# Patient Record
Sex: MALE | Race: WHITE | Employment: OTHER | URBAN - NONMETROPOLITAN AREA
[De-identification: names, ages, dates, MRNs, and addresses within clinical notes are randomized per-mention and may not be internally consistent; named-entity substitution may affect disease eponyms.]

---

## 2017-12-31 ENCOUNTER — APPOINTMENT (OUTPATIENT)
Dept: GENERAL RADIOLOGY | Facility: HOSPITAL | Age: 72
DRG: 074 | End: 2017-12-31
Attending: INTERNAL MEDICINE
Payer: COMMERCIAL

## 2017-12-31 ENCOUNTER — APPOINTMENT (OUTPATIENT)
Dept: CT IMAGING | Facility: HOSPITAL | Age: 72
DRG: 074 | End: 2017-12-31
Attending: INTERNAL MEDICINE
Payer: COMMERCIAL

## 2017-12-31 ENCOUNTER — HOSPITAL ENCOUNTER (INPATIENT)
Facility: HOSPITAL | Age: 72
LOS: 5 days | Discharge: NURSING, REHAB OR LONG-TERM CARE | DRG: 074 | End: 2018-01-05
Attending: INTERNAL MEDICINE | Admitting: INTERNAL MEDICINE
Payer: COMMERCIAL

## 2017-12-31 DIAGNOSIS — R29.6 FALLS FREQUENTLY: ICD-10-CM

## 2017-12-31 DIAGNOSIS — K59.04 CHRONIC IDIOPATHIC CONSTIPATION: ICD-10-CM

## 2017-12-31 DIAGNOSIS — E11.9 TYPE 2 DIABETES MELLITUS WITHOUT COMPLICATION, WITHOUT LONG-TERM CURRENT USE OF INSULIN (H): Primary | Chronic | ICD-10-CM

## 2017-12-31 DIAGNOSIS — I10 ESSENTIAL HYPERTENSION: ICD-10-CM

## 2017-12-31 DIAGNOSIS — G35 MS (MULTIPLE SCLEROSIS) (H): ICD-10-CM

## 2017-12-31 DIAGNOSIS — B37.2 YEAST INFECTION OF THE SKIN: ICD-10-CM

## 2017-12-31 DIAGNOSIS — R53.1 WEAKNESS: ICD-10-CM

## 2017-12-31 LAB
ALBUMIN SERPL-MCNC: 3.6 G/DL (ref 3.4–5)
ALBUMIN UR-MCNC: 30 MG/DL
ALP SERPL-CCNC: 71 U/L (ref 40–150)
ALT SERPL W P-5'-P-CCNC: 24 U/L (ref 0–70)
ANION GAP SERPL CALCULATED.3IONS-SCNC: 9 MMOL/L (ref 3–14)
APPEARANCE UR: CLEAR
AST SERPL W P-5'-P-CCNC: 24 U/L (ref 0–45)
BACTERIA #/AREA URNS HPF: ABNORMAL /HPF
BASOPHILS # BLD AUTO: 0 10E9/L (ref 0–0.2)
BASOPHILS NFR BLD AUTO: 0.3 %
BILIRUB SERPL-MCNC: 0.6 MG/DL (ref 0.2–1.3)
BILIRUB UR QL STRIP: NEGATIVE
BUN SERPL-MCNC: 16 MG/DL (ref 7–30)
CALCIUM SERPL-MCNC: 8.4 MG/DL (ref 8.5–10.1)
CHLORIDE SERPL-SCNC: 106 MMOL/L (ref 94–109)
CK SERPL-CCNC: 420 U/L (ref 30–300)
CO2 SERPL-SCNC: 24 MMOL/L (ref 20–32)
COLOR UR AUTO: YELLOW
CREAT SERPL-MCNC: 1.31 MG/DL (ref 0.66–1.25)
CRP SERPL-MCNC: 14.8 MG/L (ref 0–8)
DIFFERENTIAL METHOD BLD: ABNORMAL
EOSINOPHIL # BLD AUTO: 0 10E9/L (ref 0–0.7)
EOSINOPHIL NFR BLD AUTO: 0.3 %
ERYTHROCYTE [DISTWIDTH] IN BLOOD BY AUTOMATED COUNT: 13.7 % (ref 10–15)
FLUAV+FLUBV AG SPEC QL: NEGATIVE
FLUAV+FLUBV AG SPEC QL: NEGATIVE
GFR SERPL CREATININE-BSD FRML MDRD: 54 ML/MIN/1.7M2
GLUCOSE BLDC GLUCOMTR-MCNC: 126 MG/DL (ref 70–99)
GLUCOSE BLDC GLUCOMTR-MCNC: 131 MG/DL (ref 70–99)
GLUCOSE SERPL-MCNC: 161 MG/DL (ref 70–99)
GLUCOSE UR STRIP-MCNC: 70 MG/DL
HCT VFR BLD AUTO: 52.4 % (ref 40–53)
HGB BLD-MCNC: 17.2 G/DL (ref 13.3–17.7)
HGB UR QL STRIP: NEGATIVE
HYALINE CASTS #/AREA URNS LPF: 3 /LPF
IMM GRANULOCYTES # BLD: 0 10E9/L (ref 0–0.4)
IMM GRANULOCYTES NFR BLD: 0.4 %
KETONES UR STRIP-MCNC: NEGATIVE MG/DL
LEUKOCYTE ESTERASE UR QL STRIP: NEGATIVE
LYMPHOCYTES # BLD AUTO: 1.1 10E9/L (ref 0.8–5.3)
LYMPHOCYTES NFR BLD AUTO: 11.8 %
MCH RBC QN AUTO: 28.6 PG (ref 26.5–33)
MCHC RBC AUTO-ENTMCNC: 32.8 G/DL (ref 31.5–36.5)
MCV RBC AUTO: 87 FL (ref 78–100)
MONOCYTES # BLD AUTO: 0.8 10E9/L (ref 0–1.3)
MONOCYTES NFR BLD AUTO: 8.2 %
MUCOUS THREADS #/AREA URNS LPF: PRESENT /LPF
NEUTROPHILS # BLD AUTO: 7.4 10E9/L (ref 1.6–8.3)
NEUTROPHILS NFR BLD AUTO: 79 %
NITRATE UR QL: NEGATIVE
NRBC # BLD AUTO: 0 10*3/UL
NRBC BLD AUTO-RTO: 0 /100
PH UR STRIP: 5.5 PH (ref 4.7–8)
PLATELET # BLD AUTO: 297 10E9/L (ref 150–450)
POTASSIUM SERPL-SCNC: 4 MMOL/L (ref 3.4–5.3)
PROT SERPL-MCNC: 8.7 G/DL (ref 6.8–8.8)
RBC # BLD AUTO: 6.02 10E12/L (ref 4.4–5.9)
RBC #/AREA URNS AUTO: 0 /HPF (ref 0–2)
SODIUM SERPL-SCNC: 139 MMOL/L (ref 133–144)
SOURCE: ABNORMAL
SP GR UR STRIP: 1.02 (ref 1–1.03)
SPECIMEN SOURCE: NORMAL
TSH SERPL DL<=0.005 MIU/L-ACNC: 1.29 MU/L (ref 0.4–4)
UROBILINOGEN UR STRIP-MCNC: NORMAL MG/DL (ref 0–2)
WBC # BLD AUTO: 9.4 10E9/L (ref 4–11)
WBC #/AREA URNS AUTO: 1 /HPF (ref 0–2)

## 2017-12-31 PROCEDURE — 99285 EMERGENCY DEPT VISIT HI MDM: CPT | Mod: 25

## 2017-12-31 PROCEDURE — 25000128 H RX IP 250 OP 636: Performed by: INTERNAL MEDICINE

## 2017-12-31 PROCEDURE — 70450 CT HEAD/BRAIN W/O DYE: CPT | Mod: TC

## 2017-12-31 PROCEDURE — 72100 X-RAY EXAM L-S SPINE 2/3 VWS: CPT | Mod: TC

## 2017-12-31 PROCEDURE — 82550 ASSAY OF CK (CPK): CPT | Performed by: INTERNAL MEDICINE

## 2017-12-31 PROCEDURE — 25000132 ZZH RX MED GY IP 250 OP 250 PS 637: Performed by: FAMILY MEDICINE

## 2017-12-31 PROCEDURE — 84443 ASSAY THYROID STIM HORMONE: CPT | Performed by: INTERNAL MEDICINE

## 2017-12-31 PROCEDURE — 99283 EMERGENCY DEPT VISIT LOW MDM: CPT | Performed by: INTERNAL MEDICINE

## 2017-12-31 PROCEDURE — 86140 C-REACTIVE PROTEIN: CPT | Performed by: INTERNAL MEDICINE

## 2017-12-31 PROCEDURE — 81001 URINALYSIS AUTO W/SCOPE: CPT | Performed by: INTERNAL MEDICINE

## 2017-12-31 PROCEDURE — 00000146 ZZHCL STATISTIC GLUCOSE BY METER IP

## 2017-12-31 PROCEDURE — 72170 X-RAY EXAM OF PELVIS: CPT | Mod: TC

## 2017-12-31 PROCEDURE — 40000786 ZZHCL STATISTIC ACTIVE MRSA SURVEILLANCE CULTURE: Performed by: INTERNAL MEDICINE

## 2017-12-31 PROCEDURE — 80053 COMPREHEN METABOLIC PANEL: CPT | Performed by: INTERNAL MEDICINE

## 2017-12-31 PROCEDURE — 93005 ELECTROCARDIOGRAM TRACING: CPT

## 2017-12-31 PROCEDURE — 71020 XR CHEST 2 VW: CPT | Mod: TC

## 2017-12-31 PROCEDURE — 99223 1ST HOSP IP/OBS HIGH 75: CPT | Mod: AI | Performed by: INTERNAL MEDICINE

## 2017-12-31 PROCEDURE — 87804 INFLUENZA ASSAY W/OPTIC: CPT | Performed by: FAMILY MEDICINE

## 2017-12-31 PROCEDURE — 25000132 ZZH RX MED GY IP 250 OP 250 PS 637: Performed by: INTERNAL MEDICINE

## 2017-12-31 PROCEDURE — 93010 ELECTROCARDIOGRAM REPORT: CPT | Mod: 76 | Performed by: INTERNAL MEDICINE

## 2017-12-31 PROCEDURE — 85025 COMPLETE CBC W/AUTO DIFF WBC: CPT | Performed by: INTERNAL MEDICINE

## 2017-12-31 PROCEDURE — 12000000 ZZH R&B MED SURG/OB

## 2017-12-31 RX ORDER — NALOXONE HYDROCHLORIDE 0.4 MG/ML
.1-.4 INJECTION, SOLUTION INTRAMUSCULAR; INTRAVENOUS; SUBCUTANEOUS
Status: DISCONTINUED | OUTPATIENT
Start: 2017-12-31 | End: 2018-01-05 | Stop reason: HOSPADM

## 2017-12-31 RX ORDER — METOPROLOL TARTRATE 50 MG
50 TABLET ORAL ONCE
Status: COMPLETED | OUTPATIENT
Start: 2017-12-31 | End: 2017-12-31

## 2017-12-31 RX ORDER — HYDROMORPHONE HYDROCHLORIDE 1 MG/ML
0.2 INJECTION, SOLUTION INTRAMUSCULAR; INTRAVENOUS; SUBCUTANEOUS
Status: DISCONTINUED | OUTPATIENT
Start: 2017-12-31 | End: 2018-01-01

## 2017-12-31 RX ORDER — FAMOTIDINE 20 MG/1
20 TABLET, FILM COATED ORAL 2 TIMES DAILY
Status: DISCONTINUED | OUTPATIENT
Start: 2017-12-31 | End: 2018-01-05 | Stop reason: HOSPADM

## 2017-12-31 RX ORDER — HYDROCODONE BITARTRATE AND ACETAMINOPHEN 5; 325 MG/1; MG/1
1-2 TABLET ORAL EVERY 4 HOURS PRN
Status: DISCONTINUED | OUTPATIENT
Start: 2017-12-31 | End: 2018-01-05 | Stop reason: HOSPADM

## 2017-12-31 RX ORDER — ONDANSETRON 2 MG/ML
4 INJECTION INTRAMUSCULAR; INTRAVENOUS EVERY 6 HOURS PRN
Status: DISCONTINUED | OUTPATIENT
Start: 2017-12-31 | End: 2018-01-05 | Stop reason: HOSPADM

## 2017-12-31 RX ORDER — METOPROLOL SUCCINATE 50 MG/1
50 TABLET, EXTENDED RELEASE ORAL DAILY
Status: DISCONTINUED | OUTPATIENT
Start: 2017-12-31 | End: 2018-01-05 | Stop reason: HOSPADM

## 2017-12-31 RX ORDER — SODIUM CHLORIDE 9 MG/ML
INJECTION, SOLUTION INTRAVENOUS CONTINUOUS
Status: DISCONTINUED | OUTPATIENT
Start: 2017-12-31 | End: 2018-01-01

## 2017-12-31 RX ORDER — IBUPROFEN 600 MG/1
600 TABLET, FILM COATED ORAL EVERY 6 HOURS PRN
Status: DISCONTINUED | OUTPATIENT
Start: 2017-12-31 | End: 2018-01-05 | Stop reason: HOSPADM

## 2017-12-31 RX ORDER — CLONIDINE HYDROCHLORIDE 0.1 MG/1
0.1 TABLET ORAL ONCE
Status: COMPLETED | OUTPATIENT
Start: 2017-12-31 | End: 2017-12-31

## 2017-12-31 RX ORDER — ACETAMINOPHEN 325 MG/1
650 TABLET ORAL EVERY 4 HOURS PRN
Status: DISCONTINUED | OUTPATIENT
Start: 2017-12-31 | End: 2018-01-05 | Stop reason: HOSPADM

## 2017-12-31 RX ORDER — AMOXICILLIN 250 MG
1 CAPSULE ORAL 2 TIMES DAILY PRN
Status: DISCONTINUED | OUTPATIENT
Start: 2017-12-31 | End: 2018-01-05 | Stop reason: HOSPADM

## 2017-12-31 RX ORDER — AMOXICILLIN 250 MG
2 CAPSULE ORAL 2 TIMES DAILY PRN
Status: DISCONTINUED | OUTPATIENT
Start: 2017-12-31 | End: 2018-01-05 | Stop reason: HOSPADM

## 2017-12-31 RX ORDER — ONDANSETRON 4 MG/1
4 TABLET, ORALLY DISINTEGRATING ORAL EVERY 6 HOURS PRN
Status: DISCONTINUED | OUTPATIENT
Start: 2017-12-31 | End: 2018-01-05 | Stop reason: HOSPADM

## 2017-12-31 RX ORDER — DEXTROSE MONOHYDRATE 25 G/50ML
25-50 INJECTION, SOLUTION INTRAVENOUS
Status: DISCONTINUED | OUTPATIENT
Start: 2017-12-31 | End: 2018-01-01

## 2017-12-31 RX ORDER — LIDOCAINE 40 MG/G
CREAM TOPICAL
Status: DISCONTINUED | OUTPATIENT
Start: 2017-12-31 | End: 2018-01-05 | Stop reason: HOSPADM

## 2017-12-31 RX ORDER — NICOTINE POLACRILEX 4 MG
15-30 LOZENGE BUCCAL
Status: DISCONTINUED | OUTPATIENT
Start: 2017-12-31 | End: 2018-01-01

## 2017-12-31 RX ADMIN — FAMOTIDINE 20 MG: 20 TABLET ORAL at 20:42

## 2017-12-31 RX ADMIN — METOPROLOL TARTRATE 50 MG: 50 TABLET, FILM COATED ORAL at 10:05

## 2017-12-31 RX ADMIN — MICONAZOLE NITRATE: 20 POWDER TOPICAL at 13:20

## 2017-12-31 RX ADMIN — MICONAZOLE NITRATE: 20 POWDER TOPICAL at 20:46

## 2017-12-31 RX ADMIN — METOPROLOL SUCCINATE 50 MG: 50 TABLET, EXTENDED RELEASE ORAL at 13:15

## 2017-12-31 RX ADMIN — FAMOTIDINE 20 MG: 20 TABLET ORAL at 13:16

## 2017-12-31 RX ADMIN — SODIUM CHLORIDE 1000 ML: 9 INJECTION, SOLUTION INTRAVENOUS at 07:51

## 2017-12-31 RX ADMIN — SODIUM CHLORIDE: 9 INJECTION, SOLUTION INTRAVENOUS at 13:01

## 2017-12-31 RX ADMIN — CLONIDINE HYDROCHLORIDE 0.1 MG: 0.1 TABLET ORAL at 11:31

## 2017-12-31 ASSESSMENT — ENCOUNTER SYMPTOMS
VOICE CHANGE: 0
CHEST TIGHTNESS: 0
WHEEZING: 0
FEVER: 0
COLOR CHANGE: 0
WEAKNESS: 0
CONFUSION: 0
CHILLS: 0
ANAL BLEEDING: 0
BLOOD IN STOOL: 0
ABDOMINAL PAIN: 0
PALPITATIONS: 0
BACK PAIN: 1
MYALGIAS: 0
NAUSEA: 0
DYSURIA: 0
LIGHT-HEADEDNESS: 0
SLEEP DISTURBANCE: 0
DIAPHORESIS: 0
NECK PAIN: 0
DIZZINESS: 0
COUGH: 1
SHORTNESS OF BREATH: 0
VOMITING: 0
FREQUENCY: 0
HEADACHES: 0
ABDOMINAL DISTENTION: 0
NUMBNESS: 0
FLANK PAIN: 0

## 2017-12-31 NOTE — H&P
[facility]    History and Physical  Hospitalist       Date of Admission:  12/31/2017    Assessment & Plan   Jose Antonio Marmolejo is a 72 year old male who presents with frequent falls and weakness    Frequent falls: Looking back through his history  Suggests that he has history of falls for about 2 years.  He just recently getting worse.  Patient reports no syncope however states that he loses his balance often.  Initial workup is negative today including CT scan of the head.  But he would require further workup which may include MRI of the brain.  A neurology evaluation might be necessary.   He also need PT and OT evaluation.  He does not feel that he can't care for him at home anymore    Hypertension: Looking through his records it appears that he was on Lopressor and Zestoretic about 2 years ago  And it appears that about a year ago he stopped taking his medications.  Patient does not have any good explanation why he stopped mid taking medications.    Diabetes: he seemed to believe that  he actually does not have diabetes.  His blood sugars are not that elevated.  I will check hemoglobin A1c and start him on sliding scale.  Perhaps starting oral medications tomorrow might be appropriate.  Diabetic education and dietary education would also help    Mild renal insufficiency: Start quite delirious acute or chronic..  But that seems to be some volume issues.  We will give him gentle hydration and recheck electrolytes in the morning.hemoglobin is elevated as well which could suggest volume depletion    Social issues: He would require social work, physical therapy, occupational therapy, to find safe place for him to stay    DVT Prophylaxis: Pneumatic Compression Devices  Code Status: Full Code    Disposition: Expected discharge in 2-3 days  once above issues resolve.    Kristofer Sena    Primary Care Physician   Physician No Ref-Primary    Chief Complaint   Falls and weakness    History is obtained from the  patient    History of Present Illness   Jose Antonio Marmolejo is a 72 year old male who presents with falls and weakness.  Apparently this has been going on for about 2 years.  The last few months they have increased in frequency.  Patient does not lose consciousness and there has been no seizures reported.  Patient reports that he loses his balance.he somehow manage his life but now he is to the point where he cannot deal with that.He called and nevertheless 3 times yesterday.  Twice they gave him a assist and got him back up but third time they decided to bring him in..    He was on 2 blood pressure medications according to medical records but for some unclear reason he stopped using it.  He has no chest pain shortness of breath or lower extremity edema.  He denies palpitation  Or orthopnea or PND.    He has history of diabetes type 2 according to the records but patient does not believe he has diabetes  And he thinks the doctor stopped his diabetes medications.    He just feels generalized weakness.  He thinks he has mild cold but otherwise no fever chills nausea vomiting diarrhea constipation.        Past Medical History    I have reviewed this patient's medical history and updated it with pertinent information if needed.   Past Medical History:   Diagnosis Date     Arthritis     back and left ankle from fall     Diabetes (H)      Hypertension        Past Surgical History   I have reviewed this patient's surgical history and updated it with pertinent information if needed.  Past Surgical History:   Procedure Laterality Date     ORTHOPEDIC SURGERY      left ankle and removal of hardware       Prior to Admission Medications   Prior to Admission Medications   Prescriptions Last Dose Informant Patient Reported? Taking?   Blood Pressure Monitoring (ADULT BLOOD PRESSURE CUFF LG) KIT   No Yes   Si each 2 times daily   lisinopril-hydrochlorothiazide (PRINZIDE,ZESTORETIC) 20-12.5 MG per tablet Unknown at Unknown time  No No    Sig: Take 2 tablets by mouth daily   miconazole (MICATIN; MICRO GUARD) 2 % powder   No No   Sig: Apply topically 2 times daily      Facility-Administered Medications: None     Allergies   Allergies   Allergen Reactions     Penicillins        Social History   I have reviewed this patient's social history and updated it with pertinent information if needed. Jose Antonio Marmolejo  reports that he has quit smoking. He has never used smokeless tobacco. He reports that he does not drink alcohol or use illicit drugs.    Family History   I have reviewed this patient's family history and updated it with pertinent information if needed.   Family History   Problem Relation Age of Onset     Other - See Comments Mother      pneumonia     Obesity Mother      Other Cancer Father      hodgkins lymphoma     Coronary Artery Disease Father      pacemaker     Other Cancer Sister      unknown cancers       Review of Systems   Review of systems is limited by patient factors - poor historian    Physical Exam   Temp: 98.4  F (36.9  C) Temp src: Oral BP: 157/81 Pulse: 111 Heart Rate: 81 Resp: 14 SpO2: 96 % O2 Device: None (Room air)    Vital Signs with Ranges  Temp:  [98.4  F (36.9  C)] 98.4  F (36.9  C)  Pulse:  [111] 111  Heart Rate:  [] 81  Resp:  [12-24] 14  BP: (157-165)/() 157/81  SpO2:  [90 %-96 %] 96 %  0 lbs 0 oz    Constitutional: somewhat full historian.  Pressured speech  Eyes: Lids and lashes normal, pupils equal, round and reactive to light, extra ocular muscles intact, sclera clear, conjunctiva normal  ENT: Normocephalic, without obvious abnormality, atraumatic, sinuses nontender on palpation, external ears without lesions, oral pharynx with moist mucous membranes, tonsils without erythema or exudates, gums normal and good dentition.  Hematologic / Lymphatic: no cervical lymphadenopathy  Respiratory: No increased work of breathing, good air exchange, clear to auscultation bilaterally, no crackles or  wheezing  Cardiovascular: Normal apical impulse, regular rate and rhythm, normal S1 and S2, no S3 or S4, and no murmur noted  GI: soft non tender no hepatosplenomegaly  Genitounirinary: deferred  Skin: no rashes exposed areas  Musculoskeletal: There is no redness, warmth, or swelling of the joints.  Full range of motion noted.  Motor strength is 5 out of 5 all extremities bilaterally.  Tone is normal.  Neurologic: awake alert and oriented ×3.  Face is symmetrical.  Strength upper and lower extremity seems to be normal.  Finger-nose-finger seems to be off gait was not tested  Neuropsychiatric: General: normal, calm, normal eye contact and poor eye contact    Data   Data reviewed today:  I personally reviewed the head CT image(s) showing no acute changes.  Pelvis XR, 1-2 views   Final Result   IMPRESSION: Negative examination pelvis.      MENDEZ MATAMOROS MD      Lumbar spine XR, 2-3 views   Final Result   IMPRESSION: No acute lumbar abnormality.      MENDEZ MATAMOROS MD      XR Chest 2 Views   Final Result   IMPRESSION:  No acute cardiopulmonary disease.        MENDEZ MATAMOROS MD      CT Head w/o Contrast   Final Result   IMPRESSION: No change from previous examination in August 2016      MENDEZ MATAMOROS MD            Recent Labs  Lab 12/31/17  0720   WBC 9.4   HGB 17.2   MCV 87         POTASSIUM 4.0   CHLORIDE 106   CO2 24   BUN 16   CR 1.31*   ANIONGAP 9   STALIN 8.4*   *   ALBUMIN 3.6   PROTTOTAL 8.7   BILITOTAL 0.6   ALKPHOS 71   ALT 24   AST 24       Recent Results (from the past 24 hour(s))   CT Head w/o Contrast    Narrative    PROCEDURE: CT HEAD W/O CONTRAST 12/31/2017 8:44 AM    HISTORY: fall;     COMPARISONS: August 2016    Meds/Dose Given:    TECHNIQUE: CT scan of the brain without contrast.    FINDINGS: The ventricular system and cortical sulci are normal for  age. There is white matter low-density consistent with small vessel  disease. Are no masses ventricular shifts or extraluminal  collections.  Brainstem and cerebellum appear normal. Paranasal sinuses are clear.  Cranial vault is intact.         Impression    IMPRESSION: No change from previous examination in August 2016    MENDEZ MATAMOROS MD   XR Chest 2 Views    Narrative    PROCEDURE:  XR CHEST 2 VW    HISTORY:  cough; .     COMPARISON:  None.    FINDINGS:   The cardiac silhouette is normal in size. The pulmonary vasculature is  normal.  The lungs are clear. No pleural effusion or pneumothorax.  There are severe arthritic changes seen in the right shoulder      Impression    IMPRESSION:  No acute cardiopulmonary disease.      MENDEZ MATAMOROS MD   Lumbar spine XR, 2-3 views    Narrative    PROCEDURE: XR LUMBAR SPINE 2-3 VIEWS 12/31/2017 9:10 AM    HISTORY: fall;     COMPARISONS: August 2016    TECHNIQUE: 2 views    FINDINGS: The lumbar discs are normal height anterior and lateral  osteophytes are seen at T12 L1-L4 L5. There are facet joint  degenerative changes noted at L4-L5 and L5-S1 bilaterally.         Impression    IMPRESSION: No acute lumbar abnormality.    MENDEZ MATAMOROS MD   Pelvis XR, 1-2 views    Narrative    PROCEDURE: XR PELVIS 1/2 VW 12/31/2017 9:11 AM    HISTORY: fall;     COMPARISONS: None.    TECHNIQUE: 1 view    FINDINGS: Pelvis is intact the sacrum and sacroiliac joints appear  normal articular spaces are normal height of both hips both proximal  femurs appear intact.         Impression    IMPRESSION: Negative examination pelvis.    MENDEZ MATAMOROS MD

## 2017-12-31 NOTE — ED PROVIDER NOTES
I assumed care at change of shift.  The patient is confused, states he hasn't been taking any medications.  Has fallen x3 in the past 24 hours.  Has neuropathy.  States he's not on anything for BP or diabetes.  CT head negative, XR negative, CXR negative, /100, will give clonidine, admit to Shona Huizar MD  12/31/17 1000

## 2017-12-31 NOTE — ED NOTES
Patient transferred to room 3106 via stretcher at this time. Personal belongings sent up with pt. Nurse to nurse report given to Rosa WARD.

## 2017-12-31 NOTE — IP AVS SNAPSHOT
[facility]    [address] [place] [address]    [facility] [address]    Phone:  [phone]    Fax:  [phone]                                       After Visit Summary   12/31/2017    Jose Antonio Marmolejo    MRN: [id number]           After Visit Summary Signature Page     I have received my discharge instructions, and my questions have been answered. I have discussed any challenges I see with this plan with the nurse or doctor.    ..........................................................................................................................................  Patient/Patient Representative Signature      ..........................................................................................................................................  Patient Representative Print Name and Relationship to Patient    ..................................................               ................................................  Date                                            Time    ..........................................................................................................................................  Reviewed by Signature/Title    ...................................................              ..............................................  Date                                                            Time

## 2017-12-31 NOTE — PROGRESS NOTES
NICOLE KEMP A  Patient visit during  rounds. Patient alert and orientated.  Patient had no spiritual care requests at this time.

## 2017-12-31 NOTE — ED NOTES
"Incontinent of urine. States \"I peed in my blanket.\" pt requesting coffee with cream and sugar. Updated Dr. Donahue. Dr. Donahue into see pt.   "

## 2017-12-31 NOTE — PLAN OF CARE
[facility] Inpatient Admission Note:    Patient admitted to 3106/3106-1 at approximately 1200via cart accompanied by transport tech from emergency room . Report received from Yaneth ELIZALDE in SBAR format at 1145 via telephone. Patient transferred to bed via slide board.. Patient is alert and oriented X 3, denies pain; rates at 0 on 0-10 scale.  Patient oriented to room, unit, hourly rounding, and plan of care. Explained admission packet and patient handbook with patient bill of rights brochure. Will continue to monitor and document as needed.     Inpatient Nursing criteria listed below was met:      Health care directives status obtained and documented: Yes      Care Everywhere authorization obtained No      MRSA swab completed for patient 65 years and older: Yes      Patient identifies a surrogate decision maker: Yes If yes, who:son Contact Information:see index      Core Measure diagnosis present:No. If yes, state diagnosis       Is initial lactic acid >2.0? NA.       Vaccination assessment and education completed: Yes   Vaccinations received prior to admission: Pneumovax no  Influenza(seasonal)  NO   Vaccination(s) ordered: patient declines      Clergy visit ordered if patient requests: N/A      Skin issues/needs documented: Yes      Isolation Patient: no Education given, correct sign in place and documentation row added to PCS:  No      Fall Prevention Yes: Care plan updated, education given and documented, sticker and magnet in place: Yes      Care Plan initiated: Yes      Education Documented (including assessment): Yes      Patient has discharge needs : Yes If yes, please explain:may need inpt rehab, as has had increase in falls

## 2017-12-31 NOTE — ED NOTES
Pt assisted to bedside commode with assist of 3 staff and transfer belt. Once standing pt was noted to be incontinent of urine. Pt unable to void only passing gas. Pt assisted back to bed with assist of 3 staff and transfer belt. Pt placed back on monitors, call light in reach and coffee per request.

## 2017-12-31 NOTE — ED PROVIDER NOTES
History     Chief Complaint   Patient presents with     Fall     EMS has been pt's home 3 times in 24 hours for lift assist after falling     Back Pain     Patient is a 72 year old male presenting with fall. The history is provided by the patient.   Trauma  Mechanism of injury: fall  Injury location: pelvis and head/neck     Fall:       Fall occurred: in unknown circumstances       Impact surface: unknown    Current symptoms:       Associated symptoms:             Reports back pain.             Denies abdominal pain, chest pain, headache, nausea, neck pain and vomiting.     Problem List:    Patient Active Problem List    Diagnosis Date Noted     Falls frequently 12/31/2017     Priority: Medium     Type 2 diabetes mellitus without complication (H) 08/19/2016     Priority: Medium     Weakness of both lower extremities 08/19/2016     Priority: Medium     Yeast infection of the skin 08/19/2016     Priority: Medium     Essential hypertension 08/17/2016     Priority: Medium     Falling episodes 08/17/2016     Priority: Medium     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     Priority: Medium        Past Medical History:    Past Medical History:   Diagnosis Date     Arthritis      Diabetes (H)      Hypertension        Past Surgical History:    Past Surgical History:   Procedure Laterality Date     ORTHOPEDIC SURGERY      left ankle and removal of hardware       Family History:    Family History   Problem Relation Age of Onset     Other - See Comments Mother      pneumonia     Obesity Mother      Other Cancer Father      hodgkins lymphoma     Coronary Artery Disease Father      pacemaker     Other Cancer Sister      unknown cancers       Social History:  Marital Status:  Single [1]  Social History   Substance Use Topics     Smoking status: Former Smoker     Smokeless tobacco: Never Used     Alcohol use No        Medications:      No current outpatient prescriptions on file.      Review of Systems   Constitutional:  "Negative for chills, diaphoresis and fever.   HENT: Negative for voice change.    Eyes: Negative for visual disturbance.   Respiratory: Positive for cough. Negative for chest tightness, shortness of breath and wheezing.    Cardiovascular: Negative for chest pain, palpitations and leg swelling.   Gastrointestinal: Negative for abdominal distention, abdominal pain, anal bleeding, blood in stool, nausea and vomiting.   Genitourinary: Negative for decreased urine volume, dysuria, flank pain and frequency.   Musculoskeletal: Positive for back pain. Negative for gait problem, myalgias and neck pain.   Skin: Negative for color change, pallor and rash.   Neurological: Negative for dizziness, syncope, weakness, light-headedness, numbness and headaches.   Psychiatric/Behavioral: Negative for confusion, sleep disturbance and suicidal ideas.       Physical Exam   BP: (!) 165/113  Pulse: 111  Heart Rate: 109  Temp: 98.4  F (36.9  C)  Resp: 18  Height: 177.8 cm (5' 10\")  Weight: 121 kg (266 lb 12.1 oz)  SpO2: 96 %      Physical Exam   Constitutional: He is oriented to person, place, and time. He appears well-developed and well-nourished.   HENT:   Head: Normocephalic and atraumatic.   Mouth/Throat: No oropharyngeal exudate.   Eyes: Conjunctivae are normal. Pupils are equal, round, and reactive to light.   Neck: Normal range of motion. Neck supple. No JVD present. No tracheal deviation present. No thyromegaly present.   Cardiovascular: Normal rate, regular rhythm, normal heart sounds and intact distal pulses.  Exam reveals no gallop and no friction rub.    No murmur heard.  Pulmonary/Chest: Effort normal and breath sounds normal. No stridor. No respiratory distress. He has no wheezes. He has no rales. He exhibits no tenderness.   Abdominal: Soft. Bowel sounds are normal. He exhibits no distension and no mass. There is no tenderness. There is no rebound and no guarding.   Musculoskeletal: Normal range of motion. He exhibits no " edema or tenderness.        Lumbar back: He exhibits pain. He exhibits normal range of motion, no tenderness, no bony tenderness, no swelling and no laceration.   Lymphadenopathy:     He has no cervical adenopathy.   Neurological: He is alert and oriented to person, place, and time.   Skin: Skin is warm and dry. No rash noted. No erythema. No pallor.   Psychiatric: His behavior is normal.   Nursing note and vitals reviewed.      ED Course     ED Course     Procedures                   Labs Ordered and Resulted from Time of ED Arrival Up to the Time of Departure from the ED   CBC WITH PLATELETS DIFFERENTIAL - Abnormal; Notable for the following:        Result Value    RBC Count 6.02 (*)     All other components within normal limits   COMPREHENSIVE METABOLIC PANEL - Abnormal; Notable for the following:     Glucose 161 (*)     Creatinine 1.31 (*)     GFR Estimate 54 (*)     Calcium 8.4 (*)     All other components within normal limits   CK TOTAL - Abnormal; Notable for the following:     CK Total 420 (*)     All other components within normal limits   CRP INFLAMMATION - Abnormal; Notable for the following:     CRP Inflammation 14.8 (*)     All other components within normal limits   INFLUENZA A/B ANTIGEN       Assessments & Plan (with Medical Decision Making)   S/p fall possible early morning, complaining lower back pain  Unsteady gait, pt called EMS for lifting assistance muliple times over past 24 hrs  Cough, URI for past 2 days  Xray , labs ordered  S/o to Dr quiros at the change of shift  I have reviewed the nursing notes.    I have reviewed the findings, diagnosis, plan and need for follow up with the patient.      Current Discharge Medication List          Final diagnoses:   Weakness       12/31/2017   [facility]     Valeriano Vick MD  01/01/18 7249

## 2017-12-31 NOTE — IP AVS SNAPSHOT
"     Jose Antonio Marmolejo #[id number] (CSN: [id number])  (72 year old M)  (Adm: 17)     [id number]               [facility]: [phone]            Patient Demographics     Patient Name Sex          Age SSN Address Phone    Jose Antonio Marmolejo Male 1945 (72 year old) [id number]  [place] [address] [phone] (Home)      Emergency Contact(s)     Name Relation Home Work Mobile    ROLANDO MARMOLEJO [phone]      NO SECONDARY CONTACT Other NONE        Admission Information     Attending Provider Admitting Provider Admission Type Admission Date/Time    Leigh Ann Warren MD Rana, Naeem, MD Emergency 17  0708    Discharge Date Hospital Service Auth/Cert Status Service Area     General Medicine Incomplete [facility] SERVICES    Unit Room/Bed Admission Status       [facility] 3106-1 Admission (Confirmed)       Admission     Complaint    Falls frequently      Hospital Account     Name Acct ID Class Status Primary Coverage    Jose Antonio Marmolejo [id number] Inpatient Open COMMERCIAL - [employer]ET[employer] MEDICARE ADVANTAGE            Guarantor Account (for Hospital Account #[id number])     Name Relation to Pt Service Area Active? Acct Type    Jose Antonio Marmolejo Self [facility] Yes Personal/Family    Address Phone          [address]  [place] [address] [phone](H)              Coverage Information (for Hospital Account #[id number])     F/O Payor/Plan Precert #    COMMERCIAL/AETNA MEDICARE ADVANTAGE     Subscriber Subscriber #    Jose Antonio Mramolejo [id number]    Address Phone    [address]  [place], [place] [id number] [phone]                                                INTERAGENCY TRANSFER FORM - PHYSICIAN ORDERS   2017                       [facility]: [phone]            Attending Provider: Leigh Ann Warren MD     Allergies:  Penicillins    Infection:  None   Service:  GENERAL MEDI    Ht:  1.778 m (5' 10\")   Wt:  118.4 kg (261 lb 0.4 oz)   Admission Wt:  121 kg " (266 lb 12.1 oz)    BMI:  37.45 kg/m 2   BSA:  2.42 m 2            ED Clinical Impression     Diagnosis Description Comment Added By Time Added    Weakness [R53.1] Weakness [R53.1]  Shona Donahue MD 12/31/2017 11:03 AM      Hospital Problems as of 1/5/2018              Priority Class Noted POA    Essential hypertension Medium  8/17/2016 Yes    * (Principal)Falling episodes Medium  8/17/2016 Yes    Type 2 diabetes mellitus without complication (H) Medium  8/19/2016 Yes    Weakness of both lower extremities Medium  8/19/2016 Yes    Falls frequently Medium  12/31/2017 Yes      Non-Hospital Problems as of 1/5/2018              Priority Class Noted    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    Yeast infection of the skin Medium  8/19/2016      Code Status History     Date Active Date Inactive Code Status Order ID Comments User Context    1/5/2018  1:37 PM  Full Code [id number]  Leigh Ann Warren MD Outpatient    12/31/2017 12:26 PM 1/5/2018  1:37 PM Full Code [id number]  Kristofer Sena MD Inpatient    8/19/2016  8:09 AM 12/31/2017 12:26 PM Full Code [id number]  Eris Rosales DO Outpatient    8/17/2016 10:22 PM 8/19/2016  8:09 AM Full Code [id number]  Kevin Connolly DO Inpatient      Current Code Status     Date Active Code Status Order ID Comments User Context       Prior      Summary of Visit     Reason for your hospital stay       Weakness and frequent falls                Medication Review      START taking        Dose / Directions Comments    amLODIPine 10 MG tablet   Commonly known as:  NORVASC   Used for:  Essential hypertension        Dose:  10 mg   Take 1 tablet (10 mg) by mouth daily   Quantity:  30 tablet   Refills:  0        hydrochlorothiazide 12.5 MG capsule   Commonly known as:  MICROZIDE   Used for:  Essential hypertension        Dose:  12.5 mg   Take 1 capsule (12.5 mg) by mouth daily   Quantity:  30 capsule   Refills:  0        ibuprofen 600 MG tablet   Commonly known as:   ADVIL/MOTRIN        Dose:  600 mg   Take 1 tablet (600 mg) by mouth every 6 hours as needed for other (mild pain)   Quantity:  120 tablet   Refills:  0        lisinopril 40 MG tablet   Commonly known as:  PRINIVIL/ZESTRIL   Used for:  Essential hypertension        Dose:  40 mg   Take 1 tablet (40 mg) by mouth daily   Quantity:  30 tablet   Refills:  0        metoprolol 50 MG 24 hr tablet   Commonly known as:  TOPROL-XL   Used for:  Essential hypertension        Dose:  50 mg   Take 1 tablet (50 mg) by mouth daily   Quantity:  30 tablet   Refills:  0        senna-docusate 8.6-50 MG per tablet   Commonly known as:  SENOKOT-S;PERICOLACE   Used for:  Chronic idiopathic constipation        Dose:  1 tablet   Take 1 tablet by mouth 2 times daily as needed for constipation   Quantity:  100 tablet   Refills:  0          CONTINUE these medications which may have CHANGED, or have new prescriptions. If we are uncertain of the size of tablets/capsules you have at home, strength may be listed as something that might have changed.        Dose / Directions Comments    * miconazole 2 % powder   Commonly known as:  MICATIN; MICRO GUARD   This may have changed:  Another medication with the same name was added. Make sure you understand how and when to take each.   Used for:  Yeast infection of the skin        Apply topically 2 times daily   Quantity:  90 g   Refills:  1        * miconazole 2 % powder   Commonly known as:  MICATIN; MICRO GUARD   This may have changed:  You were already taking a medication with the same name, and this prescription was added. Make sure you understand how and when to take each.   Used for:  Yeast infection of the skin        Apply topically 2 times daily   Refills:  0        * Notice:  This list has 2 medication(s) that are the same as other medications prescribed for you. Read the directions carefully, and ask your doctor or other care provider to review them with you.      CONTINUE these medications which  have NOT CHANGED        Dose / Directions Comments    Adult Blood Pressure Cuff Lg Kit   Used for:  Malignant essential hypertension        Dose:  1 each   1 each 2 times daily   Quantity:  1 kit   Refills:  0    Check blood pressures twice per day: before breakfast and at bedtime.  Keep a log.               After Care     Activity - Ambulate in hallway       Every shift       Activity - Up with assistive device       Per NH physical therpay       Advance Diet as Tolerated       Follow this diet upon discharge: High consistent carbohydrate (9744-7521 lucina / 4-7 CHO units per meal)  Pt denies DM, but he is early stage - diet controlled diabetic       General info for SNF       Length of Stay Estimate: Short Term Care: Estimated # of Days <30  Condition at Discharge: Improving  Level of care:skilled   Rehabilitation Potential: Good  Admission H&P remains valid and up-to-date: Yes  Recent Chemotherapy: N/A  Use Nursing Home Standing Orders: Yes       Glucose monitor nursing POCT       Before meals and at bedtime       Mantoux instructions       Give two-step Mantoux (PPD) Per Facility Policy Yes       Weight bearing status                 Other Orders     DIABETES EDUCATION - Individual  []       Ordering Instructions for Diabetic Educators - please enter the correct order QUANTITY based on the followin minutes (1/2 hour), Enter QUANTITY of 1    60 minutes (1 hour), Enter QUANTITY of 2    90 minutes (1 1/2 hours), Enter QUANTITY of 3  120 minutes (2 hours), Enter QUANTITY of 4  150 minutes (2 1/2 hours), Enter QUANTITY of 5  180 minutes (3 hours), Enter QUANTITY of 6                 Further instructions from your care team       MRI Contrast Discharge Instructions    The IV contrast you received today will pass out of your body in your  urine. This will happen in the next 24 hours. You will not feel this process.  Your urine will not change color.    Drink at least 4 extra glasses of water or juice today  (unless your doctor  has restricted your fluids). This reduces the stress on your kidneys.  You may take your regular medicines.    If you are on dialysis: It is best to have dialysis today.    If you have a reaction: Most reactions happen right away. If you have  any new symptoms after leaving the hospital (such as hives or swelling),  call your hospital at the correct number below. Or call your family doctor.  If you have breathing distress or wheezing, call 911.    Special instructions: NA    I have read and understand the above information.    Signature:______________________________________ Date:_____2-9-21______    Staff:__________________________________________ Date:___________     Time:__________    [facility] Departments:    ___[facility]: [phone]  ___[facility]: [phone]  ___[place]: [phone] ___[facility]: [phone]  ___[facility]: [phone]  ___[facility]: [phone]  ___[place]: [phone]  ___[facility]: [phone]  ___[phone]    Referrals     NEUROLOGY ADULT REFERRAL       Your provider has referred you for the following:   Primary will arrange  neurology consult at earliest possible time and most convenient location.     Please be aware that coverage of these services is subject to the terms and limitations of your health insurance plan.  Call member services at your health plan with any benefit or coverage questions.      Please bring the following with you to your appointment:    (1) Any X-Rays, CTs or MRIs which have been performed.  Contact the facility where they were done to arrange for  prior to your scheduled appointment.    (2) List of current medications  (3) This referral request   (4) Any documents/labs given to you for this referral       Occupational Therapy Adult Consult       Evaluate and treat as clinically indicated.    Reason:  Frequent falls, weakness       Physical Therapy Adult Consult       Evaluate and treat as  "clinically indicated.    Reason:  LE weakness, frequent falls             Follow-Up Appointment Instructions     Follow Up and recommended labs and tests       Follow up with group home physician.  The following labs/tests are recommended: CBC and BMP (K and creatinine nikki since he just started on Lisinopril and HCTZ).             Your next 10 appointments already scheduled     Jan 30, 2018  1:00 PM CST   (Arrive by 12:45 PM)   Office Visit with Juanita Briggs MD   [facility] [place] ([facility] [place] )    [address]  [place] [address]   [phone]           Bring a current list of meds and any records pertaining to this visit. For Physicals, please bring immunization records and any forms needing to be filled out. Please arrive 10 minutes early to complete paperwork.              Statement of Approval     Ordered          01/05/18 0739  I have reviewed and agree with all the recommendations and orders detailed in this document.  EFFECTIVE NOW     Approved and electronically signed by:  Leigh Ann Warren MD                                                 INTERAGENCY TRANSFER FORM - NURSING   12/31/2017                       [facility]: [phone]            Attending Provider: Leigh Ann Warren MD     Allergies:  Penicillins    Infection:  None   Service:  GENERAL MEDI    Ht:  1.778 m (5' 10\")   Wt:  118.4 kg (261 lb 0.4 oz)   Admission Wt:  121 kg (266 lb 12.1 oz)    BMI:  37.45 kg/m 2   BSA:  2.42 m 2            Advance Directives        Does patient have a scanned Advance Directive/ACP document in EPIC?           No        Immunizations     None      ASSESSMENT     Discharge Profile Flowsheet     EXPECTED DISCHARGE     Last Bowel Movement  01/04/18 01/05/18 0022    Expected Discharge Date  01/04/18 01/02/18 1536   Passing flatus  yes 01/05/18 0022    DISCHARGE NEEDS ASSESSMENT     COMMUNICATION ASSESSMENT      Concerns To Be Addressed  discharge planning concerns " "08/18/16 1505   Patient's communication style  spoken language (English or Bilingual) 12/31/17 0709    Patient/family verbalizes understanding of discharge plan recommendations?  Yes 01/02/18 1536   FINAL RESOURCES      Medical Team notified of plan?  yes 01/02/18 1536   Resources List  Acute Rehab;Skilled Nursing Facility 01/02/18 1536    Readmission Within The Last 30 Days  no previous admission in last 30 days 01/02/18 1536   Other Resources  Other (see comment) 08/18/16 1508    Equipment Currently Used at Home  walker, rolling (Ramp, sit in shower) 01/02/18 1536   SKIN      Transportation Available  agency transportation;family or friend will provide 01/02/18 1536   Inspection of bony prominences  Full 01/05/18 0022    # of Referrals Placed by CTS  -- (SNF, Inpatient rehab) 01/02/18 1536   Skin WDL  ex 01/05/18 0810    Key Recommendations  F/U with PCP 01/02/18 1536   Skin Moisture  dry 01/05/18 0810    Does Patient Need a Referral for Clinic CC  No 01/02/18 1536   Skin Integrity  abrasion(s);bruise(s) 01/05/18 0810    ASSESSMENT OF FUNCTIONAL STATUS     Additional Documentation  -- (scattered excoriated areas from falls at home-knees and elbo) 12/31/17 1435    Prior to admission patient needed assistance with:  Medications;Meal preparation;Laundry/Housekeeping;Shopping;Transportation;Home maintenance/Yard work 01/02/18 1511   SAFETY      GASTROINTESTINAL (ADULT,PEDIATRIC,OB)     Safety WDL  WDL 01/05/18 0810    GI WDL  WDL 01/05/18 0810   All Alarms  alarm(s) activated and audible 01/05/18 0322                 Assessment WDL (Within Defined Limits) Definitions           Safety WDL     Effective: 09/28/15    Row Information: <b>WDL Definition:</b> Bed in low position, wheels locked; call light in reach; upper side rails up x 2; ID band on<br> <font color=\"gray\"><i>Item=AS safety wdl>>List=AS safety wdl>>Version=F14</i></font>      Skin WDL     Effective: 09/28/15    Row Information: <b>WDL Definition:</b> Warm; " "dry; intact; elastic; without discoloration; pressure points without redness<br> <font color=\"gray\"><i>Item=AS skin wdl>>List=AS skin wdl>>Version=F14</i></font>      Vitals     Vital Signs Flowsheet     QUICK ADDS     Height  1.778 m (5' 10\") 12/31/17 1211    Quick Adds  -- 01/03/18 0907   Height Method  Stated 12/31/17 1211    VITAL SIGNS     Weight  118.4 kg (261 lb 0.4 oz) 01/03/18 0505    Temp  97.6  F (36.4  C) 01/05/18 0642   Weight Method  Standing scale 01/03/18 0505    Temp src  Tympanic 01/05/18 0642   Bed Scale  -- (as pt unable to stand) 12/31/17 1211    Resp  18 01/05/18 0642   BSA (Calculated - sq m)  2.44 12/31/17 1211    Pulse  79 12/31/17 1151   BMI (Calculated)  38.36 12/31/17 1211    Heart Rate  71 01/05/18 0642   EKG MONITORING      Pulse/Heart Rate Source  Monitor 01/05/18 0642   Cardiac Regularity  Regular 01/01/18 2240    BP  159/82 01/05/18 0957   Cardiac Rhythm  NSR (SR 60's BBB per ICU written report) 01/01/18 2240    OXYGEN THERAPY     SANDEEP COMA SCALE      SpO2  96 % 01/05/18 0642   Best Eye Response  4-->(E4) spontaneous 01/05/18 0017    O2 Device  None (Room air) 01/05/18 0642   Best Motor Response  6-->(M6) obeys commands 01/05/18 0017    PAIN/COMFORT     Best Verbal Response  5-->(V5) oriented 01/05/18 0017    Patient Currently in Pain  denies 01/05/18 0642   [place] Coma Scale Score  15 01/05/18 0017    Preferred Pain Scale  number (Numeric Rating Pain Scale) 01/03/18 0343   DAILY CARE      0-10 Pain Scale  1 01/03/18 0343   Activity Management  activity adjusted per tolerance 01/05/18 0810    Pain Location  Head 01/03/18 0343   Activity Assistance Provided  assistance, stand-by 01/05/18 0810    Pain Descriptors  Headache 01/03/18 0343   Assistive Device Utilized  walker 01/05/18 0322    Pain Intervention(s)  Repositioned 01/03/18 0343   POSITIONING      Response to Interventions  Decrease in pain 01/03/18 0343   Body Position  independently positioning 01/05/18 0321    POINT OF " CARE TESTING     Head of Bed (HOB)  HOB flat 01/05/18 0017    Puncture Site  fingertip 01/01/18 0657   Chair  Upright in chair 01/04/18 1435    Bedside Glucose (mg/dl )   110 mg/dl 01/01/18 0657   Positioning/Transfer Devices  pillows;in use 01/04/18 1255    HEIGHT AND WEIGHT                   Patient Lines/Drains/Airways Status    Active LINES/DRAINS/AIRWAYS     None            Patient Lines/Drains/Airways Status    Active PICC/CVC     None            Intake/Output Detail Report     Date Intake     Output Net    Shift P.O. I.V. IV Piggyback Total Urine Total       Noc 01/03/18 2300 - 01/04/18 0659 120 -- -- 120 -- -- 120    Day 01/04/18 0700 - 01/04/18 1459 1320 -- -- 1320 -- -- 1320    Jeanette 01/04/18 1500 - 01/04/18 2259 240 -- -- 240 -- -- 240    Noc 01/04/18 2300 - 01/05/18 0659 -- -- -- -- 250 250 -250    Day 01/05/18 0700 - 01/05/18 1459 480 -- -- 480 -- -- 480      Last Void/BM       Most Recent Value    Urine Occurrence 1 at 01/05/2018 0900    Stool Occurrence 1 at 01/05/2018 0800      Case Management/Discharge Planning     Case Management/Discharge Planning Flowsheet     REFERRAL INFORMATION     COPING/STRESS      Did the Initial Social Work Assessment result in a Social Work Case?  No 01/02/18 1511   Major Change/Loss/Stressor  illness 01/02/18 1536    Admission Type  inpatient 01/02/18 1511   EXPECTED DISCHARGE      Arrived From  home or self-care 01/02/18 1511   Expected Discharge Date  01/04/18 01/02/18 1536    Referral Source  admission list 01/02/18 1511   ASSESSMENT/CONCERNS TO BE ADDRESSED      # of Referrals Placed by CTS  -- (SNF, Inpatient rehab) 01/02/18 1536   Concerns To Be Addressed  discharge planning concerns 08/18/16 1505    Reason For Consult  discharge planning 01/02/18 1511   DISCHARGE PLANNING      Record Reviewed  history and physical;plan of care 01/02/18 1511   Patient/family verbalizes understanding of discharge plan recommendations?  Yes 01/02/18 1536    CTS Assigned to Case   Rocio WARD 01/02/18 1511   Medical Team notified of plan?  yes 01/02/18 1536    Primary Care [facility] Name  [facility] 01/02/18 1511   Readmission Within The Last 30 Days  no previous admission in last 30 days 01/02/18 1536    Primary Care MD Name  Dr Romo 01/02/18 1511   Transportation Available  agency transportation;family or friend will provide 01/02/18 1536    LIVING ENVIRONMENT     Key Recommendations  F/U with PCP 01/02/18 1536    Lives With  child(ceasar), adult;grandchild(ceasar) 01/02/18 1511   Does Patient Need a Referral for Clinic CC  No 01/02/18 1536    Living Arrangements  house 01/02/18 1511   FINAL NOTE      Provides Primary Care For  no one 01/02/18 1511   Final Note  See Care Plan 01/02/18 1536    Quality Of Family Relationships  supportive;helpful;involved 01/02/18 1511   FINAL RESOURCES      Able to Return to Prior Living Arrangements  no 01/02/18 1511   Equipment Currently Used at Home  walker, rolling (Ramp, sit in shower) 01/02/18 1536    HOME SAFETY     Resources List  Acute Rehab;Skilled Nursing Facility 01/02/18 1536    Patient Feels Safe Living in Home?  no 01/02/18 1511   Other Resources  Other (see comment) 08/18/16 1508    ASSESSMENT OF FAMILY/SOCIAL SUPPORT     ABUSE RISK SCREEN      Marital Status  Single 01/02/18 1511   QUESTION TO PATIENT:  Has a member of your family or a partner(now or in the past) intimidated, hurt, manipulated, or controlled you in any way?  no 12/31/17 0716    Description of Support System  Supportive;Involved 01/02/18 1511   QUESTION TO PATIENT: Do you feel safe going back to the place where you are living?  yes 12/31/17 0716    Quality of Family Relationships  supportive;involved;evident 01/02/18 1511   OBSERVATION: Is there reason to believe there has been maltreatment of a vulnerable adult (ie. Physical/Sexual/Emotional abuse, self neglect, lack of adequate food, shelter, medical care, or financial exploitation)?  no 12/31/17 0716    ASSESSMENT OF  FUNCTIONAL STATUS     (R) MENTAL HEALTH SUICIDE RISK      Prior to admission patient needed assistance with:  Medications;Meal preparation;Laundry/Housekeeping;Shopping;Transportation;Home maintenance/Yard work 01/02/18 1511   Are you depressed or being treated for depression?  No 12/31/17 1405    EMPLOYMENT     HOMICIDE RISK      Do you work full or part-time?  no 01/02/18 1511   Feels Like Hurting Others  no 12/31/17 0716                  [facility]: [phone]            Medication Administration Report for Jose Antonio Marmolejo as of 01/05/18 1345   Legend:    Given Hold Not Given Due Canceled Entry Other Actions    Time Time (Time) Time  Time-Action       Inactive    Active    Linked        Medications 12/30/17 12/31/17 01/01/18 01/02/18 01/03/18 01/04/18 01/05/18    acetaminophen (TYLENOL) tablet 650 mg  Dose: 650 mg Freq: EVERY 4 HOURS PRN Route: PO  PRN Reason: mild pain  Start: 12/31/17 1226   Admin Instructions: Alternate ibuprofen (if ordered) with acetaminophen.  Maximum acetaminophen dose from all sources = 75 mg/kg/day not to exceed 4 grams/day.         0207 (650 mg)-Given             amLODIPine (NORVASC) 10 MG tablet  Start: 01/05/18 0651   End: 01/05/18 1859   Admin Instructions: Sujatha Larkin: boubacart override                  1859-Med Discontinued       amLODIPine (NORVASC) tablet 10 mg  Dose: 10 mg Freq: DAILY Route: PO  Start: 01/05/18 0900          0653 (10 mg)-Given                  famotidine (PEPCID) tablet 20 mg  Dose: 20 mg Freq: 2 TIMES DAILY Route: PO  Start: 12/31/17 1230     1316 (20 mg)-Given       2042 (20 mg)-Given        0956 (20 mg)-Given [C]       2126 (20 mg)-Given [C]        0829 (20 mg)-Given       2115 (20 mg)-Given        0814 (20 mg)-Given       2135 (20 mg)-Given        1010 (20 mg)-Given       2056 (20 mg)-Given        0932 (20 mg)-Given       [ ] 2100           hydrochlorothiazide (MICROZIDE) 12.5 MG capsule  Start: 01/05/18 0746   End: 01/05/18 1959   Admin  "Instructions: Charlene Kaiser: rik override                  1959-Med Discontinued       hydrochlorothiazide (MICROZIDE) capsule 12.5 mg  Dose: 12.5 mg Freq: DAILY Route: PO  Start: 01/05/18 0900          0746 (12.5 mg)-Given                  HYDROcodone-acetaminophen (NORCO) 5-325 MG per tablet 1-2 tablet  Dose: 1-2 tablet Freq: EVERY 4 HOURS PRN Route: PO  PRN Reason: moderate to severe pain  Start: 12/31/17 1226   Admin Instructions: Start with the lowest dose.    May adjust dose by 1 tablet every 4 hours as needed for pain control or improvement in physical function.  Hold dose for analgesic side effects.  Notify provider to assess for uncontrolled pain or analgesic side effects.  Maximum acetaminophen dose from all sources= 75 mg/kg/day not to exceed 4 grams               ibuprofen (ADVIL/MOTRIN) tablet 600 mg  Dose: 600 mg Freq: EVERY 6 HOURS PRN Route: PO  PRN Reason: other  PRN Comment: mild pain  Start: 12/31/17 1226   Admin Instructions: Alternate acetaminophen (if ordered) with ibuprofen           0952 (600 mg)-Given           lidocaine (LMX4) kit  Freq: EVERY 1 HOUR PRN Route: Top  PRN Reason: pain  PRN Comment: with VAD insertion or accessing implanted port.  Start: 12/31/17 1226   Admin Instructions: Do NOT give if patient has a history of allergy to any local anesthetic or any \"paige\" product.   Apply 30 minutes prior to VAD insertion or port access.  MAX Dose:  2.5 g (  of 5 g tube)               lidocaine 1 % 1 mL  Dose: 1 mL Freq: EVERY 1 HOUR PRN Route: OTHER  PRN Comment: mild pain with VAD insertion or accessing implanted port  Start: 12/31/17 1226   Admin Instructions: Do NOT give if patient has a history of allergy to any local anesthetic or any \"paige\" product. MAX dose 1 mL subcutaneous OR intradermal in divided doses.               lisinopril (PRINIVIL/ZESTRIL) tablet 40 mg  Dose: 40 mg Freq: DAILY Route: PO  Start: 01/04/18 0900         1011 (40 mg)-Given        0652 (40 mg)-Given     "              metoprolol (TOPROL-XL) 24 hr tablet 50 mg  Dose: 50 mg Freq: DAILY Route: PO  Start: 12/31/17 1142   Admin Instructions: DO NOT CRUSH. Tablet may be split in half along score line.      1315 (50 mg)-Given        0955 (50 mg)-Given        0733 (50 mg)-Given [C]               0814 (50 mg)-Given        1011 (50 mg)-Given        0747 (50 mg)-Given                  miconazole (MICATIN; MICRO GUARD) 2 % powder  Freq: 2 TIMES DAILY Route: Top  Start: 12/31/17 1230   Admin Instructions: Apply to groin      1320 ( )-Given       2046 ( )-Given        0956 ( )-Given       2127 ( )-Given        1010 ( )-Given       2110 ( )-Given        0814 ( )-Given       2136 ( )-Given        1115 ( )-Given [C]       2057 ( )-Given        (1038)-Not Given       [ ] 2100           naloxone (NARCAN) injection 0.1-0.4 mg  Dose: 0.1-0.4 mg Freq: EVERY 2 MIN PRN Route: IV  PRN Reason: opioid reversal  Start: 12/31/17 1226   Admin Instructions: For respiratory rate LESS than or EQUAL to 8.  Partial reversal dose:  0.1 mg titrated q 2 minutes for Analgesia Side Effects Monitoring Sedation Level of 3 (frequently drowsy, arousable, drifts to sleep during conversation).Full reversal dose:  0.4 mg bolus for Analgesia Side Effects Monitoring Sedation Level of 4 (somnolent, minimal or no response to stimulation).  For ordered doses up to 2mg give IVP. Give each 0.4mg over 15 seconds in emergency situations. For non-emergent situations further dilute in 9mL of NS to facilitate titration of response.               ondansetron (ZOFRAN-ODT) ODT tab 4 mg  Dose: 4 mg Freq: EVERY 6 HOURS PRN Route: PO  PRN Reasons: nausea,vomiting  Start: 12/31/17 1226   Admin Instructions: This is Step 1 of nausea and vomiting management.  If nausea not resolved in 15 minutes, go to Step 2 prochlorperazine (COMPAZINE). Do not push through foil backing. Peel back foil and gently remove. Place on tongue immediately. Administration with liquid unnecessary               Or  ondansetron (ZOFRAN) injection 4 mg  Dose: 4 mg Freq: EVERY 6 HOURS PRN Route: IV  PRN Reasons: nausea,vomiting  Start: 12/31/17 1226   Admin Instructions: This is Step 1 of nausea and vomiting management.  If nausea not resolved in 15 minutes, go to Step 2 prochlorperazine (COMPAZINE).  Irritant. For ordered doses up to 4 mg, give IV Push undiluted over 2-5 minutes.               senna-docusate (SENOKOT-S;PERICOLACE) 8.6-50 MG per tablet 1 tablet  Dose: 1 tablet Freq: 2 TIMES DAILY PRN Route: PO  PRN Reason: constipation  Start: 12/31/17 1226   Admin Instructions: If no bowel movement in 24 hours, increase to 2 tablets PO.  Hold for loose stools.              Or  senna-docusate (SENOKOT-S;PERICOLACE) 8.6-50 MG per tablet 2 tablet  Dose: 2 tablet Freq: 2 TIMES DAILY PRN Route: PO  PRN Reason: constipation  Start: 12/31/17 1226   Admin Instructions: Hold for loose stools.              Discontinued Medications  Medications 12/30/17 12/31/17 01/01/18 01/02/18 01/03/18 01/04/18 01/05/18         Dose: 10 mg Freq: EVERY EVENING Route: PO  Start: 01/05/18 2100   End: 01/05/18 0650          0650-Med Discontinued         Dose: 2.5 mg Freq: EVERY EVENING Route: PO  Start: 01/02/18 2100   End: 01/05/18 0645       2115 (2.5 mg)-Given        2135 (2.5 mg)-Given        2056 (2.5 mg)-Given        0645-Med Discontinued         Dose: 20 mg Freq: DAILY Route: PO  Start: 01/02/18 1445   End: 01/04/18 0840       1600 (20 mg)-Given        0814 (20 mg)-Given        0840-Med Discontinued          Dose: 3 mL Freq: EVERY 8 HOURS Route: IK  Start: 12/31/17 1230   End: 01/04/18 1653   Admin Instructions: And Q1H PRN, to lock peripheral IV dormant line.      (1317)-Not Given       (2050)-Not Given        (0618)-Not Given       1227 (3 mL)-Given       1603 (3 mL)-Given       2128 (3 mL)-Given        0104 (3 mL)-Given                     2002 (3 mL)-Given       2347 (3 mL)-Given        0814 (3 mL)-Given       1745 (3 mL)-Given        0205  (3 mL)-Given       0930 (3 mL)-Given       1653-Med Discontinued  (1737)-Not Given              Dose: 3 mL Freq: EVERY 1 HOUR PRN Route: IK  PRN Reason: line flush  PRN Comment: for peripheral IV flush post IV meds  Start: 12/31/17 1226   End: 01/04/18 1653         1653-Med Discontinued     Medications 12/30/17 12/31/17 01/01/18 01/02/18 01/03/18 01/04/18 01/05/18               INTERAGENCY TRANSFER FORM - NOTES (H&P, Discharge Summary, Consults, Procedures, Therapies)   12/31/2017                       [facility]: [phone]               History & Physicals      H&P by Kristofer Sena MD at 12/31/2017 12:21 PM     Author:  Kristofer Sena MD Service:  Hospitalist Author Type:  Physician    Filed:  12/31/2017 12:21 PM Date of Service:  12/31/2017 12:21 PM Creation Time:  12/31/2017 11:43 AM    Status:  Signed :  Kristofer Sena MD (Physician)         [facility]    History and Physical  Hospitalist       Date of Admission:  12/31/2017    Assessment & Plan   Jose Antonio Marmolejo is a 72 year old male who presents with frequent falls and weakness    Frequent falls: Looking back through his history  Suggests that he has history of falls for about 2 years.  He just recently getting worse.  Patient reports no syncope however states that he loses his balance often.  Initial workup is negative today including CT scan of the head.  But he would require further workup which may include MRI of the brain.  A neurology evaluation might be necessary.   He also need PT and OT evaluation.  He does not feel that he can't care for him at home anymore    Hypertension: Looking through his records it appears that he was on Lopressor and Zestoretic about 2 years ago  And it appears that about a year ago he stopped taking his medications.  Patient does not have any good explanation why he stopped mid taking medications.    Diabetes: he seemed to believe that  he actually does not have diabetes.  His blood sugars are not that  elevated.  I will check hemoglobin A1c and start him on sliding scale.  Perhaps starting oral medications tomorrow might be appropriate.  Diabetic education and dietary education would also help    Mild renal insufficiency: Start quite delirious acute or chronic..  But that seems to be some volume issues.  We will give him gentle hydration and recheck electrolytes in the morning.hemoglobin is elevated as well which could suggest volume depletion    Social issues: He would require social work, physical therapy, occupational therapy, to find safe place for him to stay    DVT Prophylaxis: Pneumatic Compression Devices  Code Status: Full Code    Disposition: Expected discharge in 2-3 days  once above issues resolve.    Kristofer Sena    Primary Care Physician   Physician No Ref-Primary    Chief Complaint   Falls and weakness    History is obtained from the patient    History of Present Illness   Jose Antonio A Francie is a 72 year old male who presents with falls and weakness.  Apparently this has been going on for about 2 years.  The last few months they have increased in frequency.  Patient does not lose consciousness and there has been no seizures reported.  Patient reports that he loses his balance.he somehow manage his life but now he is to the point where he cannot deal with that.He called and nevertheless 3 times yesterday.  Twice they gave him a assist and got him back up but third time they decided to bring him in..    He was on 2 blood pressure medications according to medical records but for some unclear reason he stopped using it.  He has no chest pain shortness of breath or lower extremity edema.  He denies palpitation  Or orthopnea or PND.    He has history of diabetes type 2 according to the records but patient does not believe he has diabetes  And he thinks the doctor stopped his diabetes medications.    He just feels generalized weakness.  He thinks he has mild cold but otherwise no fever chills nausea vomiting  diarrhea constipation.        Past Medical History    I have reviewed this patient's medical history and updated it with pertinent information if needed.   Past Medical History:   Diagnosis Date     Arthritis     back and left ankle from fall     Diabetes (H)      Hypertension        Past Surgical History   I have reviewed this patient's surgical history and updated it with pertinent information if needed.  Past Surgical History:   Procedure Laterality Date     ORTHOPEDIC SURGERY      left ankle and removal of hardware       Prior to Admission Medications   Prior to Admission Medications   Prescriptions Last Dose Informant Patient Reported? Taking?   Blood Pressure Monitoring (ADULT BLOOD PRESSURE CUFF LG) KIT   No Yes   Si each 2 times daily   lisinopril-hydrochlorothiazide (PRINZIDE,ZESTORETIC) 20-12.5 MG per tablet Unknown at Unknown time  No No   Sig: Take 2 tablets by mouth daily   miconazole (MICATIN; MICRO GUARD) 2 % powder   No No   Sig: Apply topically 2 times daily      Facility-Administered Medications: None     Allergies   Allergies   Allergen Reactions     Penicillins        Social History   I have reviewed this patient's social history and updated it with pertinent information if needed. Jose Antonio Marmolejo  reports that he has quit smoking. He has never used smokeless tobacco. He reports that he does not drink alcohol or use illicit drugs.    Family History   I have reviewed this patient's family history and updated it with pertinent information if needed.   Family History   Problem Relation Age of Onset     Other - See Comments Mother      pneumonia     Obesity Mother      Other Cancer Father      hodgkins lymphoma     Coronary Artery Disease Father      pacemaker     Other Cancer Sister      unknown cancers       Review of Systems   Review of systems is limited by patient factors - poor historian    Physical Exam   Temp: 98.4  F (36.9  C) Temp src: Oral BP: 157/81 Pulse: 111 Heart Rate: 81 Resp: 14  SpO2: 96 % O2 Device: None (Room air)    Vital Signs with Ranges  Temp:  [98.4  F (36.9  C)] 98.4  F (36.9  C)  Pulse:  [111] 111  Heart Rate:  [] 81  Resp:  [12-24] 14  BP: (157-165)/() 157/81  SpO2:  [90 %-96 %] 96 %  0 lbs 0 oz    Constitutional: somewhat full historian.  Pressured speech  Eyes: Lids and lashes normal, pupils equal, round and reactive to light, extra ocular muscles intact, sclera clear, conjunctiva normal  ENT: Normocephalic, without obvious abnormality, atraumatic, sinuses nontender on palpation, external ears without lesions, oral pharynx with moist mucous membranes, tonsils without erythema or exudates, gums normal and good dentition.  Hematologic / Lymphatic: no cervical lymphadenopathy  Respiratory: No increased work of breathing, good air exchange, clear to auscultation bilaterally, no crackles or wheezing  Cardiovascular: Normal apical impulse, regular rate and rhythm, normal S1 and S2, no S3 or S4, and no murmur noted  GI: soft non tender no hepatosplenomegaly  Genitounirinary: deferred  Skin: no rashes exposed areas  Musculoskeletal: There is no redness, warmth, or swelling of the joints.  Full range of motion noted.  Motor strength is 5 out of 5 all extremities bilaterally.  Tone is normal.  Neurologic: awake alert and oriented ×3.  Face is symmetrical.  Strength upper and lower extremity seems to be normal.  Finger-nose-finger seems to be off gait was not tested  Neuropsychiatric: General: normal, calm, normal eye contact and poor eye contact    Data   Data reviewed today:  I personally reviewed the head CT image(s) showing no acute changes.  Pelvis XR, 1-2 views   Final Result   IMPRESSION: Negative examination pelvis.      MENDEZ MATAMOROS MD      Lumbar spine XR, 2-3 views   Final Result   IMPRESSION: No acute lumbar abnormality.      MENDEZ MATAMOROS MD      XR Chest 2 Views   Final Result   IMPRESSION:  No acute cardiopulmonary disease.        MENDEZ MATAMOROS MD       CT Head w/o Contrast   Final Result   IMPRESSION: No change from previous examination in August 2016      MENDEZ MATAMOROS MD            Recent Labs  Lab 12/31/17  0720   WBC 9.4   HGB 17.2   MCV 87         POTASSIUM 4.0   CHLORIDE 106   CO2 24   BUN 16   CR 1.31*   ANIONGAP 9   STALIN 8.4*   *   ALBUMIN 3.6   PROTTOTAL 8.7   BILITOTAL 0.6   ALKPHOS 71   ALT 24   AST 24       Recent Results (from the past 24 hour(s))   CT Head w/o Contrast    Narrative    PROCEDURE: CT HEAD W/O CONTRAST 12/31/2017 8:44 AM    HISTORY: fall;     COMPARISONS: August 2016    Meds/Dose Given:    TECHNIQUE: CT scan of the brain without contrast.    FINDINGS: The ventricular system and cortical sulci are normal for  age. There is white matter low-density consistent with small vessel  disease. Are no masses ventricular shifts or extraluminal collections.  Brainstem and cerebellum appear normal. Paranasal sinuses are clear.  Cranial vault is intact.         Impression    IMPRESSION: No change from previous examination in August 2016    MENDEZ MATAMOROS MD   XR Chest 2 Views    Narrative    PROCEDURE:  XR CHEST 2 VW    HISTORY:  cough; .     COMPARISON:  None.    FINDINGS:   The cardiac silhouette is normal in size. The pulmonary vasculature is  normal.  The lungs are clear. No pleural effusion or pneumothorax.  There are severe arthritic changes seen in the right shoulder      Impression    IMPRESSION:  No acute cardiopulmonary disease.      MENDEZ MATAMOROS MD   Lumbar spine XR, 2-3 views    Narrative    PROCEDURE: XR LUMBAR SPINE 2-3 VIEWS 12/31/2017 9:10 AM    HISTORY: fall;     COMPARISONS: August 2016    TECHNIQUE: 2 views    FINDINGS: The lumbar discs are normal height anterior and lateral  osteophytes are seen at T12 L1-L4 L5. There are facet joint  degenerative changes noted at L4-L5 and L5-S1 bilaterally.         Impression    IMPRESSION: No acute lumbar abnormality.    MENDEZ MATAMOROS MD   Pelvis XR, 1-2 views     Narrative    PROCEDURE: XR PELVIS 1/2 VW 12/31/2017 9:11 AM    HISTORY: fall;     COMPARISONS: None.    TECHNIQUE: 1 view    FINDINGS: Pelvis is intact the sacrum and sacroiliac joints appear  normal articular spaces are normal height of both hips both proximal  femurs appear intact.         Impression    IMPRESSION: Negative examination pelvis.    MENDEZ MATAMOROS MD[NR1.1]        Revision History        User Key Date/Time User Provider Type Action    > NR1.1 12/31/2017 12:21 PM Kristofer Sena MD Physician Sign                  Discharge Summaries     No notes of this type exist for this encounter.      Consult Notes     No notes of this type exist for this encounter.         Progress Notes - Physician (Notes for yesterday and today)      Progress Notes by Leigh Ann Warren MD at 1/4/2018 10:50 AM     Author:  Leigh Ann Warren MD Service:  Hospitalist Author Type:  Physician    Filed:  1/4/2018 10:55 AM Date of Service:  1/4/2018 10:50 AM Creation Time:  1/4/2018 10:50 AM    Status:  Signed :  Leigh Ann Warren MD (Physician)         [facility]    Hospitalist Progress Note[AU1.1]      Assessment & Plan[AU1.2]   Jose Antonio Marmolejo is a 72 year old male who was admitted on 12/31/2017. The main reason for admission was LE weakness and frequent falls.    Hypertension: Looking through his records it appears that he was on Lopressor and Zestoretic about 2 years ago  And it appears that about a year ago he stopped taking his medications.  Patient does not have any good explanation why he stopped mid taking medications. His blood pressure is not controlled during this admission and I will start him on ACE-I and amlodipine  1.4.2018: will increase lisinopril to 40 mg po daily     Diabetes: he denies, but he is diabetic, mild and I believe, diet controlled. Will implement diet control     TASIA, mild on adission: Was due to dehydration. Resolved. Will keep monitoring    Frequent falls. Need rehab. Neurology  consult outpatient due to Abn MRI. Pt was admitted 2016 with same falling problems and falls have been attributed to weakness and neuropathy. If he goes to [place], neurologist will see him over there.     Social issues: He will need placement. Social service consulted.    DVT Prophylaxis: SCD  Code Status:[AU1.1] Full Code[AU1.2]    Disposition: Expected discharge when C-diff r/o. He will need placement. Social service will be consulted.[AU1.1]     Leigh Ann Warren    Interval History[AU1.2]   Admission (12.31- 1.2.2018) DM, mild, diet controlled. MRI brain ordered, done. Findings:   Multiple white matter lesions in both hemispheres of the suggestive of a white matter disease such as multiple sclerosis. One particular white matter lesion without mass effect is seen to enhance in a ringlike fashion in the left centrum semiovale. White matter  lesions are also noted. The brainstem particularly in the left side of the medulla the craniocervical junction and at the right side of the  medulla at the level the olivary nuclei. Findings will require OP neurology consult    1.3.18: started on lisinopril and CCB for better BP control.   R/o C-diff. He has had 4 watery BM and was incontinent. Tele and neuro checks dc. When C-diff w/o, will dc for rehab to SNF (was accepted)  1.4.2018: C-diff r/o. Compliant with rx. No acute events. Awaiting acceptance to NH    -Data reviewed today: I reviewed all new labs and imaging results over the last 24 hours. I personally reviewed the CT and XR image(s) showing FINDINGS: The ventricular system and cortical sulci are normal for.    XR chest: no acute  Cardiopulmonary process    Lumbar spine XR: FINDINGS: The lumbar discs are normal height anterior and lateral osteophytes are seen at T12 L1-L4 L5. There are facet joint degenerative changes noted at L4-L5 and L5-S1 bilaterally.    Pelvis XR: Pelvis is intact the sacrum and sacroiliac joints appear normal articular spaces are normal height  of both hips both proximal femurs appear intact.    MRI: see above.[AU1.1]    Physical Exam   Temp: 98.3  F (36.8  C) Temp src: Tympanic BP: 180/95   Heart Rate: 72 Resp: 18 SpO2: 93 % O2 Device: None (Room air)    Vitals:    01/01/18 0523 01/02/18 0619 01/03/18 0504   Weight: 122.7 kg (270 lb 8.1 oz) 119.3 kg (263 lb 0.1 oz) 118.4 kg (261 lb 0.4 oz)[AU1.2]     Vital Signs with Ranges[AU1.1]  Temp:  [97.7  F (36.5  C)-98.3  F (36.8  C)] 98.3  F (36.8  C)  Heart Rate:  [69-81] 72  Resp:  [16-18] 18  BP: (169-180)/(83-99) 180/95  SpO2:  [93 %-97 %] 93 %  I/O last 3 completed shifts:  In: 900 [P.O.:900]  Out: 200 [Urine:200]    Peripheral IV 12/31/17 Right Upper forearm (Active)   Site Assessment WDL 1/4/2018  8:00 AM   Line Status Saline locked 1/4/2018  8:00 AM   Phlebitis Scale 0-->no symptoms 1/4/2018  8:00 AM   Infiltration Scale 0 1/4/2018  8:00 AM   Extravasation? No 1/3/2018  5:00 PM   Number of days:4[AU1.2]     ROS: admits LE weakness, R<L, denies fever, chills, cough, chest pain, abdominal pain, dysuria, muscle pain, fasciculations.[AU1.1]     Medications        lisinopril  40 mg Oral Daily     amLODIPine  2.5 mg Oral QPM     sodium chloride (PF)  3 mL Intracatheter Q8H     famotidine  20 mg Oral BID     metoprolol  50 mg Oral Daily     miconazole   Topical BID[AU1.2]     Physical exam:  Constitutional: not in distress  HEENT: MMM, no oral lesions  Hematologic / Lymphatic: no cervical lymphadenopathy  Respiratory: no wheezing, diminished at bases. Easy air exit/entry  Cardiovascular: PMI not palpable, no S3, no rubs, no gallops  GI: soft non tender no hepatosplenomegaly, BS present  Rectal exam: not done  : bladder not palpable. No CVA tenderness  Skin: no rashes , skin warm, well perfused.   Musculoskeletal: there is mild sarcopenia, more proximal weakness.  Ambulation with everted feet.  Neurologic: CN intact. Non-focal, unsteady with ambulation  Psychiatric: awake, oriented x4   Endocrine: obese,  exgenous obesity type, no striae[AU1.1]  Data     Recent Labs  Lab 01/04/18  0517 01/01/18  0531 12/31/17  0720   WBC 5.8  --  9.4   HGB 15.1  --  17.2   MCV 84  --  87     --  297    140 139   POTASSIUM 4.2 3.9 4.0   CHLORIDE 107 109 106   CO2 25 24 24   BUN 17 18 16   CR 0.91 1.03 1.31*   ANIONGAP 7 7 9   STALIN 8.0* 7.5* 8.4*   * 93 161*   ALBUMIN  --  2.6* 3.6   PROTTOTAL  --  6.8 8.7   BILITOTAL  --  0.4 0.6   ALKPHOS  --  53 71   ALT  --  19 24   AST  --  28 24       No results found for this or any previous visit (from the past 24 hour(s)).[AU1.2]     Revision History        User Key Date/Time User Provider Type Action    > AU1.2 1/4/2018 10:55 AM Leigh Ann Warren MD Physician Sign     AU1.1 1/4/2018 10:50 AM Leigh Ann Warren MD Physician                   Procedure Notes     No notes of this type exist for this encounter.         Progress Notes - Therapies (Notes from 01/02/18 through 01/05/18)      Progress Notes by Licha Minor OTR/L at 1/2/2018  2:47 PM     Author:  Licha Minor OTR/L Service:  Acute IP Rehab Author Type:  Occupational Therapist    Filed:  1/2/2018  2:47 PM Date of Service:  1/2/2018  2:47 PM Creation Time:  1/2/2018  2:47 PM    Status:  Signed :  Licha Minor OTR/L (Occupational Therapist)          01/02/18 1341   Quick Adds   Type of Visit Initial Occupational Therapy Evaluation   Living Environment   Lives With child(ceasar), adult   Living Arrangements house   Home Accessibility ramps present at home   Number of Stairs Within Home 12   Transportation Available family or friend will provide   Living Environment Comment pt reports he does not need to use stairs in home. has a walk-in shower , son and daughter-in law live with him   Self-Care   Dominant Hand left   Usual Activity Tolerance fair   Current Activity Tolerance fair   Regular Exercise no   Equipment Currently Used at Home walker, rolling   Functional Level Prior   Ambulation  1-->assistive equipment   Transferring 1-->assistive equipment   Toileting 0-->independent   Bathing 1-->assistive equipment   Dressing 0-->independent   Eating 0-->independent   Communication 0-->understands/communicates without difficulty   Swallowing 0-->swallows foods/liquids without difficulty   Cognition 0 - no cognition issues reported   Fall history within last six months yes   Prior Functional Level Comment Pt reports he has fallen quite a bit       Present no   General Information   Onset of Illness/Injury or Date of Surgery - Date 12/31/17   Referring Physician Leigh Ann Warren MD   Patient/Family Goals Statement get back home   Additional Occupational Profile Info/Pertinent History of Current Problem Pt presented with weakness and frequent falls. pt has PMH of diabetes and hypertension. Pt family moved in with him 2 months ago but are not there during the day to assist if needed   Precautions/Limitations fall precautions   Weight-Bearing Status - LUE full weight-bearing   Weight-Bearing Status - RUE full weight-bearing   Weight-Bearing Status - LLE full weight-bearing   Weight-Bearing Status - RLE full weight-bearing   Heart Disease Risk Factors Diabetes;High blood pressure   Cognitive Status Examination   Orientation orientation to person, place and time   Level of Consciousness alert   Able to Follow Commands WNL/WFL   Personal Safety (Cognitive) mild impairment   Memory intact   Attention No deficits were identified   Organization/Problem Solving No deficits were identified   Executive Function No deficits were identified   Visual Perception   Visual Perception Wears glasses   Sensory Examination   Sensory Comments pt reports numbness in feet   Pain Assessment   Patient Currently in Pain No   Range of Motion (ROM)   ROM Comment R shoulder WFL, but decreased ROM from pt reported previous work injury. LUE WNL. Pt does report difficulty with Upper body dressing at times   Strength    Strength Comments R shoulder 3/5, LUE 4/5   Muscle Tone Assessment   Muscle Tone Quick Adds No deficits were identified   Coordination   Upper Extremity Coordination No deficits were identified   Transfer Skill: Bed to Chair/Chair to Bed   Level of [place]: Bed to Chair stand-by assist   Physical Assist/Nonphysical Assist: Bed to Chair supervision;verbal cues   Weight-Bearing Restrictions full weight-bearing   Assistive Device - Transfer Skill Bed to Chair Chair to Bed Rehab Eval rolling walker   Transfer Skill: Sit to Stand   Level of [place]: Sit/Stand stand-by assist   Physical Assist/Nonphysical Assist: Sit/Stand supervision;verbal cues   Transfer Skill: Sit to Stand full weight-bearing   Assistive Device for Transfer: Sit/Stand rolling walker   Toilet Transfer   Toilet Transfer Toilet Transfer Skill   Transfer Skill: Toilet Transfer   Level of [place]: Toilet stand-by assist   Physical Assist/Nonphysical Assist: Toilet supervision;verbal cues   Weight-Bearing Restrictions: Toilet full weight-bearing   Assistive Device rolling walker;grab bars   Upper Body Dressing   Level of [place]: Dress Upper Body stand-by assist   Lower Body Dressing   Level of [place]: Dress Lower Body stand-by assist   Toileting   Level of Tyrrell: Toilet stand-by assist   Grooming   Level of [place]rell: Grooming independent  (while seated, with set up)   Physical Assist/Nonphysical Assist: Grooming set-up required   Instrumental Activities of Daily Living (IADL)   Previous Responsibilities finances   IADL Comments pt reports son and daughter assist with cleaning, laundry, and cooking   Activities of Daily Living Analysis   Impairments Contributing to Impaired Activities of Daily Living balance impaired;strength decreased  (endurance)   General Therapy Interventions   Planned Therapy Interventions progressive activity/exercise;strengthening   Clinical Impression   Criteria for Skilled Therapeutic  "Interventions Met yes, treatment indicated   OT Diagnosis decreased activity tolerance   Influenced by the following impairments strength, endurance   Assessment of Occupational Performance 1-3 Performance Deficits   Identified Performance Deficits mobility, strength, endurance   Clinical Decision Making (Complexity) Low complexity   Therapy Frequency 5 times/wk   Predicted Duration of Therapy Intervention (days/wks) 3 days   Anticipated Discharge Disposition Transitional Care Facility   Risks and Benefits of Treatment have been explained. Yes   Patient, Family & other staff in agreement with plan of care Yes   Clinical Impression Comments Pt has had several falls and would benefit from short-term rehab to improve function prior to return to independent living situation   [facility] TM \"6 Clicks\"   2016, Trustees of [facility], under license to [employer].  All rights reserved.   6 Clicks Short Forms Daily Activity Inpatient Short Form   [facility]  \"6 Clicks\" Daily Activity Inpatient Short Form   1. Putting on and taking off regular lower body clothing? 3 - A Little   2. Bathing (including washing, rinsing, drying)? 3 - A Little   3. Toileting, which includes using toilet, bedpan or urinal? 4 - None   4. Putting on and taking off regular upper body clothing? 4 - None   5. Taking care of personal grooming such as brushing teeth? 4 - None  (if seated)   6. Eating meals? 4 - None   Daily Activity Raw Score (Score out of 24.Lower scores equate to lower levels of function) 22   Total Evaluation Time   Total Evaluation Time (Minutes) 24[ME1.1]        Revision History        User Key Date/Time User Provider Type Action    > ME1.1 1/2/2018  2:47 PM Licha Minor OTR/L Occupational Therapist Sign            Progress Notes by Suzi Hutson PT at 1/2/2018  1:02 PM     Author:  Suzi Hutson PT Service:  (none) Author Type:  Physical Therapist    Filed:  1/2/2018  1:02 PM " Date of Service:  1/2/2018  1:02 PM Creation Time:  1/2/2018  1:02 PM    Status:  Signed :  Suzi Hutson, PT (Physical Therapist)          01/02/18 1107   Quick Adds   Type of Visit Initial PT Evaluation   Living Environment   Lives With child(ceasar), adult   Living Arrangements house   Home Accessibility ramps present at home   Transportation Available family or friend will provide;car   Living Environment Comment Pt states his bedroom and walk in shower are located on the main floor of home.  Pt has not left home for quite some time due to his increasing number of falls   Self-Care   Usual Activity Tolerance fair   Current Activity Tolerance fair   Regular Exercise no   Equipment Currently Used at Home walker, rolling   Activity/Exercise/Self-Care Comment Pt has a 4WW that he has been using for at least the last year to year and a half since his number of falls have increased.     Functional Level Prior   Ambulation 1-->assistive equipment   Transferring 1-->assistive equipment   Toileting 0-->independent   Bathing 0-->independent   Dressing 0-->independent   Eating 0-->independent   Communication 0-->understands/communicates without difficulty   Swallowing 0-->swallows foods/liquids without difficulty   Cognition 0 - no cognition issues reported   Fall history within last six months yes   Number of times patient has fallen within last six months (unable to give number, nearly daily for past year)   Which of the above functional risks had a recent onset or change? ambulation;transferring;fall history   Prior Functional Level Comment Pt reprots that his functional mobility has been declining and number of falls increasing over the past year to year and a half.  Pt states that his ankles tend to roll contributing to falls as well as his legs will just give out when he becomes fatigued and does not have time to sit despite using 4WW.  Pt reports it has been an extended period of time since he has been able to  leave his home.    General Information   Onset of Illness/Injury or Date of Surgery - Date 12/31/17   Referring Physician Dr. Warren   Patient/Family Goals Statement to go to rehab   Pertinent History of Current Problem (include personal factors and/or comorbidities that impact the POC) pt admitted with frequent falls and weakness.  MRI pending.  Falls have been ongoing and increasing in frequency for the past year to year and a half   Precautions/Limitations fall precautions   Weight-Bearing Status - LLE full weight-bearing   Weight-Bearing Status - RLE full weight-bearing   Cognitive Status Examination   Orientation orientation to person, place and time   Level of Consciousness alert   Follows Commands and Answers Questions 100% of the time   Personal Safety and Judgment intact   Memory intact   Pain Assessment   Patient Currently in Pain No   Posture    Posture Forward head position;Protracted shoulders   Range of Motion (ROM)   ROM Comment Bilateral LE AROM appears WFL   Strength   Strength Comments Right:  hip flex 3-/5, hip abd 3+/5, hip add 3+/5, knee ext 3+/5, knee flex 3/5, ankle DF 4-/5, ankle PF 3/5.  Left: hip flex 3/5, abd 3+/5, add 3+/5, knee ext 4-/5, knee flex 3/5, ankle DF 4-/5, ankle PF 3+/5   Bed Mobility   Bed Mobility Comments sup>sit SBA however needed several attempts and heavy use of siderail    Transfer Skills   Transfer Comments sit>stand min A with 3 attempts needed to complete and heavy reliance on walker   Gait   Gait Comments Ambulated 15' with 4WW min A with close w/c follow for safety.  Pt demonstrates significant hyperextension in bilateral knees due to significant weakness, also tends to WB through lateral aspect of feet with significant toed out gait, fatigues very quicly.   Balance   Balance Comments fair with support from 4WW   Sensory Examination   Sensory Perception no deficits were identified   General Therapy Interventions   Planned Therapy Interventions bed mobility  "training;balance training;neuromuscular re-education;strengthening;transfer training;risk factor education;progressive activity/exercise   Clinical Impression   Criteria for Skilled Therapeutic Intervention yes, treatment indicated   PT Diagnosis impaired safety and tolerance for functional mobility   Influenced by the following impairments decreased strength, decreased balance, decreased activity tolerance   Functional limitations due to impairments decreased safety with transfers, decreased safety with ambulation, frequent falls   Clinical Presentation Evolving/Changing   Clinical Presentation Rationale ongoing falls and progressive weakness   Clinical Decision Making (Complexity) Moderate complexity   Therapy Frequency` daily   Predicted Duration of Therapy Intervention (days/wks) 7 days   Anticipated Discharge Disposition Acute Rehabilitation Facility   Risk & Benefits of therapy have been explained Yes   Patient, Family & other staff in agreement with plan of care Yes   Clinical Impression Comments Pt would benefit from short term rehab to focus on strengthening, home safety education, improving activity tolerance and safety with functional mobility.   [facility] [employer] TM \"6 Clicks\"   2016, Trustees of [facility], under license to [employer].  All rights reserved.   6 Clicks Short Forms Basic Mobility Inpatient Short Form   [facility]  \"6 Clicks\" V.2 Basic Mobility Inpatient Short Form   1. Turning from your back to your side while in a flat bed without using bedrails? 2 - A Lot   2. Moving from lying on your back to sitting on the side of a flat bed without using bedrails? 2 - A Lot   3. Moving to and from a bed to a chair (including a wheelchair)? 2 - A Lot   4. Standing up from a chair using your arms (e.g., wheelchair, or bedside chair)? 2 - A Lot   5. To walk in hospital room? 2 - A Lot   6. Climbing 3-5 steps with a railing? 1 - Total   Basic Mobility Raw Score (Score out of " 24.Lower scores equate to lower levels of function) 11   Total Evaluation Time   Total Evaluation Time (Minutes) 19[AH1.1]        Revision History        User Key Date/Time User Provider Type Action    > AH1.1 1/2/2018  1:02 PM Suzi Hutson, PT Physical Therapist Sign                                                      INTERAGENCY TRANSFER FORM - LAB / IMAGING / EKG / EMG RESULTS   12/31/2017                       [facility]: [phone]            Unresulted Labs     None         Lab Results - 3 Days      UA with Microscopic [[id number]] (Abnormal)  Resulted: 01/04/18 1726, Result status: Final result    Ordering provider: Leigh Ann Warren MD  01/04/18 1710 Resulting lab: [facility]    Specimen Information    Type Source Collected On   Midstream Urine  01/04/18 1710          Components       Value Reference Range Flag Lab   Color Urine Yellow   HI   Appearance Urine Slightly Cloudy   HI   Glucose Urine 70 NEG^Negative mg/dL A HI   Bilirubin Urine Negative NEG^Negative  HI   Ketones Urine Negative NEG^Negative mg/dL  HI   Specific Gravity Urine 1.017 1.003 - 1.035  HI   Blood Urine Negative NEG^Negative  HI   pH Urine 5.5 4.7 - 8.0 pH  HI   Protein Albumin Urine 30 NEG^Negative mg/dL A HI   Urobilinogen mg/dL Normal 0.0 - 2.0 mg/dL  HI   Nitrite Urine Negative NEG^Negative  HI   Leukocyte Esterase Urine Negative NEG^Negative  HI   Source Midstream Urine   HI   WBC Urine 0 0 - 2 /HPF  HI   RBC Urine 0 0 - 2 /HPF  HI   Bacteria Urine None NEG^Negative /HPF A HI   Mucous Urine Present NEG^Negative /LPF A HI   sperm Present NEG^Negative /HPF A HI   Triple Phosphates Few NEG^Negative /HPF A HI            Basic metabolic panel [[id number]] (Abnormal)  Resulted: 01/04/18 0552, Result status: Final result    Ordering provider: Leigh Ann Warren MD  01/03/18 2300 Resulting lab: [facility]    Specimen Information    Type Source Collected On   Blood  01/04/18 0552           Components       Value Reference Range Flag Lab   Sodium 139 133 - 144 mmol/L  HI   Potassium 4.2 3.4 - 5.3 mmol/L  HI   Comment:  Specimen slightly hemolyzed   Chloride 107 94 - 109 mmol/L  HI   Carbon Dioxide 25 20 - 32 mmol/L  HI   Anion Gap 7 3 - 14 mmol/L  HI   Glucose 116 70 - 99 mg/dL H HI   Urea Nitrogen 17 7 - 30 mg/dL  HI   Creatinine 0.91 0.66 - 1.25 mg/dL  HI   GFR Estimate 82 >60 mL/min/1.7m2  HI   Comment:  Non  GFR Calc   GFR Estimate If Black >90 >60 mL/min/1.7m2  HI   Comment:  African American GFR Calc   Calcium 8.0 8.5 - 10.1 mg/dL L HI            CBC with platelets differential [[id number]]  Resulted: 01/04/18 0529, Result status: Final result    Ordering provider: Leigh Ann Warren MD  01/03/18 7209 Resulting lab: [facility]    Specimen Information    Type Source Collected On   Blood  01/04/18 0517          Components       Value Reference Range Flag Lab   WBC 5.8 4.0 - 11.0 10e9/L  HI   RBC Count 5.29 4.4 - 5.9 10e12/L  HI   Hemoglobin 15.1 13.3 - 17.7 g/dL  HI   Hematocrit 44.6 40.0 - 53.0 %  HI   MCV 84 78 - 100 fl  HI   MCH 28.5 26.5 - 33.0 pg  HI   MCHC 33.9 31.5 - 36.5 g/dL  HI   RDW 13.5 10.0 - 15.0 %  HI   Platelet Count 261 150 - 450 10e9/L  HI   Diff Method Automated Method   HI   % Neutrophils 41.8 %  HI   % Lymphocytes 43.2 %  HI   % Monocytes 9.3 %  HI   % Eosinophils 5.0 %  HI   % Basophils 0.5 %  HI   % Immature Granulocytes 0.2 %  HI   Nucleated RBCs 0 0 /100  HI   Absolute Neutrophil 2.4 1.6 - 8.3 10e9/L  HI   Absolute Lymphocytes 2.5 0.8 - 5.3 10e9/L  HI   Absolute Monocytes 0.5 0.0 - 1.3 10e9/L  HI   Absolute Eosinophils 0.3 0.0 - 0.7 10e9/L  HI   Absolute Basophils 0.0 0.0 - 0.2 10e9/L  HI   Abs Immature Granulocytes 0.0 0 - 0.4 10e9/L  HI   Absolute Nucleated RBC 0.0   HI            Clostridium difficile toxin B PCR [[id number]]  Resulted: 01/03/18 1613, Result status: Final result    Ordering provider: Leigh Ann Warren MD  01/03/18 0632  Resulting lab: [facility]    Specimen Information    Type Source Collected On   Feces  01/03/18 1434          Components       Value Reference Range Flag Lab   Specimen Description Feces   HI   C Diff Toxin B PCR Negative NEG^Negative  HI   Comment:         Negative: Clostridium difficile target DNA sequences NOT detected, presumed   negative for Clostridium difficile toxin B or the number of bacteria present   may be below the limit of detection for the test.  FDA approved assay performed using [employer] GeneXpert real-time PCR.  A negative result does not exclude actual disease due to Clostridium difficile   and may be due to improper collection, handling and storage of the specimen   or the number of organisms in the specimen is below the detection limit of the   assay.              Testing Performed By     Lab - Abbreviation Name Director Address Valid Date Range    210 - HI [facility] Unknown [address] [place] [id number] 05/08/15 1057 - Present               Imaging Results - 3 Days      MR Brain w/o & w Contrast [[id number]]  Resulted: 01/02/18 1014, Result status: Final result    Ordering provider: Leigh Ann Warren MD  01/01/18 1458 Resulted by: Ace Hedrick MD    Performed: 01/02/18 0909 - 01/02/18 1000 Resulting lab: RADIOLOGY RESULTS    Narrative:       PROCEDURE: MR BRAIN W/O & W CONTRAST 1/2/2018 10:00 AM    HISTORY: frequent falls but on exam R weaker than left (upper and  lower extremities);     COMPARISONS: None.    Meds/Dose Given: Gadavist  10 mL    TECHNIQUE: Images were obtained sagittally T1 weighted images were  obtained axially diffusion gradient echo FLAIR T1 and T2-weighted  images were obtained axially sagittally coronally T1 weighted  following gadolinium administration report    FINDINGS: There are multiple white matter lesions in both hemispheres  most severe in the periventricular distribution white matter lesions  are also seen in the  brainstem including the right and left meg the  right and left medulla. There is a ringlike enhancement associated  with one of the right matter lesions in the centrum semiovale on the  left.. There is enlargement of the cortical sulci and ventricular  system consistent with atrophy. 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 There is mucosal thickening seen in both maxillary ethmoid and  frontal sinuses. There is mucosal thickening seen in the left sphenoid  sinus.         Impression:       IMPRESSION: Multiple white matter lesions in both hemispheres of the  suggestive of a white matter disease such as multiple sclerosis. One  particular white matter lesion without mass effect is seen to enhance  in a ringlike fashion in the left centrum semiovale. White matter  lesions are also noted. The brainstem particularly in the left side of  the medulla the craniocervical junction and at the right side of the  medulla at the level the olivary nuclei    MENDEZ MATAMOROS MD      Testing Performed By     Lab - Abbreviation Name Director Address Valid Date Range    104 - Rad Rslts RADIOLOGY RESULTS Unknown Unknown 02/16/05 1553 - Present                     Jose Antonio Marmolejo #[id number] (CSN: [id number])  (72 year old M)  (Adm: 12/31/17)     [id number]           Encounter-Level Documents:     There are no encounter-level documents.      Order-Level Documents - 12/31/2017:     Scan on 1/2/2018  2:43 PM by Outside, Provider : FINAL EKG (below)            on 1/2/2018  2:39 PM by Outside, Provider : FINAL EKG]      Jose Antonio Marmolejo #[id number] (CSN: [id number])  (72 year old M)  (Adm: 12/31/17)     [id number]            Scan on 1/2/2018  2:39 PM by Outside, Provider : FINAL EKG (below)

## 2017-12-31 NOTE — PROGRESS NOTES
Consult received for diabetes nutrition instruction.    Dietitian is available this week Tuesday through Friday. Dietitian will complete diet education when returns on Tuesday. If patient is discharged before that time will provide free education in person, over the phone, or in the mail if patient desires.    Most recent Hgb A1c was 6.5 on 8/18/16. Noted Hgb A1c lab has been ordered.    Cony Scott RD, LD

## 2017-12-31 NOTE — IP AVS SNAPSHOT
MRN:[id number]                      After Visit Summary   12/31/2017    Jose Antonio Marmolejo    MRN: [id number]           Thank you!     Thank you for choosing [facility] for your care. Our goal is always to provide you with excellent care. Hearing back from our patients is one way we can continue to improve our services. Please take a few minutes to complete the written survey that you may receive in the mail after you visit with us. Thank you!        Patient Information     Date Of Birth          1945        Designated Caregiver       Most Recent Value    Caregiver    Will someone help with your care after discharge? no [lives with son but son works]      About your hospital stay     You were admitted on:  December 31, 2017 You last received care in the:  [facility]    You were discharged on:  January 5, 2018        Reason for your hospital stay       Weakness and frequent falls                  Who to Call     For medical emergencies, please call 911.  For non-urgent questions about your medical care, please call your primary care provider or clinic, None          Attending Provider     Provider Specialty    Valeriano Vick MD Emergency Medicine    Shona Donahue MD Family Practice    Kristofer Sena MD HOSPITALIST    Leigh Ann Warren MD Internal Medicine       Primary Care Provider Fax #    Physician No Ref-Primary [phone]      After Care Instructions     Activity - Ambulate in hallway       Every shift            Activity - Up with assistive device       Per [facility] physical therpay            Advance Diet as Tolerated       Follow this diet upon discharge: High consistent carbohydrate (0175-7531 lucina / 4-7 CHO units per meal)  Pt denies DM, but he is early stage - diet controlled diabetic            General info for SNF       Length of Stay Estimate: Short Term Care: Estimated # of Days <30  Condition at Discharge: Improving  Level of care:skilled   Rehabilitation Potential: Good  Admission H&P  remains valid and up-to-date: Yes  Recent Chemotherapy: N/A  Use Nursing Home Standing Orders: Yes            Glucose monitor nursing POCT       Before meals and at bedtime            Mantoux instructions       Give two-step Mantoux (PPD) Per Facility Policy Yes            Weight bearing status                 Follow-up Appointments     Follow Up and recommended labs and tests       Follow up with long term physician.  The following labs/tests are recommended: CBC and BMP (K and creatinine nikki since he just started on Lisinopril and HCTZ).                  Your next 10 appointments already scheduled     Jan 30, 2018  1:00 PM CST   (Arrive by 12:45 PM)   Office Visit with Juanita Briggs MD   [facility] ([facility] )    [address]  [place] [address]   [phone]           Bring a current list of meds and any records pertaining to this visit. For Physicals, please bring immunization records and any forms needing to be filled out. Please arrive 10 minutes early to complete paperwork.              Additional Services     NEUROLOGY ADULT REFERRAL       Your provider has referred you for the following:   Primary will arrange  neurology consult at earliest possible time and most convenient location.     Please be aware that coverage of these services is subject to the terms and limitations of your health insurance plan.  Call member services at your health plan with any benefit or coverage questions.      Please bring the following with you to your appointment:    (1) Any X-Rays, CTs or MRIs which have been performed.  Contact the facility where they were done to arrange for  prior to your scheduled appointment.    (2) List of current medications  (3) This referral request   (4) Any documents/labs given to you for this referral            Occupational Therapy Adult Consult       Evaluate and treat as clinically indicated.    Reason:  Frequent falls, weakness             Physical Therapy Adult Consult       Evaluate and treat as clinically indicated.    Reason:  LE weakness, frequent falls                  Future tests that were ordered for you     DIABETES EDUCATION - Individual  []       Ordering Instructions for Diabetic Educators - please enter the correct order QUANTITY based on the followin minutes (1/2 hour), Enter QUANTITY of 1    60 minutes (1 hour), Enter QUANTITY of 2    90 minutes (1 1/2 hours), Enter QUANTITY of 3  120 minutes (2 hours), Enter QUANTITY of 4  150 minutes (2 1/2 hours), Enter QUANTITY of 5  180 minutes (3 hours), Enter QUANTITY of 6                    Further instructions from your care team       MRI Contrast Discharge Instructions    The IV contrast you received today will pass out of your body in your  urine. This will happen in the next 24 hours. You will not feel this process.  Your urine will not change color.    Drink at least 4 extra glasses of water or juice today (unless your doctor  has restricted your fluids). This reduces the stress on your kidneys.  You may take your regular medicines.    If you are on dialysis: It is best to have dialysis today.    If you have a reaction: Most reactions happen right away. If you have  any new symptoms after leaving the hospital (such as hives or swelling),  call your hospital at the correct number below. Or call your family doctor.  If you have breathing distress or wheezing, call 911.    Special instructions: NA    I have read and understand the above information.    Signature:______________________________________ Date:_____3-3-04______    Staff:__________________________________________ Date:___________     Time:__________    [facility] Departments:    ___[facility]: [phone]  ___[facility]: [phone]  ___[facility]: [phone] ___[facility]: [phone]  ___[facility]: [phone]  ___[facility]: [phone]  ___[facility] [phone]  ___[facility]:  "[phone]  ___[phone]    Pending Results     No orders found from 2017 to 2018.            Statement of Approval     Ordered          18 1338  I have reviewed and agree with all the recommendations and orders detailed in this document.  EFFECTIVE NOW     Approved and electronically signed by:  Leigh Ann Warren MD             Admission Information     Date & Time Provider Department Dept. Phone    2017 Leigh Ann Warren MD [facility] [phone]      Your Vitals Were     Blood Pressure Pulse Temperature Respirations Height Weight    159/82 79 97.6  F (36.4  C) (Tympanic) 18 1.778 m (5' 10\") 118.4 kg (261 lb 0.4 oz)    Pulse Oximetry BMI (Body Mass Index)                96% 37.45 kg/m2          MyChart Information     [employer] lets you send messages to your doctor, view your test results, renew your prescriptions, schedule appointments and more. To sign up, go to [email or web address] . Click on \"Log in\" on the left side of the screen, which will take you to the Welcome page. Then click on \"Sign up Now\" on the right side of the page.     You will be asked to enter the access code listed below, as well as some personal information. Please follow the directions to create your username and password.     Your access code is: [email or web address]  Expires: 2018  1:42 PM     Your access code will  in 90 days. If you need help or a new code, please call your [facility] clinic or [phone].        Care EveryWhere ID     This is your Care EveryWhere ID. This could be used by other organizations to access your [facility] medical records  [id number]        Equal Access to Services     JEFF SALINAS : Bailey Brower, rochelle fletcher, chelle story, viki patterson. So [facility] [phone].    ATENCIÓN: Si habla español, tiene a garcia disposición servicios gratuitos de asistencia lingüística. Llame al [phone].    We comply with " applicable federal civil rights laws and [place] laws. We do not discriminate on the basis of race, color, national origin, age, disability, sex, sexual orientation, or gender identity.               Review of your medicines      START taking        Dose / Directions    amLODIPine 10 MG tablet   Commonly known as:  NORVASC   Used for:  Essential hypertension        Dose:  10 mg   Take 1 tablet (10 mg) by mouth daily   Quantity:  30 tablet   Refills:  0       hydrochlorothiazide 12.5 MG capsule   Commonly known as:  MICROZIDE   Used for:  Essential hypertension        Dose:  12.5 mg   Take 1 capsule (12.5 mg) by mouth daily   Quantity:  30 capsule   Refills:  0       ibuprofen 600 MG tablet   Commonly known as:  ADVIL/MOTRIN        Dose:  600 mg   Take 1 tablet (600 mg) by mouth every 6 hours as needed for other (mild pain)   Quantity:  120 tablet   Refills:  0       lisinopril 40 MG tablet   Commonly known as:  PRINIVIL/ZESTRIL   Used for:  Essential hypertension        Dose:  40 mg   Take 1 tablet (40 mg) by mouth daily   Quantity:  30 tablet   Refills:  0       metoprolol 50 MG 24 hr tablet   Commonly known as:  TOPROL-XL   Used for:  Essential hypertension        Dose:  50 mg   Take 1 tablet (50 mg) by mouth daily   Quantity:  30 tablet   Refills:  0       senna-docusate 8.6-50 MG per tablet   Commonly known as:  SENOKOT-S;PERICOLACE   Used for:  Chronic idiopathic constipation        Dose:  1 tablet   Take 1 tablet by mouth 2 times daily as needed for constipation   Quantity:  100 tablet   Refills:  0         CONTINUE these medicines which may have CHANGED, or have new prescriptions. If we are uncertain of the size of tablets/capsules you have at home, strength may be listed as something that might have changed.        Dose / Directions    * miconazole 2 % powder   Commonly known as:  MICATIN; MICRO GUARD   This may have changed:  Another medication with the same name was added. Make sure you understand how  and when to take each.   Used for:  Yeast infection of the skin        Apply topically 2 times daily   Quantity:  90 g   Refills:  1       * miconazole 2 % powder   Commonly known as:  MICATIN; MICRO GUARD   This may have changed:  You were already taking a medication with the same name, and this prescription was added. Make sure you understand how and when to take each.   Used for:  Yeast infection of the skin        Apply topically 2 times daily   Refills:  0       * Notice:  This list has 2 medication(s) that are the same as other medications prescribed for you. Read the directions carefully, and ask your doctor or other care provider to review them with you.      CONTINUE these medicines which have NOT CHANGED        Dose / Directions    Adult Blood Pressure Cuff Lg Kit   Used for:  Malignant essential hypertension        Dose:  1 each   1 each 2 times daily   Quantity:  1 kit   Refills:  0            Where to get your medicines      Some of these will need a paper prescription and others can be bought over the counter. Ask your nurse if you have questions.     You don't need a prescription for these medications     amLODIPine 10 MG tablet    hydrochlorothiazide 12.5 MG capsule    ibuprofen 600 MG tablet    lisinopril 40 MG tablet    metoprolol 50 MG 24 hr tablet    miconazole 2 % powder    senna-docusate 8.6-50 MG per tablet                Protect others around you: Learn how to safely use, store and throw away your medicines at www.disposemymeds.org.             Medication List: This is a list of all your medications and when to take them. Check marks below indicate your daily home schedule. Keep this list as a reference.      Medications           Morning Afternoon Evening Bedtime As Needed    Adult Blood Pressure Cuff Lg Kit   1 each 2 times daily                                amLODIPine 10 MG tablet   Commonly known as:  NORVASC   Take 1 tablet (10 mg) by mouth daily   Last time this was given:  10 mg on  1/5/2018  6:53 AM                                hydrochlorothiazide 12.5 MG capsule   Commonly known as:  MICROZIDE   Take 1 capsule (12.5 mg) by mouth daily   Last time this was given:  12.5 mg on 1/5/2018  7:46 AM                                ibuprofen 600 MG tablet   Commonly known as:  ADVIL/MOTRIN   Take 1 tablet (600 mg) by mouth every 6 hours as needed for other (mild pain)   Last time this was given:  600 mg on 1/5/2018  9:52 AM                                lisinopril 40 MG tablet   Commonly known as:  PRINIVIL/ZESTRIL   Take 1 tablet (40 mg) by mouth daily   Last time this was given:  40 mg on 1/5/2018  6:52 AM                                metoprolol 50 MG 24 hr tablet   Commonly known as:  TOPROL-XL   Take 1 tablet (50 mg) by mouth daily   Last time this was given:  50 mg on 1/5/2018  7:47 AM                                * miconazole 2 % powder   Commonly known as:  MICATIN; MICRO GUARD   Apply topically 2 times daily   Last time this was given:  1/4/2018  8:57 PM                                * miconazole 2 % powder   Commonly known as:  MICATIN; MICRO GUARD   Apply topically 2 times daily   Last time this was given:  1/4/2018  8:57 PM                                senna-docusate 8.6-50 MG per tablet   Commonly known as:  SENOKOT-S;PERICOLACE   Take 1 tablet by mouth 2 times daily as needed for constipation                                * Notice:  This list has 2 medication(s) that are the same as other medications prescribed for you. Read the directions carefully, and ask your doctor or other care provider to review them with you.              More Information        Fall Prevention  Falls often occur due to slipping, tripping or losing your balance. Millions of people fall every year and injure themselves. Here are ways to reduce your risk of falling again.    Think about your fall, was there anything that caused your fall that can be fixed, removed, or replaced?    Make your home safe by  keeping walkways clear of objects you may trip over.    Use non-slip pads under rugs. Do not use area rugs or small throw rugs.    Use non-slip mats in bathtubs and showers.    Install handrails and lights on staircases.    Do not walk in poorly lit areas.    Do not stand on chairs or wobbly ladders.    Use caution when reaching overhead or looking upward. This position can cause a loss of balance.    Be sure your shoes fit properly, have non-slip bottoms and are in good condition.     Wear shoes both inside and out. Avoid going barefoot or wearing slippers.    Be cautious when going up and down stairs, curbs, and when walking on uneven sidewalks.    If your balance is poor, consider using a cane or walker.    If your fall was related to alcohol use, stop or limit alcohol intake.     If your fall was related to use of sleeping medicines, talk to your doctor about this. You may need to reduce your dosage at bedtime if you awaken during the night to go to the bathroom.      To reduce the need for nighttime bathroom trips:    Avoid drinking fluids for several hours before going to bed    Empty your bladder before going to bed    Men can keep a urinal at the bedside    Stay as active as you can. Balance, flexibility, strength, and endurance all come from exercise. They all play a role in preventing falls. Ask your healthcare provider which types of activity are right for you.    Get your vision checked on a regular basis.    If you have pets, know where they are before you stand up or walk so you don't trip over them.    Use night lights.  Date Last Reviewed: 11/5/2015 2000-2017 [employer]. [address] [place] [address], [place] [address]. All rights reserved. This information is not intended as a substitute for professional medical care. Always follow your healthcare professional's instructions.                Diabetes: Activity Tips    Being more active can help you manage your diabetes. The tips on this  sheet can help you get the most from your exercise. They can also help you stay safe.  Staying Active  It s important for adults to spend less time sitting and being inactive. This is especially true if you have type 2 diabetes. When you are sitting for long periods of time, get up for short sessions of light activity every 30 minutes.  You should aim for at least 150 minutes a week of exercise or physical activity. Don t let more than 2 days go by without being active.  Benefit from briskness  Brisk activity gets your heart beating faster. This can help you increase your fitness, lose extra weight, and manage your blood sugar level. Try brisk walking. Or, if you have foot or leg problems, you can try swimming or bike riding. You can break up your exercise into chunks throughout the day. Work up to at least 30 minutes of steady, brisk exercise on most days.  Warm up and cool down  Warming up and cooling down reduce your risk of injury. They also help limit muscle soreness. Do a mild version of your activity for 5 minutes before and after your routine. You can also learn stretches that will help keep your muscles loose. Your healthcare provider may show you good ways to warm up and stretch.  Do the talk-sing test  The talk-sing test is a simple way to tell how hard you re exercising. If you can talk while exercising, you re in a safe range. If you re out of breath, slow down. If you can carry a tune, it s time to  the pace. Walk up a hill. Increase the resistance on your stationary bike. Or swim faster.  What about eating?  You may be told to plan your exercise for 1 to 2 hours after a meal. In most cases, you don t need to eat while being active. If you take insulin or medicine that can cause low blood sugar, test your blood sugar before exercising. And carry a fast-acting sugar that will raise your blood sugar level quickly. This includes glucose tablets or hard candy. Use it if you feel low blood sugar  symptoms.  Safety tips  These tips can help you stay safe as you become fit:    Exercise with a friend or carry a cell phone if you have one.    Carry or wear identification, such as a necklace or bracelet, that says you have diabetes.    Use the proper footwear and safety equipment for your activity.    Drink water before, during, and after exercise.    Dress properly for the weather.    Don t exercise in very hot or very cold weather.    Don t exercise if you are sick.    If you are instructed to do so, test your blood sugar before and after you exercise. Have a small carbohydrate snack if your blood sugar is low before you start exercising.   When to stop exercising and call your healthcare provider  Stop exercising and call your healthcare provider right away if you notice any of the following:    Pain, pressure, tightness, or heaviness in the chest    Pain or heaviness in the neck, shoulders, back, arms, legs, or feet    Unusual shortness of breath    Dizziness or lightheadedness    Unusually rapid or slow pulse    Increased joint or muscle pain    Nausea or vomiting  Date Last Reviewed: 5/1/2016 2000-2017 [employer]. [address] [place] [address]. All rights reserved. This information is not intended as a substitute for professional medical care. Always follow your healthcare professional's instructions.                Preventing Falls: Moving Safely Using a Cane or Walker     When using a cane, keep it away from your feet so you don t trip.     A walking aid, such as a cane or walker, can help you stay more independent and avoid falls. Remember to keep your walking aid within easy reach when you're in a chair or in bed. And learn how to use it safely so you don't injure yourself. Be sure the cane or walker is the correct height. Hang your arm loosely at your side, and measure the distance from your wrist to the floor. The distance should be the same as the height of your cane or  walker.  Using a cane  If you have a stronger side, hold the cane on the side of your stronger leg.  1. Get your balance.  2. Move the cane and your weaker leg forward.  3. Support your weight on both the cane and your weaker side.  4. Step with your stronger leg.  5. Start again from step 1.  Using a walker  1. Roll the walker (or lift it, if you're using one without wheels) forward about 12 inches.  2. Step forward with your weaker leg first.  3. Use the walker to help keep your balance.  4. Bring your other foot forward to the center of the walker.  5. Start again from step 1.  Helpful tips    Check with your healthcare provider about the right walking aid to use. Ask about a walker with a seat attached.    Check the tips of your cane or walker to make sure they have nonskid covers.    Move slowly from room to room. Don't rush.    Sit down to get dressed.    Use a minoo pack or backpack to keep your hands free.    Get help for jobs that mean climbing, even on a stepstool.    Do not try going up or down stairs using a walker.   Date Last Reviewed: 5/1/2017 2000-2017 [employer]. [address] [place] [address], [place] [id number]. All rights reserved. This information is not intended as a substitute for professional medical care. Always follow your healthcare professional's instructions.                Be Involved in Your Health Care: Taking Medicines    When medicines are taken as directed, they can greatly improve your health. But if they are not taken as instructed, they may not work. In some cases, not taking them correctly can be harmful. To help make sure that your treatment remains effective and safe, understand your medicines and how to take them.  Reviewing your medicines  Medicines can interact with each other. They can also interact with herbal remedies and supplements. In either case, it can be harmful to your health. This is why your healthcare provider needs to know about every medicine, herb,  and supplement that you take. Schedule a visit with your healthcare provider or pharmacist to discuss all your medicines. When you go in for your visit, have either one of the following:    A bag filled with bottles of all your medicines. Include all prescription and over-the-counter medicines. Also include any vitamins and herbal remedies that you take. Keep medicines in their labeled bottles.  OR    A list of all the medicines, vitamins, herbs, and supplements that you take. Be sure to list how much you take and how often you take it.  Your healthcare provider or pharmacist will review your medicines with you. He or she can decide whether you are at risk for any medicine interactions. Depending on what you take, your prescriptions may be adjusted.  Remembering to take medicines  Remembering to take medicines on time can be hard. Here are some tips to help you:    Develop a routine. For example, take your medicine at the same time each day, such as after you brush your teeth. This helps remind you to take it.    Schedule reminders on your computer, PDA, watch, or cell phone.    If you take more than one medicine, use a pillbox. Get one that lists the days of the week. If you take pills more than once a day, get a pillbox that has spaces for morning, noon, and evening. As you fill the pillbox, have the bottles with labels in front of you. This helps you avoid confusing pills that look alike.    Keep your medicine routine when you re away from home. When traveling, make sure you bring enough for your entire trip. When traveling by air, keep your medicines in your carry-on. Be sure you have your prescription labels (either the original package or a photocopy). To learn more about traveling with medicines, visit: www.tsa.gov.  Working with your healthcare provider  Follow up with your healthcare provider. This lets him or her see how well you are doing on the medicine. It may take a few tries to find the right dosage,  medicine, or combination of medicines for you. Make sure you talk with your healthcare provider about the medicines you take and how they make you feel. And never stop taking a medicine without talking to your healthcare provider first. Some medicines cannot be stopped suddenly. Others can cause problems if they are not taken for the prescribed amount of time.  Taking medicine safely  Below are some tips for taking medicine safely. If you have any questions about your medicines, call your healthcare provider or pharmacist:    Make sure to refill your prescription while you still have some left, so you don t run out. Ask your healthcare provider for an  extra  written prescription in case of an emergency. If you use mail order, be sure to order with enough time for the refill to arrive while you still have some left.    Be sure refills are correct before taking them.    Hold on to the instructions that come with each medicine.    Don t take less than prescribed to save money. Talk to your healthcare provider if you can t afford your medicines. There are choices that can help you.    Never share medicines.    Store medicines in a cool, dry, dark place. A steamy bathroom is often not the best place.  Date Last Reviewed: 2/13/2016 2000-2017 [employer]. [address] [place] [address], [place] [address]. All rights reserved. This information is not intended as a substitute for professional medical care. Always follow your healthcare professional's instructions.                Preventing the Spread of Infection: Understanding Isolation Procedures  Certain infections can spread from person to person. This is why your friend or family member may be put in a special room. Restrictions may be placed on who can go in and out of that room and what protection must be worn. This is for your protection and the patient s protection. Read this sheet to learn more.  How infection spreads  Infection is caused by germs. An  infected person carries germs that he or she can give to others. Even a person who doesn t feel sick can still carry and spread germs. Germs can cause infection by traveling through the air, through direct contact or on the surface of objects such as a door handle, bed railing, or tabletop. The rules about when and who can visit your friend or family member depend on what kind of infection he or she has.  Precautions help prevent the spread of infection  To stop infection from spreading, healthcare workers may do 1 or more of the following:    Place an infected patient in a private room, or in a room with others who have exactly the same infection. (This depends on what kind of infection the person has.)    Place restrictions on who can enter and exit this room.    Wear a mask and eye protection or a face shield, gloves, gown, or other items, and ask you to do the same when you visit.    Wear an air filter (respirator) for some infections, and ask you to do the same when you visit.  What you can do    You may be asked to wear a mask and eye protection or a face shield, gloves, gown, or other items when you visit. Follow any instructions carefully.    Practice good hand hygiene, especially after using the bathroom and before and after touching the person or his or her surroundings. Good hand hygiene means washing your hands with soap and water or using alcohol-based gels or foams.    Keep your hands away from your face.    Cough or sneeze into a tissue. Then throw the tissue away and wash your hands. If you don't have a tissue, cough or sneeze into your elbow.    Do not use the person s bathroom.    Do not visit someone if you feel sick. Do not visit if you have been exposed to an illness such as the flu, chickenpox, or measles.     Date Last Reviewed: 12/1/2016 2000-2017 [employer]. [address] [place] [address], [place] [address]. All rights reserved. This information is not intended as a substitute for  professional medical care. Always follow your healthcare professional's instructions.

## 2017-12-31 NOTE — ED NOTES
Patient presents to emergency room via [facility] with complaints of falls and back pain. EMS reports 3 calls to his residence for falls with lift assists in the past 24 hours. Pt states his son's family just moved in with him to help him. C/o dizziness. C/o cough x 2 days. Non productive. Denies shortness of breath. Dyspneic with talking to staff. Confused. Negative fast exam. C/o lower back pain. States one of the falls he did hit his head. Denies LOC. States he quit taking his medications because he lost a 100 lbs. No edema noted. Abrasions noted to bilateral elbows. Denies chest pain. Placed on monitors. Tachycardia noted. BP elevated.

## 2018-01-01 ENCOUNTER — NURSING HOME VISIT (OUTPATIENT)
Dept: FAMILY MEDICINE | Facility: OTHER | Age: 73
End: 2018-01-01
Payer: COMMERCIAL

## 2018-01-01 ENCOUNTER — RADIANT APPOINTMENT (OUTPATIENT)
Dept: GENERAL RADIOLOGY | Facility: OTHER | Age: 73
End: 2018-01-01
Attending: FAMILY MEDICINE
Payer: COMMERCIAL

## 2018-01-01 ENCOUNTER — RESULTS ONLY (OUTPATIENT)
Dept: LAB | Age: 73
End: 2018-01-01

## 2018-01-01 ENCOUNTER — APPOINTMENT (OUTPATIENT)
Dept: MRI IMAGING | Facility: HOSPITAL | Age: 73
DRG: 603 | End: 2018-01-01
Attending: INTERNAL MEDICINE
Payer: COMMERCIAL

## 2018-01-01 ENCOUNTER — TRANSFERRED RECORDS (OUTPATIENT)
Dept: HEALTH INFORMATION MANAGEMENT | Facility: CLINIC | Age: 73
End: 2018-01-01

## 2018-01-01 ENCOUNTER — HOSPITAL ENCOUNTER (INPATIENT)
Facility: HOSPITAL | Age: 73
LOS: 5 days | Discharge: NURSING, REHAB OR LONG-TERM CARE | DRG: 603 | End: 2018-07-09
Attending: PHYSICIAN ASSISTANT | Admitting: INTERNAL MEDICINE
Payer: COMMERCIAL

## 2018-01-01 ENCOUNTER — APPOINTMENT (OUTPATIENT)
Dept: GENERAL RADIOLOGY | Facility: HOSPITAL | Age: 73
End: 2018-01-01
Attending: PHYSICIAN ASSISTANT
Payer: COMMERCIAL

## 2018-01-01 ENCOUNTER — HOSPITAL ENCOUNTER (OUTPATIENT)
Facility: HOSPITAL | Age: 73
Setting detail: OBSERVATION
Discharge: NURSING, REHAB OR LONG-TERM CARE | End: 2018-03-22
Attending: PHYSICIAN ASSISTANT | Admitting: HOSPITALIST
Payer: COMMERCIAL

## 2018-01-01 ENCOUNTER — APPOINTMENT (OUTPATIENT)
Dept: PHYSICAL THERAPY | Facility: HOSPITAL | Age: 73
DRG: 603 | End: 2018-01-01
Payer: COMMERCIAL

## 2018-01-01 ENCOUNTER — TELEPHONE (OUTPATIENT)
Dept: CASE MANAGEMENT | Facility: HOSPITAL | Age: 73
End: 2018-01-01

## 2018-01-01 ENCOUNTER — TELEPHONE (OUTPATIENT)
Dept: FAMILY MEDICINE | Facility: OTHER | Age: 73
End: 2018-01-01

## 2018-01-01 ENCOUNTER — OFFICE VISIT (OUTPATIENT)
Dept: FAMILY MEDICINE | Facility: OTHER | Age: 73
End: 2018-01-01
Attending: FAMILY MEDICINE
Payer: COMMERCIAL

## 2018-01-01 ENCOUNTER — PATIENT OUTREACH (OUTPATIENT)
Dept: CARE COORDINATION | Facility: OTHER | Age: 73
End: 2018-01-01

## 2018-01-01 ENCOUNTER — ANESTHESIA EVENT (OUTPATIENT)
Dept: MEDSURG UNIT | Facility: HOSPITAL | Age: 73
DRG: 603 | End: 2018-01-01
Payer: COMMERCIAL

## 2018-01-01 ENCOUNTER — HOSPITAL ENCOUNTER (EMERGENCY)
Facility: HOSPITAL | Age: 73
Discharge: NURSING, REHAB OR LONG-TERM CARE | End: 2018-07-26
Attending: INTERNAL MEDICINE | Admitting: INTERNAL MEDICINE
Payer: COMMERCIAL

## 2018-01-01 ENCOUNTER — ANTICOAGULATION THERAPY VISIT (OUTPATIENT)
Dept: ANTICOAGULATION | Facility: OTHER | Age: 73
End: 2018-01-01
Payer: MEDICAID

## 2018-01-01 ENCOUNTER — APPOINTMENT (OUTPATIENT)
Dept: PHYSICAL THERAPY | Facility: HOSPITAL | Age: 73
End: 2018-01-01
Payer: COMMERCIAL

## 2018-01-01 ENCOUNTER — APPOINTMENT (OUTPATIENT)
Dept: OCCUPATIONAL THERAPY | Facility: HOSPITAL | Age: 73
DRG: 603 | End: 2018-01-01
Attending: INTERNAL MEDICINE
Payer: COMMERCIAL

## 2018-01-01 ENCOUNTER — APPOINTMENT (OUTPATIENT)
Dept: INTERVENTIONAL RADIOLOGY/VASCULAR | Facility: HOSPITAL | Age: 73
DRG: 603 | End: 2018-01-01
Attending: INTERNAL MEDICINE
Payer: COMMERCIAL

## 2018-01-01 ENCOUNTER — MEDICAL CORRESPONDENCE (OUTPATIENT)
Dept: HEALTH INFORMATION MANAGEMENT | Facility: CLINIC | Age: 73
End: 2018-01-01

## 2018-01-01 ENCOUNTER — APPOINTMENT (OUTPATIENT)
Dept: PHYSICAL THERAPY | Facility: HOSPITAL | Age: 73
End: 2018-01-01
Attending: INTERNAL MEDICINE
Payer: COMMERCIAL

## 2018-01-01 ENCOUNTER — HOSPITAL ENCOUNTER (OUTPATIENT)
Facility: HOSPITAL | Age: 73
Setting detail: OBSERVATION
Discharge: NURSING, REHAB OR LONG-TERM CARE | End: 2018-05-07
Attending: PHYSICIAN ASSISTANT | Admitting: STUDENT IN AN ORGANIZED HEALTH CARE EDUCATION/TRAINING PROGRAM
Payer: COMMERCIAL

## 2018-01-01 ENCOUNTER — TELEPHONE (OUTPATIENT)
Dept: INFUSION THERAPY | Facility: OTHER | Age: 73
End: 2018-01-01

## 2018-01-01 ENCOUNTER — APPOINTMENT (OUTPATIENT)
Dept: GENERAL RADIOLOGY | Facility: HOSPITAL | Age: 73
End: 2018-01-01
Attending: INTERNAL MEDICINE
Payer: COMMERCIAL

## 2018-01-01 ENCOUNTER — INFUSION THERAPY VISIT (OUTPATIENT)
Dept: INFUSION THERAPY | Facility: OTHER | Age: 73
End: 2018-01-01
Attending: FAMILY MEDICINE
Payer: COMMERCIAL

## 2018-01-01 ENCOUNTER — HOSPITAL ENCOUNTER (EMERGENCY)
Facility: HOSPITAL | Age: 73
Discharge: HOME | End: 2018-07-30
Attending: FAMILY MEDICINE | Admitting: FAMILY MEDICINE
Payer: COMMERCIAL

## 2018-01-01 ENCOUNTER — HOSPITAL ENCOUNTER (EMERGENCY)
Facility: HOSPITAL | Age: 73
Discharge: ANOTHER HOSPITAL | End: 2018-08-29
Attending: INTERNAL MEDICINE | Admitting: INTERNAL MEDICINE
Payer: COMMERCIAL

## 2018-01-01 ENCOUNTER — OFFICE VISIT (OUTPATIENT)
Dept: ORTHOPEDICS | Facility: OTHER | Age: 73
End: 2018-01-01
Attending: FAMILY MEDICINE
Payer: COMMERCIAL

## 2018-01-01 ENCOUNTER — APPOINTMENT (OUTPATIENT)
Dept: CT IMAGING | Facility: HOSPITAL | Age: 73
End: 2018-01-01
Attending: INTERNAL MEDICINE
Payer: COMMERCIAL

## 2018-01-01 ENCOUNTER — NURSING HOME VISIT (OUTPATIENT)
Dept: FAMILY MEDICINE | Facility: OTHER | Age: 73
End: 2018-01-01
Payer: MEDICAID

## 2018-01-01 ENCOUNTER — HOSPITAL ENCOUNTER (OUTPATIENT)
Facility: HOSPITAL | Age: 73
Setting detail: OBSERVATION
Discharge: ANOTHER HOSPITAL | End: 2018-11-01
Attending: PHYSICIAN ASSISTANT | Admitting: INTERNAL MEDICINE
Payer: COMMERCIAL

## 2018-01-01 ENCOUNTER — APPOINTMENT (OUTPATIENT)
Dept: PHYSICAL THERAPY | Facility: HOSPITAL | Age: 73
DRG: 603 | End: 2018-01-01
Attending: INTERNAL MEDICINE
Payer: COMMERCIAL

## 2018-01-01 ENCOUNTER — ALLIED HEALTH/NURSE VISIT (OUTPATIENT)
Dept: EMERGENCY MEDICINE | Facility: HOSPITAL | Age: 73
End: 2018-01-01

## 2018-01-01 ENCOUNTER — TELEPHONE (OUTPATIENT)
Dept: WOUND CARE | Facility: HOSPITAL | Age: 73
End: 2018-01-01

## 2018-01-01 ENCOUNTER — APPOINTMENT (OUTPATIENT)
Dept: CT IMAGING | Facility: HOSPITAL | Age: 73
End: 2018-01-01
Attending: HOSPITALIST
Payer: COMMERCIAL

## 2018-01-01 ENCOUNTER — APPOINTMENT (OUTPATIENT)
Dept: CT IMAGING | Facility: HOSPITAL | Age: 73
End: 2018-01-01
Attending: PHYSICIAN ASSISTANT
Payer: COMMERCIAL

## 2018-01-01 ENCOUNTER — ANTICOAGULATION THERAPY VISIT (OUTPATIENT)
Dept: ANTICOAGULATION | Facility: OTHER | Age: 73
End: 2018-01-01

## 2018-01-01 ENCOUNTER — APPOINTMENT (OUTPATIENT)
Dept: OCCUPATIONAL THERAPY | Facility: HOSPITAL | Age: 73
DRG: 603 | End: 2018-01-01
Payer: COMMERCIAL

## 2018-01-01 ENCOUNTER — DOCUMENTATION ONLY (OUTPATIENT)
Dept: FAMILY MEDICINE | Facility: OTHER | Age: 73
End: 2018-01-01

## 2018-01-01 ENCOUNTER — HOSPITAL ENCOUNTER (EMERGENCY)
Facility: HOSPITAL | Age: 73
Discharge: HOME | End: 2018-08-13
Attending: NURSE PRACTITIONER | Admitting: NURSE PRACTITIONER
Payer: COMMERCIAL

## 2018-01-01 ENCOUNTER — ANESTHESIA (OUTPATIENT)
Dept: MEDSURG UNIT | Facility: HOSPITAL | Age: 73
DRG: 603 | End: 2018-01-01
Payer: COMMERCIAL

## 2018-01-01 ENCOUNTER — APPOINTMENT (OUTPATIENT)
Dept: MRI IMAGING | Facility: HOSPITAL | Age: 73
End: 2018-01-01
Attending: INTERNAL MEDICINE
Payer: COMMERCIAL

## 2018-01-01 VITALS
DIASTOLIC BLOOD PRESSURE: 68 MMHG | WEIGHT: 267.64 LBS | TEMPERATURE: 98 F | RESPIRATION RATE: 18 BRPM | BODY MASS INDEX: 38.32 KG/M2 | SYSTOLIC BLOOD PRESSURE: 135 MMHG | OXYGEN SATURATION: 94 % | HEIGHT: 70 IN | HEART RATE: 71 BPM

## 2018-01-01 VITALS
RESPIRATION RATE: 16 BRPM | SYSTOLIC BLOOD PRESSURE: 130 MMHG | HEIGHT: 70 IN | DIASTOLIC BLOOD PRESSURE: 78 MMHG | TEMPERATURE: 97 F | HEART RATE: 81 BPM | OXYGEN SATURATION: 100 %

## 2018-01-01 VITALS
DIASTOLIC BLOOD PRESSURE: 80 MMHG | OXYGEN SATURATION: 95 % | TEMPERATURE: 97.4 F | SYSTOLIC BLOOD PRESSURE: 110 MMHG | HEART RATE: 65 BPM

## 2018-01-01 VITALS
WEIGHT: 260 LBS | BODY MASS INDEX: 37.22 KG/M2 | SYSTOLIC BLOOD PRESSURE: 148 MMHG | HEART RATE: 78 BPM | DIASTOLIC BLOOD PRESSURE: 70 MMHG | HEIGHT: 70 IN | OXYGEN SATURATION: 94 % | TEMPERATURE: 97.6 F | RESPIRATION RATE: 18 BRPM

## 2018-01-01 VITALS
TEMPERATURE: 98.2 F | SYSTOLIC BLOOD PRESSURE: 124 MMHG | OXYGEN SATURATION: 96 % | HEART RATE: 74 BPM | BODY MASS INDEX: 38.74 KG/M2 | RESPIRATION RATE: 20 BRPM | DIASTOLIC BLOOD PRESSURE: 60 MMHG | WEIGHT: 270 LBS

## 2018-01-01 VITALS
SYSTOLIC BLOOD PRESSURE: 137 MMHG | HEIGHT: 70 IN | TEMPERATURE: 96.6 F | RESPIRATION RATE: 24 BRPM | WEIGHT: 260 LBS | DIASTOLIC BLOOD PRESSURE: 82 MMHG | BODY MASS INDEX: 37.22 KG/M2 | OXYGEN SATURATION: 97 % | HEART RATE: 112 BPM

## 2018-01-01 VITALS
DIASTOLIC BLOOD PRESSURE: 72 MMHG | BODY MASS INDEX: 30.54 KG/M2 | WEIGHT: 219 LBS | HEART RATE: 74 BPM | SYSTOLIC BLOOD PRESSURE: 128 MMHG

## 2018-01-01 VITALS
HEART RATE: 70 BPM | BODY MASS INDEX: 37.51 KG/M2 | HEIGHT: 70 IN | DIASTOLIC BLOOD PRESSURE: 80 MMHG | OXYGEN SATURATION: 97 % | SYSTOLIC BLOOD PRESSURE: 128 MMHG | RESPIRATION RATE: 18 BRPM | WEIGHT: 262 LBS | TEMPERATURE: 96.9 F

## 2018-01-01 VITALS
SYSTOLIC BLOOD PRESSURE: 140 MMHG | HEART RATE: 78 BPM | DIASTOLIC BLOOD PRESSURE: 74 MMHG | OXYGEN SATURATION: 95 % | RESPIRATION RATE: 22 BRPM

## 2018-01-01 VITALS
BODY MASS INDEX: 37.22 KG/M2 | HEIGHT: 70 IN | DIASTOLIC BLOOD PRESSURE: 49 MMHG | OXYGEN SATURATION: 94 % | WEIGHT: 260 LBS | TEMPERATURE: 98.4 F | SYSTOLIC BLOOD PRESSURE: 118 MMHG | RESPIRATION RATE: 18 BRPM

## 2018-01-01 VITALS
SYSTOLIC BLOOD PRESSURE: 160 MMHG | RESPIRATION RATE: 18 BRPM | BODY MASS INDEX: 35.71 KG/M2 | WEIGHT: 263.67 LBS | HEIGHT: 72 IN | DIASTOLIC BLOOD PRESSURE: 83 MMHG | OXYGEN SATURATION: 95 % | TEMPERATURE: 99 F

## 2018-01-01 VITALS
HEART RATE: 63 BPM | DIASTOLIC BLOOD PRESSURE: 59 MMHG | SYSTOLIC BLOOD PRESSURE: 151 MMHG | RESPIRATION RATE: 20 BRPM | BODY MASS INDEX: 37.22 KG/M2 | TEMPERATURE: 97.1 F | HEIGHT: 70 IN | OXYGEN SATURATION: 96 % | WEIGHT: 260 LBS

## 2018-01-01 VITALS
RESPIRATION RATE: 18 BRPM | TEMPERATURE: 97.2 F | HEIGHT: 70 IN | BODY MASS INDEX: 42.51 KG/M2 | OXYGEN SATURATION: 95 % | WEIGHT: 296.96 LBS | DIASTOLIC BLOOD PRESSURE: 64 MMHG | SYSTOLIC BLOOD PRESSURE: 148 MMHG | HEART RATE: 118 BPM

## 2018-01-01 VITALS
TEMPERATURE: 98.3 F | HEART RATE: 94 BPM | BODY MASS INDEX: 31.7 KG/M2 | HEIGHT: 71 IN | DIASTOLIC BLOOD PRESSURE: 80 MMHG | RESPIRATION RATE: 20 BRPM | WEIGHT: 226.41 LBS | SYSTOLIC BLOOD PRESSURE: 174 MMHG | OXYGEN SATURATION: 98 %

## 2018-01-01 VITALS
SYSTOLIC BLOOD PRESSURE: 108 MMHG | OXYGEN SATURATION: 95 % | RESPIRATION RATE: 18 BRPM | DIASTOLIC BLOOD PRESSURE: 72 MMHG | TEMPERATURE: 97.7 F | HEART RATE: 79 BPM

## 2018-01-01 VITALS
SYSTOLIC BLOOD PRESSURE: 118 MMHG | HEART RATE: 84 BPM | OXYGEN SATURATION: 93 % | TEMPERATURE: 98.6 F | DIASTOLIC BLOOD PRESSURE: 64 MMHG

## 2018-01-01 VITALS
BODY MASS INDEX: 37.31 KG/M2 | OXYGEN SATURATION: 95 % | HEART RATE: 76 BPM | SYSTOLIC BLOOD PRESSURE: 114 MMHG | WEIGHT: 260 LBS | DIASTOLIC BLOOD PRESSURE: 60 MMHG | TEMPERATURE: 97.3 F

## 2018-01-01 VITALS
RESPIRATION RATE: 18 BRPM | TEMPERATURE: 98.3 F | DIASTOLIC BLOOD PRESSURE: 69 MMHG | HEIGHT: 70 IN | WEIGHT: 260 LBS | SYSTOLIC BLOOD PRESSURE: 105 MMHG | BODY MASS INDEX: 37.22 KG/M2 | OXYGEN SATURATION: 96 %

## 2018-01-01 VITALS
RESPIRATION RATE: 16 BRPM | TEMPERATURE: 98.4 F | OXYGEN SATURATION: 98 % | SYSTOLIC BLOOD PRESSURE: 130 MMHG | DIASTOLIC BLOOD PRESSURE: 47 MMHG

## 2018-01-01 VITALS
SYSTOLIC BLOOD PRESSURE: 153 MMHG | TEMPERATURE: 96.2 F | DIASTOLIC BLOOD PRESSURE: 74 MMHG | OXYGEN SATURATION: 95 % | HEART RATE: 70 BPM | RESPIRATION RATE: 20 BRPM

## 2018-01-01 VITALS
SYSTOLIC BLOOD PRESSURE: 140 MMHG | TEMPERATURE: 98.1 F | OXYGEN SATURATION: 97 % | HEART RATE: 71 BPM | DIASTOLIC BLOOD PRESSURE: 80 MMHG | BODY MASS INDEX: 35.8 KG/M2 | RESPIRATION RATE: 20 BRPM | WEIGHT: 264 LBS

## 2018-01-01 DIAGNOSIS — I26.99 PULMONARY EMBOLISM WITH INFARCTION (H): ICD-10-CM

## 2018-01-01 DIAGNOSIS — R29.6 RECURRENT FALLS: Primary | ICD-10-CM

## 2018-01-01 DIAGNOSIS — I10 ESSENTIAL HYPERTENSION: ICD-10-CM

## 2018-01-01 DIAGNOSIS — M79.89 MASS OF SOFT TISSUE OF LEFT UPPER EXTREMITY: ICD-10-CM

## 2018-01-01 DIAGNOSIS — S72.402A CLOSED FRACTURE OF DISTAL END OF LEFT FEMUR, UNSPECIFIED FRACTURE MORPHOLOGY, INITIAL ENCOUNTER (H): ICD-10-CM

## 2018-01-01 DIAGNOSIS — M25.511 CHRONIC RIGHT SHOULDER PAIN: Primary | ICD-10-CM

## 2018-01-01 DIAGNOSIS — G35 OPTIC NEURITIS DUE TO MULTIPLE SCLEROSIS (H): Primary | ICD-10-CM

## 2018-01-01 DIAGNOSIS — L03.119 CELLULITIS OF LOWER EXTREMITY, UNSPECIFIED LATERALITY: Primary | ICD-10-CM

## 2018-01-01 DIAGNOSIS — L89.95: Primary | ICD-10-CM

## 2018-01-01 DIAGNOSIS — L89.322 DECUBITUS ULCER OF LEFT BUTTOCK, STAGE 2 (H): ICD-10-CM

## 2018-01-01 DIAGNOSIS — H46.9 OPTIC NEURITIS DUE TO MULTIPLE SCLEROSIS (H): ICD-10-CM

## 2018-01-01 DIAGNOSIS — S83.207D TEAR OF MENISCUS OF LEFT KNEE AS CURRENT INJURY, UNSPECIFIED MENISCUS, UNSPECIFIED TEAR TYPE, SUBSEQUENT ENCOUNTER: ICD-10-CM

## 2018-01-01 DIAGNOSIS — G35 MS (MULTIPLE SCLEROSIS) (H): ICD-10-CM

## 2018-01-01 DIAGNOSIS — Z79.01 LONG-TERM (CURRENT) USE OF ANTICOAGULANTS: ICD-10-CM

## 2018-01-01 DIAGNOSIS — L73.9 FOLLICULITIS: ICD-10-CM

## 2018-01-01 DIAGNOSIS — Z53.9 ERRONEOUS ENCOUNTER--DISREGARD: Primary | ICD-10-CM

## 2018-01-01 DIAGNOSIS — C25.9 MALIGNANT NEOPLASM OF PANCREAS, UNSPECIFIED LOCATION OF MALIGNANCY (H): Primary | ICD-10-CM

## 2018-01-01 DIAGNOSIS — R29.898 WEAKNESS OF BOTH LOWER EXTREMITIES: ICD-10-CM

## 2018-01-01 DIAGNOSIS — I26.92 ACUTE SADDLE PULMONARY EMBOLISM WITHOUT ACUTE COR PULMONALE (H): ICD-10-CM

## 2018-01-01 DIAGNOSIS — K83.1 OBSTRUCTIVE JAUNDICE (H): ICD-10-CM

## 2018-01-01 DIAGNOSIS — R29.6 RECURRENT FALLS: ICD-10-CM

## 2018-01-01 DIAGNOSIS — L89.320 DECUBITUS ULCER OF LEFT BUTTOCK, UNSTAGEABLE (H): ICD-10-CM

## 2018-01-01 DIAGNOSIS — G89.29 CHRONIC RIGHT SHOULDER PAIN: ICD-10-CM

## 2018-01-01 DIAGNOSIS — Z78.9 NURSING HOME RESIDENT: Primary | ICD-10-CM

## 2018-01-01 DIAGNOSIS — E11.9 TYPE 2 DIABETES MELLITUS WITHOUT COMPLICATION, WITHOUT LONG-TERM CURRENT USE OF INSULIN (H): Chronic | ICD-10-CM
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
## 2018-01-01 DIAGNOSIS — R80.9 PROTEINURIA, UNSPECIFIED TYPE: ICD-10-CM

## 2018-01-01 DIAGNOSIS — R53.1 WEAKNESS: ICD-10-CM

## 2018-01-01 DIAGNOSIS — H46.9 OPTIC NEURITIS DUE TO MULTIPLE SCLEROSIS (H): Primary | ICD-10-CM

## 2018-01-01 DIAGNOSIS — I26.92 ACUTE SADDLE PULMONARY EMBOLISM WITHOUT ACUTE COR PULMONALE (H): Primary | ICD-10-CM

## 2018-01-01 DIAGNOSIS — R29.6 FALLING EPISODES: ICD-10-CM

## 2018-01-01 DIAGNOSIS — R29.6 FALLS FREQUENTLY: ICD-10-CM

## 2018-01-01 DIAGNOSIS — L03.317 CELLULITIS OF BUTTOCK: ICD-10-CM

## 2018-01-01 DIAGNOSIS — R93.0 ABNORMAL MRI OF HEAD: ICD-10-CM

## 2018-01-01 DIAGNOSIS — M25.519 ARTHRALGIA OF SHOULDER, UNSPECIFIED LATERALITY: ICD-10-CM

## 2018-01-01 DIAGNOSIS — G35 MS (MULTIPLE SCLEROSIS) (H): Primary | ICD-10-CM

## 2018-01-01 DIAGNOSIS — R45.3 APATHY: ICD-10-CM

## 2018-01-01 DIAGNOSIS — L89.309 DECUBITUS ULCER OF BUTTOCK, UNSPECIFIED LATERALITY, UNSPECIFIED ULCER STAGE: ICD-10-CM

## 2018-01-01 DIAGNOSIS — R26.89 INABILITY TO BEAR WEIGHT: ICD-10-CM

## 2018-01-01 DIAGNOSIS — E11.9 TYPE 2 DIABETES MELLITUS WITHOUT COMPLICATION, WITHOUT LONG-TERM CURRENT USE OF INSULIN (H): Primary | Chronic | ICD-10-CM

## 2018-01-01 DIAGNOSIS — R17 JAUNDICE: ICD-10-CM

## 2018-01-01 DIAGNOSIS — F09 COGNITIVE DYSFUNCTION, ACQUIRED: ICD-10-CM

## 2018-01-01 DIAGNOSIS — R21 RASH AND NONSPECIFIC SKIN ERUPTION: Primary | ICD-10-CM

## 2018-01-01 DIAGNOSIS — R41.89 COGNITIVE IMPAIRMENT: ICD-10-CM

## 2018-01-01 DIAGNOSIS — M25.511 CHRONIC RIGHT SHOULDER PAIN: ICD-10-CM

## 2018-01-01 DIAGNOSIS — G35 OPTIC NEURITIS DUE TO MULTIPLE SCLEROSIS (H): ICD-10-CM

## 2018-01-01 DIAGNOSIS — Z79.01 LONG TERM CURRENT USE OF ANTICOAGULANT THERAPY: Primary | ICD-10-CM

## 2018-01-01 DIAGNOSIS — A04.72 C. DIFFICILE COLITIS: ICD-10-CM

## 2018-01-01 DIAGNOSIS — N17.9 ACUTE KIDNEY INJURY (H): ICD-10-CM

## 2018-01-01 DIAGNOSIS — R29.898 WEAKNESS OF BOTH LOWER EXTREMITIES: Primary | ICD-10-CM

## 2018-01-01 DIAGNOSIS — G89.29 CHRONIC RIGHT SHOULDER PAIN: Primary | ICD-10-CM

## 2018-01-01 DIAGNOSIS — H46.9 OPTIC NEURITIS: ICD-10-CM

## 2018-01-01 DIAGNOSIS — M19.011 PRIMARY OSTEOARTHRITIS OF RIGHT SHOULDER: Primary | ICD-10-CM

## 2018-01-01 DIAGNOSIS — K86.89 PANCREATIC MASS: ICD-10-CM

## 2018-01-01 DIAGNOSIS — L03.317 CELLULITIS OF BUTTOCK: Primary | ICD-10-CM

## 2018-01-01 DIAGNOSIS — R40.4 TRANSIENT ALTERATION OF AWARENESS: ICD-10-CM

## 2018-01-01 DIAGNOSIS — Z71.89 COUNSELING REGARDING ADVANCED CARE PLANNING AND GOALS OF CARE: ICD-10-CM

## 2018-01-01 LAB
ALB CSF/SERPL: 11.4 RATIO (ref 0–9)
ALB CSF/SERPL: 12.6 RATIO (ref 0–9)
ALBUMIN CSF-MCNC: 27 MG/DL (ref 0–35)
ALBUMIN CSF-MCNC: 28 MG/DL (ref 0–35)
ALBUMIN SERPL-MCNC: 2.3 G/DL (ref 3.4–5)
ALBUMIN SERPL-MCNC: 2.3 G/DL (ref 3.4–5)
ALBUMIN SERPL-MCNC: 2.4 G/DL (ref 3.4–5)
ALBUMIN SERPL-MCNC: 2.5 G/DL (ref 3.4–5)
ALBUMIN SERPL-MCNC: 2.6 G/DL (ref 3.4–5)
ALBUMIN SERPL-MCNC: 2.7 G/DL (ref 3.4–5)
ALBUMIN SERPL-MCNC: 2.8 G/DL (ref 3.4–5)
ALBUMIN SERPL-MCNC: 2.8 G/DL (ref 3.4–5)
ALBUMIN SERPL-MCNC: 2220 MG/DL (ref 3500–5200)
ALBUMIN SERPL-MCNC: 2370 MG/DL (ref 3500–5200)
ALBUMIN SERPL-MCNC: 3 G/DL (ref 3.4–5)
ALBUMIN SERPL-MCNC: 3.1 G/DL (ref 3.4–5)
ALBUMIN SERPL-MCNC: 3.2 G/DL (ref 3.4–5)
ALBUMIN SERPL-MCNC: 3.5 G/DL (ref 3.4–5)
ALBUMIN UR-MCNC: 10 MG/DL
ALBUMIN UR-MCNC: 30 MG/DL
ALBUMIN UR-MCNC: 30 MG/DL
ALBUMIN UR-MCNC: NEGATIVE MG/DL
ALP SERPL-CCNC: 116 U/L (ref 40–150)
ALP SERPL-CCNC: 356 U/L (ref 40–150)
ALP SERPL-CCNC: 385 U/L (ref 40–150)
ALP SERPL-CCNC: 439 U/L (ref 40–150)
ALP SERPL-CCNC: 51 U/L (ref 40–150)
ALP SERPL-CCNC: 53 U/L (ref 40–150)
ALP SERPL-CCNC: 61 U/L (ref 40–150)
ALP SERPL-CCNC: 64 U/L (ref 40–150)
ALP SERPL-CCNC: 65 U/L (ref 40–150)
ALP SERPL-CCNC: 68 U/L (ref 40–150)
ALP SERPL-CCNC: 69 U/L (ref 40–150)
ALP SERPL-CCNC: 82 U/L (ref 40–150)
ALT SERPL W P-5'-P-CCNC: 14 U/L (ref 0–70)
ALT SERPL W P-5'-P-CCNC: 150 U/L (ref 0–70)
ALT SERPL W P-5'-P-CCNC: 172 U/L (ref 0–70)
ALT SERPL W P-5'-P-CCNC: 18 U/L (ref 0–70)
ALT SERPL W P-5'-P-CCNC: 19 U/L (ref 0–70)
ALT SERPL W P-5'-P-CCNC: 21 U/L (ref 0–70)
ALT SERPL W P-5'-P-CCNC: 216 U/L (ref 0–70)
ALT SERPL W P-5'-P-CCNC: 29 U/L (ref 0–70)
ALT SERPL W P-5'-P-CCNC: 30 U/L (ref 0–70)
ALT SERPL W P-5'-P-CCNC: 33 U/L (ref 0–70)
ALT SERPL W P-5'-P-CCNC: 35 U/L (ref 0–70)
ALT SERPL W P-5'-P-CCNC: 60 U/L (ref 0–70)
AMMONIA PLAS-SCNC: 11 UMOL/L (ref 10–50)
ANION GAP SERPL CALCULATED.3IONS-SCNC: 11 MMOL/L (ref 3–14)
ANION GAP SERPL CALCULATED.3IONS-SCNC: 5 MMOL/L (ref 3–14)
ANION GAP SERPL CALCULATED.3IONS-SCNC: 6 MMOL/L (ref 3–14)
ANION GAP SERPL CALCULATED.3IONS-SCNC: 7 MMOL/L (ref 3–14)
ANION GAP SERPL CALCULATED.3IONS-SCNC: 8 MMOL/L (ref 3–14)
ANION GAP SERPL CALCULATED.3IONS-SCNC: 9 MMOL/L (ref 3–14)
ANION GAP SERPL CALCULATED.3IONS-SCNC: 9 MMOL/L (ref 3–14)
APPEARANCE UR: CLEAR
APTT PPP: 50 SEC (ref 24–37)
AST SERPL W P-5'-P-CCNC: 105 U/L (ref 0–45)
AST SERPL W P-5'-P-CCNC: 111 U/L (ref 0–45)
AST SERPL W P-5'-P-CCNC: 12 U/L (ref 0–45)
AST SERPL W P-5'-P-CCNC: 131 U/L (ref 0–45)
AST SERPL W P-5'-P-CCNC: 15 U/L (ref 0–45)
AST SERPL W P-5'-P-CCNC: 17 U/L (ref 0–45)
AST SERPL W P-5'-P-CCNC: 19 U/L (ref 0–45)
AST SERPL W P-5'-P-CCNC: 20 U/L (ref 0–45)
AST SERPL W P-5'-P-CCNC: 24 U/L (ref 0–45)
AST SERPL W P-5'-P-CCNC: 28 U/L (ref 0–45)
AST SERPL W P-5'-P-CCNC: 29 U/L (ref 0–45)
AST SERPL W P-5'-P-CCNC: 5 U/L (ref 0–45)
BACTERIA #/AREA URNS HPF: ABNORMAL /HPF
BACTERIA SPEC CULT: ABNORMAL
BACTERIA SPEC CULT: NO GROWTH
BACTERIA SPEC CULT: NORMAL
BASOPHILS # BLD AUTO: 0 10E9/L (ref 0–0.2)
BASOPHILS # BLD AUTO: 0.1 10E9/L (ref 0–0.2)
BASOPHILS NFR BLD AUTO: 0.3 %
BASOPHILS NFR BLD AUTO: 0.4 %
BASOPHILS NFR BLD AUTO: 0.5 %
BASOPHILS NFR BLD AUTO: 0.5 %
BASOPHILS NFR BLD AUTO: 0.6 %
BASOPHILS NFR BLD AUTO: 0.7 %
BASOPHILS NFR BLD AUTO: 0.8 %
BILIRUB DIRECT SERPL-MCNC: 0.1 MG/DL (ref 0–0.2)
BILIRUB DIRECT SERPL-MCNC: 7.2 MG/DL (ref 0–0.2)
BILIRUB SERPL-MCNC: 0.4 MG/DL (ref 0.2–1.3)
BILIRUB SERPL-MCNC: 0.5 MG/DL (ref 0.2–1.3)
BILIRUB SERPL-MCNC: 1 MG/DL (ref 0.2–1.3)
BILIRUB SERPL-MCNC: 8.3 MG/DL (ref 0.2–1.3)
BILIRUB SERPL-MCNC: 9.4 MG/DL (ref 0.2–1.3)
BILIRUB SERPL-MCNC: 9.5 MG/DL (ref 0.2–1.3)
BILIRUB UR QL STRIP: ABNORMAL
BILIRUB UR QL STRIP: NEGATIVE
BUN SERPL-MCNC: 10 MG/DL (ref 7–30)
BUN SERPL-MCNC: 12 MG/DL (ref 7–30)
BUN SERPL-MCNC: 13 MG/DL (ref 7–30)
BUN SERPL-MCNC: 14 MG/DL (ref 7–30)
BUN SERPL-MCNC: 15 MG/DL (ref 7–30)
BUN SERPL-MCNC: 16 MG/DL (ref 7–30)
BUN SERPL-MCNC: 16 MG/DL (ref 7–30)
BUN SERPL-MCNC: 17 MG/DL (ref 7–30)
BUN SERPL-MCNC: 18 MG/DL (ref 7–30)
BUN SERPL-MCNC: 19 MG/DL (ref 7–30)
BUN SERPL-MCNC: 19 MG/DL (ref 7–30)
BUN SERPL-MCNC: 21 MG/DL (ref 7–30)
BUN SERPL-MCNC: 37 MG/DL (ref 7–30)
BUN SERPL-MCNC: 69 MG/DL (ref 7–30)
BUN SERPL-MCNC: 8 MG/DL (ref 7–30)
CALCIUM SERPL-MCNC: 7.5 MG/DL (ref 8.5–10.1)
CALCIUM SERPL-MCNC: 7.7 MG/DL (ref 8.5–10.1)
CALCIUM SERPL-MCNC: 7.9 MG/DL (ref 8.5–10.1)
CALCIUM SERPL-MCNC: 7.9 MG/DL (ref 8.5–10.1)
CALCIUM SERPL-MCNC: 8 MG/DL (ref 8.5–10.1)
CALCIUM SERPL-MCNC: 8 MG/DL (ref 8.5–10.1)
CALCIUM SERPL-MCNC: 8.1 MG/DL (ref 8.5–10.1)
CALCIUM SERPL-MCNC: 8.2 MG/DL (ref 8.5–10.1)
CALCIUM SERPL-MCNC: 8.2 MG/DL (ref 8.5–10.1)
CALCIUM SERPL-MCNC: 8.4 MG/DL (ref 8.5–10.1)
CALCIUM SERPL-MCNC: 8.5 MG/DL (ref 8.5–10.1)
CALCIUM SERPL-MCNC: 8.5 MG/DL (ref 8.5–10.1)
CALCIUM SERPL-MCNC: 8.6 MG/DL (ref 8.5–10.1)
CALCIUM SERPL-MCNC: 8.7 MG/DL (ref 8.5–10.1)
CALCIUM SERPL-MCNC: 8.8 MG/DL (ref 8.5–10.1)
CHLORIDE SERPL-SCNC: 102 MMOL/L (ref 94–109)
CHLORIDE SERPL-SCNC: 102 MMOL/L (ref 94–109)
CHLORIDE SERPL-SCNC: 103 MMOL/L (ref 94–109)
CHLORIDE SERPL-SCNC: 106 MMOL/L (ref 94–109)
CHLORIDE SERPL-SCNC: 108 MMOL/L (ref 94–109)
CHLORIDE SERPL-SCNC: 109 MMOL/L (ref 94–109)
CHLORIDE SERPL-SCNC: 110 MMOL/L (ref 94–109)
CHLORIDE SERPL-SCNC: 110 MMOL/L (ref 94–109)
CHLORIDE SERPL-SCNC: 111 MMOL/L (ref 94–109)
CHLORIDE SERPL-SCNC: 99 MMOL/L (ref 94–109)
CK SERPL-CCNC: 123 U/L (ref 30–300)
CK SERPL-CCNC: 23 U/L (ref 30–300)
CK SERPL-CCNC: 74 U/L (ref 30–300)
CO2 SERPL-SCNC: 22 MMOL/L (ref 20–32)
CO2 SERPL-SCNC: 23 MMOL/L (ref 20–32)
CO2 SERPL-SCNC: 23 MMOL/L (ref 20–32)
CO2 SERPL-SCNC: 24 MMOL/L (ref 20–32)
CO2 SERPL-SCNC: 25 MMOL/L (ref 20–32)
CO2 SERPL-SCNC: 25 MMOL/L (ref 20–32)
CO2 SERPL-SCNC: 26 MMOL/L (ref 20–32)
CO2 SERPL-SCNC: 26 MMOL/L (ref 20–32)
CO2 SERPL-SCNC: 27 MMOL/L (ref 20–32)
CO2 SERPL-SCNC: 28 MMOL/L (ref 20–32)
COLOR UR AUTO: ABNORMAL
COLOR UR AUTO: YELLOW
CREAT SERPL-MCNC: 0.6 MG/DL (ref 0.66–1.25)
CREAT SERPL-MCNC: 0.63 MG/DL (ref 0.66–1.25)
CREAT SERPL-MCNC: 0.72 MG/DL (ref 0.66–1.25)
CREAT SERPL-MCNC: 0.73 MG/DL (ref 0.66–1.25)
CREAT SERPL-MCNC: 0.74 MG/DL (ref 0.66–1.25)
CREAT SERPL-MCNC: 0.75 MG/DL (ref 0.66–1.25)
CREAT SERPL-MCNC: 0.79 MG/DL (ref 0.66–1.25)
CREAT SERPL-MCNC: 0.89 MG/DL (ref 0.66–1.25)
CREAT SERPL-MCNC: 0.92 MG/DL (ref 0.66–1.25)
CREAT SERPL-MCNC: 0.95 MG/DL (ref 0.66–1.25)
CREAT SERPL-MCNC: 0.99 MG/DL (ref 0.66–1.25)
CREAT SERPL-MCNC: 1.01 MG/DL (ref 0.66–1.25)
CREAT SERPL-MCNC: 1.03 MG/DL (ref 0.66–1.25)
CREAT SERPL-MCNC: 1.06 MG/DL (ref 0.66–1.25)
CREAT SERPL-MCNC: 1.09 MG/DL (ref 0.66–1.25)
CREAT SERPL-MCNC: 1.14 MG/DL (ref 0.66–1.25)
CREAT SERPL-MCNC: 1.26 MG/DL (ref 0.66–1.25)
CREAT UR-MCNC: 117 MG/DL
CRP SERPL-MCNC: 133 MG/L (ref 0–8)
CRP SERPL-MCNC: 28.2 MG/L (ref 0–8)
CRP SERPL-MCNC: 35.4 MG/L (ref 0–8)
CRP SERPL-MCNC: 38.3 MG/L (ref 0–8)
CRP SERPL-MCNC: 51.8 MG/L (ref 0–8)
CRP SERPL-MCNC: 75.2 MG/L (ref 0–8)
DIFFERENTIAL METHOD BLD: ABNORMAL
DIFFERENTIAL METHOD BLD: NORMAL
E COLI SXT1+2 STL IA: NORMAL
EOSINOPHIL # BLD AUTO: 0.1 10E9/L (ref 0–0.7)
EOSINOPHIL # BLD AUTO: 0.1 10E9/L (ref 0–0.7)
EOSINOPHIL # BLD AUTO: 0.3 10E9/L (ref 0–0.7)
EOSINOPHIL # BLD AUTO: 0.4 10E9/L (ref 0–0.7)
EOSINOPHIL # BLD AUTO: 0.5 10E9/L (ref 0–0.7)
EOSINOPHIL NFR BLD AUTO: 0.6 %
EOSINOPHIL NFR BLD AUTO: 1.5 %
EOSINOPHIL NFR BLD AUTO: 1.8 %
EOSINOPHIL NFR BLD AUTO: 2 %
EOSINOPHIL NFR BLD AUTO: 3.3 %
EOSINOPHIL NFR BLD AUTO: 3.9 %
EOSINOPHIL NFR BLD AUTO: 4.4 %
EOSINOPHIL NFR BLD AUTO: 4.4 %
EOSINOPHIL NFR BLD AUTO: 4.7 %
EOSINOPHIL NFR BLD AUTO: 4.9 %
EOSINOPHIL NFR BLD AUTO: 5.5 %
EOSINOPHIL NFR BLD AUTO: 5.6 %
EOSINOPHIL NFR BLD AUTO: 6 %
EOSINOPHIL NFR BLD AUTO: 6.3 %
ERYTHROCYTE [DISTWIDTH] IN BLOOD BY AUTOMATED COUNT: 13 % (ref 10–15)
ERYTHROCYTE [DISTWIDTH] IN BLOOD BY AUTOMATED COUNT: 13.1 % (ref 10–15)
ERYTHROCYTE [DISTWIDTH] IN BLOOD BY AUTOMATED COUNT: 13.1 % (ref 10–15)
ERYTHROCYTE [DISTWIDTH] IN BLOOD BY AUTOMATED COUNT: 13.4 % (ref 10–15)
ERYTHROCYTE [DISTWIDTH] IN BLOOD BY AUTOMATED COUNT: 13.4 % (ref 10–15)
ERYTHROCYTE [DISTWIDTH] IN BLOOD BY AUTOMATED COUNT: 13.5 % (ref 10–15)
ERYTHROCYTE [DISTWIDTH] IN BLOOD BY AUTOMATED COUNT: 13.6 % (ref 10–15)
ERYTHROCYTE [DISTWIDTH] IN BLOOD BY AUTOMATED COUNT: 13.7 % (ref 10–15)
ERYTHROCYTE [DISTWIDTH] IN BLOOD BY AUTOMATED COUNT: 14.2 % (ref 10–15)
ERYTHROCYTE [DISTWIDTH] IN BLOOD BY AUTOMATED COUNT: 14.2 % (ref 10–15)
ERYTHROCYTE [DISTWIDTH] IN BLOOD BY AUTOMATED COUNT: 14.6 % (ref 10–15)
ERYTHROCYTE [DISTWIDTH] IN BLOOD BY AUTOMATED COUNT: 15.4 % (ref 10–15)
ERYTHROCYTE [DISTWIDTH] IN BLOOD BY AUTOMATED COUNT: 15.9 % (ref 10–15)
ERYTHROCYTE [DISTWIDTH] IN BLOOD BY AUTOMATED COUNT: 15.9 % (ref 10–15)
ERYTHROCYTE [DISTWIDTH] IN BLOOD BY AUTOMATED COUNT: 16 % (ref 10–15)
EST. AVERAGE GLUCOSE BLD GHB EST-MCNC: 140 MG/DL
EST. AVERAGE GLUCOSE BLD GHB EST-MCNC: 146 MG/DL
GFR SERPL CREATININE-BSD FRML MDRD: 56 ML/MIN/1.7M2
GFR SERPL CREATININE-BSD FRML MDRD: 63 ML/MIN/1.7M2
GFR SERPL CREATININE-BSD FRML MDRD: 67 ML/MIN/1.7M2
GFR SERPL CREATININE-BSD FRML MDRD: 69 ML/MIN/1.7M2
GFR SERPL CREATININE-BSD FRML MDRD: 71 ML/MIN/1.7M2
GFR SERPL CREATININE-BSD FRML MDRD: 73 ML/MIN/1.7M2
GFR SERPL CREATININE-BSD FRML MDRD: 75 ML/MIN/1.7M2
GFR SERPL CREATININE-BSD FRML MDRD: 78 ML/MIN/1.7M2
GFR SERPL CREATININE-BSD FRML MDRD: 81 ML/MIN/1.7M2
GFR SERPL CREATININE-BSD FRML MDRD: 84 ML/MIN/1.7M2
GFR SERPL CREATININE-BSD FRML MDRD: >90 ML/MIN/1.7M2
GLUCOSE BLD-MCNC: 250 MG/DL (ref 70–99)
GLUCOSE BLD-MCNC: 291 MG/DL (ref 70–99)
GLUCOSE BLDC GLUCOMTR-MCNC: 103 MG/DL (ref 70–99)
GLUCOSE BLDC GLUCOMTR-MCNC: 106 MG/DL (ref 70–99)
GLUCOSE BLDC GLUCOMTR-MCNC: 109 MG/DL (ref 70–99)
GLUCOSE BLDC GLUCOMTR-MCNC: 110 MG/DL (ref 70–99)
GLUCOSE BLDC GLUCOMTR-MCNC: 111 MG/DL (ref 70–99)
GLUCOSE BLDC GLUCOMTR-MCNC: 112 MG/DL (ref 70–99)
GLUCOSE BLDC GLUCOMTR-MCNC: 116 MG/DL (ref 70–99)
GLUCOSE BLDC GLUCOMTR-MCNC: 117 MG/DL (ref 70–99)
GLUCOSE BLDC GLUCOMTR-MCNC: 124 MG/DL (ref 70–99)
GLUCOSE BLDC GLUCOMTR-MCNC: 124 MG/DL (ref 70–99)
GLUCOSE BLDC GLUCOMTR-MCNC: 127 MG/DL (ref 70–99)
GLUCOSE BLDC GLUCOMTR-MCNC: 128 MG/DL (ref 70–99)
GLUCOSE BLDC GLUCOMTR-MCNC: 129 MG/DL (ref 70–99)
GLUCOSE BLDC GLUCOMTR-MCNC: 131 MG/DL (ref 70–99)
GLUCOSE BLDC GLUCOMTR-MCNC: 136 MG/DL (ref 70–99)
GLUCOSE BLDC GLUCOMTR-MCNC: 144 MG/DL (ref 70–99)
GLUCOSE BLDC GLUCOMTR-MCNC: 144 MG/DL (ref 70–99)
GLUCOSE BLDC GLUCOMTR-MCNC: 147 MG/DL (ref 70–99)
GLUCOSE BLDC GLUCOMTR-MCNC: 148 MG/DL (ref 70–99)
GLUCOSE BLDC GLUCOMTR-MCNC: 150 MG/DL (ref 70–99)
GLUCOSE BLDC GLUCOMTR-MCNC: 164 MG/DL (ref 70–99)
GLUCOSE BLDC GLUCOMTR-MCNC: 171 MG/DL (ref 70–99)
GLUCOSE BLDC GLUCOMTR-MCNC: 175 MG/DL (ref 70–99)
GLUCOSE BLDC GLUCOMTR-MCNC: 177 MG/DL (ref 70–99)
GLUCOSE BLDC GLUCOMTR-MCNC: 179 MG/DL (ref 70–99)
GLUCOSE BLDC GLUCOMTR-MCNC: 193 MG/DL (ref 70–99)
GLUCOSE BLDC GLUCOMTR-MCNC: 82 MG/DL (ref 70–99)
GLUCOSE CSF-MCNC: 85 MG/DL (ref 40–70)
GLUCOSE SERPL-MCNC: 108 MG/DL (ref 70–99)
GLUCOSE SERPL-MCNC: 113 MG/DL (ref 70–99)
GLUCOSE SERPL-MCNC: 124 MG/DL (ref 70–99)
GLUCOSE SERPL-MCNC: 125 MG/DL (ref 70–99)
GLUCOSE SERPL-MCNC: 127 MG/DL (ref 70–99)
GLUCOSE SERPL-MCNC: 129 MG/DL (ref 70–99)
GLUCOSE SERPL-MCNC: 130 MG/DL (ref 70–99)
GLUCOSE SERPL-MCNC: 139 MG/DL (ref 70–99)
GLUCOSE SERPL-MCNC: 139 MG/DL (ref 70–99)
GLUCOSE SERPL-MCNC: 146 MG/DL (ref 70–99)
GLUCOSE SERPL-MCNC: 155 MG/DL (ref 70–99)
GLUCOSE SERPL-MCNC: 159 MG/DL (ref 70–99)
GLUCOSE SERPL-MCNC: 170 MG/DL (ref 70–99)
GLUCOSE SERPL-MCNC: 174 MG/DL (ref 70–99)
GLUCOSE SERPL-MCNC: 175 MG/DL (ref 70–99)
GLUCOSE SERPL-MCNC: 178 MG/DL (ref 70–99)
GLUCOSE SERPL-MCNC: 195 MG/DL (ref 70–99)
GLUCOSE SERPL-MCNC: 244 MG/DL (ref 70–99)
GLUCOSE SERPL-MCNC: 93 MG/DL (ref 70–99)
GLUCOSE UR STRIP-MCNC: 150 MG/DL
GLUCOSE UR STRIP-MCNC: 300 MG/DL
GLUCOSE UR STRIP-MCNC: 300 MG/DL
GLUCOSE UR STRIP-MCNC: 70 MG/DL
GLUCOSE UR STRIP-MCNC: NEGATIVE MG/DL
GRAM STN SPEC: NORMAL
HBA1C MFR BLD: 6.5 % (ref 0–5.6)
HBA1C MFR BLD: 6.7 % (ref 4.3–6)
HCT VFR BLD AUTO: 38.2 % (ref 40–53)
HCT VFR BLD AUTO: 39.1 % (ref 40–53)
HCT VFR BLD AUTO: 40.3 % (ref 40–53)
HCT VFR BLD AUTO: 41.7 % (ref 40–53)
HCT VFR BLD AUTO: 41.9 % (ref 40–53)
HCT VFR BLD AUTO: 42.6 % (ref 40–53)
HCT VFR BLD AUTO: 42.6 % (ref 40–53)
HCT VFR BLD AUTO: 43.1 % (ref 40–53)
HCT VFR BLD AUTO: 43.6 % (ref 40–53)
HCT VFR BLD AUTO: 44.4 % (ref 40–53)
HCT VFR BLD AUTO: 44.5 % (ref 40–53)
HCT VFR BLD AUTO: 44.7 % (ref 40–53)
HCT VFR BLD AUTO: 46.5 % (ref 40–53)
HCT VFR BLD AUTO: 46.8 % (ref 40–53)
HCT VFR BLD AUTO: 47.3 % (ref 40–53)
HCT VFR BLD AUTO: 48.2 % (ref 40–53)
HCT VFR BLD AUTO: 51.9 % (ref 40–53)
HGB BLD-MCNC: 12.7 G/DL (ref 13.3–17.7)
HGB BLD-MCNC: 12.8 G/DL (ref 13.3–17.7)
HGB BLD-MCNC: 13.8 G/DL (ref 13.3–17.7)
HGB BLD-MCNC: 13.9 G/DL (ref 13.3–17.7)
HGB BLD-MCNC: 14.1 G/DL (ref 13.3–17.7)
HGB BLD-MCNC: 14.1 G/DL (ref 13.3–17.7)
HGB BLD-MCNC: 14.4 G/DL (ref 13.3–17.7)
HGB BLD-MCNC: 14.7 G/DL (ref 13.3–17.7)
HGB BLD-MCNC: 14.8 G/DL (ref 13.3–17.7)
HGB BLD-MCNC: 15.1 G/DL (ref 13.3–17.7)
HGB BLD-MCNC: 15.3 G/DL (ref 13.3–17.7)
HGB BLD-MCNC: 15.3 G/DL (ref 13.3–17.7)
HGB BLD-MCNC: 15.5 G/DL (ref 13.3–17.7)
HGB BLD-MCNC: 15.9 G/DL (ref 13.3–17.7)
HGB BLD-MCNC: 16 G/DL (ref 13.3–17.7)
HGB BLD-MCNC: 16.2 G/DL (ref 13.3–17.7)
HGB BLD-MCNC: 17.6 G/DL (ref 13.3–17.7)
HGB UR QL STRIP: ABNORMAL
HGB UR QL STRIP: NEGATIVE
HYALINE CASTS #/AREA URNS LPF: 3 /LPF
IGG CSF-MCNC: 10.8 MG/DL (ref 0–6)
IGG CSF-MCNC: 9 MG/DL (ref 0–6)
IGG SERPL-MCNC: 1240 MG/DL (ref 768–1632)
IGG SERPL-MCNC: 1520 MG/DL (ref 768–1632)
IGG SYNTH RATE SER+CSF CALC-MRATE: 1.7 MG/D
IGG SYNTH RATE SER+CSF CALC-MRATE: 14.9 MG/D
IGG/ALB CLEAR SER+CSF-RTO: 0.52 RATIO (ref 0.28–0.66)
IGG/ALB CLEAR SER+CSF-RTO: 0.69 RATIO (ref 0.28–0.66)
IGG/ALB CSF: 0.33 RATIO (ref 0.09–0.25)
IGG/ALB CSF: 0.39 RATIO (ref 0.09–0.25)
IMM GRANULOCYTES # BLD: 0 10E9/L (ref 0–0.4)
IMM GRANULOCYTES # BLD: 0.1 10E9/L (ref 0–0.4)
IMM GRANULOCYTES # BLD: 0.1 10E9/L (ref 0–0.4)
IMM GRANULOCYTES NFR BLD: 0.1 %
IMM GRANULOCYTES NFR BLD: 0.2 %
IMM GRANULOCYTES NFR BLD: 0.3 %
IMM GRANULOCYTES NFR BLD: 0.5 %
IMM GRANULOCYTES NFR BLD: 0.5 %
IMM GRANULOCYTES NFR BLD: 0.6 %
IMM GRANULOCYTES NFR BLD: 0.8 %
INR PPP: 1 (ref 0.8–1.2)
INR PPP: 1.24 (ref 0.8–1.2)
INR PPP: 1.8
INR PPP: 2.3
INR PPP: 2.5
INR PPP: 2.7
INR PPP: 3.2
INR PPP: 3.3
INR PPP: 3.6
INR PPP: 3.8
INR PPP: 4.96 (ref 0.8–1.2)
INR PPP: 5
INR PPP: 5.1
INR PPP: 5.2
INR PPP: 5.2
INR PPP: 9.45 (ref 0.8–1.2)
KETONES UR STRIP-MCNC: 5 MG/DL
KETONES UR STRIP-MCNC: NEGATIVE MG/DL
LACTATE SERPL-SCNC: 1.5 MMOL/L (ref 0.4–2)
LACTATE SERPL-SCNC: 1.7 MMOL/L (ref 0.4–2)
LACTATE SERPL-SCNC: 1.7 MMOL/L (ref 0.4–2)
LACTATE SERPL-SCNC: 2.3 MMOL/L (ref 0.4–2)
LACTOFERRIN STL QL IA: POSITIVE
LEUKOCYTE ESTERASE UR QL STRIP: ABNORMAL
LEUKOCYTE ESTERASE UR QL STRIP: ABNORMAL
LEUKOCYTE ESTERASE UR QL STRIP: NEGATIVE
LIPASE SERPL-CCNC: 1500 U/L (ref 73–393)
LIPASE SERPL-CCNC: 938 U/L (ref 73–393)
LYMPHOCYTES # BLD AUTO: 0.9 10E9/L (ref 0.8–5.3)
LYMPHOCYTES # BLD AUTO: 1.3 10E9/L (ref 0.8–5.3)
LYMPHOCYTES # BLD AUTO: 1.6 10E9/L (ref 0.8–5.3)
LYMPHOCYTES # BLD AUTO: 1.7 10E9/L (ref 0.8–5.3)
LYMPHOCYTES # BLD AUTO: 1.9 10E9/L (ref 0.8–5.3)
LYMPHOCYTES # BLD AUTO: 1.9 10E9/L (ref 0.8–5.3)
LYMPHOCYTES # BLD AUTO: 2 10E9/L (ref 0.8–5.3)
LYMPHOCYTES # BLD AUTO: 2 10E9/L (ref 0.8–5.3)
LYMPHOCYTES # BLD AUTO: 2.1 10E9/L (ref 0.8–5.3)
LYMPHOCYTES # BLD AUTO: 2.2 10E9/L (ref 0.8–5.3)
LYMPHOCYTES # BLD AUTO: 2.7 10E9/L (ref 0.8–5.3)
LYMPHOCYTES # BLD AUTO: 3.3 10E9/L (ref 0.8–5.3)
LYMPHOCYTES NFR BLD AUTO: 15.6 %
LYMPHOCYTES NFR BLD AUTO: 18.3 %
LYMPHOCYTES NFR BLD AUTO: 18.6 %
LYMPHOCYTES NFR BLD AUTO: 21.6 %
LYMPHOCYTES NFR BLD AUTO: 24.9 %
LYMPHOCYTES NFR BLD AUTO: 27.5 %
LYMPHOCYTES NFR BLD AUTO: 28.4 %
LYMPHOCYTES NFR BLD AUTO: 28.7 %
LYMPHOCYTES NFR BLD AUTO: 29 %
LYMPHOCYTES NFR BLD AUTO: 29.4 %
LYMPHOCYTES NFR BLD AUTO: 30.2 %
LYMPHOCYTES NFR BLD AUTO: 31.6 %
LYMPHOCYTES NFR BLD AUTO: 33.9 %
LYMPHOCYTES NFR BLD AUTO: 7.1 %
Lab: NORMAL
MAGNESIUM SERPL-MCNC: 2.1 MG/DL (ref 1.6–2.3)
MCH RBC QN AUTO: 28.2 PG (ref 26.5–33)
MCH RBC QN AUTO: 28.3 PG (ref 26.5–33)
MCH RBC QN AUTO: 28.6 PG (ref 26.5–33)
MCH RBC QN AUTO: 28.7 PG (ref 26.5–33)
MCH RBC QN AUTO: 28.8 PG (ref 26.5–33)
MCH RBC QN AUTO: 29 PG (ref 26.5–33)
MCH RBC QN AUTO: 29 PG (ref 26.5–33)
MCH RBC QN AUTO: 29.1 PG (ref 26.5–33)
MCH RBC QN AUTO: 29.2 PG (ref 26.5–33)
MCH RBC QN AUTO: 29.2 PG (ref 26.5–33)
MCH RBC QN AUTO: 29.4 PG (ref 26.5–33)
MCHC RBC AUTO-ENTMCNC: 32.7 G/DL (ref 31.5–36.5)
MCHC RBC AUTO-ENTMCNC: 33.1 G/DL (ref 31.5–36.5)
MCHC RBC AUTO-ENTMCNC: 33.1 G/DL (ref 31.5–36.5)
MCHC RBC AUTO-ENTMCNC: 33.2 G/DL (ref 31.5–36.5)
MCHC RBC AUTO-ENTMCNC: 33.3 G/DL (ref 31.5–36.5)
MCHC RBC AUTO-ENTMCNC: 33.6 G/DL (ref 31.5–36.5)
MCHC RBC AUTO-ENTMCNC: 33.6 G/DL (ref 31.5–36.5)
MCHC RBC AUTO-ENTMCNC: 33.7 G/DL (ref 31.5–36.5)
MCHC RBC AUTO-ENTMCNC: 33.7 G/DL (ref 31.5–36.5)
MCHC RBC AUTO-ENTMCNC: 33.8 G/DL (ref 31.5–36.5)
MCHC RBC AUTO-ENTMCNC: 33.9 G/DL (ref 31.5–36.5)
MCHC RBC AUTO-ENTMCNC: 33.9 G/DL (ref 31.5–36.5)
MCHC RBC AUTO-ENTMCNC: 34.2 G/DL (ref 31.5–36.5)
MCHC RBC AUTO-ENTMCNC: 34.2 G/DL (ref 31.5–36.5)
MCHC RBC AUTO-ENTMCNC: 34.3 G/DL (ref 31.5–36.5)
MCHC RBC AUTO-ENTMCNC: 34.4 G/DL (ref 31.5–36.5)
MCHC RBC AUTO-ENTMCNC: 34.5 G/DL (ref 31.5–36.5)
MCV RBC AUTO: 83 FL (ref 78–100)
MCV RBC AUTO: 84 FL (ref 78–100)
MCV RBC AUTO: 85 FL (ref 78–100)
MCV RBC AUTO: 86 FL (ref 78–100)
MCV RBC AUTO: 87 FL (ref 78–100)
MCV RBC AUTO: 88 FL (ref 78–100)
MCV RBC AUTO: 88 FL (ref 78–100)
MICROALBUMIN UR-MCNC: 10 MG/L
MICROALBUMIN/CREAT UR: 8.45 MG/G CR (ref 0–17)
MONOCYTES # BLD AUTO: 0.5 10E9/L (ref 0–1.3)
MONOCYTES # BLD AUTO: 0.6 10E9/L (ref 0–1.3)
MONOCYTES # BLD AUTO: 0.7 10E9/L (ref 0–1.3)
MONOCYTES # BLD AUTO: 0.7 10E9/L (ref 0–1.3)
MONOCYTES # BLD AUTO: 0.8 10E9/L (ref 0–1.3)
MONOCYTES # BLD AUTO: 0.8 10E9/L (ref 0–1.3)
MONOCYTES # BLD AUTO: 0.9 10E9/L (ref 0–1.3)
MONOCYTES # BLD AUTO: 0.9 10E9/L (ref 0–1.3)
MONOCYTES # BLD AUTO: 1.1 10E9/L (ref 0–1.3)
MONOCYTES # BLD AUTO: 1.3 10E9/L (ref 0–1.3)
MONOCYTES NFR BLD AUTO: 10.1 %
MONOCYTES NFR BLD AUTO: 10.4 %
MONOCYTES NFR BLD AUTO: 10.5 %
MONOCYTES NFR BLD AUTO: 10.7 %
MONOCYTES NFR BLD AUTO: 11.3 %
MONOCYTES NFR BLD AUTO: 6.9 %
MONOCYTES NFR BLD AUTO: 7 %
MONOCYTES NFR BLD AUTO: 7.4 %
MONOCYTES NFR BLD AUTO: 7.5 %
MONOCYTES NFR BLD AUTO: 8 %
MONOCYTES NFR BLD AUTO: 8.4 %
MONOCYTES NFR BLD AUTO: 8.5 %
MONOCYTES NFR BLD AUTO: 8.6 %
MONOCYTES NFR BLD AUTO: 9.5 %
MUCOUS THREADS #/AREA URNS LPF: PRESENT /LPF
NEUTROPHILS # BLD AUTO: 10.3 10E9/L (ref 1.6–8.3)
NEUTROPHILS # BLD AUTO: 10.7 10E9/L (ref 1.6–8.3)
NEUTROPHILS # BLD AUTO: 3.2 10E9/L (ref 1.6–8.3)
NEUTROPHILS # BLD AUTO: 3.4 10E9/L (ref 1.6–8.3)
NEUTROPHILS # BLD AUTO: 3.8 10E9/L (ref 1.6–8.3)
NEUTROPHILS # BLD AUTO: 3.9 10E9/L (ref 1.6–8.3)
NEUTROPHILS # BLD AUTO: 4.1 10E9/L (ref 1.6–8.3)
NEUTROPHILS # BLD AUTO: 4.1 10E9/L (ref 1.6–8.3)
NEUTROPHILS # BLD AUTO: 4.2 10E9/L (ref 1.6–8.3)
NEUTROPHILS # BLD AUTO: 4.4 10E9/L (ref 1.6–8.3)
NEUTROPHILS # BLD AUTO: 4.4 10E9/L (ref 1.6–8.3)
NEUTROPHILS # BLD AUTO: 4.9 10E9/L (ref 1.6–8.3)
NEUTROPHILS # BLD AUTO: 6.3 10E9/L (ref 1.6–8.3)
NEUTROPHILS # BLD AUTO: 8.8 10E9/L (ref 1.6–8.3)
NEUTROPHILS NFR BLD AUTO: 47.7 %
NEUTROPHILS NFR BLD AUTO: 51.1 %
NEUTROPHILS NFR BLD AUTO: 53.1 %
NEUTROPHILS NFR BLD AUTO: 56.1 %
NEUTROPHILS NFR BLD AUTO: 56.4 %
NEUTROPHILS NFR BLD AUTO: 56.7 %
NEUTROPHILS NFR BLD AUTO: 58.1 %
NEUTROPHILS NFR BLD AUTO: 59.6 %
NEUTROPHILS NFR BLD AUTO: 60.2 %
NEUTROPHILS NFR BLD AUTO: 68.1 %
NEUTROPHILS NFR BLD AUTO: 68.8 %
NEUTROPHILS NFR BLD AUTO: 70.9 %
NEUTROPHILS NFR BLD AUTO: 71.1 %
NEUTROPHILS NFR BLD AUTO: 84.7 %
NITRATE UR QL: NEGATIVE
NRBC # BLD AUTO: 0 10*3/UL
NRBC BLD AUTO-RTO: 0 /100
OLIGOCLONAL BANDS CSF ELPH-IMP: ABNORMAL
OLIGOCLONAL BANDS CSF ELPH-IMP: POSITIVE
OLIGOCLONAL BANDS CSF IEF: 16 BANDS (ref 0–1)
PH UR STRIP: 5 PH (ref 4.7–8)
PH UR STRIP: 5 PH (ref 4.7–8)
PH UR STRIP: 5.5 PH (ref 4.7–8)
PH UR STRIP: 6 PH (ref 4.7–8)
PLATELET # BLD AUTO: 205 10E9/L (ref 150–450)
PLATELET # BLD AUTO: 215 10E9/L (ref 150–450)
PLATELET # BLD AUTO: 217 10E9/L (ref 150–450)
PLATELET # BLD AUTO: 232 10E9/L (ref 150–450)
PLATELET # BLD AUTO: 243 10E9/L (ref 150–450)
PLATELET # BLD AUTO: 246 10E9/L (ref 150–450)
PLATELET # BLD AUTO: 250 10E9/L (ref 150–450)
PLATELET # BLD AUTO: 251 10E9/L (ref 150–450)
PLATELET # BLD AUTO: 253 10E9/L (ref 150–450)
PLATELET # BLD AUTO: 257 10E9/L (ref 150–450)
PLATELET # BLD AUTO: 258 10E9/L (ref 150–450)
PLATELET # BLD AUTO: 260 10E9/L (ref 150–450)
PLATELET # BLD AUTO: 270 10E9/L (ref 150–450)
PLATELET # BLD AUTO: 281 10E9/L (ref 150–450)
PLATELET # BLD AUTO: 281 10E9/L (ref 150–450)
PLATELET # BLD AUTO: 286 10E9/L (ref 150–450)
PLATELET # BLD AUTO: 378 10E9/L (ref 150–450)
POTASSIUM SERPL-SCNC: 2.9 MMOL/L (ref 3.4–5.3)
POTASSIUM SERPL-SCNC: 3.3 MMOL/L (ref 3.4–5.3)
POTASSIUM SERPL-SCNC: 3.4 MMOL/L (ref 3.4–5.3)
POTASSIUM SERPL-SCNC: 3.6 MMOL/L (ref 3.4–5.3)
POTASSIUM SERPL-SCNC: 3.7 MMOL/L (ref 3.4–5.3)
POTASSIUM SERPL-SCNC: 3.8 MMOL/L (ref 3.4–5.3)
POTASSIUM SERPL-SCNC: 3.8 MMOL/L (ref 3.4–5.3)
POTASSIUM SERPL-SCNC: 3.9 MMOL/L (ref 3.4–5.3)
POTASSIUM SERPL-SCNC: 4 MMOL/L (ref 3.4–5.3)
POTASSIUM SERPL-SCNC: 4 MMOL/L (ref 3.4–5.3)
POTASSIUM SERPL-SCNC: 4.1 MMOL/L (ref 3.4–5.3)
POTASSIUM SERPL-SCNC: 4.2 MMOL/L (ref 3.4–5.3)
POTASSIUM SERPL-SCNC: 4.6 MMOL/L (ref 3.4–5.3)
POTASSIUM SERPL-SCNC: 5.3 MMOL/L (ref 3.4–5.3)
PROCALCITONIN SERPL-MCNC: <0.05 NG/ML
PROCALCITONIN SERPL-MCNC: <0.05 NG/ML
PROT CSF-MCNC: 59 MG/DL (ref 15–60)
PROT SERPL-MCNC: 6.3 G/DL (ref 6.8–8.8)
PROT SERPL-MCNC: 6.3 G/DL (ref 6.8–8.8)
PROT SERPL-MCNC: 6.5 G/DL (ref 6.8–8.8)
PROT SERPL-MCNC: 6.8 G/DL (ref 6.8–8.8)
PROT SERPL-MCNC: 7.2 G/DL (ref 6.8–8.8)
PROT SERPL-MCNC: 7.4 G/DL (ref 6.8–8.8)
PROT SERPL-MCNC: 7.4 G/DL (ref 6.8–8.8)
PROT SERPL-MCNC: 7.5 G/DL (ref 6.8–8.8)
PROT SERPL-MCNC: 7.7 G/DL (ref 6.8–8.8)
PROT SERPL-MCNC: 7.9 G/DL (ref 6.8–8.8)
PROT SERPL-MCNC: 7.9 G/DL (ref 6.8–8.8)
PROT SERPL-MCNC: 8 G/DL (ref 6.8–8.8)
RADIOLOGIST FLAGS: ABNORMAL
RBC # BLD AUTO: 4.38 10E12/L (ref 4.4–5.9)
RBC # BLD AUTO: 4.47 10E12/L (ref 4.4–5.9)
RBC # BLD AUTO: 4.76 10E12/L (ref 4.4–5.9)
RBC # BLD AUTO: 4.82 10E12/L (ref 4.4–5.9)
RBC # BLD AUTO: 4.83 10E12/L (ref 4.4–5.9)
RBC # BLD AUTO: 4.83 10E12/L (ref 4.4–5.9)
RBC # BLD AUTO: 5 10E12/L (ref 4.4–5.9)
RBC # BLD AUTO: 5.06 10E12/L (ref 4.4–5.9)
RBC # BLD AUTO: 5.08 10E12/L (ref 4.4–5.9)
RBC # BLD AUTO: 5.26 10E12/L (ref 4.4–5.9)
RBC # BLD AUTO: 5.32 10E12/L (ref 4.4–5.9)
RBC # BLD AUTO: 5.32 10E12/L (ref 4.4–5.9)
RBC # BLD AUTO: 5.39 10E12/L (ref 4.4–5.9)
RBC # BLD AUTO: 5.41 10E12/L (ref 4.4–5.9)
RBC # BLD AUTO: 5.49 10E12/L (ref 4.4–5.9)
RBC # BLD AUTO: 5.74 10E12/L (ref 4.4–5.9)
RBC # BLD AUTO: 6.22 10E12/L (ref 4.4–5.9)
RBC # CSF MANUAL: 20 /UL (ref 0–2)
RBC #/AREA URNS AUTO: 0 /HPF (ref 0–2)
RBC #/AREA URNS AUTO: 0 /HPF (ref 0–2)
RBC #/AREA URNS AUTO: 1 /HPF (ref 0–2)
RBC #/AREA URNS AUTO: 75 /HPF (ref 0–2)
RBC #/AREA URNS AUTO: <1 /HPF (ref 0–2)
SODIUM SERPL-SCNC: 135 MMOL/L (ref 133–144)
SODIUM SERPL-SCNC: 135 MMOL/L (ref 133–144)
SODIUM SERPL-SCNC: 136 MMOL/L (ref 133–144)
SODIUM SERPL-SCNC: 136 MMOL/L (ref 133–144)
SODIUM SERPL-SCNC: 137 MMOL/L (ref 133–144)
SODIUM SERPL-SCNC: 137 MMOL/L (ref 133–144)
SODIUM SERPL-SCNC: 138 MMOL/L (ref 133–144)
SODIUM SERPL-SCNC: 138 MMOL/L (ref 133–144)
SODIUM SERPL-SCNC: 139 MMOL/L (ref 133–144)
SODIUM SERPL-SCNC: 140 MMOL/L (ref 133–144)
SODIUM SERPL-SCNC: 142 MMOL/L (ref 133–144)
SODIUM SERPL-SCNC: 142 MMOL/L (ref 133–144)
SOURCE: ABNORMAL
SP GR UR STRIP: 1.01 (ref 1–1.03)
SP GR UR STRIP: 1.02 (ref 1–1.03)
SP GR UR STRIP: 1.05 (ref 1–1.03)
SPECIMEN SOURCE: ABNORMAL
SPECIMEN SOURCE: ABNORMAL
SPECIMEN SOURCE: NORMAL
SPERM #/AREA URNS HPF: PRESENT /HPF
TROPONIN I SERPL-MCNC: <0.015 UG/L (ref 0–0.04)
TSH SERPL DL<=0.005 MIU/L-ACNC: 1.05 MU/L (ref 0.4–4)
UROBILINOGEN UR STRIP-MCNC: 2 MG/DL (ref 0–2)
UROBILINOGEN UR STRIP-MCNC: NORMAL MG/DL (ref 0–2)
WBC # BLD AUTO: 12.4 10E9/L (ref 4–11)
WBC # BLD AUTO: 12.6 10E9/L (ref 4–11)
WBC # BLD AUTO: 15.1 10E9/L (ref 4–11)
WBC # BLD AUTO: 6.3 10E9/L (ref 4–11)
WBC # BLD AUTO: 6.4 10E9/L (ref 4–11)
WBC # BLD AUTO: 6.9 10E9/L (ref 4–11)
WBC # BLD AUTO: 7.3 10E9/L (ref 4–11)
WBC # BLD AUTO: 7.4 10E9/L (ref 4–11)
WBC # BLD AUTO: 7.4 10E9/L (ref 4–11)
WBC # BLD AUTO: 7.5 10E9/L (ref 4–11)
WBC # BLD AUTO: 7.6 10E9/L (ref 4–11)
WBC # BLD AUTO: 8.1 10E9/L (ref 4–11)
WBC # BLD AUTO: 8.1 10E9/L (ref 4–11)
WBC # BLD AUTO: 8.5 10E9/L (ref 4–11)
WBC # BLD AUTO: 9.1 10E9/L (ref 4–11)
WBC # CSF MANUAL: 2 /UL (ref 0–5)
WBC #/AREA URNS AUTO: 1 /HPF (ref 0–5)
WBC #/AREA URNS AUTO: 4 /HPF (ref 0–5)
WBC #/AREA URNS AUTO: 4 /HPF (ref 0–5)
WBC #/AREA URNS AUTO: <1 /HPF (ref 0–5)
WBC #/AREA URNS AUTO: <1 /HPF (ref 0–5)

## 2018-01-01 PROCEDURE — 81001 URINALYSIS AUTO W/SCOPE: CPT | Performed by: PHYSICIAN ASSISTANT

## 2018-01-01 PROCEDURE — 25000128 H RX IP 250 OP 636: Performed by: INTERNAL MEDICINE

## 2018-01-01 PROCEDURE — 93010 ELECTROCARDIOGRAM REPORT: CPT | Performed by: INTERNAL MEDICINE

## 2018-01-01 PROCEDURE — 80053 COMPREHEN METABOLIC PANEL: CPT | Performed by: INTERNAL MEDICINE

## 2018-01-01 PROCEDURE — 73721 MRI JNT OF LWR EXTRE W/O DYE: CPT | Mod: TC,LT

## 2018-01-01 PROCEDURE — 96365 THER/PROPH/DIAG IV INF INIT: CPT

## 2018-01-01 PROCEDURE — 74177 CT ABD & PELVIS W/CONTRAST: CPT | Mod: TC

## 2018-01-01 PROCEDURE — 97161 PT EVAL LOW COMPLEX 20 MIN: CPT | Mod: GP

## 2018-01-01 PROCEDURE — G0378 HOSPITAL OBSERVATION PER HR: HCPCS

## 2018-01-01 PROCEDURE — 36415 COLL VENOUS BLD VENIPUNCTURE: CPT | Performed by: INTERNAL MEDICINE

## 2018-01-01 PROCEDURE — 97110 THERAPEUTIC EXERCISES: CPT | Mod: GP | Performed by: PHYSICAL THERAPIST

## 2018-01-01 PROCEDURE — 82784 ASSAY IGA/IGD/IGG/IGM EACH: CPT | Performed by: INTERNAL MEDICINE

## 2018-01-01 PROCEDURE — 80076 HEPATIC FUNCTION PANEL: CPT | Performed by: PHYSICIAN ASSISTANT

## 2018-01-01 PROCEDURE — 87040 BLOOD CULTURE FOR BACTERIA: CPT | Performed by: INTERNAL MEDICINE

## 2018-01-01 PROCEDURE — 99217 ZZC OBSERVATION CARE DISCHARGE: CPT | Performed by: INTERNAL MEDICINE

## 2018-01-01 PROCEDURE — 97530 THERAPEUTIC ACTIVITIES: CPT | Mod: GP | Performed by: PHYSICAL THERAPIST

## 2018-01-01 PROCEDURE — 36415 COLL VENOUS BLD VENIPUNCTURE: CPT | Performed by: PHYSICIAN ASSISTANT

## 2018-01-01 PROCEDURE — 25000125 ZZHC RX 250

## 2018-01-01 PROCEDURE — 84145 PROCALCITONIN (PCT): CPT | Performed by: INTERNAL MEDICINE

## 2018-01-01 PROCEDURE — 99308 SBSQ NF CARE LOW MDM 20: CPT | Performed by: FAMILY MEDICINE

## 2018-01-01 PROCEDURE — 25000132 ZZH RX MED GY IP 250 OP 250 PS 637: Performed by: INTERNAL MEDICINE

## 2018-01-01 PROCEDURE — 80048 BASIC METABOLIC PNL TOTAL CA: CPT | Performed by: STUDENT IN AN ORGANIZED HEALTH CARE EDUCATION/TRAINING PROGRAM

## 2018-01-01 PROCEDURE — A9585 GADOBUTROL INJECTION: HCPCS | Performed by: RADIOLOGY

## 2018-01-01 PROCEDURE — 99309 SBSQ NF CARE MODERATE MDM 30: CPT | Performed by: FAMILY MEDICINE

## 2018-01-01 PROCEDURE — 40000133 ZZH STATISTIC OT WARD VISIT

## 2018-01-01 PROCEDURE — 72157 MRI CHEST SPINE W/O & W/DYE: CPT | Mod: TC

## 2018-01-01 PROCEDURE — 71045 X-RAY EXAM CHEST 1 VIEW: CPT | Mod: TC

## 2018-01-01 PROCEDURE — 25500064 ZZH RX 255 OP 636: Performed by: RADIOLOGY

## 2018-01-01 PROCEDURE — 83036 HEMOGLOBIN GLYCOSYLATED A1C: CPT | Performed by: INTERNAL MEDICINE

## 2018-01-01 PROCEDURE — 83605 ASSAY OF LACTIC ACID: CPT | Performed by: INTERNAL MEDICINE

## 2018-01-01 PROCEDURE — 82550 ASSAY OF CK (CPK): CPT | Performed by: INTERNAL MEDICINE

## 2018-01-01 PROCEDURE — 83690 ASSAY OF LIPASE: CPT | Performed by: PHYSICIAN ASSISTANT

## 2018-01-01 PROCEDURE — 36415 COLL VENOUS BLD VENIPUNCTURE: CPT | Performed by: STUDENT IN AN ORGANIZED HEALTH CARE EDUCATION/TRAINING PROGRAM

## 2018-01-01 PROCEDURE — 82042 OTHER SOURCE ALBUMIN QUAN EA: CPT | Performed by: INTERNAL MEDICINE

## 2018-01-01 PROCEDURE — 82945 GLUCOSE OTHER FLUID: CPT | Performed by: INTERNAL MEDICINE

## 2018-01-01 PROCEDURE — 87086 URINE CULTURE/COLONY COUNT: CPT | Performed by: INTERNAL MEDICINE

## 2018-01-01 PROCEDURE — 00000146 ZZHCL STATISTIC GLUCOSE BY METER IP

## 2018-01-01 PROCEDURE — 80048 BASIC METABOLIC PNL TOTAL CA: CPT | Performed by: PHYSICIAN ASSISTANT

## 2018-01-01 PROCEDURE — 99213 OFFICE O/P EST LOW 20 MIN: CPT | Performed by: FAMILY MEDICINE

## 2018-01-01 PROCEDURE — 12000000 ZZH R&B MED SURG/OB

## 2018-01-01 PROCEDURE — 85025 COMPLETE CBC W/AUTO DIFF WBC: CPT | Performed by: INTERNAL MEDICINE

## 2018-01-01 PROCEDURE — 96375 TX/PRO/DX INJ NEW DRUG ADDON: CPT

## 2018-01-01 PROCEDURE — 99308 SBSQ NF CARE LOW MDM 20: CPT | Mod: GV | Performed by: FAMILY MEDICINE

## 2018-01-01 PROCEDURE — G0463 HOSPITAL OUTPT CLINIC VISIT: HCPCS | Mod: 25

## 2018-01-01 PROCEDURE — 85025 COMPLETE CBC W/AUTO DIFF WBC: CPT | Performed by: PHYSICIAN ASSISTANT

## 2018-01-01 PROCEDURE — 85025 COMPLETE CBC W/AUTO DIFF WBC: CPT | Performed by: FAMILY MEDICINE

## 2018-01-01 PROCEDURE — 25000132 ZZH RX MED GY IP 250 OP 250 PS 637: Performed by: STUDENT IN AN ORGANIZED HEALTH CARE EDUCATION/TRAINING PROGRAM

## 2018-01-01 PROCEDURE — 86140 C-REACTIVE PROTEIN: CPT | Performed by: INTERNAL MEDICINE

## 2018-01-01 PROCEDURE — 99239 HOSP IP/OBS DSCHRG MGMT >30: CPT | Performed by: INTERNAL MEDICINE

## 2018-01-01 PROCEDURE — 40000275 ZZH STATISTIC RCP TIME EA 10 MIN

## 2018-01-01 PROCEDURE — 25000128 H RX IP 250 OP 636: Performed by: FAMILY MEDICINE

## 2018-01-01 PROCEDURE — 87205 SMEAR GRAM STAIN: CPT | Performed by: INTERNAL MEDICINE

## 2018-01-01 PROCEDURE — 25000128 H RX IP 250 OP 636: Performed by: HOSPITALIST

## 2018-01-01 PROCEDURE — 99285 EMERGENCY DEPT VISIT HI MDM: CPT | Performed by: PHYSICIAN ASSISTANT

## 2018-01-01 PROCEDURE — 80048 BASIC METABOLIC PNL TOTAL CA: CPT | Performed by: INTERNAL MEDICINE

## 2018-01-01 PROCEDURE — 85730 THROMBOPLASTIN TIME PARTIAL: CPT | Performed by: PHYSICIAN ASSISTANT

## 2018-01-01 PROCEDURE — 96374 THER/PROPH/DIAG INJ IV PUSH: CPT

## 2018-01-01 PROCEDURE — 99284 EMERGENCY DEPT VISIT MOD MDM: CPT | Mod: Z6 | Performed by: PHYSICIAN ASSISTANT

## 2018-01-01 PROCEDURE — 73700 CT LOWER EXTREMITY W/O DYE: CPT | Mod: TC,LT

## 2018-01-01 PROCEDURE — 81001 URINALYSIS AUTO W/SCOPE: CPT | Mod: ZL | Performed by: FAMILY MEDICINE

## 2018-01-01 PROCEDURE — 99305 1ST NF CARE MODERATE MDM 35: CPT | Performed by: FAMILY MEDICINE

## 2018-01-01 PROCEDURE — 93005 ELECTROCARDIOGRAM TRACING: CPT

## 2018-01-01 PROCEDURE — 62270 DX LMBR SPI PNXR: CPT | Mod: TC

## 2018-01-01 PROCEDURE — 70450 CT HEAD/BRAIN W/O DYE: CPT | Mod: TC

## 2018-01-01 PROCEDURE — 25000128 H RX IP 250 OP 636: Performed by: RADIOLOGY

## 2018-01-01 PROCEDURE — 40000786 ZZHCL STATISTIC ACTIVE MRSA SURVEILLANCE CULTURE: Performed by: STUDENT IN AN ORGANIZED HEALTH CARE EDUCATION/TRAINING PROGRAM

## 2018-01-01 PROCEDURE — 85610 PROTHROMBIN TIME: CPT | Performed by: INTERNAL MEDICINE

## 2018-01-01 PROCEDURE — 36416 COLLJ CAPILLARY BLOOD SPEC: CPT | Mod: ZL | Performed by: FAMILY MEDICINE

## 2018-01-01 PROCEDURE — 40000193 ZZH STATISTIC PT WARD VISIT: Performed by: PHYSICAL THERAPIST

## 2018-01-01 PROCEDURE — 40000788 ZZHCL STATISTIC ESTIMATED AVERAGE GLUCOSE: Performed by: INTERNAL MEDICINE

## 2018-01-01 PROCEDURE — 25000128 H RX IP 250 OP 636: Performed by: PHYSICIAN ASSISTANT

## 2018-01-01 PROCEDURE — 96374 THER/PROPH/DIAG INJ IV PUSH: CPT | Performed by: INTERNAL MEDICINE

## 2018-01-01 PROCEDURE — 84443 ASSAY THYROID STIM HORMONE: CPT | Performed by: HOSPITALIST

## 2018-01-01 PROCEDURE — 83605 ASSAY OF LACTIC ACID: CPT | Performed by: PHYSICIAN ASSISTANT

## 2018-01-01 PROCEDURE — 009U3ZX DRAINAGE OF SPINAL CANAL, PERCUTANEOUS APPROACH, DIAGNOSTIC: ICD-10-PCS | Performed by: INTERNAL MEDICINE

## 2018-01-01 PROCEDURE — 82550 ASSAY OF CK (CPK): CPT | Performed by: PHYSICIAN ASSISTANT

## 2018-01-01 PROCEDURE — 99204 OFFICE O/P NEW MOD 45 MIN: CPT | Performed by: FAMILY MEDICINE

## 2018-01-01 PROCEDURE — 96367 TX/PROPH/DG ADDL SEQ IV INF: CPT

## 2018-01-01 PROCEDURE — 99232 SBSQ HOSP IP/OBS MODERATE 35: CPT | Performed by: INTERNAL MEDICINE

## 2018-01-01 PROCEDURE — 40000193 ZZH STATISTIC PT WARD VISIT

## 2018-01-01 PROCEDURE — 82140 ASSAY OF AMMONIA: CPT | Performed by: PHYSICIAN ASSISTANT

## 2018-01-01 PROCEDURE — 99213 OFFICE O/P EST LOW 20 MIN: CPT | Performed by: NURSE PRACTITIONER

## 2018-01-01 PROCEDURE — 99284 EMERGENCY DEPT VISIT MOD MDM: CPT | Mod: Z6 | Performed by: FAMILY MEDICINE

## 2018-01-01 PROCEDURE — 81001 URINALYSIS AUTO W/SCOPE: CPT | Performed by: INTERNAL MEDICINE

## 2018-01-01 PROCEDURE — 83690 ASSAY OF LIPASE: CPT | Performed by: INTERNAL MEDICINE

## 2018-01-01 PROCEDURE — 25000132 ZZH RX MED GY IP 250 OP 250 PS 637: Performed by: HOSPITALIST

## 2018-01-01 PROCEDURE — 40000786 ZZHCL STATISTIC ACTIVE MRSA SURVEILLANCE CULTURE: Performed by: HOSPITALIST

## 2018-01-01 PROCEDURE — 99285 EMERGENCY DEPT VISIT HI MDM: CPT | Mod: 25

## 2018-01-01 PROCEDURE — 25000132 ZZH RX MED GY IP 250 OP 250 PS 637: Performed by: PHYSICIAN ASSISTANT

## 2018-01-01 PROCEDURE — 99214 OFFICE O/P EST MOD 30 MIN: CPT | Performed by: FAMILY MEDICINE

## 2018-01-01 PROCEDURE — 37000011 ZZH ANESTHESIA WARD SERVICE: Performed by: NURSE ANESTHETIST, CERTIFIED REGISTERED

## 2018-01-01 PROCEDURE — 80048 BASIC METABOLIC PNL TOTAL CA: CPT | Performed by: HOSPITALIST

## 2018-01-01 PROCEDURE — 83916 OLIGOCLONAL BANDS: CPT | Performed by: INTERNAL MEDICINE

## 2018-01-01 PROCEDURE — 80053 COMPREHEN METABOLIC PANEL: CPT | Performed by: FAMILY MEDICINE

## 2018-01-01 PROCEDURE — 83735 ASSAY OF MAGNESIUM: CPT | Performed by: PHYSICIAN ASSISTANT

## 2018-01-01 PROCEDURE — 84484 ASSAY OF TROPONIN QUANT: CPT | Performed by: INTERNAL MEDICINE

## 2018-01-01 PROCEDURE — 85027 COMPLETE CBC AUTOMATED: CPT | Performed by: HOSPITALIST

## 2018-01-01 PROCEDURE — 84145 PROCALCITONIN (PCT): CPT | Performed by: PHYSICIAN ASSISTANT

## 2018-01-01 PROCEDURE — G0463 HOSPITAL OUTPT CLINIC VISIT: HCPCS

## 2018-01-01 PROCEDURE — 85027 COMPLETE CBC AUTOMATED: CPT | Performed by: STUDENT IN AN ORGANIZED HEALTH CARE EDUCATION/TRAINING PROGRAM

## 2018-01-01 PROCEDURE — 99217 ZZC OBSERVATION CARE DISCHARGE: CPT | Performed by: NURSE PRACTITIONER

## 2018-01-01 PROCEDURE — 99310 SBSQ NF CARE HIGH MDM 45: CPT | Performed by: FAMILY MEDICINE

## 2018-01-01 PROCEDURE — 25000125 ZZHC RX 250: Performed by: RADIOLOGY

## 2018-01-01 PROCEDURE — 85610 PROTHROMBIN TIME: CPT | Performed by: PHYSICIAN ASSISTANT

## 2018-01-01 PROCEDURE — 85027 COMPLETE CBC AUTOMATED: CPT | Performed by: PHYSICIAN ASSISTANT

## 2018-01-01 PROCEDURE — 97165 OT EVAL LOW COMPLEX 30 MIN: CPT | Mod: GO

## 2018-01-01 PROCEDURE — 70553 MRI BRAIN STEM W/O & W/DYE: CPT | Mod: TC

## 2018-01-01 PROCEDURE — 40000786 ZZHCL STATISTIC ACTIVE MRSA SURVEILLANCE CULTURE: Performed by: INTERNAL MEDICINE

## 2018-01-01 PROCEDURE — 97110 THERAPEUTIC EXERCISES: CPT | Mod: GO

## 2018-01-01 PROCEDURE — 25000131 ZZH RX MED GY IP 250 OP 636 PS 637: Performed by: INTERNAL MEDICINE

## 2018-01-01 PROCEDURE — 99220 ZZC INITIAL OBSERVATION CARE,LEVL III: CPT | Performed by: INTERNAL MEDICINE

## 2018-01-01 PROCEDURE — 80053 COMPREHEN METABOLIC PANEL: CPT | Performed by: PHYSICIAN ASSISTANT

## 2018-01-01 PROCEDURE — 99203 OFFICE O/P NEW LOW 30 MIN: CPT | Performed by: SURGERY

## 2018-01-01 PROCEDURE — 82947 ASSAY GLUCOSE BLOOD QUANT: CPT | Mod: ZL | Performed by: FAMILY MEDICINE

## 2018-01-01 PROCEDURE — 25000128 H RX IP 250 OP 636

## 2018-01-01 PROCEDURE — 96372 THER/PROPH/DIAG INJ SC/IM: CPT | Performed by: INTERNAL MEDICINE

## 2018-01-01 PROCEDURE — 87070 CULTURE OTHR SPECIMN AEROBIC: CPT | Performed by: INTERNAL MEDICINE

## 2018-01-01 PROCEDURE — 73562 X-RAY EXAM OF KNEE 3: CPT | Mod: TC,LT

## 2018-01-01 PROCEDURE — 99223 1ST HOSP IP/OBS HIGH 75: CPT | Mod: AI | Performed by: INTERNAL MEDICINE

## 2018-01-01 PROCEDURE — 82040 ASSAY OF SERUM ALBUMIN: CPT | Performed by: INTERNAL MEDICINE

## 2018-01-01 PROCEDURE — 99202 OFFICE O/P NEW SF 15 MIN: CPT | Performed by: ORTHOPAEDIC SURGERY

## 2018-01-01 PROCEDURE — 82043 UR ALBUMIN QUANTITATIVE: CPT | Mod: ZL | Performed by: FAMILY MEDICINE

## 2018-01-01 PROCEDURE — 84132 ASSAY OF SERUM POTASSIUM: CPT | Performed by: INTERNAL MEDICINE

## 2018-01-01 PROCEDURE — 96372 THER/PROPH/DIAG INJ SC/IM: CPT | Mod: XS

## 2018-01-01 PROCEDURE — 99219 ZZC INITIAL OBSERVATION CARE,LEVL II: CPT | Performed by: HOSPITALIST

## 2018-01-01 PROCEDURE — 73030 X-RAY EXAM OF SHOULDER: CPT | Mod: TC,RT

## 2018-01-01 PROCEDURE — 72156 MRI NECK SPINE W/O & W/DYE: CPT | Mod: TC

## 2018-01-01 PROCEDURE — 71275 CT ANGIOGRAPHY CHEST: CPT | Mod: TC

## 2018-01-01 PROCEDURE — 36415 COLL VENOUS BLD VENIPUNCTURE: CPT | Performed by: HOSPITALIST

## 2018-01-01 PROCEDURE — 99285 EMERGENCY DEPT VISIT HI MDM: CPT | Mod: Z6 | Performed by: INTERNAL MEDICINE

## 2018-01-01 PROCEDURE — 97161 PT EVAL LOW COMPLEX 20 MIN: CPT | Mod: GP | Performed by: PHYSICAL THERAPIST

## 2018-01-01 PROCEDURE — 99225 ZZC SUBSEQUENT OBSERVATION CARE,LEVEL II: CPT | Performed by: INTERNAL MEDICINE

## 2018-01-01 PROCEDURE — 84157 ASSAY OF PROTEIN OTHER: CPT | Performed by: INTERNAL MEDICINE

## 2018-01-01 PROCEDURE — 99285 EMERGENCY DEPT VISIT HI MDM: CPT | Performed by: INTERNAL MEDICINE

## 2018-01-01 PROCEDURE — 36415 COLL VENOUS BLD VENIPUNCTURE: CPT | Performed by: FAMILY MEDICINE

## 2018-01-01 PROCEDURE — 96360 HYDRATION IV INFUSION INIT: CPT

## 2018-01-01 PROCEDURE — 99284 EMERGENCY DEPT VISIT MOD MDM: CPT | Mod: 25

## 2018-01-01 PROCEDURE — 96361 HYDRATE IV INFUSION ADD-ON: CPT

## 2018-01-01 RX ORDER — ONDANSETRON 4 MG/1
4 TABLET, ORALLY DISINTEGRATING ORAL EVERY 6 HOURS PRN
Qty: 120 TABLET | COMMUNITY
Start: 2018-01-01

## 2018-01-01 RX ORDER — LIDOCAINE HYDROCHLORIDE 10 MG/ML
INJECTION, SOLUTION EPIDURAL; INFILTRATION; INTRACAUDAL; PERINEURAL
Status: DISCONTINUED
Start: 2018-01-01 | End: 2018-01-01 | Stop reason: WASHOUT

## 2018-01-01 RX ORDER — LISINOPRIL 40 MG/1
40 TABLET ORAL DAILY
Qty: 30 TABLET | OUTPATIENT
Start: 2018-01-01

## 2018-01-01 RX ORDER — SULFAMETHOXAZOLE/TRIMETHOPRIM 800-160 MG
1 TABLET ORAL 2 TIMES DAILY
Qty: 14 TABLET | Refills: 0 | Status: SHIPPED | OUTPATIENT
Start: 2018-01-01 | End: 2018-01-01

## 2018-01-01 RX ORDER — SENNOSIDES 8.6 MG
1 TABLET ORAL 2 TIMES DAILY
Status: DISCONTINUED | OUTPATIENT
Start: 2018-01-01 | End: 2018-01-01 | Stop reason: HOSPADM

## 2018-01-01 RX ORDER — NALOXONE HYDROCHLORIDE 0.4 MG/ML
.1-.4 INJECTION, SOLUTION INTRAMUSCULAR; INTRAVENOUS; SUBCUTANEOUS
Status: DISCONTINUED | OUTPATIENT
Start: 2018-01-01 | End: 2018-01-01 | Stop reason: HOSPADM

## 2018-01-01 RX ORDER — LISINOPRIL 40 MG/1
40 TABLET ORAL DAILY
Status: DISCONTINUED | OUTPATIENT
Start: 2018-01-01 | End: 2018-01-01 | Stop reason: HOSPADM

## 2018-01-01 RX ORDER — LIDOCAINE HYDROCHLORIDE 10 MG/ML
5 INJECTION, SOLUTION EPIDURAL; INFILTRATION; INTRACAUDAL; PERINEURAL ONCE
Status: COMPLETED | OUTPATIENT
Start: 2018-01-01 | End: 2018-01-01

## 2018-01-01 RX ORDER — AMOXICILLIN 250 MG
1 CAPSULE ORAL 2 TIMES DAILY PRN
Status: DISCONTINUED | OUTPATIENT
Start: 2018-01-01 | End: 2018-01-01 | Stop reason: HOSPADM

## 2018-01-01 RX ORDER — AMLODIPINE BESYLATE 10 MG/1
TABLET ORAL
Status: DISPENSED
Start: 2018-01-01 | End: 2018-01-01

## 2018-01-01 RX ORDER — NUTRITIONAL SUPPLEMENT
1.5 POWDER (GRAM) ORAL 2 TIMES DAILY
Status: ON HOLD | COMMUNITY
End: 2018-01-01

## 2018-01-01 RX ORDER — SODIUM CHLORIDE AND POTASSIUM CHLORIDE 150; 900 MG/100ML; MG/100ML
INJECTION, SOLUTION INTRAVENOUS CONTINUOUS
COMMUNITY
Start: 2018-01-01

## 2018-01-01 RX ORDER — HYDROCODONE BITARTRATE AND ACETAMINOPHEN 7.5; 325 MG/1; MG/1
1 TABLET ORAL EVERY 6 HOURS PRN
Status: DISCONTINUED | OUTPATIENT
Start: 2018-01-01 | End: 2018-01-01 | Stop reason: HOSPADM

## 2018-01-01 RX ORDER — DIAPER,BRIEF,INFANT-TODD,DISP
EACH MISCELLANEOUS
Qty: 30 G | Refills: 0 | Status: SHIPPED | OUTPATIENT
Start: 2018-01-01 | End: 2018-01-01

## 2018-01-01 RX ORDER — NYSTATIN 100000 U/G
OINTMENT TOPICAL 2 TIMES DAILY
Status: DISCONTINUED | OUTPATIENT
Start: 2018-01-01 | End: 2018-01-01

## 2018-01-01 RX ORDER — WARFARIN SODIUM 2.5 MG/1
TABLET ORAL
Refills: 0 | COMMUNITY
Start: 2018-01-01 | End: 2018-01-01

## 2018-01-01 RX ORDER — DEXTROSE MONOHYDRATE 25 G/50ML
25-50 INJECTION, SOLUTION INTRAVENOUS
Status: DISCONTINUED | OUTPATIENT
Start: 2018-01-01 | End: 2018-01-01 | Stop reason: HOSPADM

## 2018-01-01 RX ORDER — LISINOPRIL 40 MG/1
40 TABLET ORAL ONCE
Status: COMPLETED | OUTPATIENT
Start: 2018-01-01 | End: 2018-01-01

## 2018-01-01 RX ORDER — METOPROLOL SUCCINATE 50 MG/1
50 TABLET, EXTENDED RELEASE ORAL DAILY
Qty: 30 TABLET | OUTPATIENT
Start: 2018-01-01

## 2018-01-01 RX ORDER — BISACODYL 10 MG
10 SUPPOSITORY, RECTAL RECTAL DAILY PRN
Status: DISCONTINUED | OUTPATIENT
Start: 2018-01-01 | End: 2018-01-01 | Stop reason: HOSPADM

## 2018-01-01 RX ORDER — ONDANSETRON 4 MG/1
4 TABLET, ORALLY DISINTEGRATING ORAL EVERY 6 HOURS PRN
Status: DISCONTINUED | OUTPATIENT
Start: 2018-01-01 | End: 2018-01-01 | Stop reason: HOSPADM

## 2018-01-01 RX ORDER — ACETAMINOPHEN 325 MG/1
650 TABLET ORAL EVERY 4 HOURS PRN
Status: DISCONTINUED | OUTPATIENT
Start: 2018-01-01 | End: 2018-01-01 | Stop reason: HOSPADM

## 2018-01-01 RX ORDER — METOPROLOL SUCCINATE 50 MG/1
50 TABLET, EXTENDED RELEASE ORAL ONCE
Status: COMPLETED | OUTPATIENT
Start: 2018-01-01 | End: 2018-01-01

## 2018-01-01 RX ORDER — AMOXICILLIN 250 MG
1 CAPSULE ORAL 2 TIMES DAILY
Status: DISCONTINUED | OUTPATIENT
Start: 2018-01-01 | End: 2018-01-01 | Stop reason: HOSPADM

## 2018-01-01 RX ORDER — AMOXICILLIN 250 MG
2 CAPSULE ORAL 2 TIMES DAILY PRN
Status: DISCONTINUED | OUTPATIENT
Start: 2018-01-01 | End: 2018-01-01 | Stop reason: HOSPADM

## 2018-01-01 RX ORDER — HYDRALAZINE HYDROCHLORIDE 20 MG/ML
10 INJECTION INTRAMUSCULAR; INTRAVENOUS EVERY 6 HOURS PRN
Status: DISCONTINUED | OUTPATIENT
Start: 2018-01-01 | End: 2018-01-01

## 2018-01-01 RX ORDER — METOPROLOL SUCCINATE 50 MG/1
50 TABLET, EXTENDED RELEASE ORAL DAILY
Status: DISCONTINUED | OUTPATIENT
Start: 2018-01-01 | End: 2018-01-01 | Stop reason: HOSPADM

## 2018-01-01 RX ORDER — IOPAMIDOL 755 MG/ML
75 INJECTION, SOLUTION INTRAVASCULAR ONCE
Status: COMPLETED | OUTPATIENT
Start: 2018-01-01 | End: 2018-01-01

## 2018-01-01 RX ORDER — SODIUM CHLORIDE 9 MG/ML
INJECTION, SOLUTION INTRAVENOUS CONTINUOUS
Status: DISCONTINUED | OUTPATIENT
Start: 2018-01-01 | End: 2018-01-01

## 2018-01-01 RX ORDER — GADOBUTROL 604.72 MG/ML
7.5 INJECTION INTRAVENOUS ONCE
Status: COMPLETED | OUTPATIENT
Start: 2018-01-01 | End: 2018-01-01

## 2018-01-01 RX ORDER — POLYETHYLENE GLYCOL 3350 17 G/17G
17 POWDER, FOR SOLUTION ORAL DAILY PRN
Status: DISCONTINUED | OUTPATIENT
Start: 2018-01-01 | End: 2018-01-01 | Stop reason: HOSPADM

## 2018-01-01 RX ORDER — VANCOMYCIN HYDROCHLORIDE 1 G/200ML
1000 INJECTION, SOLUTION INTRAVENOUS ONCE
Status: COMPLETED | OUTPATIENT
Start: 2018-01-01 | End: 2018-01-01

## 2018-01-01 RX ORDER — LIDOCAINE 40 MG/G
CREAM TOPICAL
Status: DISCONTINUED | OUTPATIENT
Start: 2018-01-01 | End: 2018-01-01 | Stop reason: HOSPADM

## 2018-01-01 RX ORDER — POTASSIUM CL/LIDO/0.9 % NACL 10MEQ/0.1L
10 INTRAVENOUS SOLUTION, PIGGYBACK (ML) INTRAVENOUS
Status: DISCONTINUED | OUTPATIENT
Start: 2018-01-01 | End: 2018-01-01 | Stop reason: HOSPADM

## 2018-01-01 RX ORDER — POTASSIUM CHLORIDE 7.45 MG/ML
10 INJECTION INTRAVENOUS
Status: DISCONTINUED | OUTPATIENT
Start: 2018-01-01 | End: 2018-01-01 | Stop reason: HOSPADM

## 2018-01-01 RX ORDER — IOPAMIDOL 612 MG/ML
100 INJECTION, SOLUTION INTRAVASCULAR ONCE
Status: COMPLETED | OUTPATIENT
Start: 2018-01-01 | End: 2018-01-01

## 2018-01-01 RX ORDER — MORPHINE SULFATE 4 MG/ML
1 INJECTION, SOLUTION INTRAMUSCULAR; INTRAVENOUS
Status: DISCONTINUED | OUTPATIENT
Start: 2018-01-01 | End: 2018-01-01 | Stop reason: HOSPADM

## 2018-01-01 RX ORDER — LISINOPRIL 10 MG/1
40 TABLET ORAL ONCE
Status: COMPLETED | OUTPATIENT
Start: 2018-01-01 | End: 2018-01-01

## 2018-01-01 RX ORDER — SODIUM CHLORIDE 9 MG/ML
1000 INJECTION, SOLUTION INTRAVENOUS CONTINUOUS
Status: DISCONTINUED | OUTPATIENT
Start: 2018-01-01 | End: 2018-01-01

## 2018-01-01 RX ORDER — SODIUM CHLORIDE 9 MG/ML
INJECTION, SOLUTION INTRAVENOUS
Status: DISCONTINUED
Start: 2018-01-01 | End: 2018-01-01 | Stop reason: HOSPADM

## 2018-01-01 RX ORDER — GADOBUTROL 604.72 MG/ML
7.5 INJECTION INTRAVENOUS ONCE
Status: DISCONTINUED | OUTPATIENT
Start: 2018-01-01 | End: 2018-01-01 | Stop reason: HOSPADM

## 2018-01-01 RX ORDER — HYDROCODONE BITARTRATE AND ACETAMINOPHEN 5; 325 MG/1; MG/1
1-2 TABLET ORAL EVERY 4 HOURS PRN
Status: DISCONTINUED | OUTPATIENT
Start: 2018-01-01 | End: 2018-01-01 | Stop reason: HOSPADM

## 2018-01-01 RX ORDER — CEFAZOLIN SODIUM 1 G/50ML
1 INJECTION, SOLUTION INTRAVENOUS EVERY 8 HOURS
Status: DISCONTINUED | OUTPATIENT
Start: 2018-01-01 | End: 2018-01-01

## 2018-01-01 RX ORDER — NICOTINE POLACRILEX 4 MG
15-30 LOZENGE BUCCAL
Status: DISCONTINUED | OUTPATIENT
Start: 2018-01-01 | End: 2018-01-01 | Stop reason: HOSPADM

## 2018-01-01 RX ORDER — HYDROCODONE BITARTRATE AND ACETAMINOPHEN 5; 325 MG/1; MG/1
1 TABLET ORAL EVERY 6 HOURS PRN
COMMUNITY
End: 2018-01-01

## 2018-01-01 RX ORDER — IBUPROFEN 600 MG/1
600 TABLET, FILM COATED ORAL EVERY 6 HOURS PRN
Status: DISCONTINUED | OUTPATIENT
Start: 2018-01-01 | End: 2018-01-01 | Stop reason: HOSPADM

## 2018-01-01 RX ORDER — ONDANSETRON 2 MG/ML
4 INJECTION INTRAMUSCULAR; INTRAVENOUS EVERY 6 HOURS PRN
Status: DISCONTINUED | OUTPATIENT
Start: 2018-01-01 | End: 2018-01-01 | Stop reason: HOSPADM

## 2018-01-01 RX ORDER — ACETAMINOPHEN 650 MG/1
650 SUPPOSITORY RECTAL EVERY 4 HOURS PRN
Status: DISCONTINUED | OUTPATIENT
Start: 2018-01-01 | End: 2018-01-01 | Stop reason: HOSPADM

## 2018-01-01 RX ORDER — POTASSIUM CHLORIDE 1500 MG/1
40 TABLET, EXTENDED RELEASE ORAL ONCE
Status: COMPLETED | OUTPATIENT
Start: 2018-01-01 | End: 2018-01-01

## 2018-01-01 RX ORDER — LISINOPRIL 40 MG/1
40 TABLET ORAL DAILY
Status: DISCONTINUED | OUTPATIENT
Start: 2018-01-01 | End: 2018-01-01

## 2018-01-01 RX ORDER — NYSTATIN AND TRIAMCINOLONE ACETONIDE 100000; 1 [USP'U]/G; MG/G
CREAM TOPICAL 2 TIMES DAILY
Status: DISCONTINUED | OUTPATIENT
Start: 2018-01-01 | End: 2018-01-01 | Stop reason: HOSPADM

## 2018-01-01 RX ORDER — KETOROLAC TROMETHAMINE 30 MG/ML
15 INJECTION, SOLUTION INTRAMUSCULAR; INTRAVENOUS ONCE
Status: COMPLETED | OUTPATIENT
Start: 2018-01-01 | End: 2018-01-01

## 2018-01-01 RX ORDER — BACITRACIN 500 [USP'U]/G
0.5 OINTMENT OPHTHALMIC 2 TIMES DAILY
Status: ON HOLD | COMMUNITY
End: 2018-01-01

## 2018-01-01 RX ORDER — CEPHALEXIN 500 MG/1
500 CAPSULE ORAL EVERY 6 HOURS SCHEDULED
Status: DISCONTINUED | OUTPATIENT
Start: 2018-01-01 | End: 2018-01-01 | Stop reason: HOSPADM

## 2018-01-01 RX ORDER — HYDROCODONE BITARTRATE AND ACETAMINOPHEN 7.5; 325 MG/1; MG/1
1 TABLET ORAL EVERY 6 HOURS PRN
Qty: 20 TABLET | Refills: 0 | Status: ON HOLD | OUTPATIENT
Start: 2018-01-01 | End: 2018-01-01

## 2018-01-01 RX ORDER — SODIUM CHLORIDE AND POTASSIUM CHLORIDE 150; 900 MG/100ML; MG/100ML
INJECTION, SOLUTION INTRAVENOUS CONTINUOUS
Status: DISCONTINUED | OUTPATIENT
Start: 2018-01-01 | End: 2018-01-01 | Stop reason: HOSPADM

## 2018-01-01 RX ORDER — AMOXICILLIN 250 MG
2 CAPSULE ORAL 2 TIMES DAILY
Status: DISCONTINUED | OUTPATIENT
Start: 2018-01-01 | End: 2018-01-01 | Stop reason: HOSPADM

## 2018-01-01 RX ORDER — WARFARIN SODIUM 2 MG/1
TABLET ORAL
Refills: 0 | COMMUNITY
Start: 2018-01-01 | End: 2018-01-01

## 2018-01-01 RX ORDER — KETOROLAC TROMETHAMINE 15 MG/ML
15 INJECTION, SOLUTION INTRAMUSCULAR; INTRAVENOUS ONCE
Status: COMPLETED | OUTPATIENT
Start: 2018-01-01 | End: 2018-01-01

## 2018-01-01 RX ORDER — ACETAMINOPHEN 325 MG/1
650 TABLET ORAL EVERY 4 HOURS PRN
Status: DISCONTINUED | OUTPATIENT
Start: 2018-01-01 | End: 2018-01-01

## 2018-01-01 RX ORDER — POTASSIUM CHLORIDE 1500 MG/1
20-40 TABLET, EXTENDED RELEASE ORAL
Status: DISCONTINUED | OUTPATIENT
Start: 2018-01-01 | End: 2018-01-01 | Stop reason: HOSPADM

## 2018-01-01 RX ORDER — SENNOSIDES 8.6 MG
1 TABLET ORAL 2 TIMES DAILY
Status: ON HOLD | COMMUNITY
End: 2018-01-01

## 2018-01-01 RX ORDER — HYDROCHLOROTHIAZIDE 12.5 MG/1
12.5 CAPSULE ORAL DAILY
Status: DISCONTINUED | OUTPATIENT
Start: 2018-01-01 | End: 2018-01-01 | Stop reason: HOSPADM

## 2018-01-01 RX ORDER — WARFARIN SODIUM 2.5 MG/1
TABLET ORAL
Qty: 30 TABLET | Refills: 0 | COMMUNITY
Start: 2018-01-01 | End: 2018-01-01

## 2018-01-01 RX ORDER — AMLODIPINE BESYLATE 10 MG/1
10 TABLET ORAL ONCE
Status: COMPLETED | OUTPATIENT
Start: 2018-01-01 | End: 2018-01-01

## 2018-01-01 RX ORDER — CEPHALEXIN 500 MG/1
500 CAPSULE ORAL EVERY 6 HOURS
Qty: 28 CAPSULE | Refills: 0 | Status: SHIPPED | OUTPATIENT
Start: 2018-01-01 | End: 2018-01-01

## 2018-01-01 RX ORDER — PREDNISONE 20 MG/1
120 TABLET ORAL DAILY
Qty: 66 TABLET | Refills: 0 | Status: SHIPPED | OUTPATIENT
Start: 2018-01-01 | End: 2018-01-01

## 2018-01-01 RX ORDER — POTASSIUM CHLORIDE 1.5 G/1.58G
20-40 POWDER, FOR SOLUTION ORAL
Status: DISCONTINUED | OUTPATIENT
Start: 2018-01-01 | End: 2018-01-01 | Stop reason: HOSPADM

## 2018-01-01 RX ORDER — NYSTATIN 100000 U/G
CREAM TOPICAL
Refills: 99 | Status: ON HOLD | COMMUNITY
Start: 2018-01-01 | End: 2018-01-01

## 2018-01-01 RX ORDER — HYDROCODONE BITARTRATE AND ACETAMINOPHEN 5; 325 MG/1; MG/1
TABLET ORAL
Status: DISCONTINUED
Start: 2018-01-01 | End: 2018-01-01 | Stop reason: WASHOUT

## 2018-01-01 RX ORDER — AMLODIPINE BESYLATE 10 MG/1
10 TABLET ORAL DAILY
Qty: 30 TABLET | OUTPATIENT
Start: 2018-01-01

## 2018-01-01 RX ORDER — WARFARIN SODIUM 4 MG/1
TABLET ORAL
COMMUNITY
Start: 2018-01-01 | End: 2018-01-01

## 2018-01-01 RX ORDER — ACETAMINOPHEN 325 MG/1
975 TABLET ORAL ONCE
Status: COMPLETED | OUTPATIENT
Start: 2018-01-01 | End: 2018-01-01

## 2018-01-01 RX ORDER — AZTREONAM 1 G/1
INJECTION, POWDER, LYOPHILIZED, FOR SOLUTION INTRAMUSCULAR; INTRAVENOUS
Status: COMPLETED
Start: 2018-01-01 | End: 2018-01-01

## 2018-01-01 RX ORDER — ACETAMINOPHEN 325 MG/1
650 TABLET ORAL EVERY 4 HOURS PRN
Qty: 100 TABLET | DISCHARGE
Start: 2018-01-01

## 2018-01-01 RX ORDER — BACITRACIN ZINC 500 [USP'U]/G
OINTMENT TOPICAL 2 TIMES DAILY
COMMUNITY

## 2018-01-01 RX ORDER — VANCOMYCIN HYDROCHLORIDE 125 MG/1
CAPSULE ORAL
Qty: 40 CAPSULE | Refills: 0 | Status: SHIPPED | OUTPATIENT
Start: 2018-01-01 | End: 2018-01-01

## 2018-01-01 RX ADMIN — NYSTATIN, TRIAMCINOLONE ACETONIDE: 100000; 1 CREAM TOPICAL at 20:14

## 2018-01-01 RX ADMIN — MICONAZOLE NITRATE: 2 POWDER TOPICAL at 10:47

## 2018-01-01 RX ADMIN — INSULIN ASPART 2 UNITS: 100 INJECTION, SOLUTION INTRAVENOUS; SUBCUTANEOUS at 16:11

## 2018-01-01 RX ADMIN — SODIUM CHLORIDE 1000 MG: 9 INJECTION, SOLUTION INTRAVENOUS at 18:22

## 2018-01-01 RX ADMIN — IOPAMIDOL 75 ML: 755 INJECTION, SOLUTION INTRAVENOUS at 01:07

## 2018-01-01 RX ADMIN — SODIUM CHLORIDE: 9 INJECTION, SOLUTION INTRAVENOUS at 01:51

## 2018-01-01 RX ADMIN — NYSTATIN, TRIAMCINOLONE ACETONIDE: 100000; 1 CREAM TOPICAL at 10:47

## 2018-01-01 RX ADMIN — CEPHALEXIN 500 MG: 500 CAPSULE ORAL at 11:21

## 2018-01-01 RX ADMIN — METOPROLOL SUCCINATE 50 MG: 50 TABLET, EXTENDED RELEASE ORAL at 10:48

## 2018-01-01 RX ADMIN — ENOXAPARIN SODIUM 40 MG: 40 INJECTION SUBCUTANEOUS at 20:25

## 2018-01-01 RX ADMIN — VANCOMYCIN HYDROCHLORIDE 2000 MG: 1 INJECTION, POWDER, LYOPHILIZED, FOR SOLUTION INTRAVENOUS at 08:30

## 2018-01-01 RX ADMIN — HYDROCHLOROTHIAZIDE 12.5 MG: 12.5 CAPSULE ORAL at 10:55

## 2018-01-01 RX ADMIN — NYSTATIN, TRIAMCINOLONE ACETONIDE: 100000; 1 CREAM TOPICAL at 21:45

## 2018-01-01 RX ADMIN — LISINOPRIL 40 MG: 40 TABLET ORAL at 01:41

## 2018-01-01 RX ADMIN — LISINOPRIL 40 MG: 40 TABLET ORAL at 19:40

## 2018-01-01 RX ADMIN — SODIUM CHLORIDE 1000 ML: 9 INJECTION, SOLUTION INTRAVENOUS at 15:38

## 2018-01-01 RX ADMIN — MICONAZOLE NITRATE: 2 POWDER TOPICAL at 21:15

## 2018-01-01 RX ADMIN — MICONAZOLE NITRATE: 2 POWDER TOPICAL at 20:14

## 2018-01-01 RX ADMIN — GADOBUTROL 7.5 ML: 604.72 INJECTION INTRAVENOUS at 09:50

## 2018-01-01 RX ADMIN — VANCOMYCIN HYDROCHLORIDE 1000 MG: 1 INJECTION, SOLUTION INTRAVENOUS at 02:12

## 2018-01-01 RX ADMIN — POTASSIUM CHLORIDE AND SODIUM CHLORIDE: 900; 150 INJECTION, SOLUTION INTRAVENOUS at 16:12

## 2018-01-01 RX ADMIN — METOPROLOL SUCCINATE 50 MG: 50 TABLET, FILM COATED, EXTENDED RELEASE ORAL at 16:12

## 2018-01-01 RX ADMIN — CEPHALEXIN 500 MG: 500 CAPSULE ORAL at 16:31

## 2018-01-01 RX ADMIN — HYDROCHLOROTHIAZIDE 12.5 MG: 12.5 CAPSULE ORAL at 10:51

## 2018-01-01 RX ADMIN — VANCOMYCIN HYDROCHLORIDE 2000 MG: 1 INJECTION, POWDER, LYOPHILIZED, FOR SOLUTION INTRAVENOUS at 20:13

## 2018-01-01 RX ADMIN — METOPROLOL SUCCINATE 50 MG: 50 TABLET, EXTENDED RELEASE ORAL at 08:40

## 2018-01-01 RX ADMIN — LISINOPRIL 40 MG: 40 TABLET ORAL at 08:33

## 2018-01-01 RX ADMIN — METOPROLOL SUCCINATE 50 MG: 50 TABLET, EXTENDED RELEASE ORAL at 10:55

## 2018-01-01 RX ADMIN — INSULIN ASPART 1 UNITS: 100 INJECTION, SOLUTION INTRAVENOUS; SUBCUTANEOUS at 12:57

## 2018-01-01 RX ADMIN — ENOXAPARIN SODIUM 40 MG: 40 INJECTION SUBCUTANEOUS at 18:38

## 2018-01-01 RX ADMIN — METOPROLOL SUCCINATE 50 MG: 50 TABLET, EXTENDED RELEASE ORAL at 19:41

## 2018-01-01 RX ADMIN — LISINOPRIL 40 MG: 40 TABLET ORAL at 20:25

## 2018-01-01 RX ADMIN — SODIUM CHLORIDE 1000 ML: 9 INJECTION, SOLUTION INTRAVENOUS at 00:26

## 2018-01-01 RX ADMIN — LISINOPRIL 40 MG: 40 TABLET ORAL at 16:38

## 2018-01-01 RX ADMIN — ENOXAPARIN SODIUM 40 MG: 40 INJECTION SUBCUTANEOUS at 20:41

## 2018-01-01 RX ADMIN — CEPHALEXIN 500 MG: 500 CAPSULE ORAL at 12:57

## 2018-01-01 RX ADMIN — ENOXAPARIN SODIUM 40 MG: 40 INJECTION SUBCUTANEOUS at 19:41

## 2018-01-01 RX ADMIN — MICONAZOLE NITRATE: 2 POWDER TOPICAL at 13:13

## 2018-01-01 RX ADMIN — SODIUM CHLORIDE 1000 ML: 9 INJECTION, SOLUTION INTRAVENOUS at 21:12

## 2018-01-01 RX ADMIN — SODIUM CHLORIDE 1000 MG: 9 INJECTION, SOLUTION INTRAVENOUS at 11:58

## 2018-01-01 RX ADMIN — INSULIN ASPART 1 UNITS: 100 INJECTION, SOLUTION INTRAVENOUS; SUBCUTANEOUS at 07:29

## 2018-01-01 RX ADMIN — METOPROLOL SUCCINATE 50 MG: 50 TABLET, EXTENDED RELEASE ORAL at 10:30

## 2018-01-01 RX ADMIN — INSULIN ASPART 2 UNITS: 100 INJECTION, SOLUTION INTRAVENOUS; SUBCUTANEOUS at 06:43

## 2018-01-01 RX ADMIN — MICONAZOLE NITRATE: 2 POWDER TOPICAL at 20:08

## 2018-01-01 RX ADMIN — NYSTATIN, TRIAMCINOLONE ACETONIDE: 100000; 1 CREAM TOPICAL at 14:44

## 2018-01-01 RX ADMIN — ACETAMINOPHEN 975 MG: 325 TABLET ORAL at 19:40

## 2018-01-01 RX ADMIN — HYDROCHLOROTHIAZIDE 12.5 MG: 12.5 CAPSULE ORAL at 08:40

## 2018-01-01 RX ADMIN — METOPROLOL SUCCINATE 50 MG: 50 TABLET, FILM COATED, EXTENDED RELEASE ORAL at 08:58

## 2018-01-01 RX ADMIN — KETOROLAC TROMETHAMINE 15 MG: 30 INJECTION, SOLUTION INTRAMUSCULAR at 22:19

## 2018-01-01 RX ADMIN — FAMOTIDINE 20 MG: 20 TABLET ORAL at 09:56

## 2018-01-01 RX ADMIN — HYDROCHLOROTHIAZIDE 12.5 MG: 12.5 CAPSULE ORAL at 01:41

## 2018-01-01 RX ADMIN — LISINOPRIL 40 MG: 40 TABLET ORAL at 10:29

## 2018-01-01 RX ADMIN — INSULIN ASPART 1 UNITS: 100 INJECTION, SOLUTION INTRAVENOUS; SUBCUTANEOUS at 13:06

## 2018-01-01 RX ADMIN — ENOXAPARIN SODIUM 40 MG: 40 INJECTION SUBCUTANEOUS at 20:13

## 2018-01-01 RX ADMIN — FAMOTIDINE 20 MG: 20 TABLET ORAL at 21:26

## 2018-01-01 RX ADMIN — METOPROLOL SUCCINATE 50 MG: 50 TABLET, EXTENDED RELEASE ORAL at 10:26

## 2018-01-01 RX ADMIN — MICONAZOLE NITRATE: 2 POWDER TOPICAL at 08:58

## 2018-01-01 RX ADMIN — SENNOSIDES 1 TABLET: 8.6 TABLET, FILM COATED ORAL at 21:14

## 2018-01-01 RX ADMIN — LISINOPRIL 40 MG: 40 TABLET ORAL at 08:58

## 2018-01-01 RX ADMIN — LISINOPRIL 40 MG: 10 TABLET ORAL at 10:48

## 2018-01-01 RX ADMIN — METOPROLOL SUCCINATE 50 MG: 50 TABLET, EXTENDED RELEASE ORAL at 09:55

## 2018-01-01 RX ADMIN — MICONAZOLE NITRATE: 20 POWDER TOPICAL at 09:56

## 2018-01-01 RX ADMIN — CEPHALEXIN 500 MG: 500 CAPSULE ORAL at 18:34

## 2018-01-01 RX ADMIN — ENOXAPARIN SODIUM 40 MG: 40 INJECTION SUBCUTANEOUS at 19:40

## 2018-01-01 RX ADMIN — NYSTATIN, TRIAMCINOLONE ACETONIDE: 100000; 1 CREAM TOPICAL at 22:08

## 2018-01-01 RX ADMIN — ACETAMINOPHEN 650 MG: 325 TABLET, FILM COATED ORAL at 01:11

## 2018-01-01 RX ADMIN — METOPROLOL SUCCINATE 50 MG: 50 TABLET, EXTENDED RELEASE ORAL at 08:33

## 2018-01-01 RX ADMIN — CEPHALEXIN 500 MG: 500 CAPSULE ORAL at 06:36

## 2018-01-01 RX ADMIN — MICONAZOLE NITRATE: 20 POWDER TOPICAL at 21:27

## 2018-01-01 RX ADMIN — HYDROCODONE BITARTRATE AND ACETAMINOPHEN 1 TABLET: 7.5; 325 TABLET ORAL at 22:18

## 2018-01-01 RX ADMIN — METOPROLOL SUCCINATE 50 MG: 50 TABLET, EXTENDED RELEASE ORAL at 08:58

## 2018-01-01 RX ADMIN — AMLODIPINE BESYLATE 10 MG: 10 TABLET ORAL at 00:07

## 2018-01-01 RX ADMIN — ENOXAPARIN SODIUM 120 MG: 60 INJECTION SUBCUTANEOUS at 02:22

## 2018-01-01 RX ADMIN — LISINOPRIL 40 MG: 40 TABLET ORAL at 16:12

## 2018-01-01 RX ADMIN — SODIUM CHLORIDE 1000 ML: 9 INJECTION, SOLUTION INTRAVENOUS at 15:04

## 2018-01-01 RX ADMIN — CEPHALEXIN 500 MG: 500 CAPSULE ORAL at 18:39

## 2018-01-01 RX ADMIN — MICONAZOLE NITRATE: 2 POWDER TOPICAL at 22:08

## 2018-01-01 RX ADMIN — VANCOMYCIN HYDROCHLORIDE 2250 MG: 1 INJECTION, POWDER, LYOPHILIZED, FOR SOLUTION INTRAVENOUS at 20:05

## 2018-01-01 RX ADMIN — SODIUM CHLORIDE 1000 MG: 9 INJECTION, SOLUTION INTRAVENOUS at 12:03

## 2018-01-01 RX ADMIN — POTASSIUM CHLORIDE 40 MEQ: 1500 TABLET, EXTENDED RELEASE ORAL at 11:59

## 2018-01-01 RX ADMIN — NYSTATIN, TRIAMCINOLONE ACETONIDE: 100000; 1 CREAM TOPICAL at 09:52

## 2018-01-01 RX ADMIN — LIDOCAINE HYDROCHLORIDE 5 ML: 10 INJECTION, SOLUTION EPIDURAL; INFILTRATION; INTRACAUDAL; PERINEURAL at 16:34

## 2018-01-01 RX ADMIN — MICONAZOLE NITRATE: 2 POWDER TOPICAL at 09:52

## 2018-01-01 RX ADMIN — SODIUM CHLORIDE 1000 ML: 9 INJECTION, SOLUTION INTRAVENOUS at 16:23

## 2018-01-01 RX ADMIN — METOPROLOL SUCCINATE 50 MG: 50 TABLET, EXTENDED RELEASE ORAL at 16:38

## 2018-01-01 RX ADMIN — CEPHALEXIN 500 MG: 500 CAPSULE ORAL at 23:56

## 2018-01-01 RX ADMIN — SODIUM CHLORIDE: 9 INJECTION, SOLUTION INTRAVENOUS at 22:19

## 2018-01-01 RX ADMIN — SODIUM CHLORIDE 1000 MG: 9 INJECTION, SOLUTION INTRAVENOUS at 14:01

## 2018-01-01 RX ADMIN — LISINOPRIL 40 MG: 40 TABLET ORAL at 20:36

## 2018-01-01 RX ADMIN — SENNOSIDES 1 TABLET: 8.6 TABLET, FILM COATED ORAL at 08:58

## 2018-01-01 RX ADMIN — METOPROLOL SUCCINATE 50 MG: 50 TABLET, EXTENDED RELEASE ORAL at 01:41

## 2018-01-01 RX ADMIN — CEPHALEXIN 500 MG: 500 CAPSULE ORAL at 11:41

## 2018-01-01 RX ADMIN — ACETAMINOPHEN 650 MG: 325 TABLET, FILM COATED ORAL at 00:03

## 2018-01-01 RX ADMIN — POTASSIUM CHLORIDE AND SODIUM CHLORIDE: 900; 150 INJECTION, SOLUTION INTRAVENOUS at 03:07

## 2018-01-01 RX ADMIN — CEPHALEXIN 500 MG: 500 CAPSULE ORAL at 06:43

## 2018-01-01 RX ADMIN — SODIUM CHLORIDE 1000 ML: 9 INJECTION, SOLUTION INTRAVENOUS at 23:26

## 2018-01-01 RX ADMIN — SODIUM CHLORIDE 1000 MG: 9 INJECTION, SOLUTION INTRAVENOUS at 12:23

## 2018-01-01 RX ADMIN — SODIUM CHLORIDE 1000 ML: 9 INJECTION, SOLUTION INTRAVENOUS at 19:23

## 2018-01-01 RX ADMIN — NYSTATIN, TRIAMCINOLONE ACETONIDE: 100000; 1 CREAM TOPICAL at 21:28

## 2018-01-01 RX ADMIN — POTASSIUM CHLORIDE AND SODIUM CHLORIDE: 900; 150 INJECTION, SOLUTION INTRAVENOUS at 00:46

## 2018-01-01 RX ADMIN — ACETAMINOPHEN 650 MG: 325 TABLET, FILM COATED ORAL at 05:25

## 2018-01-01 RX ADMIN — KETOROLAC TROMETHAMINE 15 MG: 15 INJECTION, SOLUTION INTRAMUSCULAR; INTRAVENOUS at 00:05

## 2018-01-01 RX ADMIN — ACETAMINOPHEN 650 MG: 325 TABLET, FILM COATED ORAL at 23:56

## 2018-01-01 RX ADMIN — PHYTONADIONE 10 MG: 10 INJECTION, EMULSION INTRAMUSCULAR; INTRAVENOUS; SUBCUTANEOUS at 16:12

## 2018-01-01 RX ADMIN — MICONAZOLE NITRATE: 2 POWDER TOPICAL at 21:28

## 2018-01-01 RX ADMIN — NYSTATIN, TRIAMCINOLONE ACETONIDE: 100000; 1 CREAM TOPICAL at 13:13

## 2018-01-01 RX ADMIN — INSULIN ASPART 1 UNITS: 100 INJECTION, SOLUTION INTRAVENOUS; SUBCUTANEOUS at 07:12

## 2018-01-01 RX ADMIN — AZTREONAM 2 G: 1 INJECTION, POWDER, LYOPHILIZED, FOR SOLUTION INTRAMUSCULAR; INTRAVENOUS at 01:36

## 2018-01-01 RX ADMIN — MICONAZOLE NITRATE: 2 POWDER TOPICAL at 11:07

## 2018-01-01 RX ADMIN — LISINOPRIL 40 MG: 40 TABLET ORAL at 10:26

## 2018-01-01 RX ADMIN — INSULIN ASPART 1 UNITS: 100 INJECTION, SOLUTION INTRAVENOUS; SUBCUTANEOUS at 06:43

## 2018-01-01 RX ADMIN — IOPAMIDOL 100 ML: 612 INJECTION, SOLUTION INTRAVENOUS at 12:43

## 2018-01-01 RX ADMIN — METOPROLOL SUCCINATE 50 MG: 50 TABLET, EXTENDED RELEASE ORAL at 10:51

## 2018-01-01 RX ADMIN — CEPHALEXIN 500 MG: 500 CAPSULE ORAL at 23:59

## 2018-01-01 RX ADMIN — CEPHALEXIN 500 MG: 500 CAPSULE ORAL at 01:02

## 2018-01-01 RX ADMIN — ENOXAPARIN SODIUM 40 MG: 40 INJECTION SUBCUTANEOUS at 16:25

## 2018-01-01 RX ADMIN — ENOXAPARIN SODIUM 40 MG: 40 INJECTION SUBCUTANEOUS at 18:34

## 2018-01-01 RX ADMIN — SODIUM CHLORIDE: 9 INJECTION, SOLUTION INTRAVENOUS at 08:46

## 2018-01-01 RX ADMIN — SODIUM CHLORIDE 250 ML: 9 INJECTION, SOLUTION INTRAVENOUS at 11:58

## 2018-01-01 RX ADMIN — CEFAZOLIN SODIUM 1 G: 1 INJECTION, SOLUTION INTRAVENOUS at 07:29

## 2018-01-01 RX ADMIN — INSULIN ASPART 1 UNITS: 100 INJECTION, SOLUTION INTRAVENOUS; SUBCUTANEOUS at 11:25

## 2018-01-01 RX ADMIN — MICONAZOLE NITRATE: 2 POWDER TOPICAL at 21:45

## 2018-01-01 RX ADMIN — CEPHALEXIN 500 MG: 500 CAPSULE ORAL at 06:28

## 2018-01-01 ASSESSMENT — ENCOUNTER SYMPTOMS
WEAKNESS: 1
COLOR CHANGE: 1
FATIGUE: 0
FLANK PAIN: 0
COUGH: 0
ARTHRALGIAS: 1
CHILLS: 0
SHORTNESS OF BREATH: 0
ABDOMINAL PAIN: 0
FEVER: 0
FREQUENCY: 0
LIGHT-HEADEDNESS: 0
CHEST TIGHTNESS: 0
DIZZINESS: 0
ANAL BLEEDING: 0
BACK PAIN: 0
PSYCHIATRIC NEGATIVE: 1
CONFUSION: 1
ANAL BLEEDING: 0
ABDOMINAL PAIN: 0
CONFUSION: 1
ALTERED MENTAL STATUS: 1
DIZZINESS: 0
ANAL BLEEDING: 0
DISORIENTATION: 1
FREQUENCY: 0
DYSURIA: 0
UNEXPECTED WEIGHT CHANGE: 0
ABDOMINAL PAIN: 0
FEVER: 0
CHILLS: 0
SLEEP DISTURBANCE: 0
FEVER: 0
DYSURIA: 0
NAUSEA: 0
LIGHT-HEADEDNESS: 0
DIZZINESS: 1
MUSCULOSKELETAL NEGATIVE: 1
FLANK PAIN: 0
ACTIVITY CHANGE: 0
PALPITATIONS: 0
RESPIRATORY NEGATIVE: 1
ALTERED MENTAL STATUS: 1
VOMITING: 0
SHORTNESS OF BREATH: 0
FEVER: 0
FEVER: 0
CHILLS: 0
NEUROLOGICAL NEGATIVE: 1
BLOOD IN STOOL: 0
COLOR CHANGE: 0
HEADACHES: 0
CONSTIPATION: 0
ACTIVITY CHANGE: 0
NERVOUS/ANXIOUS: 1
NECK PAIN: 0
PALPITATIONS: 0
ABDOMINAL DISTENTION: 0
SHORTNESS OF BREATH: 0
LIGHT-HEADEDNESS: 1
WOUND: 1
DIZZINESS: 0
DIARRHEA: 0
WEAKNESS: 1
FEVER: 0
ABDOMINAL PAIN: 0
WHEEZING: 0
LIGHT-HEADEDNESS: 0
DIAPHORESIS: 0
CHILLS: 0
NAUSEA: 0
SHORTNESS OF BREATH: 0
WEAKNESS: 1
SORE THROAT: 0
NUMBNESS: 0
NUMBNESS: 0
FEVER: 0
VOICE CHANGE: 0
DIAPHORESIS: 0
COUGH: 0
RECTAL PAIN: 0
VOMITING: 0
SLEEP DISTURBANCE: 0
ABDOMINAL DISTENTION: 0
CHILLS: 0
CARDIOVASCULAR NEGATIVE: 1
BLOOD IN STOOL: 0
CHILLS: 0
HEMATURIA: 0
HEADACHES: 0
MYALGIAS: 0
HEADACHES: 0
BLOOD IN STOOL: 0
BACK PAIN: 0
ABDOMINAL DISTENTION: 0
COLOR CHANGE: 1
COLOR CHANGE: 0
APPETITE CHANGE: 0
COUGH: 0
HEADACHES: 0
WEAKNESS: 0
LIGHT-HEADEDNESS: 0
VOMITING: 0
FATIGUE: 0
ABDOMINAL PAIN: 0
MYALGIAS: 0
NECK PAIN: 0
DYSURIA: 0
NAUSEA: 0
WOUND: 0
SHORTNESS OF BREATH: 0
JOINT SWELLING: 1
WEAKNESS: 0
FATIGUE: 1
DISORIENTATION: 1
VOICE CHANGE: 0
PALPITATIONS: 0
WHEEZING: 0
CHEST TIGHTNESS: 0
APPETITE CHANGE: 0

## 2018-01-01 ASSESSMENT — PAIN SCALES - GENERAL
PAINLEVEL: NO PAIN (0)
PAINLEVEL: MODERATE PAIN (5)
PAINLEVEL: NO PAIN (0)

## 2018-01-01 ASSESSMENT — PAIN DESCRIPTION - DESCRIPTORS
DESCRIPTORS: SHARP
DESCRIPTORS: HEADACHE

## 2018-01-01 ASSESSMENT — VISUAL ACUITY
OS: OTHER (SEE COMMENTS)
OD: 20/70

## 2018-01-01 ASSESSMENT — ACTIVITIES OF DAILY LIVING (ADL): PREVIOUS_RESPONSIBILITIES: FINANCES;MEDICATION MANAGEMENT

## 2018-01-01 NOTE — PLAN OF CARE
Problem: Patient Care Overview  Goal: Plan of Care/Patient Progress Review  Outcome: No Change  Pt slept most of the night without any problems.  Neuro's intact and unchanged.  VSS except temp of 99.2 and 99.3.  NS running at 75/hr without any problems and changed to saline lock by the end of the shift. Moves independently in bed.  Urinated x 2 azul urine in the urinal.  Groin folds very yeasty with redness R>L.  Less redness throughout this shift. Telemetry running SR in the 80's per ICU.  Had a full tray in the beginning of the shift.  States he got a little confused on the time of day and slept through dinner.  Pt A/O although forgetful at times and very redirectable.  Cough at times with some production in sputum. Lungs are diminished.   Denies any pain.  Call light within reach and encouraged to use with any needs.  Bed alarm on although doesn't attempt to get out of bed.  Pt has SCD boots on all night without any issues.      Face to face report given with opportunity to observe patient.    Report given to Jocelyn Stevenson   1/1/2018  7:57 AM

## 2018-01-01 NOTE — PLAN OF CARE
"Problem: Patient Care Overview  Goal: Plan of Care/Patient Progress Review  Outcome: No Change  Admitted to unit at 1200. Hx of falls but has had increase in falls recently, requiring assist from EMS to get up. Pt states he has button to call for help, but did not have said 'button' on upon arrival to floor. Arrived with Cell phone. Careless with phone. Falls asleep freq and phone wrapped in bedding.Sleeps freq. Moans in sleep. Wakes to name and denies pain. Does need assist and direction to turn self in bed for prevention of skin breakdown. Does turn self to back when falls asleep.. SCD's on. Bed alarm on at all times. Has not made any attempts to get OOB on own. Bed alarm on at all times. MD orders for pt \"Strict Bedrest\"  Voided in urinal this shift. IVF @ 75/hr. Takes fluids well when offered. Groin red and excoriated from yeast. Cleaned X2 this shift and Micatin pwdr applied. Telemetry intact. Has not needed insulin this shift. Neuros stable.     Face to face report given with opportunity to observe patient.    Report given to Abril Brown   12/31/2017  11:08 PM    "

## 2018-01-01 NOTE — PLAN OF CARE
Problem: Patient Care Overview  Goal: Individualization & Mutuality  Outcome: No Change  Pt very sleepy with initial assessment, woke up around 1000 alert and oriented. Able to ambulate to bathroom with gait belt and walker with assist of 1, gait unsteady. Sat in chair for awhile with personal alarm attached. Neuro checks WDL. Denies pain, nausea N/T or dizziness when up. Telemetry per ICU RN SR 60 BBB. Room air sats 95%. No resp distress or SOB. Minimal cough in a.m. Interdry applied to bilat groin after cleansing for red rash and excoriation. Nystatin powder also used. To periarea. Afebrile. SCD's on earlier with request to be off for awhile. Elevated heels after feet cleansed and lotioned with aloe due to dry, cracked areas to soles.

## 2018-01-02 ENCOUNTER — APPOINTMENT (OUTPATIENT)
Dept: OCCUPATIONAL THERAPY | Facility: HOSPITAL | Age: 73
DRG: 074 | End: 2018-01-02
Attending: INTERNAL MEDICINE
Payer: COMMERCIAL

## 2018-01-02 ENCOUNTER — APPOINTMENT (OUTPATIENT)
Dept: MRI IMAGING | Facility: HOSPITAL | Age: 73
DRG: 074 | End: 2018-01-02
Attending: INTERNAL MEDICINE
Payer: COMMERCIAL

## 2018-01-02 ENCOUNTER — APPOINTMENT (OUTPATIENT)
Dept: PHYSICAL THERAPY | Facility: HOSPITAL | Age: 73
DRG: 074 | End: 2018-01-02
Attending: INTERNAL MEDICINE
Payer: COMMERCIAL

## 2018-01-02 PROCEDURE — 12000000 ZZH R&B MED SURG/OB

## 2018-01-02 PROCEDURE — 97530 THERAPEUTIC ACTIVITIES: CPT | Mod: GP | Performed by: PHYSICAL THERAPIST

## 2018-01-02 PROCEDURE — 25000128 H RX IP 250 OP 636: Performed by: RADIOLOGY

## 2018-01-02 PROCEDURE — 40000444 ZZHC STATISTIC OT PEDS VISIT

## 2018-01-02 PROCEDURE — 99232 SBSQ HOSP IP/OBS MODERATE 35: CPT | Performed by: INTERNAL MEDICINE

## 2018-01-02 PROCEDURE — 25000132 ZZH RX MED GY IP 250 OP 250 PS 637: Performed by: INTERNAL MEDICINE

## 2018-01-02 PROCEDURE — 70553 MRI BRAIN STEM W/O & W/DYE: CPT | Mod: TC

## 2018-01-02 PROCEDURE — 40000193 ZZH STATISTIC PT WARD VISIT: Performed by: PHYSICAL THERAPIST

## 2018-01-02 PROCEDURE — 97162 PT EVAL MOD COMPLEX 30 MIN: CPT | Mod: GP | Performed by: PHYSICAL THERAPIST

## 2018-01-02 PROCEDURE — 97530 THERAPEUTIC ACTIVITIES: CPT | Mod: GO

## 2018-01-02 PROCEDURE — A9585 GADOBUTROL INJECTION: HCPCS | Performed by: RADIOLOGY

## 2018-01-02 RX ORDER — LISINOPRIL 20 MG/1
20 TABLET ORAL DAILY
Status: DISCONTINUED | OUTPATIENT
Start: 2018-01-02 | End: 2018-01-04 | Stop reason: DRUGHIGH

## 2018-01-02 RX ORDER — GADOBUTROL 604.72 MG/ML
10 INJECTION INTRAVENOUS ONCE
Status: COMPLETED | OUTPATIENT
Start: 2018-01-02 | End: 2018-01-02

## 2018-01-02 RX ORDER — AMLODIPINE BESYLATE 2.5 MG/1
2.5 TABLET ORAL EVERY EVENING
Status: DISCONTINUED | OUTPATIENT
Start: 2018-01-02 | End: 2018-01-05

## 2018-01-02 RX ADMIN — MICONAZOLE NITRATE: 20 POWDER TOPICAL at 21:10

## 2018-01-02 RX ADMIN — METOPROLOL SUCCINATE 50 MG: 50 TABLET, EXTENDED RELEASE ORAL at 07:33

## 2018-01-02 RX ADMIN — FAMOTIDINE 20 MG: 20 TABLET ORAL at 21:15

## 2018-01-02 RX ADMIN — GADOBUTROL 10 ML: 604.72 INJECTION INTRAVENOUS at 09:49

## 2018-01-02 RX ADMIN — LISINOPRIL 20 MG: 20 TABLET ORAL at 16:00

## 2018-01-02 RX ADMIN — FAMOTIDINE 20 MG: 20 TABLET ORAL at 08:29

## 2018-01-02 RX ADMIN — AMLODIPINE BESYLATE 2.5 MG: 2.5 TABLET ORAL at 21:15

## 2018-01-02 RX ADMIN — MICONAZOLE NITRATE: 20 POWDER TOPICAL at 10:10

## 2018-01-02 ASSESSMENT — ACTIVITIES OF DAILY LIVING (ADL): PREVIOUS_RESPONSIBILITIES: FINANCES

## 2018-01-02 NOTE — PLAN OF CARE
"Problem: Patient Care Overview  Goal: Plan of Care/Patient Progress Review  Outcome: No Change  Reason for hospital stay:  Falls    Most recent vitals: /85  Pulse 79  Temp 98.3  F (36.8  C) (Tympanic)  Resp 20  Ht 1.778 m (5' 10\")  Wt 122.7 kg (270 lb 8.1 oz)  SpO2 93%  BMI 38.81 kg/m2  Pain Management:  Denies pain  Orientation:  A&O with reported forgetfulness. Neuro's intact  Cardiac:  Telemetry reading SR 70's with BBB per ICU  Respiratory:  LS exp wheezes. Denies sob  GI:  BS active. Denies nausea  :  Voids without difficulty  Diet: Reg  Skin Issues:  Redness to abd folds, Inter-dry remains in place.  IVF:  Saline locked  ABX:  None  Mobility:  Stand by assist with 4ww. Up in chair for a period of time.  Safety:  Call light within reach. Bed alarm active.    Comments: MRI to be preformed today. Check list completed by previous shift.    1/2/2018  5:47 AM  Cora Ferrari    Face to face report given with opportunity to observe patient.    Report given to SYLVIA Banks   1/2/2018  7:36 AM      "

## 2018-01-02 NOTE — PLAN OF CARE
Problem: Patient Care Overview  Goal: Plan of Care/Patient Progress Review  Outcome: No Change  Discharge Planner OT   Patient plan for discharge: pt would like to return to home but was agreeable to short-term rehab  Current status: SBA and set up for ADLs. Contact guard for functional transfers  Barriers to return to prior living situation: mobility, strength, endurance  Recommendations for discharge: short-term rehab  Rationale for recommendations: pt has decreased activity tolerance and has had frequent falls. He would benefit from short-term rehab prior to returning prior living situation.       Entered by: Licha Minor 01/02/2018 2:49 PM

## 2018-01-02 NOTE — PROGRESS NOTES
Jose Antonio Marmolejo 72 year old    Returned from mri  Exam Performed : mri brain  Pushed to Reading Service?: No  Tolerated Procedure : well  May resume previous diet : Yes  Time Returned to Unit : 10:10  Report Given to : jil nurse    1/2/2018 10:27 AM   SABRINA HURD

## 2018-01-02 NOTE — PLAN OF CARE
Face to face report given with opportunity to observe patient.    Report given to Ramiro RN.    Jocelyn King  1/2/2018, 3:50 PM

## 2018-01-02 NOTE — PLAN OF CARE
Problem: Patient Care Overview  Goal: Individualization & Mutuality  Outcome: No Change  Pt has denied pain, nausea, N/T or dizziness when up. Assist of one with gait belt and walker to and back from bed or chair. Ambulates to bathroom with his own rolling walker- gait slightly unsteady . No difficulty eating or swallowing, appetite good. MRI completed this a.m room air sat 95%- states respiratory issues at home in which he felt he caught. Occasional cough with rhonchi and maybe a few wheezes. Groin rash appears to be healing. Clean interdry applied to bilat groin after bath. Voiding qs in urinal.afebrile. Pleasant and cooperative.

## 2018-01-02 NOTE — PLAN OF CARE
Problem: Patient Care Overview  Goal: Plan of Care/Patient Progress Review  Outcome: No Change  No falls or injuries this shift. Uses call light approp. Plan for pt to have MRI brain in am. Assisted pt to fill out MRI checklist. Did have 8 beats V Tach at 2300 tonight. ICU nurse to pts room to check VS.Up in chair for dinner. Assist of 1.     Face to face report given with opportunity to observe patient.    Report given to Cora Brown   1/2/2018  12:12 AM

## 2018-01-02 NOTE — PROGRESS NOTES
Assessment completed see flow sheet.    Jose Antonio is laying in bed, he did not have a PCP, and establish care appointment was set up with Dr Briggs on Tues Jan 30 @ 1114. He sees Dr Romo for dental care, does not have an eye doctor, he wears cheaters. He states his son is his POA, paperwork sent to medical records. He [place] [facility] [place] [facility] He is a , the [facility] has been notified.     Jose Antonio lives at home, his adult son and wife and granchild live with him. He states he feels safe and that his home is well maintained. He performs his own self cares, manages his medication and appointment schedule. His son and daughter in law do the shopping and food prep. He has not driven in a year, he uses agency transportation, but is basically homebound. His bedroom and bathroom are on the main floor, he does not go into the basement, he hires his laundry done. He also hires out his grass and snow removal.     He sleeps ok, and naps during the day. He has no mood concerns. He quit smoking in 1988 and quit drinking in 1987. His wally is important to him, he does not want awlly support at this time. His support system is his family who live with him, his 3 other children, friends and siblings. His goal is to live and get through this. He enjoys fishing and pontooning.     His plan is to go to inpatient rehab or a SNF for short term rehab. Ref[facility] to [place] [facility], [facility], [facility], [facility] and [facility]. CM will remain available.    LOC: alert    Others present: Patient    Dx: fall/back pain    Lives with: Lives With: child(ceasar), adult, grandchild(ceasar)    Living at: Living Arrangements: house    Equipment used:  Walker, ramps, handicapped shower    Support System: Description of Support System: Supportive, Involved    Primary PCP: No Ref-Primary, Physician    POA/Guardian: Yes, paperwork sent to medical records    Health Care Directive: NO     Pharmacy: [facility] @  [facility]    :  no    Homecare/County Services:   no    Adequate Resources for needs (housing, utilities, food/med): YES    Meds and appointments management: YES    Work: NO    Transportation: YES     ADLs: independent    Falls: Yes  Reason for falls risk: Other- many in the last 6 months    Able to Return to Prior Living Arrangements: NO    : YES    Goals: get better    Barriers: unknown    Needs: Demonstrates Competency    MARY KATE: low    Discharge Plan: SNF vs Inpatient rehab

## 2018-01-02 NOTE — PROGRESS NOTES
Consult received for DM diet instruction.    Patient declined DM diet education. Hgb A1C 6.7.    Encouraged patient to consume a consistent amount of carbohydrates at each meal for glucose control.    Cony Scott RD, LD

## 2018-01-02 NOTE — PROGRESS NOTES
01/02/18 1107   Quick Adds   Type of Visit Initial PT Evaluation   Living Environment   Lives With child(ceasar), adult   Living Arrangements house   Home Accessibility ramps present at home   Transportation Available family or friend will provide;car   Living Environment Comment Pt states his bedroom and walk in shower are located on the main floor of home.  Pt has not left home for quite some time due to his increasing number of falls   Self-Care   Usual Activity Tolerance fair   Current Activity Tolerance fair   Regular Exercise no   Equipment Currently Used at Home walker, rolling   Activity/Exercise/Self-Care Comment Pt has a 4WW that he has been using for at least the last year to year and a half since his number of falls have increased.     Functional Level Prior   Ambulation 1-->assistive equipment   Transferring 1-->assistive equipment   Toileting 0-->independent   Bathing 0-->independent   Dressing 0-->independent   Eating 0-->independent   Communication 0-->understands/communicates without difficulty   Swallowing 0-->swallows foods/liquids without difficulty   Cognition 0 - no cognition issues reported   Fall history within last six months yes   Number of times patient has fallen within last six months (unable to give number, nearly daily for past year)   Which of the above functional risks had a recent onset or change? ambulation;transferring;fall history   Prior Functional Level Comment Pt reprots that his functional mobility has been declining and number of falls increasing over the past year to year and a half.  Pt states that his ankles tend to roll contributing to falls as well as his legs will just give out when he becomes fatigued and does not have time to sit despite using 4WW.  Pt reports it has been an extended period of time since he has been able to leave his home.    General Information   Onset of Illness/Injury or Date of Surgery - Date 12/31/17   Referring Physician Dr. Warren    Patient/Family Goals Statement to go to rehab   Pertinent History of Current Problem (include personal factors and/or comorbidities that impact the POC) pt admitted with frequent falls and weakness.  MRI pending.  Falls have been ongoing and increasing in frequency for the past year to year and a half   Precautions/Limitations fall precautions   Weight-Bearing Status - LLE full weight-bearing   Weight-Bearing Status - RLE full weight-bearing   Cognitive Status Examination   Orientation orientation to person, place and time   Level of Consciousness alert   Follows Commands and Answers Questions 100% of the time   Personal Safety and Judgment intact   Memory intact   Pain Assessment   Patient Currently in Pain No   Posture    Posture Forward head position;Protracted shoulders   Range of Motion (ROM)   ROM Comment Bilateral LE AROM appears WFL   Strength   Strength Comments Right:  hip flex 3-/5, hip abd 3+/5, hip add 3+/5, knee ext 3+/5, knee flex 3/5, ankle DF 4-/5, ankle PF 3/5.  Left: hip flex 3/5, abd 3+/5, add 3+/5, knee ext 4-/5, knee flex 3/5, ankle DF 4-/5, ankle PF 3+/5   Bed Mobility   Bed Mobility Comments sup>sit SBA however needed several attempts and heavy use of siderail    Transfer Skills   Transfer Comments sit>stand min A with 3 attempts needed to complete and heavy reliance on walker   Gait   Gait Comments Ambulated 15' with 4WW min A with close w/c follow for safety.  Pt demonstrates significant hyperextension in bilateral knees due to significant weakness, also tends to WB through lateral aspect of feet with significant toed out gait, fatigues very quicly.   Balance   Balance Comments fair with support from 4WW   Sensory Examination   Sensory Perception no deficits were identified   General Therapy Interventions   Planned Therapy Interventions bed mobility training;balance training;neuromuscular re-education;strengthening;transfer training;risk factor education;progressive activity/exercise  "  Clinical Impression   Criteria for Skilled Therapeutic Intervention yes, treatment indicated   PT Diagnosis impaired safety and tolerance for functional mobility   Influenced by the following impairments decreased strength, decreased balance, decreased activity tolerance   Functional limitations due to impairments decreased safety with transfers, decreased safety with ambulation, frequent falls   Clinical Presentation Evolving/Changing   Clinical Presentation Rationale ongoing falls and progressive weakness   Clinical Decision Making (Complexity) Moderate complexity   Therapy Frequency` daily   Predicted Duration of Therapy Intervention (days/wks) 7 days   Anticipated Discharge Disposition Acute Rehabilitation Facility   Risk & Benefits of therapy have been explained Yes   Patient, Family & other staff in agreement with plan of care Yes   Clinical Impression Comments Pt would benefit from short term rehab to focus on strengthening, home safety education, improving activity tolerance and safety with functional mobility.   [facility] [email or web address] \"6 Clicks\"   2016, Trustees of [facility], under license to [employer].  All rights reserved.   6 Clicks Short Forms Basic Mobility Inpatient Short Form   [facility] AM-PAC  \"6 Clicks\" V.2 Basic Mobility Inpatient Short Form   1. Turning from your back to your side while in a flat bed without using bedrails? 2 - A Lot   2. Moving from lying on your back to sitting on the side of a flat bed without using bedrails? 2 - A Lot   3. Moving to and from a bed to a chair (including a wheelchair)? 2 - A Lot   4. Standing up from a chair using your arms (e.g., wheelchair, or bedside chair)? 2 - A Lot   5. To walk in hospital room? 2 - A Lot   6. Climbing 3-5 steps with a railing? 1 - Total   Basic Mobility Raw Score (Score out of 24.Lower scores equate to lower levels of function) 11   Total Evaluation Time   Total Evaluation Time (Minutes) 19     "

## 2018-01-02 NOTE — PLAN OF CARE
At 2255 pt had 10 beat run of v-tach, vital signs obtained, and primary nurse notified. Pt asymptomatic

## 2018-01-02 NOTE — PROGRESS NOTES
01/02/18 1341   Quick Adds   Type of Visit Initial Occupational Therapy Evaluation   Living Environment   Lives With child(ceasar), adult   Living Arrangements house   Home Accessibility ramps present at home   Number of Stairs Within Home 12   Transportation Available family or friend will provide   Living Environment Comment pt reports he does not need to use stairs in home. has a walk-in shower , son and daughter-in law live with him   Self-Care   Dominant Hand left   Usual Activity Tolerance fair   Current Activity Tolerance fair   Regular Exercise no   Equipment Currently Used at Home walker, rolling   Functional Level Prior   Ambulation 1-->assistive equipment   Transferring 1-->assistive equipment   Toileting 0-->independent   Bathing 1-->assistive equipment   Dressing 0-->independent   Eating 0-->independent   Communication 0-->understands/communicates without difficulty   Swallowing 0-->swallows foods/liquids without difficulty   Cognition 0 - no cognition issues reported   Fall history within last six months yes   Prior Functional Level Comment Pt reports he has fallen quite a bit       Present no   General Information   Onset of Illness/Injury or Date of Surgery - Date 12/31/17   Referring Physician Leigh Ann Warren MD   Patient/Family Goals Statement get back home   Additional Occupational Profile Info/Pertinent History of Current Problem Pt presented with weakness and frequent falls. pt has PMH of diabetes and hypertension. Pt family moved in with him 2 months ago but are not there during the day to assist if needed   Precautions/Limitations fall precautions   Weight-Bearing Status - LUE full weight-bearing   Weight-Bearing Status - RUE full weight-bearing   Weight-Bearing Status - LLE full weight-bearing   Weight-Bearing Status - RLE full weight-bearing   Heart Disease Risk Factors Diabetes;High blood pressure   Cognitive Status Examination   Orientation orientation to person, place  and time   Level of Consciousness alert   Able to Follow Commands WNL/WFL   Personal Safety (Cognitive) mild impairment   Memory intact   Attention No deficits were identified   Organization/Problem Solving No deficits were identified   Executive Function No deficits were identified   Visual Perception   Visual Perception Wears glasses   Sensory Examination   Sensory Comments pt reports numbness in feet   Pain Assessment   Patient Currently in Pain No   Range of Motion (ROM)   ROM Comment R shoulder WFL, but decreased ROM from pt reported previous work injury. LUE WNL. Pt does report difficulty with Upper body dressing at times   Strength   Strength Comments R shoulder 3/5, LUE 4/5   Muscle Tone Assessment   Muscle Tone Quick Adds No deficits were identified   Coordination   Upper Extremity Coordination No deficits were identified   Transfer Skill: Bed to Chair/Chair to Bed   Level of [place]: Bed to Chair stand-by assist   Physical Assist/Nonphysical Assist: Bed to Chair supervision;verbal cues   Weight-Bearing Restrictions full weight-bearing   Assistive Device - Transfer Skill Bed to Chair Chair to Bed Rehab Eval rolling walker   Transfer Skill: Sit to Stand   Level of [place]: Sit/Stand stand-by assist   Physical Assist/Nonphysical Assist: Sit/Stand supervision;verbal cues   Transfer Skill: Sit to Stand full weight-bearing   Assistive Device for Transfer: Sit/Stand rolling walker   Toilet Transfer   Toilet Transfer Toilet Transfer Skill   Transfer Skill: Toilet Transfer   Level of [place]: Toilet stand-by assist   Physical Assist/Nonphysical Assist: Toilet supervision;verbal cues   Weight-Bearing Restrictions: Toilet full weight-bearing   Assistive Device rolling walker;grab bars   Upper Body Dressing   Level of [place]: Dress Upper Body stand-by assist   Lower Body Dressing   Level of [place]: Dress Lower Body stand-by assist   Toileting   Level of [place]: Toilet stand-by assist  "  Grooming   Level of [place]: Grooming independent  (while seated, with set up)   Physical Assist/Nonphysical Assist: Grooming set-up required   Instrumental Activities of Daily Living (IADL)   Previous Responsibilities finances   IADL Comments pt reports son and daughter assist with cleaning, laundry, and cooking   Activities of Daily Living Analysis   Impairments Contributing to Impaired Activities of Daily Living balance impaired;strength decreased  (endurance)   General Therapy Interventions   Planned Therapy Interventions progressive activity/exercise;strengthening   Clinical Impression   Criteria for Skilled Therapeutic Interventions Met yes, treatment indicated   OT Diagnosis decreased activity tolerance   Influenced by the following impairments strength, endurance   Assessment of Occupational Performance 1-3 Performance Deficits   Identified Performance Deficits mobility, strength, endurance   Clinical Decision Making (Complexity) Low complexity   Therapy Frequency 5 times/wk   Predicted Duration of Therapy Intervention (days/wks) 3 days   Anticipated Discharge Disposition Transitional Care Facility   Risks and Benefits of Treatment have been explained. Yes   Patient, Family & other staff in agreement with plan of care Yes   Clinical Impression Comments Pt has had several falls and would benefit from short-term rehab to improve function prior to return to independent living situation   [facility] TM \"6 Clicks\"   2016, Trustees of [facility], under license to [employer].  All rights reserved.   6 Clicks Short Forms Daily Activity Inpatient Short Form   [facility] AM-PAC  \"6 Clicks\" Daily Activity Inpatient Short Form   1. Putting on and taking off regular lower body clothing? 3 - A Little   2. Bathing (including washing, rinsing, drying)? 3 - A Little   3. Toileting, which includes using toilet, bedpan or urinal? 4 - None   4. Putting on and taking off regular upper body " clothing? 4 - None   5. Taking care of personal grooming such as brushing teeth? 4 - None  (if seated)   6. Eating meals? 4 - None   Daily Activity Raw Score (Score out of 24.Lower scores equate to lower levels of function) 22   Total Evaluation Time   Total Evaluation Time (Minutes) 24

## 2018-01-02 NOTE — PROGRESS NOTES
[facility]    Hospitalist Progress Note      Assessment & Plan   Jose Antonio Marmolejo is a 72 year old male who was admitted on 12/31/2017. The main reason for admission was LE weakness and frequent falls.    Frequent falls: will r/o CVA with MRI brain. PT/OT     Hypertension: Looking through his records it appears that he was on Lopressor and Zestoretic about 2 years ago  And it appears that about a year ago he stopped taking his medications.  Patient does not have any good explanation why he stopped mid taking medications.     Diabetes: he denies, but he is diabetic, mild and I believe, diet controlled.      Mild renal insufficiency: Was due to dehydration. Resolved. Will keep monitoring     Social issues: He will need placement. Social service consult.    DVT Prophylaxis: SCD  Code Status: Full Code    Disposition: Expected discharge in 2-3 days once above problems resolved. He will need placement. Social service will be consulted.     Leigh Ann Warren    Interval History   DM, mild, diet controlled. MRI brain ordered (pt has R sided weakness)    -Data reviewed today: I reviewed all new labs and imaging results over the last 24 hours. I personally reviewed the CT and XR image(s) showing FINDINGS: The ventricular system and cortical sulci are normal for.  XR chest: no acute  Cardiopulmonary process  Lumbar spine XR: FINDINGS: The lumbar discs are normal height anterior and lateral osteophytes are seen at T12 L1-L4 L5. There are facet joint degenerative changes noted at L4-L5 and L5-S1 bilaterally.  Pelvis XR: Pelvis is intact the sacrum and sacroiliac joints appear normal articular spaces are normal height of both hips both proximal femurs appear intact.    Physical Exam   Temp: 99.4  F (37.4  C) Temp src: Tympanic BP: 157/79   Heart Rate: 77 Resp: 20 SpO2: 96 % O2 Device: None (Room air)    Vitals:    12/31/17 1208 01/01/18 0523   Weight: 121 kg (266 lb 12.1 oz) 122.7 kg (270 lb 8.1 oz)     Vital Signs with  Ranges  Temp:  [98  F (36.7  C)-99.7  F (37.6  C)] 99.4  F (37.4  C)  Heart Rate:  [64-77] 77  Resp:  [20-22] 20  BP: (142-165)/(60-89) 157/79  SpO2:  [93 %-96 %] 96 %  I/O last 3 completed shifts:  In: 2322 [P.O.:1038; I.V.:1284]  Out: 475 [Urine:475]    Peripheral IV 12/31/17 Right Upper forearm (Active)   Site Assessment WDL 1/1/2018  4:00 PM   Line Status Saline locked;Checked every 1-2 hour 1/1/2018  4:00 PM   Phlebitis Scale 0-->no symptoms 1/1/2018  4:00 PM   Infiltration Scale 0 1/1/2018  4:00 PM   Number of days:1     ROS: admits LE weakness, R<L, denies fever, chills, cough, chest pain, abdominal pain, dysuria, muscle pain, fasciculations.     Medications        sodium chloride (PF)  3 mL Intracatheter Q8H     famotidine  20 mg Oral BID     metoprolol  50 mg Oral Daily     miconazole   Topical BID   Physical exam:  Constitutional: snot in distress  HEENT: MMM, no oral lesions  Hematologic / Lymphatic: no cervical lymphadenopathy  Respiratory: no wheezing, diminished at bases. Easy air exit/entry  Cardiovascular: PMI not palpable, no S3, no rubs, no gallops  GI: soft non tender no hepatosplenomegaly, BS present  Rectal exam: not done  : bladder not palpable. No CVA tenderness  Skin: no rashes , skin warm, well perfused.   Musculoskeletal: there is mild sarcopenia, more proximal weakness  Neurologic: CN intact. R side weaker than left. Also some distal muscle weakness.   Psychiatric: awake, oriented x4   Endocrine: obese, exgenous obesity type, no striae  Data     Recent Labs  Lab 01/01/18  0531 12/31/17  0720   WBC  --  9.4   HGB  --  17.2   MCV  --  87   PLT  --  297    139   POTASSIUM 3.9 4.0   CHLORIDE 109 106   CO2 24 24   BUN 18 16   CR 1.03 1.31*   ANIONGAP 7 9   STALIN 7.5* 8.4*   GLC 93 161*   ALBUMIN 2.6* 3.6   PROTTOTAL 6.8 8.7   BILITOTAL 0.4 0.6   ALKPHOS 53 71   ALT 19 24   AST 28 24       No results found for this or any previous visit (from the past 24 hour(s)).

## 2018-01-03 ENCOUNTER — APPOINTMENT (OUTPATIENT)
Dept: PHYSICAL THERAPY | Facility: HOSPITAL | Age: 73
DRG: 074 | End: 2018-01-03
Payer: COMMERCIAL

## 2018-01-03 ENCOUNTER — TELEPHONE (OUTPATIENT)
Dept: FAMILY MEDICINE | Facility: OTHER | Age: 73
End: 2018-01-03

## 2018-01-03 ENCOUNTER — APPOINTMENT (OUTPATIENT)
Dept: OCCUPATIONAL THERAPY | Facility: HOSPITAL | Age: 73
DRG: 074 | End: 2018-01-03
Payer: COMMERCIAL

## 2018-01-03 LAB
C DIFF TOX B STL QL: NEGATIVE
SPECIMEN SOURCE: NORMAL

## 2018-01-03 PROCEDURE — 25000132 ZZH RX MED GY IP 250 OP 250 PS 637: Performed by: INTERNAL MEDICINE

## 2018-01-03 PROCEDURE — 97110 THERAPEUTIC EXERCISES: CPT | Mod: GO

## 2018-01-03 PROCEDURE — 40000133 ZZH STATISTIC OT WARD VISIT

## 2018-01-03 PROCEDURE — 12000000 ZZH R&B MED SURG/OB

## 2018-01-03 PROCEDURE — 99232 SBSQ HOSP IP/OBS MODERATE 35: CPT | Performed by: INTERNAL MEDICINE

## 2018-01-03 PROCEDURE — 87493 C DIFF AMPLIFIED PROBE: CPT | Performed by: INTERNAL MEDICINE

## 2018-01-03 PROCEDURE — 97530 THERAPEUTIC ACTIVITIES: CPT | Mod: GP

## 2018-01-03 PROCEDURE — 97116 GAIT TRAINING THERAPY: CPT | Mod: GP

## 2018-01-03 PROCEDURE — 40000193 ZZH STATISTIC PT WARD VISIT

## 2018-01-03 PROCEDURE — 97535 SELF CARE MNGMENT TRAINING: CPT | Mod: GO

## 2018-01-03 RX ADMIN — ACETAMINOPHEN 650 MG: 325 TABLET, FILM COATED ORAL at 02:07

## 2018-01-03 RX ADMIN — METOPROLOL SUCCINATE 50 MG: 50 TABLET, EXTENDED RELEASE ORAL at 08:14

## 2018-01-03 RX ADMIN — LISINOPRIL 20 MG: 20 TABLET ORAL at 08:14

## 2018-01-03 RX ADMIN — MICONAZOLE NITRATE: 20 POWDER TOPICAL at 21:36

## 2018-01-03 RX ADMIN — AMLODIPINE BESYLATE 2.5 MG: 2.5 TABLET ORAL at 21:35

## 2018-01-03 RX ADMIN — FAMOTIDINE 20 MG: 20 TABLET ORAL at 21:35

## 2018-01-03 RX ADMIN — FAMOTIDINE 20 MG: 20 TABLET ORAL at 08:14

## 2018-01-03 RX ADMIN — MICONAZOLE NITRATE: 20 POWDER TOPICAL at 08:14

## 2018-01-03 ASSESSMENT — PAIN DESCRIPTION - DESCRIPTORS
DESCRIPTORS: HEADACHE
DESCRIPTORS: HEADACHE

## 2018-01-03 NOTE — PLAN OF CARE
Face to face report given with opportunity to observe patient.    Report given to Shane RN.    Jocelyn King  1/3/2018, 3:49 PM

## 2018-01-03 NOTE — PROGRESS NOTES
Met with Jose Antonio today, advised that I do not still have a short term rehab for him but several facilities are screening him and need a prior authorization from [facility]. CM will remain available.

## 2018-01-03 NOTE — PLAN OF CARE
Problem: Patient Care Overview  Goal: Plan of Care/Patient Progress Review  Outcome: Therapy, progress toward functional goals as expected  Discharge Planner OT   Patient plan for discharge: short-term rehab  Current status: contact guard and set up for ADLs  Barriers to return to prior living situation: endurance, falls  Recommendations for discharge: short-term rehab or home therapy   Rationale for recommendations: pt has had several falls and has decreased activity tolerance          Entered by: Licha Minor 01/03/2018 12:03 PM

## 2018-01-03 NOTE — DISCHARGE INSTRUCTIONS
MRI Contrast Discharge Instructions    The IV contrast you received today will pass out of your body in your  urine. This will happen in the next 24 hours. You will not feel this process.  Your urine will not change color.    Drink at least 4 extra glasses of water or juice today (unless your doctor  has restricted your fluids). This reduces the stress on your kidneys.  You may take your regular medicines.    If you are on dialysis: It is best to have dialysis today.    If you have a reaction: Most reactions happen right away. If you have  any new symptoms after leaving the hospital (such as hives or swelling),  call your hospital at the correct number below. Or call your family doctor.  If you have breathing distress or wheezing, call 911.    Special instructions: NA    I have read and understand the above information.    Signature:______________________________________ Date:_____3-0-89______    Staff:__________________________________________ Date:___________     Time:__________    [facility] Departments:    ___[facility]: [phone]  ___[facility]: [phone]  ___[place]: [phone] ___[facility]: [phone]  ___[facility]: [phone]  ___[facility]: [phone]  ___[phone]  ___[facility]: [phone]  ___[phone]

## 2018-01-03 NOTE — PLAN OF CARE
Problem: Patient Care Overview  Goal: Individualization & Mutuality  Outcome: No Change  Stool sent for cdiff. Has had 1 gel-like light brown stool for shift. Telemetry dc'd HR 70's . Denied pain, nausea or dizziness when up. Frequent ambulation to bathroom for void . Spent most of day in chair. Appetite good. Rash to groin appears to be healing well, interdry in R pannus crease.

## 2018-01-03 NOTE — PLAN OF CARE
Face to face report given with opportunity to observe patient.    Report given to SYLVIA Alvarez   1/3/2018  7:28 AM

## 2018-01-03 NOTE — PLAN OF CARE
Problem: Patient Care Overview  Goal: Plan of Care/Patient Progress Review  Outcome: No Change  A/O, denies pain. VSS. Tele report per ICU report: SR 70's w/ bundle branch. Loose stool x2 this shift. Ax1 w/ FWW and gait belt. Pt exhibits unsteady gait. Denies dizziness/weakness upon ambulation.  Interdry applied to abdominal folds this AM.      01/02/18 2106   OTHER   Plan Of Care Reviewed With patient   Plan of Care Review   Progress no change       Problem: Safety Awareness Impairment (IRF) (Adult)  Goal: Safety Awareness Fxn [place]   01/02/18 2106   Safety Awareness Impairment (IRF) (Adult)   Safety Awareness Fxn [place] progressing to functional independence

## 2018-01-03 NOTE — PLAN OF CARE
"Problem: Patient Care Overview  Goal: Plan of Care/Patient Progress Review  Outcome: Improving  Reason for hospital stay:  Falls    Most recent vitals: /79  Pulse 79  Temp 98.4  F (36.9  C) (Tympanic)  Resp 16  Ht 1.778 m (5' 10\")  Wt 118.4 kg (261 lb 0.4 oz)  SpO2 95%  BMI 37.45 kg/m2  Pain Management:  C/o 3/10 headache, administered Tylenol with decrease  Orientation:  A&O, forgetful. Needed frequent reminders to use call light for assitance  Cardiac:  Telemetry reading SR 70 with BBB per ICU  Respiratory:  LS clear. Denies sob. Infrequent cough reported  GI:  BS active. Denies nausea. BM this shift  :  Voids frequently but without difficulty  Diet: Reg  Skin Issues:  Redness to abd folds, inter-dry in place  IVF:  Saline locked  ABX:  None  Mobility:  Stand by assist to Ind. Unsteady/wobbly gait noted  Safety:  Call light within reach and bed/chair alarm active    Comments:    1/3/2018  5:09 AM  Cora Ferrari          "

## 2018-01-03 NOTE — TELEPHONE ENCOUNTER
Patient being discharged from [facility] to short term rehab at [facility], calling to verify Dr. Hussein will follow usual protocal. Called Misty [phone] informerd her yes, usual protocal per Johnna

## 2018-01-03 NOTE — PLAN OF CARE
Face to face report given with opportunity to observe patient.    Report given to Cora Yancey   1/2/2018  11:27 PM

## 2018-01-03 NOTE — PROVIDER NOTIFICATION
MD notified of Pt loose stools x2 this am and request for medication to help settle. MD would like a stool sample to test for C-diff and then will decide on any new orders.

## 2018-01-03 NOTE — PROGRESS NOTES
[facility]    Hospitalist Progress Note      Assessment & Plan   Jose Antonio Marmolejo is a 72 year old male who was admitted on 12/31/2017. The main reason for admission was LE weakness and frequent falls.    Hypertension: Looking through his records it appears that he was on Lopressor and Zestoretic about 2 years ago  And it appears that about a year ago he stopped taking his medications.  Patient does not have any good explanation why he stopped mid taking medications. His blood pressure is not controlled during this admission and I will start him on ACE-I and amlodipine     Diabetes: he denies, but he is diabetic, mild and I believe, diet controlled.      Mild renal insufficiency: Was due to dehydration. Resolved. Will keep monitoring    Frequent falls. Need rehab. Neurology consult outpatient due to Abn MRI. Pt was admitted 2016 with same falling problems and falls have been attributed to weakness and neuropathy.      Social issues: He will need placement. Social service consulted.    DVT Prophylaxis: SCD  Code Status: Full Code    Disposition: Expected discharge when C-diff r/o. He will need placement. Social service will be consulted.     Leigh Ann Warren    Interval History   Admission (12.31- 1.2.2018) DM, mild, diet controlled. MRI brain ordered, done. Findings:   Multiple white matter lesions in both hemispheres of the suggestive of a white matter disease such as multiple sclerosis. One particular white matter lesion without mass effect is seen to enhance in a ringlike fashion in the left centrum semiovale. White matter  lesions are also noted. The brainstem particularly in the left side of the medulla the craniocervical junction and at the right side of the  medulla at the level the olivary nuclei. Findings will require OP neurology consult    1.3.18: started on lisinopril and CCB for better BP control.   R/o C-diff. He has had 4 watery BM and was incontinent. Tele and neuro checks dc. When C-diff w/o,  will dc for rehab to SNF (was accepted)    -Data reviewed today: I reviewed all new labs and imaging results over the last 24 hours. I personally reviewed the CT and XR image(s) showing FINDINGS: The ventricular system and cortical sulci are normal for.    XR chest: no acute  Cardiopulmonary process    Lumbar spine XR: FINDINGS: The lumbar discs are normal height anterior and lateral osteophytes are seen at T12 L1-L4 L5. There are facet joint degenerative changes noted at L4-L5 and L5-S1 bilaterally.    Pelvis XR: Pelvis is intact the sacrum and sacroiliac joints appear normal articular spaces are normal height of both hips both proximal femurs appear intact.    MRI: see above.    Physical Exam   Temp: 97.9  F (36.6  C) Temp src: Tympanic BP: 168/88 (Dr Warren aware)   Heart Rate: 70 Resp: 16 SpO2: 94 % O2 Device: None (Room air)    Vitals:    01/01/18 0523 01/02/18 0619 01/03/18 0504   Weight: 122.7 kg (270 lb 8.1 oz) 119.3 kg (263 lb 0.1 oz) 118.4 kg (261 lb 0.4 oz)     Vital Signs with Ranges  Temp:  [97.9  F (36.6  C)-98.4  F (36.9  C)] 97.9  F (36.6  C)  Heart Rate:  [69-82] 70  Resp:  [16-18] 16  BP: (163-195)/() 168/88  SpO2:  [94 %-97 %] 94 %  I/O last 3 completed shifts:  In: 1203 [P.O.:1200; I.V.:3]  Out: 675 [Urine:675]    Peripheral IV 12/31/17 Right Upper forearm (Active)   Site Assessment WDL 1/3/2018  8:00 AM   Line Status Saline locked 1/3/2018  8:00 AM   Phlebitis Scale 0-->no symptoms 1/3/2018  8:00 AM   Infiltration Scale 0 1/3/2018  8:00 AM   Extravasation? No 1/2/2018  4:00 PM   Number of days:3     ROS: admits LE weakness, R<L, denies fever, chills, cough, chest pain, abdominal pain, dysuria, muscle pain, fasciculations.     Medications        lisinopril  20 mg Oral Daily     amLODIPine  2.5 mg Oral QPM     sodium chloride (PF)  3 mL Intracatheter Q8H     famotidine  20 mg Oral BID     metoprolol  50 mg Oral Daily     miconazole   Topical BID     Physical exam:  Constitutional: not in  distress  HEENT: MMM, no oral lesions  Hematologic / Lymphatic: no cervical lymphadenopathy  Respiratory: no wheezing, diminished at bases. Easy air exit/entry  Cardiovascular: PMI not palpable, no S3, no rubs, no gallops  GI: soft non tender no hepatosplenomegaly, BS present  Rectal exam: not done  : bladder not palpable. No CVA tenderness  Skin: no rashes , skin warm, well perfused.   Musculoskeletal: there is mild sarcopenia, more proximal weakness. Ambulation with everted feet.  Neurologic: CN intact. Non-focal  Psychiatric: awake, oriented x4   Endocrine: obese, exgenous obesity type, no striae  Data     Recent Labs  Lab 01/01/18  0531 12/31/17  0720   WBC  --  9.4   HGB  --  17.2   MCV  --  87   PLT  --  297    139   POTASSIUM 3.9 4.0   CHLORIDE 109 106   CO2 24 24   BUN 18 16   CR 1.03 1.31*   ANIONGAP 7 9   STALIN 7.5* 8.4*   GLC 93 161*   ALBUMIN 2.6* 3.6   PROTTOTAL 6.8 8.7   BILITOTAL 0.4 0.6   ALKPHOS 53 71   ALT 19 24   AST 28 24       No results found for this or any previous visit (from the past 24 hour(s)).

## 2018-01-04 LAB
ALBUMIN UR-MCNC: 30 MG/DL
ANION GAP SERPL CALCULATED.3IONS-SCNC: 7 MMOL/L (ref 3–14)
APPEARANCE UR: ABNORMAL
BACTERIA #/AREA URNS HPF: ABNORMAL /HPF
BASOPHILS # BLD AUTO: 0 10E9/L (ref 0–0.2)
BASOPHILS NFR BLD AUTO: 0.5 %
BILIRUB UR QL STRIP: NEGATIVE
BUN SERPL-MCNC: 17 MG/DL (ref 7–30)
CALCIUM SERPL-MCNC: 8 MG/DL (ref 8.5–10.1)
CHLORIDE SERPL-SCNC: 107 MMOL/L (ref 94–109)
CO2 SERPL-SCNC: 25 MMOL/L (ref 20–32)
COLOR UR AUTO: YELLOW
CREAT SERPL-MCNC: 0.91 MG/DL (ref 0.66–1.25)
DIFFERENTIAL METHOD BLD: NORMAL
EOSINOPHIL # BLD AUTO: 0.3 10E9/L (ref 0–0.7)
EOSINOPHIL NFR BLD AUTO: 5 %
ERYTHROCYTE [DISTWIDTH] IN BLOOD BY AUTOMATED COUNT: 13.5 % (ref 10–15)
GFR SERPL CREATININE-BSD FRML MDRD: 82 ML/MIN/1.7M2
GLUCOSE SERPL-MCNC: 116 MG/DL (ref 70–99)
GLUCOSE UR STRIP-MCNC: 70 MG/DL
HCT VFR BLD AUTO: 44.6 % (ref 40–53)
HGB BLD-MCNC: 15.1 G/DL (ref 13.3–17.7)
HGB UR QL STRIP: NEGATIVE
IMM GRANULOCYTES # BLD: 0 10E9/L (ref 0–0.4)
IMM GRANULOCYTES NFR BLD: 0.2 %
KETONES UR STRIP-MCNC: NEGATIVE MG/DL
LEUKOCYTE ESTERASE UR QL STRIP: NEGATIVE
LYMPHOCYTES # BLD AUTO: 2.5 10E9/L (ref 0.8–5.3)
LYMPHOCYTES NFR BLD AUTO: 43.2 %
MCH RBC QN AUTO: 28.5 PG (ref 26.5–33)
MCHC RBC AUTO-ENTMCNC: 33.9 G/DL (ref 31.5–36.5)
MCV RBC AUTO: 84 FL (ref 78–100)
MONOCYTES # BLD AUTO: 0.5 10E9/L (ref 0–1.3)
MONOCYTES NFR BLD AUTO: 9.3 %
MUCOUS THREADS #/AREA URNS LPF: PRESENT /LPF
NEUTROPHILS # BLD AUTO: 2.4 10E9/L (ref 1.6–8.3)
NEUTROPHILS NFR BLD AUTO: 41.8 %
NITRATE UR QL: NEGATIVE
NRBC # BLD AUTO: 0 10*3/UL
NRBC BLD AUTO-RTO: 0 /100
PH UR STRIP: 5.5 PH (ref 4.7–8)
PLATELET # BLD AUTO: 261 10E9/L (ref 150–450)
POTASSIUM SERPL-SCNC: 4.2 MMOL/L (ref 3.4–5.3)
RBC # BLD AUTO: 5.29 10E12/L (ref 4.4–5.9)
RBC #/AREA URNS AUTO: 0 /HPF (ref 0–2)
SODIUM SERPL-SCNC: 139 MMOL/L (ref 133–144)
SOURCE: ABNORMAL
SP GR UR STRIP: 1.02 (ref 1–1.03)
SPERM #/AREA URNS HPF: PRESENT /HPF
TRI-PHOS CRY #/AREA URNS HPF: ABNORMAL /HPF
UROBILINOGEN UR STRIP-MCNC: NORMAL MG/DL (ref 0–2)
WBC # BLD AUTO: 5.8 10E9/L (ref 4–11)
WBC #/AREA URNS AUTO: 0 /HPF (ref 0–2)

## 2018-01-04 PROCEDURE — 25000132 ZZH RX MED GY IP 250 OP 250 PS 637: Performed by: INTERNAL MEDICINE

## 2018-01-04 PROCEDURE — 81001 URINALYSIS AUTO W/SCOPE: CPT | Performed by: INTERNAL MEDICINE

## 2018-01-04 PROCEDURE — 36415 COLL VENOUS BLD VENIPUNCTURE: CPT | Performed by: INTERNAL MEDICINE

## 2018-01-04 PROCEDURE — 99232 SBSQ HOSP IP/OBS MODERATE 35: CPT | Performed by: INTERNAL MEDICINE

## 2018-01-04 PROCEDURE — 85025 COMPLETE CBC W/AUTO DIFF WBC: CPT | Performed by: INTERNAL MEDICINE

## 2018-01-04 PROCEDURE — 12000000 ZZH R&B MED SURG/OB

## 2018-01-04 PROCEDURE — 80048 BASIC METABOLIC PNL TOTAL CA: CPT | Performed by: INTERNAL MEDICINE

## 2018-01-04 RX ORDER — LISINOPRIL 20 MG/1
40 TABLET ORAL DAILY
Status: DISCONTINUED | OUTPATIENT
Start: 2018-01-04 | End: 2018-01-05 | Stop reason: HOSPADM

## 2018-01-04 RX ADMIN — AMLODIPINE BESYLATE 2.5 MG: 2.5 TABLET ORAL at 20:56

## 2018-01-04 RX ADMIN — FAMOTIDINE 20 MG: 20 TABLET ORAL at 10:10

## 2018-01-04 RX ADMIN — FAMOTIDINE 20 MG: 20 TABLET ORAL at 20:56

## 2018-01-04 RX ADMIN — METOPROLOL SUCCINATE 50 MG: 50 TABLET, EXTENDED RELEASE ORAL at 10:11

## 2018-01-04 RX ADMIN — MICONAZOLE NITRATE: 20 POWDER TOPICAL at 11:15

## 2018-01-04 RX ADMIN — LISINOPRIL 40 MG: 20 TABLET ORAL at 10:11

## 2018-01-04 RX ADMIN — MICONAZOLE NITRATE: 20 POWDER TOPICAL at 20:57

## 2018-01-04 NOTE — PLAN OF CARE
Problem: Patient Care Overview  Goal: Plan of Care/Patient Progress Review  Physical Therapy Discharge Summary    Reason for therapy discharge:    Discharged to transitional care facility.    Progress towards therapy goal(s). See goals on Care Plan in [facility] electronic health record for goal details.  Goals partially met.  Barriers to achieving goals:   discharge from facility.    Therapy recommendation(s):    Continued therapy is recommended.  Rationale/Recommendations:  Continued inpatient PT recommended for gait training, strengthening, balance, improved safety.

## 2018-01-04 NOTE — PROGRESS NOTES
[facility]    Hospitalist Progress Note      Assessment & Plan   Jose Antonio Marmolejo is a 72 year old male who was admitted on 12/31/2017. The main reason for admission was LE weakness and frequent falls.    Hypertension: Looking through his records it appears that he was on Lopressor and Zestoretic about 2 years ago  And it appears that about a year ago he stopped taking his medications.  Patient does not have any good explanation why he stopped mid taking medications. His blood pressure is not controlled during this admission and I will start him on ACE-I and amlodipine  1.4.2018: will increase lisinopril to 40 mg po daily     Diabetes: he denies, but he is diabetic, mild and I believe, diet controlled. Will implement diet control     TASIA, mild on adission: Was due to dehydration. Resolved. Will keep monitoring    Frequent falls. Need rehab. Neurology consult outpatient due to Abn MRI. Pt was admitted 2016 with same falling problems and falls have been attributed to weakness and neuropathy. If he goes to [place], neurologist will see him over there.     Social issues: He will need placement. Social service consulted.    DVT Prophylaxis: SCD  Code Status: Full Code    Disposition: Expected discharge when C-diff r/o. He will need placement. Social service will be consulted.     Leigh Ann Stewartjarvis    Interval History   Admission (12.31- 1.2.2018) DM, mild, diet controlled. MRI brain ordered, done. Findings:   Multiple white matter lesions in both hemispheres of the suggestive of a white matter disease such as multiple sclerosis. One particular white matter lesion without mass effect is seen to enhance in a ringlike fashion in the left centrum semiovale. White matter  lesions are also noted. The brainstem particularly in the left side of the medulla the craniocervical junction and at the right side of the  medulla at the level the olivary nuclei. Findings will require OP neurology consult    1.3.18: started on  lisinopril and CCB for better BP control.   R/o C-diff. He has had 4 watery BM and was incontinent. Tele and neuro checks dc. When C-diff w/o, will dc for rehab to SNF (was accepted)  1.4.2018: C-diff r/o. Compliant with rx. No acute events. Awaiting acceptance to NH    -Data reviewed today: I reviewed all new labs and imaging results over the last 24 hours. I personally reviewed the CT and XR image(s) showing FINDINGS: The ventricular system and cortical sulci are normal for.    XR chest: no acute  Cardiopulmonary process    Lumbar spine XR: FINDINGS: The lumbar discs are normal height anterior and lateral osteophytes are seen at T12 L1-L4 L5. There are facet joint degenerative changes noted at L4-L5 and L5-S1 bilaterally.    Pelvis XR: Pelvis is intact the sacrum and sacroiliac joints appear normal articular spaces are normal height of both hips both proximal femurs appear intact.    MRI: see above.    Physical Exam   Temp: 98.3  F (36.8  C) Temp src: Tympanic BP: 180/95   Heart Rate: 72 Resp: 18 SpO2: 93 % O2 Device: None (Room air)    Vitals:    01/01/18 0523 01/02/18 0619 01/03/18 0504   Weight: 122.7 kg (270 lb 8.1 oz) 119.3 kg (263 lb 0.1 oz) 118.4 kg (261 lb 0.4 oz)     Vital Signs with Ranges  Temp:  [97.7  F (36.5  C)-98.3  F (36.8  C)] 98.3  F (36.8  C)  Heart Rate:  [69-81] 72  Resp:  [16-18] 18  BP: (169-180)/(83-99) 180/95  SpO2:  [93 %-97 %] 93 %  I/O last 3 completed shifts:  In: 900 [P.O.:900]  Out: 200 [Urine:200]    Peripheral IV 12/31/17 Right Upper forearm (Active)   Site Assessment WDL 1/4/2018  8:00 AM   Line Status Saline locked 1/4/2018  8:00 AM   Phlebitis Scale 0-->no symptoms 1/4/2018  8:00 AM   Infiltration Scale 0 1/4/2018  8:00 AM   Extravasation? No 1/3/2018  5:00 PM   Number of days:4     ROS: admits LE weakness, R<L, denies fever, chills, cough, chest pain, abdominal pain, dysuria, muscle pain, fasciculations.     Medications        lisinopril  40 mg Oral Daily     amLODIPine  2.5  mg Oral QPM     sodium chloride (PF)  3 mL Intracatheter Q8H     famotidine  20 mg Oral BID     metoprolol  50 mg Oral Daily     miconazole   Topical BID     Physical exam:  Constitutional: not in distress  HEENT: MMM, no oral lesions  Hematologic / Lymphatic: no cervical lymphadenopathy  Respiratory: no wheezing, diminished at bases. Easy air exit/entry  Cardiovascular: PMI not palpable, no S3, no rubs, no gallops  GI: soft non tender no hepatosplenomegaly, BS present  Rectal exam: not done  : bladder not palpable. No CVA tenderness  Skin: no rashes , skin warm, well perfused.   Musculoskeletal: there is mild sarcopenia, more proximal weakness.  Ambulation with everted feet.  Neurologic: CN intact. Non-focal, unsteady with ambulation  Psychiatric: awake, oriented x4   Endocrine: obese, exgenous obesity type, no striae  Data     Recent Labs  Lab 01/04/18  0517 01/01/18  0531 12/31/17  0720   WBC 5.8  --  9.4   HGB 15.1  --  17.2   MCV 84  --  87     --  297    140 139   POTASSIUM 4.2 3.9 4.0   CHLORIDE 107 109 106   CO2 25 24 24   BUN 17 18 16   CR 0.91 1.03 1.31*   ANIONGAP 7 7 9   STALIN 8.0* 7.5* 8.4*   * 93 161*   ALBUMIN  --  2.6* 3.6   PROTTOTAL  --  6.8 8.7   BILITOTAL  --  0.4 0.6   ALKPHOS  --  53 71   ALT  --  19 24   AST  --  28 24       No results found for this or any previous visit (from the past 24 hour(s)).

## 2018-01-04 NOTE — PLAN OF CARE
Problem: Patient Care Overview  Goal: Plan of Care/Patient Progress Review  Outcome: Adequate for Discharge Date Met: 01/04/18  Occupational Therapy Discharge Summary    Reason for therapy discharge:    Discharged to transitional care facility.    Progress towards therapy goal(s). See goals on Care Plan in [facility] health record for goal details.  Goals met    Therapy recommendation(s):    Continued therapy is recommended.  Rationale/Recommendations:  pt has decreased activity tolerance.

## 2018-01-04 NOTE — PLAN OF CARE
"Problem: Patient Care Overview  Goal: Plan of Care/Patient Progress Review  Outcome: Improving  Reason for hospital stay:  Falls    Most recent vitals: /88  Pulse 79  Temp 97.7  F (36.5  C) (Tympanic)  Resp 16  Ht 1.778 m (5' 10\")  Wt 118.4 kg (261 lb 0.4 oz)  SpO2 94%  BMI 37.45 kg/m2  Pain Management:  Denies pain  Orientation:  A&O, forgetful  Cardiac:  HR wnl  Respiratory:  LS clear. Infrequent cough  GI:  BS active. Denies nausea. No stool this shift  :  Voids frequently  Diet:Reg diet  Skin Issues:  Cold sore to the left upper lip, neck rash noted to neck following facial shave. Red/pink abd folds  IVF:  Saline locked  ABX:  None  Mobility:  Stand by assist to Ind with ambulation to bathroom  Safety:  Call light within reach. Bed alarm active, needs reminders to use call light    Comments:    1/4/2018  4:50 AM  Cora Ferrari    Face to face report given with opportunity to observe patient.    Report given to SYLVIA Gordon   1/4/2018  7:33 AM      "

## 2018-01-04 NOTE — PROGRESS NOTES
Met with Jose Antonio, we are still waiting for a response from [facility], if not accepted at [place] [facility] Jose Antonio will be admitted to [facility] on 01/05/2018.

## 2018-01-04 NOTE — PLAN OF CARE
Problem: Patient Care Overview  Goal: Individualization & Mutuality  Outcome: Improving  Pt has denied pain, nausea, dizziness or N/T. Up with standby assist with gait belt and walker. Needs direction with safe ambulation but continues to just sit when close to chair or toilet. Forgetful at times. /95 and 161/89., appetite good- consumes 100% meals. Groin rash healing well. Spent most of day in chair- has ambulated back and forth from chair to bathroom. No complaints. Pt stable

## 2018-01-04 NOTE — PLAN OF CARE
Problem: Patient Care Overview  Goal: Plan of Care/Patient Progress Review  Outcome: Improving  A/O. VSS, denies pain. No BM this shift. Urinary frequency noted throughout this shift. Redness to abdominal folds- miconazole powder applied. Appetite good. SBA to BR. Pt appears to be wobbly upon ambulation. SCD's on in bed.      01/03/18 2254   OTHER   Plan Of Care Reviewed With patient   Plan of Care Review   Progress improving       Problem: Safety Awareness Impairment (IRF) (Adult)  Goal: Safety Awareness Fxn [place]  Outcome: No Change   01/03/18 2254   Safety Awareness Impairment (IRF) (Adult)   Safety Awareness Fxn [place] progressing to functional independence       Face to face report given with opportunity to observe patient.    Report given to Cora Yancey   1/3/2018  11:03 PM

## 2018-01-05 VITALS
BODY MASS INDEX: 37.37 KG/M2 | HEIGHT: 70 IN | RESPIRATION RATE: 18 BRPM | SYSTOLIC BLOOD PRESSURE: 159 MMHG | TEMPERATURE: 97.6 F | OXYGEN SATURATION: 96 % | DIASTOLIC BLOOD PRESSURE: 82 MMHG | WEIGHT: 261.02 LBS | HEART RATE: 79 BPM

## 2018-01-05 PROCEDURE — 25000132 ZZH RX MED GY IP 250 OP 250 PS 637: Performed by: INTERNAL MEDICINE

## 2018-01-05 PROCEDURE — 99238 HOSP IP/OBS DSCHRG MGMT 30/<: CPT | Performed by: INTERNAL MEDICINE

## 2018-01-05 RX ORDER — HYDROCHLOROTHIAZIDE 12.5 MG/1
12.5 CAPSULE ORAL DAILY
Status: DISCONTINUED | OUTPATIENT
Start: 2018-01-05 | End: 2018-01-05 | Stop reason: HOSPADM

## 2018-01-05 RX ORDER — HYDROCHLOROTHIAZIDE 12.5 MG/1
CAPSULE ORAL
Status: DISCONTINUED
Start: 2018-01-05 | End: 2018-01-05 | Stop reason: HOSPADM

## 2018-01-05 RX ORDER — AMLODIPINE BESYLATE 10 MG/1
10 TABLET ORAL EVERY EVENING
Status: DISCONTINUED | OUTPATIENT
Start: 2018-01-05 | End: 2018-01-05

## 2018-01-05 RX ORDER — HYDROCHLOROTHIAZIDE 12.5 MG/1
12.5 CAPSULE ORAL DAILY
Qty: 30 CAPSULE | Status: ON HOLD | DISCHARGE
Start: 2018-01-05 | End: 2018-01-01

## 2018-01-05 RX ORDER — AMOXICILLIN 250 MG
1 CAPSULE ORAL 2 TIMES DAILY PRN
Qty: 100 TABLET | Status: ON HOLD | DISCHARGE
Start: 2018-01-05 | End: 2018-01-01

## 2018-01-05 RX ORDER — LISINOPRIL 40 MG/1
40 TABLET ORAL DAILY
Qty: 30 TABLET | DISCHARGE
Start: 2018-01-05

## 2018-01-05 RX ORDER — AMLODIPINE BESYLATE 10 MG/1
10 TABLET ORAL DAILY
Qty: 30 TABLET | Status: ON HOLD | DISCHARGE
Start: 2018-01-05 | End: 2018-01-01

## 2018-01-05 RX ORDER — AMLODIPINE BESYLATE 10 MG/1
TABLET ORAL
Status: DISCONTINUED
Start: 2018-01-05 | End: 2018-01-05 | Stop reason: HOSPADM

## 2018-01-05 RX ORDER — METOPROLOL SUCCINATE 50 MG/1
50 TABLET, EXTENDED RELEASE ORAL DAILY
Qty: 30 TABLET | DISCHARGE
Start: 2018-01-05

## 2018-01-05 RX ORDER — IBUPROFEN 600 MG/1
600 TABLET, FILM COATED ORAL EVERY 6 HOURS PRN
Qty: 120 TABLET | Status: ON HOLD | DISCHARGE
Start: 2018-01-05 | End: 2018-01-01

## 2018-01-05 RX ORDER — AMLODIPINE BESYLATE 10 MG/1
10 TABLET ORAL DAILY
Status: DISCONTINUED | OUTPATIENT
Start: 2018-01-05 | End: 2018-01-05 | Stop reason: HOSPADM

## 2018-01-05 RX ADMIN — IBUPROFEN 600 MG: 600 TABLET ORAL at 09:52

## 2018-01-05 RX ADMIN — FAMOTIDINE 20 MG: 20 TABLET ORAL at 09:32

## 2018-01-05 RX ADMIN — METOPROLOL SUCCINATE 50 MG: 50 TABLET, EXTENDED RELEASE ORAL at 07:47

## 2018-01-05 RX ADMIN — HYDROCHLOROTHIAZIDE 12.5 MG: 12.5 CAPSULE ORAL at 07:46

## 2018-01-05 RX ADMIN — LISINOPRIL 40 MG: 20 TABLET ORAL at 06:52

## 2018-01-05 RX ADMIN — AMLODIPINE BESYLATE 10 MG: 10 TABLET ORAL at 06:53

## 2018-01-05 NOTE — PLAN OF CARE
Initial assessment /100, MD informed. Giving scheduled lisinopril now and Amlodipine 10mg. Will monitor.     Recheck of BP still elevated, 190/101. MD aware and gave HTZ early also Metoprolol early. Recheck 160/85. MD aware. Will continue to monitor.

## 2018-01-05 NOTE — PROGRESS NOTES
Name: Jose Antonio Marmolejo    MRN#: [id number]    Reason for Hospitalization: Weakness [R53.1]  Type 2 diabetes mellitus without complication, without long-term current use of insulin (H) [E11.9]    Discharge Date: 1/5/2018    Patient / Family response to discharge plan: in agreement    Follow-Up Appt: Future Appointments  Date Time Provider Department Center   1/30/2018 1:00 PM Juanita Briggs MD [facility]       Other Providers (Care Coordinator, County Services, PCA services etc): Yes: discharged faxed to [facility].    Discharge Disposition: [facility], transported by [facility] transportation.        Rocio Carrizales

## 2018-01-05 NOTE — PLAN OF CARE
Problem: Patient Care Overview  Goal: Plan of Care/Patient Progress Review  Outcome: No Change  No falls or injuries this shift. Uses safelt belt and 4WW when up. Ua to lab as pt noted to void freq. (-)Appetite good for dinner. Bed alarm on at all times as pt is impulsive . Does not use call light as instructed mult times.

## 2018-01-05 NOTE — DISCHARGE SUMMARY
[facility]    Discharge Summary  Hospitalist    Date of Admission:  12/31/2017  Date of Discharge:  1/5/2018  Discharging Provider: Leigh Ann Warren  Date of Service (when I saw the patient): 01/05/18    Discharge Diagnoses   Hypertension:     Diabetes:     TASIA, mild on adission: Was due to dehydration. Resolved.   Frequent falls.   Abnormal MRI brain    History of Present Illness  Jose Antonio Marmolejo is a 72 year old male who presents with falls and weakness.  Apparently this has been going on for about 2 years.  The last few months they have increased in frequency.  Patient does not lose consciousness and there has been no seizures reported.  Patient reports that he loses his balance.he somehow manage his life but now he is to the point where he cannot deal with that.He called and nevertheless 3 times yesterday.  Twice they gave him a assist and got him back up but third time they decided to bring him in..     He was on 2 blood pressure medications according to medical records but for some unclear reason he stopped using it.  He has no chest pain shortness of breath or lower extremity edema.  He denies palpitation  Or orthopnea or PND.     He has history of diabetes type 2 according to the records but patient does not believe he has diabetes  And he thinks the doctor stopped his diabetes medications.     He just feels generalized weakness.  He thinks he has mild cold but otherwise no fever chills nausea vomiting diarrhea constipation.    Hospital Course   Jose Antonio Marmolejo was admitted on 12/31/2017.  The following problems were addressed during his hospitalization:    Hypertension: he came with no BP meds, he is leaving the hospital with lisinopril 40 mg po daily, HCTZ 12.5 mg po daily, amlodipine 10 mg po daily. BP still not optimally controlled but is acceptable. Renal function needs to be followed.   Diabetes: he denies, but he is diabetic, mild and I believe, diet controlled. Will implement diet control. Hg A1C  6.7     TASIA, mild on adission: Was due to dehydration. Resolved     Frequent falls. Need rehab. Neurology consult outpatient due to Abn MRI. Pt was admitted 2016 with same falling problems and falls have been attributed to weakness and neuropathy. If he goes to [place], neurologist will see him over there.    Other hospital events; his MRI showed abnormal findings (demyelinaiton type) in different parts of the brain. Will need neurology consult. Recommendation will be given to a newly found PCP ( will find the new PCP for this patient)     Leigh Ann Warren    Significant Results and Procedures   CT head: The ventricular system and cortical sulci are normal for  age. There is white matter low-density consistent with small vessel  disease. Are no masses ventricular shifts or extraluminal collections.  Brainstem and cerebellum appear normal. Paranasal sinuses are clear.  Cranial vault is intact    MRI brain: Multiple white matter lesions in both hemispheres of the  suggestive of a white matter disease such as multiple sclerosis. One  particular white matter lesion without mass effect is seen to enhance  in a ringlike fashion in the left centrum semiovale. White matter  lesions are also noted. The brainstem particularly in the left side of  the medulla the craniocervical junction and at the right side of the  medulla at the level the olivary nuclei    Pending Results None    Unresulted Labs Ordered in the Past 30 Days of this Admission     No orders found from 11/1/2017 to 1/1/2018.          Code Status   Full Code       Primary Care Physician   Physician No Ref-Primary    Physical Exam   Temp: 97.6  F (36.4  C) Temp src: Tympanic BP: 159/82   Heart Rate: 71 Resp: 18 SpO2: 96 % O2 Device: None (Room air)    Vitals:    01/01/18 0523 01/02/18 0619 01/03/18 0504   Weight: 122.7 kg (270 lb 8.1 oz) 119.3 kg (263 lb 0.1 oz) 118.4 kg (261 lb 0.4 oz)     Vital Signs with Ranges  Temp:  [97.6  F (36.4  C)-99.2  F  (37.3  C)] 97.6  F (36.4  C)  Heart Rate:  [66-74] 71  Resp:  [18] 18  BP: (151-200)/() 159/82  SpO2:  [94 %-96 %] 96 %  I/O last 3 completed shifts:  In: 1560 [P.O.:1560]  Out: 250 [Urine:250]    Physical exam:   Constitutional: not in distress  HEENT: MMM, no oral lesions  Hematologic / Lymphatic: no cervical lymphadenopathy  Respiratory: no wheezing, diminished at bases. Easy air exit/entry  Cardiovascular: PMI not palpable, no S3, no rubs, no gallops  GI: soft non tender no hepatosplenomegaly, BS present  Rectal exam: not done  : bladder not palpable. No CVA tenderness  Skin: no rashes , skin warm, well perfused.   Musculoskeletal: there is mild sarcopenia, more proximal weakness.  Ambulation with everted feet.  Neurologic: CN intact. Non-focal, unsteady with ambulation  Psychiatric: awake, oriented x4   Endocrine: obese, exgenous obesity type, no striae    Discharge Disposition   Discharged to nursing home  Condition at discharge: Good    Consultations This Hospital Stay   SOCIAL WORK IP CONSULT  NUTRITION SERVICES ADULT IP CONSULT  SOCIAL WORK IP CONSULT  PHYSICAL THERAPY ADULT IP CONSULT  OCCUPATIONAL THERAPY ADULT IP CONSULT  PHYSICAL THERAPY ADULT IP CONSULT  OCCUPATIONAL THERAPY ADULT IP CONSULT  PHYSICAL THERAPY ADULT IP CONSULT  OCCUPATIONAL THERAPY ADULT IP CONSULT    Time Spent on this Encounter   Leigh Ann ARANGO, personally saw the patient today and spent less than or equal to 30 minutes discharging this patient.    Discharge Orders     DIABETES EDUCATION - Individual  []   Ordering Instructions for Diabetic Educators - please enter the correct order QUANTITY based on the followin minutes (1/2 hour), Enter QUANTITY of 1   60 minutes (1 hour), Enter QUANTITY of 2   90 minutes (1 1/2 hours), Enter QUANTITY of 3  120 minutes (2 hours), Enter QUANTITY of 4  150 minutes (2 1/2 hours), Enter QUANTITY of 5  180 minutes (3 hours), Enter QUANTITY of 6       NEUROLOGY ADULT REFERRAL      General info for SNF   Length of Stay Estimate: Short Term Care: Estimated # of Days <30  Condition at Discharge: Improving  Level of care:skilled   Rehabilitation Potential: Good  Admission H&P remains valid and up-to-date: Yes  Recent Chemotherapy: N/A  Use Nursing Home Standing Orders: Yes     Mantoux instructions   Give two-step Mantoux (PPD) Per Facility Policy Yes     Reason for your hospital stay   Weakness and frequent falls     Glucose monitor nursing POCT   Before meals and at bedtime     Follow Up and recommended labs and tests   Follow up with [facility] physician.  The following labs/tests are recommended: CBC and BMP (K and creatinine nikki since he just started on Lisinopril and HCTZ).     Activity - Up with assistive device   Per NH physical therpay     Activity - Ambulate in hallway   Every shift     Weight bearing status     Full Code     Physical Therapy Adult Consult   Evaluate and treat as clinically indicated.    Reason:  LE weakness, frequent falls     Occupational Therapy Adult Consult   Evaluate and treat as clinically indicated.    Reason:  Frequent falls, weakness     Advance Diet as Tolerated   Follow this diet upon discharge: High consistent carbohydrate (9063-6113 lucina / 4-7 CHO units per meal)  Pt denies DM, but he is early stage - diet controlled diabetic       Discharge Medications   Current Discharge Medication List      START taking these medications    Details   ibuprofen (ADVIL/MOTRIN) 600 MG tablet Take 1 tablet (600 mg) by mouth every 6 hours as needed for other (mild pain)  Qty: 120 tablet    Associated Diagnoses: Falls frequently      lisinopril (PRINIVIL/ZESTRIL) 40 MG tablet Take 1 tablet (40 mg) by mouth daily  Qty: 30 tablet    Associated Diagnoses: Essential hypertension      metoprolol (TOPROL-XL) 50 MG 24 hr tablet Take 1 tablet (50 mg) by mouth daily  Qty: 30 tablet    Associated Diagnoses: Essential hypertension      amLODIPine (NORVASC) 10 MG tablet Take 1 tablet  (10 mg) by mouth daily  Qty: 30 tablet    Associated Diagnoses: Essential hypertension      !! miconazole (MICATIN; MICRO GUARD) 2 % powder Apply topically 2 times daily    Associated Diagnoses: Yeast infection of the skin      hydrochlorothiazide (MICROZIDE) 12.5 MG capsule Take 1 capsule (12.5 mg) by mouth daily  Qty: 30 capsule    Associated Diagnoses: Essential hypertension      senna-docusate (SENOKOT-S;PERICOLACE) 8.6-50 MG per tablet Take 1 tablet by mouth 2 times daily as needed for constipation  Qty: 100 tablet    Associated Diagnoses: Chronic idiopathic constipation       !! - Potential duplicate medications found. Please discuss with provider.      CONTINUE these medications which have NOT CHANGED    Details   Blood Pressure Monitoring (ADULT BLOOD PRESSURE CUFF LG) KIT 1 each 2 times daily  Qty: 1 kit, Refills: 0    Comments: Check blood pressures twice per day: before breakfast and at bedtime.  Keep a log.  Associated Diagnoses: Benign essential hypertension; Malignant essential hypertension      !! miconazole (MICATIN; MICRO GUARD) 2 % powder Apply topically 2 times daily  Qty: 90 g, Refills: 1    Associated Diagnoses: Yeast infection of the skin       !! - Potential duplicate medications found. Please discuss with provider.        Allergies   Allergies   Allergen Reactions     Penicillins      Data   Most Recent 3 CBC's:  Recent Labs   Lab Test  01/04/18   0517  12/31/17   0720  08/17/16   1715   WBC  5.8  9.4  7.6   HGB  15.1  17.2  17.0   MCV  84  87  84   PLT  261  297  271      Most Recent 3 BMP's:  Recent Labs   Lab Test  01/04/18   0517  01/01/18   0531  12/31/17   0720   NA  139  140  139   POTASSIUM  4.2  3.9  4.0   CHLORIDE  107  109  106   CO2  25  24  24   BUN  17  18  16   CR  0.91  1.03  1.31*   ANIONGAP  7  7  9   STALIN  8.0*  7.5*  8.4*   GLC  116*  93  161*     Most Recent 2 LFT's:  Recent Labs   Lab Test  01/01/18   0531  12/31/17   0720   AST  28  24   ALT  19  24   ALKPHOS  53  71    BILITOTAL  0.4  0.6     Most Recent INR's and Anticoagulation Dosing History:  Anticoagulation Dose History     There is no flowsheet data to display.        Most Recent 3 Troponin's:No lab results found.  Most Recent Cholesterol Panel:No lab results found.  Most Recent 6 Bacteria Isolates From Any Culture (See EPIC Reports for Culture Details):  Recent Labs   Lab Test  12/31/17   1210  08/17/16   2155   CULT  No MRSA isolated  No MRSA isolated     Most Recent TSH, T4 and A1c Labs:  Recent Labs   Lab Test  01/01/18   0531  12/31/17   0720   TSH   --   1.29   A1C  6.7*   --      Results for orders placed or performed during the hospital encounter of 12/31/17   CT Head w/o Contrast    Narrative    PROCEDURE: CT HEAD W/O CONTRAST 12/31/2017 8:44 AM    HISTORY: fall;     COMPARISONS: August 2016    Meds/Dose Given:    TECHNIQUE: CT scan of the brain without contrast.    FINDINGS: The ventricular system and cortical sulci are normal for  age. There is white matter low-density consistent with small vessel  disease. Are no masses ventricular shifts or extraluminal collections.  Brainstem and cerebellum appear normal. Paranasal sinuses are clear.  Cranial vault is intact.         Impression    IMPRESSION: No change from previous examination in August 2016    MENDEZ MATAMOROS MD   XR Chest 2 Views    Narrative    PROCEDURE:  XR CHEST 2 VW    HISTORY:  cough; .     COMPARISON:  None.    FINDINGS:   The cardiac silhouette is normal in size. The pulmonary vasculature is  normal.  The lungs are clear. No pleural effusion or pneumothorax.  There are severe arthritic changes seen in the right shoulder      Impression    IMPRESSION:  No acute cardiopulmonary disease.      MENDEZ MATAMOROS MD   Lumbar spine XR, 2-3 views    Narrative    PROCEDURE: XR LUMBAR SPINE 2-3 VIEWS 12/31/2017 9:10 AM    HISTORY: fall;     COMPARISONS: August 2016    TECHNIQUE: 2 views    FINDINGS: The lumbar discs are normal height anterior and  lateral  osteophytes are seen at T12 L1-L4 L5. There are facet joint  degenerative changes noted at L4-L5 and L5-S1 bilaterally.         Impression    IMPRESSION: No acute lumbar abnormality.    MENDEZ MATAMOROS MD   Pelvis XR, 1-2 views    Narrative    PROCEDURE: XR PELVIS 1/2 VW 12/31/2017 9:11 AM    HISTORY: fall;     COMPARISONS: None.    TECHNIQUE: 1 view    FINDINGS: Pelvis is intact the sacrum and sacroiliac joints appear  normal articular spaces are normal height of both hips both proximal  femurs appear intact.         Impression    IMPRESSION: Negative examination pelvis.    MENDEZ MATAMOROS MD   MR Brain w/o & w Contrast    Narrative    PROCEDURE: MR BRAIN W/O & W CONTRAST 1/2/2018 10:00 AM    HISTORY: frequent falls but on exam R weaker than left (upper and  lower extremities);     COMPARISONS: None.    Meds/Dose Given: Gadavist  10 mL    TECHNIQUE: Images were obtained sagittally T1 weighted images were  obtained axially diffusion gradient echo FLAIR T1 and T2-weighted  images were obtained axially sagittally coronally T1 weighted  following gadolinium administration report    FINDINGS: There are multiple white matter lesions in both hemispheres  most severe in the periventricular distribution white matter lesions  are also seen in the brainstem including the right and left meg the  right and left medulla. There is a ringlike enhancement associated  with one of the right matter lesions in the centrum semiovale on the  left.. There is enlargement of the cortical sulci and ventricular  system consistent with atrophy. 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 There is mucosal thickening seen in both maxillary ethmoid and  frontal sinuses. There is mucosal thickening seen in the left sphenoid  sinus.         Impression    IMPRESSION: Multiple white matter lesions in both hemispheres of the  suggestive of a white matter disease such as multiple sclerosis. One  particular white matter lesion without mass  effect is seen to enhance  in a ringlike fashion in the left centrum semiovale. White matter  lesions are also noted. The brainstem particularly in the left side of  the medulla the craniocervical junction and at the right side of the  medulla at the level the olivary nuclei    MENDEZ MATAMOROS MD

## 2018-01-05 NOTE — PLAN OF CARE
Patient discharged at 2:14 PM via wheel chair accompanied by other:NA and staff. Prescriptions sent to patients preferred pharmacy. All belongings sent with patient.     Discharge instructions reviewed with patient. Listed belongings gathered and returned to patient.     Patient discharged to  rehab.   Report called to Nursing Home:  Lambert Perales RN at 1415    Core Measures and Vaccines  Core Measures applicable during stay: No. If yes, state diagnosis: NA  Pneumonia and Influenza given prior to discharge, if indicated: N/A    Surgical Patient   Surgical Procedures during stay: NA  Did patient receive discharge instruction on wound care and recognition of infection symptoms? Yes    MISC  Follow up appointment made:  Yes  Home and hospital aquired medications returned to patient: Yes and N/A  Patient reports pain was well managed at discharge: Yes

## 2018-01-05 NOTE — PLAN OF CARE
Problem: Patient Care Overview  Goal: Plan of Care/Patient Progress Review  Outcome: No Change  No falls or injuries this shift. Has been up to bathroom 4 times during night. Doesn't use call light. Always uses walker when up. Plan for pt to be discharged to 1 of 2 LTCF upon discharge. Pt agreeable to this plan.     Face to face report given with opportunity to observe patient.    Report given to Janice Brown   1/5/2018  5:16 AM
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
## 2018-03-21 NOTE — IP AVS SNAPSHOT
[facility]    [address]    [facility] [address]    Phone:  [phone]    Fax:  [phone]                                       After Visit Summary   3/21/2018    Jose Antonio Marmolejo    MRN: [id number]           After Visit Summary Signature Page     I have received my discharge instructions, and my questions have been answered. I have discussed any challenges I see with this plan with the nurse or doctor.    ..........................................................................................................................................  Patient/Patient Representative Signature      ..........................................................................................................................................  Patient Representative Print Name and Relationship to Patient    ..................................................               ................................................  Date                                            Time    ..........................................................................................................................................  Reviewed by Signature/Title    ...................................................              ..............................................  Date                                                            Time

## 2018-03-21 NOTE — ED NOTES
"Patient arrives via [facility] with complaint of weakness and dizziness. Patient fell while going to use the bathroom this afternoon and was unable to get up. Patient then called 911 for a lift assist and when EMS arrived also reported dizziness. Patient stating \"I just feel tired and weak.\" Denies any chest pain. Denies any shortness of breath. Per EMS, patient calls multiple times a week for a lift assist. Negative FAST exam.  IV placed by EMS. Call light within reach.   "

## 2018-03-21 NOTE — ED PROVIDER NOTES
History     Chief Complaint   Patient presents with     Extremity Weakness     weakness and falling frequently at home. pt notes on the floor for approx 30 min. denies injury from falling. incont urine while on the floor     The history is provided by the patient.     Jose Antonio Marmolejo is a 72 year old male who presented to the emergency department via EMS for evaluation of weakness and fall.  He was walking to the bathroom with his walker when his legs became weak and fell to the floor.  Denies any hip or knee pain.  Denies any shoulder pain. Denies any chest pain.  Denies any shortness of breath.  States he felt dizzy when he was on the floor activated his medical alert.  Denies dizziness to me right now.  EMS reports that they are at his house on numerous occasions for lift assist.  They have also filed a vulnerable adult.  He denies any fevers.  Denies any abdominal pain.    Problem List:    Patient Active Problem List    Diagnosis Date Noted     Falls frequently 12/31/2017     Priority: Medium     Type 2 diabetes mellitus without complication (H) 08/19/2016     Priority: Medium     Weakness of both lower extremities 08/19/2016     Priority: Medium     Yeast infection of the skin 08/19/2016     Priority: Medium     Essential hypertension 08/17/2016     Priority: Medium     Falling episodes 08/17/2016     Priority: Medium     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     Priority: Medium        Past Medical History:    Past Medical History:   Diagnosis Date     Arthritis      Diabetes (H)      Hypertension        Past Surgical History:    Past Surgical History:   Procedure Laterality Date     ORTHOPEDIC SURGERY      left ankle and removal of hardware       Family History:    Family History   Problem Relation Age of Onset     Other - See Comments Mother      pneumonia     Obesity Mother      Other Cancer Father      hodgkins lymphoma     Coronary Artery Disease Father      pacemaker     Other Cancer Sister       unknown cancers       Social History:  Marital Status:  Single [1]  Social History   Substance Use Topics     Smoking status: Former Smoker     Smokeless tobacco: Never Used     Alcohol use No        Medications:      ibuprofen (ADVIL/MOTRIN) 600 MG tablet   lisinopril (PRINIVIL/ZESTRIL) 40 MG tablet   metoprolol (TOPROL-XL) 50 MG 24 hr tablet   amLODIPine (NORVASC) 10 MG tablet   hydrochlorothiazide (MICROZIDE) 12.5 MG capsule   Blood Pressure Monitoring (ADULT BLOOD PRESSURE CUFF LG) KIT   miconazole (MICATIN; MICRO GUARD) 2 % powder   senna-docusate (SENOKOT-S;PERICOLACE) 8.6-50 MG per tablet   miconazole (MICATIN; MICRO GUARD) 2 % powder         Review of Systems   Constitutional: Positive for fatigue. Negative for activity change, chills and fever.   Respiratory: Negative for shortness of breath.    Cardiovascular: Negative for chest pain and palpitations.   Gastrointestinal: Negative for abdominal pain.   Genitourinary: Negative.    Musculoskeletal:        Chronic right shoulder pain    Skin: Negative.    Neurological: Positive for dizziness, weakness and light-headedness. Negative for headaches.        All chronic        Physical Exam   BP: (!) 202/107  Heart Rate: 101  Temp: 98.6  F (37  C)  Resp: 18  SpO2: 94 %      Physical Exam   Constitutional: He is oriented to person, place, and time. He appears well-developed and well-nourished.   HENT:   Head: Normocephalic and atraumatic.   Cardiovascular: Normal rate and regular rhythm.    Pulmonary/Chest: Effort normal.   Musculoskeletal:   Pain on passive and active ROM of the right shoulder-chronic   Neurological: He is alert and oriented to person, place, and time.   No focal findings.    Skin: Skin is warm and dry.   Psychiatric: He has a normal mood and affect.   Nursing note and vitals reviewed.      ED Course     ED Course     Procedures  Medications   lisinopril (PRINIVIL/ZESTRIL) tablet 40 mg (40 mg Oral Given 3/21/18 2568)   metoprolol succinate  (TOPROL-XL) 24 hr tablet 50 mg (50 mg Oral Given 3/21/18 9730)     Results for orders placed or performed during the hospital encounter of 03/21/18   CBC with platelets differential   Result Value Ref [facility]    WBC 6.9 4.0 - 11.0 10e9/L    RBC Count 5.74 4.4 - 5.9 10e12/L    Hemoglobin 16.2 13.3 - 17.7 g/dL    Hematocrit 48.2 40.0 - 53.0 %    MCV 84 78 - 100 fl    MCH 28.2 26.5 - 33.0 pg    MCHC 33.6 31.5 - 36.5 g/dL    RDW 14.6 10.0 - 15.0 %    Platelet Count 217 150 - 450 10e9/L    Diff Method Automated Method     % Neutrophils 70.9 %    % Lymphocytes 18.3 %    % Monocytes 8.4 %    % Eosinophils 1.5 %    % Basophils 0.6 %    % Immature Granulocytes 0.3 %    Nucleated RBCs 0 0 /100    Absolute Neutrophil 4.9 1.6 - 8.3 10e9/L    Absolute Lymphocytes 1.3 0.8 - 5.3 10e9/L    Absolute Monocytes 0.6 0.0 - 1.3 10e9/L    Absolute Eosinophils 0.1 0.0 - 0.7 10e9/L    Absolute Basophils 0.0 0.0 - 0.2 10e9/L    Abs Immature Granulocytes 0.0 0 - 0.4 10e9/L    Absolute Nucleated RBC 0.0    Comprehensive metabolic panel   Result Value Ref [facility]    Sodium 140 133 - 144 mmol/L    Potassium 3.8 3.4 - 5.3 mmol/L    Chloride 109 94 - 109 mmol/L    Carbon Dioxide 24 20 - 32 mmol/L    Anion Gap 7 3 - 14 mmol/L    Glucose 178 (H) 70 - 99 mg/dL    Urea Nitrogen 13 7 - 30 mg/dL    Creatinine 0.99 0.66 - 1.25 mg/dL    GFR Estimate 75 >60 mL/min/1.7m2    GFR Estimate If Black >90 >60 mL/min/1.7m2    Calcium 8.4 (L) 8.5 - 10.1 mg/dL    Bilirubin Total 0.4 0.2 - 1.3 mg/dL    Albumin 3.5 3.4 - 5.0 g/dL    Protein Total 7.7 6.8 - 8.8 g/dL    Alkaline Phosphatase 69 40 - 150 U/L    ALT 21 0 - 70 U/L    AST 19 0 - 45 U/L   Magnesium   Result Value Ref [facility]    Magnesium 2.1 1.6 - 2.3 mg/dL   CK total   Result Value Ref [facility]    CK Total 123 30 - 300 U/L                Critical Care time:  none               Results for orders placed or performed during the hospital encounter of 03/21/18 (from the past 24 hour(s))   CBC with platelets differential    Result Value Ref [facility]    WBC 6.9 4.0 - 11.0 10e9/L    RBC Count 5.74 4.4 - 5.9 10e12/L    Hemoglobin 16.2 13.3 - 17.7 g/dL    Hematocrit 48.2 40.0 - 53.0 %    MCV 84 78 - 100 fl    MCH 28.2 26.5 - 33.0 pg    MCHC 33.6 31.5 - 36.5 g/dL    RDW 14.6 10.0 - 15.0 %    Platelet Count 217 150 - 450 10e9/L    Diff Method Automated Method     % Neutrophils 70.9 %    % Lymphocytes 18.3 %    % Monocytes 8.4 %    % Eosinophils 1.5 %    % Basophils 0.6 %    % Immature Granulocytes 0.3 %    Nucleated RBCs 0 0 /100    Absolute Neutrophil 4.9 1.6 - 8.3 10e9/L    Absolute Lymphocytes 1.3 0.8 - 5.3 10e9/L    Absolute Monocytes 0.6 0.0 - 1.3 10e9/L    Absolute Eosinophils 0.1 0.0 - 0.7 10e9/L    Absolute Basophils 0.0 0.0 - 0.2 10e9/L    Abs Immature Granulocytes 0.0 0 - 0.4 10e9/L    Absolute Nucleated RBC 0.0    Comprehensive metabolic panel   Result Value Ref [facility]    Sodium 140 133 - 144 mmol/L    Potassium 3.8 3.4 - 5.3 mmol/L    Chloride 109 94 - 109 mmol/L    Carbon Dioxide 24 20 - 32 mmol/L    Anion Gap 7 3 - 14 mmol/L    Glucose 178 (H) 70 - 99 mg/dL    Urea Nitrogen 13 7 - 30 mg/dL    Creatinine 0.99 0.66 - 1.25 mg/dL    GFR Estimate 75 >60 mL/min/1.7m2    GFR Estimate If Black >90 >60 mL/min/1.7m2    Calcium 8.4 (L) 8.5 - 10.1 mg/dL    Bilirubin Total 0.4 0.2 - 1.3 mg/dL    Albumin 3.5 3.4 - 5.0 g/dL    Protein Total 7.7 6.8 - 8.8 g/dL    Alkaline Phosphatase 69 40 - 150 U/L    ALT 21 0 - 70 U/L    AST 19 0 - 45 U/L   Magnesium   Result Value Ref [facility]    Magnesium 2.1 1.6 - 2.3 mg/dL   CK total   Result Value Ref Range    CK Total 123 30 - 300 U/L       Medications   lisinopril (PRINIVIL/ZESTRIL) tablet 40 mg (40 mg Oral Given 3/21/18 1638)   metoprolol succinate (TOPROL-XL) 24 hr tablet 50 mg (50 mg Oral Given 3/21/18 1638)       Assessments & Plan (with Medical Decision Making)   Jose Antonio does not feel safe at home.  Lives alone. Multiple EMS calls. Vulnerable adult has been filed by EMS and ED.  Graciously accepted by   [facility].  Provided Lisinopril and Toprol here in the ED.     I have reviewed the nursing notes.    I have reviewed the findings, diagnosis, plan and need for follow up with the patient.       Current Discharge Medication List          Final diagnoses:   Falls frequently   Weakness       3/21/2018   [facility]     Srinath Yeung PA-C  03/21/18 0542

## 2018-03-21 NOTE — H&P
[facility]    History and Physical  Hospitalist       Date of Admission:  3/21/2018  Date of Service (when I saw the patient): 03/21/18    Assessment & Plan   1. Fall and generalized weakness  2. Dizziness  3. Uncontrolled hypertension  4. Medication noncompliance  5. Concern for vulnerable adult  6. Hx of Abnormal MRI Brain (Multiple white matter lesions in both hemispheres of the  suggestive of a white matter disease such as multiple sclerosis). It had been recommended he follow up with Neurology  7. Hx of T2DM    Plan  1. CT Head; may need repeat MRI Brain in morning will defer to rounding physician. Anticipated outpatient Neurology follow up  2. Fall precautions  3. Orthostatics  4. EKG  5. PT, OT, SW consult (may need short term rehab)  6. Obtain medication list in AM he does not know home meds; given lisinopril and metoprolol in ED; home meds to be ordered once med rec complete  7. Medium SSI; diabetic diet; hypoglycemia protocol   8. Check TSH      DVT Prophylaxis: Pneumatic Compression Devices  Code Status: Full Code    Disposition: Expected discharge in 1-2 days once above workup complete.    Kei Marcus    Primary Care Physician   Physician No Ref-Primary    Chief Complaint   fall    History is obtained from the patient    History of Present Illness   Jose Antonio Marmolejo is a 72 year old male with past medical history presenting for evaluation of fall. He was previously hospitalized in early January of this year for falls and weakness. He had abnormal MRI of Brain at that time and was encouraged to follow up with Neurology as outpatient (it appears he did not do so). THe patient had another fall today. He felt dizzy and lightheaded. He lowered/fell to floor and scraped his left knee. He did not lose consciousness. He denies seizure activity, stroke symptoms, nausea, vomiting. He has had previous falls where he hit his head but did not hit his head today. He felt unsafe to go home and thus was  admitted under observation status. He denies chest pain, sob, fevers, chills. He states his is noncompliant with his medications and will frequently skip antihypertensive medications every few days. He has no other complaints.     Past Medical History    I have reviewed this patient's medical history and updated it with pertinent information if needed.   Past Medical History:   Diagnosis Date     Arthritis     back and left ankle from fall     Diabetes (H)      Hypertension        Past Surgical History   I have reviewed this patient's surgical history and updated it with pertinent information if needed.  Past Surgical History:   Procedure Laterality Date     ORTHOPEDIC SURGERY      left ankle and removal of hardware       Prior to Admission Medications   Prior to Admission Medications   Prescriptions Last Dose Informant Patient Reported? Taking?   Blood Pressure Monitoring (ADULT BLOOD PRESSURE CUFF LG) KIT Past Week at Unknown time  No Yes   Si each 2 times daily   amLODIPine (NORVASC) 10 MG tablet Past Week at Unknown time  No Yes   Sig: Take 1 tablet (10 mg) by mouth daily   hydrochlorothiazide (MICROZIDE) 12.5 MG capsule Past Week at Unknown time  No Yes   Sig: Take 1 capsule (12.5 mg) by mouth daily   ibuprofen (ADVIL/MOTRIN) 600 MG tablet Past Week at Unknown time  No Yes   Sig: Take 1 tablet (600 mg) by mouth every 6 hours as needed for other (mild pain)   lisinopril (PRINIVIL/ZESTRIL) 40 MG tablet Past Week at Unknown time  No Yes   Sig: Take 1 tablet (40 mg) by mouth daily   metoprolol (TOPROL-XL) 50 MG 24 hr tablet Past Week at Unknown time  No Yes   Sig: Take 1 tablet (50 mg) by mouth daily   miconazole (MICATIN; MICRO GUARD) 2 % powder More than a month at Unknown time  No No   Sig: Apply topically 2 times daily   miconazole (MICATIN; MICRO GUARD) 2 % powder More than a month at Unknown time  No No   Sig: Apply topically 2 times daily   senna-docusate (SENOKOT-S;PERICOLACE) 8.6-50 MG per tablet More  than a month at Unknown time  No No   Sig: Take 1 tablet by mouth 2 times daily as needed for constipation      Facility-Administered Medications: None     Allergies   Allergies   Allergen Reactions     Penicillins        Social History   I have reviewed this patient's social history and updated it with pertinent information if needed. Jose Antonio Marmolejo  reports that he has quit smoking. He has never used smokeless tobacco. He reports that he does not drink alcohol or use illicit drugs.    Family History   I have reviewed this patient's family history and updated it with pertinent information if needed.   Family History   Problem Relation Age of Onset     Other - See Comments Mother      pneumonia     Obesity Mother      Other Cancer Father      hodgkins lymphoma     Coronary Artery Disease Father      pacemaker     Other Cancer Sister      unknown cancers       Review of Systems   10 point ROS is negative except as noted in hpi    Physical Exam   Temp: 98.2  F (36.8  C) Temp src: Tympanic BP: 177/98   Heart Rate: 108 Resp: 16 SpO2: 94 % O2 Device: None (Room air)    Vital Signs with Ranges  Temp:  [97.7  F (36.5  C)-98.6  F (37  C)] 98.2  F (36.8  C)  Heart Rate:  [] 108  Resp:  [16-18] 16  BP: (177-202)/() 177/98  SpO2:  [91 %-94 %] 94 %  262 lbs 5.56 oz    Constitutional: no acute distress  Eyes: eomi; mmm  HEENT: ncat  Respiratory: ctab  Cardiovascular: tachycardic; normal s1s2  GI: soft, nt, nd  Genitourinary: deferred   Skin: warm, dry, no rash on face;   Musculoskeletal: age appropriate muscle mass  Neurologic: alert and orientedX3; CN grossly intact, moving extremities;   Psychiatric: appropriate affect      Data   Data reviewed today:  I personally reviewed today's labs     Recent Labs  Lab 03/21/18  1654   WBC 6.9   HGB 16.2   MCV 84         POTASSIUM 3.8   CHLORIDE 109   CO2 24   BUN 13   CR 0.99   ANIONGAP 7   STALIN 8.4*   *   ALBUMIN 3.5   PROTTOTAL 7.7   BILITOTAL 0.4   ALKPHOS  69   ALT 21   AST 19       No results found for this or any previous visit (from the past 24 hour(s)).     Kei Marcus MD

## 2018-03-21 NOTE — IP AVS SNAPSHOT
MRN:[id number]                      After Visit Summary   3/21/2018    Jose Antonio Marmolejo    MRN: [id number]           Thank you!     Thank you for choosing [facility] for your care. Our goal is always to provide you with excellent care. Hearing back from our patients is one way we can continue to improve our services. Please take a few minutes to complete the written survey that you may receive in the mail after you visit with us. Thank you!        Patient Information     Date Of Birth          1945        Designated Caregiver       Most Recent Value    Caregiver    Will someone help with your care after discharge? yes    Name of designated caregiver Dre    Phone number of caregiver [phone]    Caregiver address see face sheet      About your hospital stay     You were admitted on:  March 21, 2018 You last received care in the:  [facility]    You were discharged on:  March 22, 2018        Reason for your hospital stay       Jose Antonio Marmolejo is a 72 year old man presenting for evaluation of fall. He was previously hospitalized in early Januaryfor falls and weakness. He had a fall a home today, as well as several previously. No other prodromal symptoms.  It does appear his functional status has worsened since recent discharge from skilled nursing after his similar presentation.  Hypertension with poor compliance with medications.  He is discharged with plans for subacute inpatient rehabilitation as well as evaluation of blood pressure control and self-care abilities.                  Who to Call     For medical emergencies, please call 911.  For non-urgent questions about your medical care, please call your primary care provider or clinic, None          Attending Provider     Provider Specialty    Srinath Yeung PA-C Emergency Medicine    Kei Marcus MD Internal Medicine    Dangelo Barragan MD Internal Medicine       Primary Care Provider Fax #    Physician No Ref-Primary [phone]       After Care Instructions     Activity - Up with assistive device           Activity - Up with nursing assistance       Per physical therapy recommendations            Advance Diet as Tolerated       Follow this diet upon discharge: Orders Placed This Encounter      Consistent Carbohydrate Diet 6877-5536 Calories: Low Consistent CHO (3-5 CHO units/meal)            Fall precautions           General info for SNF       Length of Stay Estimate: Short Term Care: Estimated # of Days <30  Condition at Discharge: Improving  Level of care:skilled   Rehabilitation Potential: Good  Admission H&P remains valid and up-to-date: Yes  Recent Chemotherapy: N/A  Use Nursing Home Standing Orders: Yes            Glucose monitor nursing POCT       Twice daily            Mantoux instructions       Give two-step Mantoux (PPD) Per Facility Policy Yes                  Follow-up Appointments     Follow Up and recommended labs and tests       Follow up with primary care physician   Neurology evaluation after primary care evaluation                  Your next 10 appointments already scheduled     Mar 27, 2018  9:00 AM CDT   (Arrive by 8:45 AM)   Office Visit with Juanita Briggs MD   [facility] [place] ([facility] )    [address] [place] [address]  [place] [address]   [phone]           Bring a current list of meds and any records pertaining to this visit. For Physicals, please bring immunization records and any forms needing to be filled out. Please arrive 10 minutes early to complete paperwork.              Additional Services     Nutrition Services Adult IP Consult       Reason: Diabetic education regarding prudent consistent carbohydrate diet            Occupational Therapy Adult Consult       Evaluate and treat as clinically indicated.    Reason: Gait disturbance, frequent falls.            Physical Therapy Adult Consult       Evaluate and treat as clinically indicated.    Reason: Gait disturbance, generalized  "weakness, frequent falls.                  Pending Results     Date and Time Order Name Status Description    3/21/2018 2058 Active MRSA Surveillance Culture In process             Statement of Approval     Ordered          18 1250  I have reviewed and agree with all the recommendations and orders detailed in this document.  EFFECTIVE NOW     Approved and electronically signed by:  Dangelo Barragan MD             Admission Information     Date & Time Provider Department Dept. Phone    3/21/2018 Dangelo Barragan MD [facility] [phone]      Your Vitals Were     Blood Pressure Temperature Respirations Height Weight Pulse Oximetry    160/83 99  F (37.2  C) (Tympanic) 18 1.829 m (6') 119.6 kg (263 lb 10.7 oz) 95%    BMI (Body Mass Index)                   35.76 kg/m2           MyChart Information     [employer] lets you send messages to your doctor, view your test results, renew your prescriptions, schedule appointments and more. To sign up, go to [email or web address] . Click on \"Log in\" on the left side of the screen, which will take you to the Welcome page. Then click on \"Sign up Now\" on the right side of the page.     You will be asked to enter the access code listed below, as well as some personal information. Please follow the directions to create your username and password.     Your access code is: [email or web address]  Expires: 2018  2:42 PM     Your access code will  in 90 days. If you need help or a new code, please call your [facility] clinic or [phone].        Care EveryWhere ID     This is your Care EveryWhere ID. This could be used by other organizations to access your [facility] medical records  [id number]        Equal Access to Services     [facility]: Hadii charles Brower, wadhavalda lumonicaadaha, qaybta kaalmada porsha, viki triplett . So [facility] [phone].    ATENCIÓN: Si habla español, tiene a garcia disposición servicios gratuitos de " asistencia lingüística. Sandra al [phone].    We comply with applicable federal civil rights laws and [place] laws. We do not discriminate on the basis of race, color, national origin, age, disability, sex, sexual orientation, or gender identity.               Review of your medicines      START taking        Dose / Directions    acetaminophen 325 MG tablet   Commonly known as:  TYLENOL   Used for:  Weakness        Dose:  650 mg   Take 2 tablets (650 mg) by mouth every 4 hours as needed for mild pain   Quantity:  100 tablet   Refills:  0         CONTINUE these medicines which have NOT CHANGED        Dose / Directions    Adult Blood Pressure Cuff Lg Kit   Used for:  Benign essential hypertension, Malignant essential hypertension        Dose:  1 each   1 each 2 times daily   Quantity:  1 kit   Refills:  0       hydrochlorothiazide 12.5 MG capsule   Commonly known as:  MICROZIDE   Used for:  Essential hypertension        Dose:  12.5 mg   Take 1 capsule (12.5 mg) by mouth daily   Quantity:  30 capsule   Refills:  0       ibuprofen 600 MG tablet   Commonly known as:  ADVIL/MOTRIN   Used for:  Falls frequently        Dose:  600 mg   Take 1 tablet (600 mg) by mouth every 6 hours as needed for other (mild pain)   Quantity:  120 tablet   Refills:  0       lisinopril 40 MG tablet   Commonly known as:  PRINIVIL/ZESTRIL   Used for:  Essential hypertension        Dose:  40 mg   Take 1 tablet (40 mg) by mouth daily   Quantity:  30 tablet   Refills:  0       metoprolol succinate 50 MG 24 hr tablet   Commonly known as:  TOPROL-XL   Used for:  Essential hypertension        Dose:  50 mg   Take 1 tablet (50 mg) by mouth daily   Quantity:  30 tablet   Refills:  0       * miconazole 2 % powder   Commonly known as:  MICATIN; MICRO GUARD   Used for:  Yeast infection of the skin        Apply topically 2 times daily   Quantity:  90 g   Refills:  1       * miconazole 2 % powder   Commonly known as:  MICATIN; MICRO GUARD   Used for:   Yeast infection of the skin        Apply topically 2 times daily   Refills:  0       senna-docusate 8.6-50 MG per tablet   Commonly known as:  SENOKOT-S;PERICOLACE   Used for:  Chronic idiopathic constipation        Dose:  1 tablet   Take 1 tablet by mouth 2 times daily as needed for constipation   Quantity:  100 tablet   Refills:  0       * Notice:  This list has 2 medication(s) that are the same as other medications prescribed for you. Read the directions carefully, and ask your doctor or other care provider to review them with you.      STOP taking     amLODIPine 10 MG tablet   Commonly known as:  NORVASC                Where to get your medicines      Some of these will need a paper prescription and others can be bought over the counter. Ask your nurse if you have questions.     You don't need a prescription for these medications     acetaminophen 325 MG tablet                Protect others around you: Learn how to safely use, store and throw away your medicines at www.disposemymeds.org.             Medication List: This is a list of all your medications and when to take them. Check marks below indicate your daily home schedule. Keep this list as a reference.      Medications           Morning Afternoon Evening Bedtime As Needed    acetaminophen 325 MG tablet   Commonly known as:  TYLENOL   Take 2 tablets (650 mg) by mouth every 4 hours as needed for mild pain   Last time this was given:  650 mg on 3/22/2018 12:03 AM                                Adult Blood Pressure Cuff Lg Kit   1 each 2 times daily                                hydrochlorothiazide 12.5 MG capsule   Commonly known as:  MICROZIDE   Take 1 capsule (12.5 mg) by mouth daily                                ibuprofen 600 MG tablet   Commonly known as:  ADVIL/MOTRIN   Take 1 tablet (600 mg) by mouth every 6 hours as needed for other (mild pain)                                lisinopril 40 MG tablet   Commonly known as:  PRINIVIL/ZESTRIL   Take 1  tablet (40 mg) by mouth daily   Last time this was given:  40 mg on 3/22/2018 10:48 AM                                metoprolol succinate 50 MG 24 hr tablet   Commonly known as:  TOPROL-XL   Take 1 tablet (50 mg) by mouth daily   Last time this was given:  50 mg on 3/22/2018 10:48 AM                                * miconazole 2 % powder   Commonly known as:  MICATIN; MICRO GUARD   Apply topically 2 times daily                                * miconazole 2 % powder   Commonly known as:  MICATIN; MICRO GUARD   Apply topically 2 times daily                                senna-docusate 8.6-50 MG per tablet   Commonly known as:  SENOKOT-S;PERICOLACE   Take 1 tablet by mouth 2 times daily as needed for constipation                                * Notice:  This list has 2 medication(s) that are the same as other medications prescribed for you. Read the directions carefully, and ask your doctor or other care provider to review them with you.              More Information        Patient Education    Acetaminophen Chewable tablet    Acetaminophen Elixir    Acetaminophen Oral capsule    Acetaminophen Oral disintegrating tablet    Acetaminophen Oral drops, solution    Acetaminophen Oral drops, suspension    Acetaminophen Oral solution    Acetaminophen Oral suspension    Acetaminophen Oral tablet    Acetaminophen Oral tablet, biphasic release    Acetaminophen Oral tablet, extended-release    Acetaminophen Rectal suppository    Acetaminophen Solution for injection  Acetaminophen Oral tablet  What is this medicine?  ACETAMINOPHEN (a set a ASHLEY guillermina fen) is a pain reliever. It is used to treat mild pain and fever.  This medicine may be used for other purposes; ask your health care provider or pharmacist if you have questions.  What should I tell my health care provider before I take this medicine?  They need to know if you have any of these conditions:    if you often drink alcohol    liver disease    an unusual or allergic  reaction to acetaminophen, other medicines, foods, dyes, or preservatives    pregnant or trying to get pregnant    breast-feeding  How should I use this medicine?  Take this medicine by mouth with a glass of water. Follow the directions on the package or prescription label. Take your medicine at regular intervals. Do not take your medicine more often than directed.  Talk to your pediatrician regarding the use of this medicine in children. While this drug may be prescribed for children as young as 6 years of age for selected conditions, precautions do apply.  Overdosage: If you think you have taken too much of this medicine contact a poison control center or emergency room at once.  NOTE: This medicine is only for you. Do not share this medicine with others.  What if I miss a dose?  If you miss a dose, take it as soon as you can. If it is almost time for your next dose, take only that dose. Do not take double or extra doses.  What may interact with this medicine?    alcohol    imatinib    isoniazid    other medicines with acetaminophen  This list may not describe all possible interactions. Give your health care provider a list of all the medicines, herbs, non-prescription drugs, or dietary supplements you use. Also tell them if you smoke, drink alcohol, or use illegal drugs. Some items may interact with your medicine.  What should I watch for while using this medicine?  Tell your doctor or health care professional if the pain lasts more than 10 days (5 days for children), if it gets worse, or if there is a new or different kind of pain. Also, check with your doctor if a fever lasts for more than 3 days.  Do not take other medicines that contain acetaminophen with this medicine. Always read labels carefully. If you have questions, ask your doctor or pharmacist.  If you take too much acetaminophen get medical help right away. Too much acetaminophen can be very dangerous and cause liver damage. Even if you do not have  symptoms, it is important to get help right away.  What side effects may I notice from receiving this medicine?  Side effects that you should report to your doctor or health care professional as soon as possible:    allergic reactions like skin rash, itching or hives, swelling of the face, lips, or tongue    breathing problems    fever or sore throat    redness, blistering, peeling or loosening of the skin, including inside the mouth    trouble passing urine or change in the amount of urine    unusual bleeding or bruising    unusually weak or tired    yellowing of the eyes or skin  Side effects that usually do not require medical attention (report to your doctor or health care professional if they continue or are bothersome):    headache    nausea, stomach upset  This list may not describe all possible side effects. Call your doctor for medical advice about side effects. You may report side effects to FDA at 4-724-FDA-6749.  Where should I keep my medicine?  Keep out of reach of children.  Store at room temperature between 20 and 25 degrees C (68 and 77 degrees F). Protect from moisture and heat. Throw away any unused medicine after the expiration date.  NOTE:This sheet is a summary. It may not cover all possible information. If you have questions about this medicine, talk to your doctor, pharmacist, or health care provider. Copyright  2016 [employer] Standard                Weakness (Uncertain Cause)  Based on your exam today, the exact cause of your weakness is not certain. However, your weakness does not seem to be a sign of a serious illness at this time. Keep an eye on your symptoms and get medical advice as instructed below.  Home care    Rest at home today. Do not over-exert yourself.    Take any medicine as prescribed.    For the next few days, drink extra fluids (unless your healthcare provider wants you to restrict fluids for other reasons). Do not skip meals.  Follow-up care  Follow up with your healthcare  provider or as advised.  When to seek medical advice  Call your healthcare provider for any of the following    Worsening of your symptoms    Symptoms don't start getting better within 2 days    Fever of 100.4  F (38  C) or higher, or as directed by your healthcare provider     Call 911  Get emergency medical care for any of these:    Chest, arm, neck, jaw or upper back pain    Trouble breathing    Numbness or weakness of the face, one arm or one leg    Slurred speech, confusion, trouble speaking, walking or seeing    Blood in vomit or stool (black or red color)    Loss of consciousness  Date Last Reviewed: 6/10/2015    5878-8518 [employer]. [address] [place] [id number]. All rights reserved. This information is not intended as a substitute for professional medical care. Always follow your healthcare professional's instructions.                Discharge Instructions for High Blood Pressure (Hypertension)  You have been diagnosed with high blood pressure (also called hypertension). This means the force of blood against your artery walls is too strong. It also means your heart is working hard to move blood. High blood pressure usually has no symptoms, but over time, it can damage your heart, blood vessels, eyes, kidneys, and other organs. With help from your doctor, you can manage your blood pressure and protect your health.  Taking medicine    Learn to take your own blood pressure. Keep a record of your results. Ask your doctor which readings mean that you need medical attention.    Take your blood pressure medicine exactly as directed. Don t skip doses. Missing doses can cause your blood pressure to get out of control.    If you do miss a dose (or doses) check with your healthcare provider about what to do.    Avoid medicine that contain heart stimulants, including over-the-counter drugs. Check for warnings about high blood pressure on the label. Ask the pharmacist before purchasing  "something you haven't used before    Check with your doctor or pharmacist before taking a decongestant. Some decongestants can worsen high blood pressure.  Lifestyle changes    Maintain a healthy weight. Get help to lose any extra pounds.    Cut back on salt.    Limit canned, dried, packaged, and fast foods.    Don t add salt to your food at the table.    Season foods with herbs instead of salt when you cook.    Request no added salt when you go to a restaurant.    The American Heart Association s (AHA) \"ideal\" sodium intake recommendation is 1,500 milligrams per day.  However, since American's eat so much salt, the AHA says a positive change can occur by cutting back to even 2,400 milligrams of sodium a day.     Follow the DASH (Dietary Approaches to Stop Hypertension) eating plan. This plan recommends vegetables, fruits, whole gains, and other heart healthy foods.    Begin an exercise program. Ask your doctor how to get started. The American Heart Association recommends aerobic exercise 3 to 4 times a week for an average of 40 minutes at a time, with your doctor's approval. Simple activities like walking or gardening can help.    Break the smoking habit. Enroll in a stop-smoking program to improve your chances of success. Ask your healthcare provider about programs and medicines to help you stop smoking.    Limit drinks that contain caffeine (coffee, black or green tea, cola) to 2 per day.    Never take stimulants such as amphetamines or cocaine; these drugs can be deadly for someone with high blood pressure.    Control your stress. Learn stress-management techniques.    Limit alcohol to no more than 1 drink a day for women and 2 drinks a day for men.  Follow-up care  Make a follow-up appointment as directed by our staff.     When to seek medical care  Call your doctor immediately or seek emergency care if you have any of the following:    Chest pain or shortness of breath (call 911)    Moderate to severe " headache    Weakness in the muscles of your face, arms, or legs    Trouble speaking    Extreme drowsiness    Confusion    Fainting or dizziness    Pulsating or rushing sound in your ears    Unexplained nosebleed    Weakness, tingling, or numbness of your face, arms, or legs    Change in vision    Blood pressure measured at home that is greater than 180/110   Date Last Reviewed: 4/27/2016 2000-2017 [employer]. [address] [place] [address], [place] [id number]. All rights reserved. This information is not intended as a substitute for professional medical care. Always follow your healthcare professional's instructions.

## 2018-03-21 NOTE — IP AVS SNAPSHOT
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 then swallow with saliva; liquid not required.              Or  ondansetron (ZOFRAN) injection 4 mg  Dose: 4 mg  Freq: EVERY 6 HOURS PRN Route: IV  PRN Reasons: nausea,vomiting  Start: 03/21/18 2013   Admin Instructions: This is Step 1 of nausea and vomiting management.  If nausea not resolved in 15 minutes, go to Step 2 prochlorperazine (COMPAZINE).  Irritant. For ordered doses up to 4 mg, give IV Push undiluted over 2-5 minutes.               senna-docusate (SENOKOT-S;PERICOLACE) 8.6-50 MG per tablet 1 tablet  Dose: 1 tablet  Freq: 2 TIMES DAILY PRN Route: PO  PRN Reason: constipation  Start: 03/21/18 2014   Admin Instructions: If no bowel movement in 24 hours, increase to 2 tablets PO.  Hold for loose stools.              Or  senna-docusate (SENOKOT-S;PERICOLACE) 8.6-50 MG per tablet 2 tablet  Dose: 2 tablet  Freq: 2 TIMES DAILY PRN Route: PO  PRN Reason: constipation  Start: 03/21/18 2014   Admin Instructions: Hold for loose stools.               sodium chloride (PF) 0.9% PF flush 3 mL  Dose: 3 mL  Freq: EVERY 8 HOURS Route: IK  Start: 03/21/18 2015   Admin Instructions: And Q1H PRN, to lock peripheral IV dormant line.           2017 (3 mL)-Given                      [ ] 2015           sodium chloride (PF) 0.9% PF flush 3 mL  Dose: 3 mL  Freq: EVERY 1 HOUR PRN Route: IK  PRN Reason: line flush  Start: 03/21/18 2013   Admin Instructions: for peripheral IV flush post IV meds              Completed Medications  Medications 03/16/18 03/17/18 03/18/18 03/19/18 03/20/18 03/21/18 03/22/18         Dose: 40 mg  Freq: ONCE Route: PO  Start: 03/22/18 1015   End: 03/22/18 1048          1048 (40 mg)-Given             Dose: 40 mg  Freq: ONCE Route: PO  Start: 03/21/18 1622   End: 03/21/18 1638         1638 (40 mg)-Given              Dose: 50 mg  Freq: ONCE Route: PO  Start: 03/21/18 1622   End: 03/21/18 1638   Admin Instructions: DO NOT CRUSH. Tablet may be split in half along score line.          1638 (50 mg)-Given           Discontinued Medications  Medications 03/16/18 03/17/18 03/18/18 03/19/18 03/20/18 03/21/18 03/22/18         Dose: 10 mg  Freq: EVERY 6 HOURS PRN Route: IV  PRN Reason: high blood pressure  PRN Comment: SBP>180  Start: 03/21/18 2040   End: 03/22/18 1009   Admin Instructions: For ordered doses up to 40 mg, give IV Push undiluted over 1 minute.           1009-Med Discontinued         Rate: 100 mL/hr   Freq: CONTINUOUS Route: IV  Start: 03/21/18 2145   End: 03/22/18 1009         2219 ( )-New Bag        0846 ( )-New Bag       1009-Med Discontinued                INTERAGENCY TRANSFER FORM - NOTES (H&P, Discharge Summary, Consults, Procedures, Therapies)   3/21/2018                       [facility]: [phone]               History & Physicals      H&P by Kei Marcus MD at 3/21/2018  6:51 PM     Author:  Kei Marcus MD Service:  Hospitalist Author Type:  Physician    Filed:  3/21/2018 10:12 PM Date of Service:  3/21/2018  6:51 PM Creation Time:  3/21/2018  6:50 PM    Status:  Addendum :  Kei Marcus MD (Physician)           [facility]    History and Physical  Hospitalist       Date  of Admission:  3/21/2018  Date of Service (when I saw the patient):[MD1.1] 03/21/18[MD1.2]    Assessment & Plan[MD1.3]   1. Fall and generalized weakness  2. Dizziness  3. Uncontrolled hypertension  4. Medication noncompliance  5. Concern for vulnerable adult  6. Hx of Abnormal MRI Brain (Multiple white matter lesions in both hemispheres of the  suggestive of a white matter disease such as multiple sclerosis). It had been recommended he follow up with Neurology  7. Hx of T2DM    Plan  1. CT Head; may need repeat MRI Brain in morning will defer to rounding physician. Anticipated outpatient Neurology follow up  2. Fall precautions  3. Orthostatics  4.[MD1.4] EKG[MD1.5]  5. PT, OT, SW consult (may need short term rehab)  6. Obtain medication list in AM he does not know home meds; given lisinopril and metoprolol in ED; home meds to be ordered once med rec complete  7. Medium SSI; diabetic diet; hypoglycemia protocol   8. Check TSH[MD1.4]      DVT Prophylaxis:[MD1.1] Pneumatic Compression Devices[MD1.4]  Code Status:[MD1.1] Full Code[MD1.4]    Disposition: Expected discharge in[MD1.1] 1-2[MD1.4] days once[MD1.1] above workup complete[MD1.4].[MD1.1]    Kei Marcus    Primary Care Physician   Physician No Ref-Primary    Chief Complaint[MD1.3]   fall    History is obtained from the patient[MD1.6]    History of Present Illness[MD1.3]   Jose Antonio Marmolejo is a 72 year old male[MD1.1] with past medical history presenting for evaluation of fall.[MD1.6] He was previously hospitalized in early January of this year for falls and weakness. He had abnormal MRI of Brain at that time and was encouraged to follow up with Neurology as outpatient (it appears he did not do so). THe patient had another fall today. He felt dizzy and lightheaded. He lowered/fell to floor and scraped his left knee. He did not lose consciousness. He denies seizure activity, stroke symptoms, nausea, vomiting. He has had previous falls where he hit his head but did  not hit his head today. He felt unsafe to go home and thus was admitted under observation status. He denies chest pain, sob, fevers, chills. He states his is noncompliant with his medications and will frequently skip antihypertensive medications every few days. He has no other complaints.[MD1.4]     Past Medical History[MD1.3]    I have reviewed this patient's medical history and updated it with pertinent information if needed.[MD1.1]   Past Medical History:   Diagnosis Date     Arthritis     back and left ankle from fall     Diabetes (H)      Hypertension        Past Surgical History[MD1.3]   I have reviewed this patient's surgical history and updated it with pertinent information if needed.[MD1.1]  Past Surgical History:   Procedure Laterality Date     ORTHOPEDIC SURGERY      left ankle and removal of hardware       Prior to Admission Medications   Prior to Admission Medications   Prescriptions Last Dose Informant Patient Reported? Taking?   Blood Pressure Monitoring (ADULT BLOOD PRESSURE CUFF LG) KIT Past Week at Unknown time  No Yes   Si each 2 times daily   amLODIPine (NORVASC) 10 MG tablet Past Week at Unknown time  No Yes   Sig: Take 1 tablet (10 mg) by mouth daily   hydrochlorothiazide (MICROZIDE) 12.5 MG capsule Past Week at Unknown time  No Yes   Sig: Take 1 capsule (12.5 mg) by mouth daily   ibuprofen (ADVIL/MOTRIN) 600 MG tablet Past Week at Unknown time  No Yes   Sig: Take 1 tablet (600 mg) by mouth every 6 hours as needed for other (mild pain)   lisinopril (PRINIVIL/ZESTRIL) 40 MG tablet Past Week at Unknown time  No Yes   Sig: Take 1 tablet (40 mg) by mouth daily   metoprolol (TOPROL-XL) 50 MG 24 hr tablet Past Week at Unknown time  No Yes   Sig: Take 1 tablet (50 mg) by mouth daily   miconazole (MICATIN; MICRO GUARD) 2 % powder More than a month at Unknown time  No No   Sig: Apply topically 2 times daily   miconazole (MICATIN; MICRO GUARD) 2 % powder More than a month at Unknown time  No No    Sig: Apply topically 2 times daily   senna-docusate (SENOKOT-S;PERICOLACE) 8.6-50 MG per tablet More than a month at Unknown time  No No   Sig: Take 1 tablet by mouth 2 times daily as needed for constipation      Facility-Administered Medications: None     Allergies   Allergies   Allergen Reactions     Penicillins        Social History[MD1.3]   I have reviewed this patient's social history and updated it with pertinent information if needed. Jose Antonio APODACA Francie[MD1.1]  reports that he has quit smoking. He has never used smokeless tobacco. He reports that he does not drink alcohol or use illicit drugs.    Family History[MD1.3]   I have reviewed this patient's family history and updated it with pertinent information if needed.[MD1.1]   Family History   Problem Relation Age of Onset     Other - See Comments Mother      pneumonia     Obesity Mother      Other Cancer Father      hodgkins lymphoma     Coronary Artery Disease Father      pacemaker     Other Cancer Sister      unknown cancers       Review of Systems   10 point ROS is negative except as noted in hpi    Physical Exam   Temp: 98.2  F (36.8  C) Temp src: Tympanic BP: 177/98   Heart Rate: 108 Resp: 16 SpO2: 94 % O2 Device: None (Room air)[MD1.3]    Vital Signs with Ranges[MD1.1]  Temp:  [97.7  F (36.5  C)-98.6  F (37  C)] 98.2  F (36.8  C)  Heart Rate:  [] 108  Resp:  [16-18] 16  BP: (177-202)/() 177/98  SpO2:  [91 %-94 %] 94 %  262 lbs 5.56 oz[MD1.3]    Constitutional: no acute distress  Eyes: eomi[MD1.7]; mmm[MD1.3]  HEENT: ncat  Respiratory: ctab  Cardiovascular: [MD1.7]tachycardic[MD1.3]; normal s1s2  GI: soft, nt, nd  Genitourinary: deferred   Skin: warm, dry, no rash on face;   Musculoskeletal: age appropriate muscle mass  Neurologic: alert and oriented[MD1.7]X3[MD1.3]; CN grossly intact, moving extremities;   Psychiatric: appropriate affect[MD1.7]      Data[MD1.3]   Data reviewed today:  I personally reviewed[MD1.1] today's labs[MD1.7]     Recent  Labs  Lab 03/21/18  1654   WBC 6.9   HGB 16.2   MCV 84         POTASSIUM 3.8   CHLORIDE 109   CO2 24   BUN 13   CR 0.99   ANIONGAP 7   STALIN 8.4*   *   ALBUMIN 3.5   PROTTOTAL 7.7   BILITOTAL 0.4   ALKPHOS 69   ALT 21   AST 19       No results found for this or any previous visit (from the past 24 hour(s)).[MD1.3]     Kei Marcus MD[MD1.7]     Revision History        User Key Date/Time User Provider Type Action    > MD1.5 3/21/2018 10:12 PM Kei Marcus MD Physician Addend     [N/A] 3/21/2018 10:06 PM Kei Marcus MD Physician Addend     MD1.2 3/21/2018 10:03 PM Kei Marcus MD Physician Sign     MD1.4 3/21/2018  9:52 PM Kei Marcus MD Physician      MD1.6 3/21/2018  9:41 PM Kei Marcus MD Physician      MD1.3 3/21/2018  9:38 PM Kei Marcus MD Physician      MD1.7 3/21/2018  8:40 PM Kei Marcus MD Physician      MD1.1 3/21/2018  6:50 PM Kei Marcus MD Physician                   Discharge Summaries     No notes of this type exist for this encounter.      Consult Notes     No notes of this type exist for this encounter.         Progress Notes - Physician (Notes for yesterday and today)      ED Provider Notes by Srinath Yeung PA-C at 3/21/2018  4:09 PM     Author:  Srinath Yeugn PA-C Service:  Emergency Medicine Author Type:  Physician Assistant - C    Filed:  3/21/2018  7:00 PM Date of Service:  3/21/2018  4:09 PM Creation Time:  3/21/2018  4:29 PM    Status:  Signed :  Srinath Yeung PA-C (Physician Assistant - C)           History[KW1.1]     Chief Complaint   Patient presents with     Extremity Weakness     weakness and falling frequently at home. pt notes on the floor for approx 30 min. denies injury from falling. incont urine while on the floor[KW1.2]     The history is provided by[KW1.1] the patient[KW1.3].     Jose Antonio Marmolejo is a 72 year old male who presented to the emergency department via EMS for evaluation of weakness and fall.  He  was walking to the bathroom with his walker when his legs became weak and fell to the floor.  Denies any hip or knee pain.  Denies any shoulder pain. Denies any chest pain.  Denies any shortness of breath.  States he felt dizzy when he was on the floor activated his medical alert.  Denies dizziness to me right now.  EMS reports that they are at his house on numerous occasions for lift assist.  They have also filed a vulnerable adult.  He denies any fevers.  Denies any abdominal pain.    Problem List:[KW1.1]    Patient Active Problem List    Diagnosis Date Noted     Falls frequently 12/31/2017     Priority: Medium     Type 2 diabetes mellitus without complication (H) 08/19/2016     Priority: Medium     Weakness of both lower extremities 08/19/2016     Priority: Medium     Yeast infection of the skin 08/19/2016     Priority: Medium     Essential hypertension 08/17/2016     Priority: Medium     Falling episodes 08/17/2016     Priority: Medium     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     Priority: Medium[KW1.2]        Past Medical History:[KW1.1]    Past Medical History:   Diagnosis Date     Arthritis      Diabetes (H)      Hypertension[KW1.2]        Past Surgical History:[KW1.1]    Past Surgical History:   Procedure Laterality Date     ORTHOPEDIC SURGERY      left ankle and removal of hardware[KW1.2]       Family History:[KW1.1]    Family History   Problem Relation Age of Onset     Other - See Comments Mother      pneumonia     Obesity Mother      Other Cancer Father      hodgkins lymphoma     Coronary Artery Disease Father      pacemaker     Other Cancer Sister      unknown cancers[KW1.2]       Social History:  Marital Status:  Single [1][KW1.1]  Social History   Substance Use Topics     Smoking status: Former Smoker     Smokeless tobacco: Never Used     Alcohol use No[KW1.2]        Medications:[KW1.1]      ibuprofen (ADVIL/MOTRIN) 600 MG tablet   lisinopril (PRINIVIL/ZESTRIL) 40 MG tablet   metoprolol  (TOPROL-XL) 50 MG 24 hr tablet   amLODIPine (NORVASC) 10 MG tablet   hydrochlorothiazide (MICROZIDE) 12.5 MG capsule   Blood Pressure Monitoring (ADULT BLOOD PRESSURE CUFF LG) KIT   miconazole (MICATIN; MICRO GUARD) 2 % powder   senna-docusate (SENOKOT-S;PERICOLACE) 8.6-50 MG per tablet   miconazole (MICATIN; MICRO GUARD) 2 % powder[KW1.2]         Review of Systems   Constitutional: Positive for[KW1.1] fatigue[KW1.3]. Negative for[KW1.1] activity change[KW1.3],[KW1.1] chills[KW1.3] and[KW1.1] fever[KW1.3].   Respiratory: Negative for[KW1.1] shortness of breath[KW1.3].    Cardiovascular: Negative for[KW1.1] chest pain[KW1.3] and[KW1.1] palpitations[KW1.3].   Gastrointestinal: Negative for[KW1.1] abdominal pain[KW1.3].   Genitourinary:[KW1.1] Negative[KW1.3].    Musculoskeletal:[KW1.1]        Chronic right shoulder pain[KW1.3]    Skin:[KW1.1] Negative[KW1.3].    Neurological: Positive for[KW1.1] dizziness[KW1.3],[KW1.1] weakness[KW1.3] and[KW1.1] light-headedness[KW1.3]. Negative for[KW1.1] headaches[KW1.3].[KW1.1]        All chronic[KW1.3]        Physical Exam[KW1.1]   BP: (!) 202/107  Heart Rate: 101  Temp: 98.6  F (37  C)  Resp: 18  SpO2: 94 %[KW1.2]      Physical Exam   Constitutional: He is[KW1.1] oriented to person, place, and time[KW1.3]. He appears[KW1.1] well-developed[KW1.3] and[KW1.1] well-nourished[KW1.3].   HENT:   Head:[KW1.1] Normocephalic[KW1.3] and[KW1.1] atraumatic[KW1.3].   Cardiovascular:[KW1.1] Normal rate[KW1.3] and[KW1.1] regular rhythm[KW1.3].    Pulmonary/Chest:[KW1.1] Effort normal[KW1.3].   Musculoskeletal:[KW1.1]   Pain on passive and active ROM of the right shoulder-chronic[KW1.3]   Neurological: He is[KW1.1] alert[KW1.3] and[KW1.1] oriented to person, place, and time[KW1.3].[KW1.1]   No focal findings.[KW1.3]    Skin: Skin is[KW1.1] warm[KW1.3] and[KW1.1] dry[KW1.3].   Psychiatric: He has a[KW1.1] normal mood and affect[KW1.3].[KW1.1]   Nursing note[KW1.3] and[KW1.1] vitals[KW1.3]  reviewed.      ED Course[KW1.1]     ED Course[KW1.2]     Procedures[KW1.1]  Medications   lisinopril (PRINIVIL/ZESTRIL) tablet 40 mg (40 mg Oral Given 3/21/18 1638)   metoprolol succinate (TOPROL-XL) 24 hr tablet 50 mg (50 mg Oral Given 3/21/18 1638)     Results for orders placed or performed during the hospital encounter of 03/21/18   CBC with platelets differential   Result Value Ref Range    WBC 6.9 4.0 - 11.0 10e9/L    RBC Count 5.74 4.4 - 5.9 10e12/L    Hemoglobin 16.2 13.3 - 17.7 g/dL    Hematocrit 48.2 40.0 - 53.0 %    MCV 84 78 - 100 fl    MCH 28.2 26.5 - 33.0 pg    MCHC 33.6 31.5 - 36.5 g/dL    RDW 14.6 10.0 - 15.0 %    Platelet Count 217 150 - 450 10e9/L    Diff Method Automated Method     % Neutrophils 70.9 %    % Lymphocytes 18.3 %    % Monocytes 8.4 %    % Eosinophils 1.5 %    % Basophils 0.6 %    % Immature Granulocytes 0.3 %    Nucleated RBCs 0 0 /100    Absolute Neutrophil 4.9 1.6 - 8.3 10e9/L    Absolute Lymphocytes 1.3 0.8 - 5.3 10e9/L    Absolute Monocytes 0.6 0.0 - 1.3 10e9/L    Absolute Eosinophils 0.1 0.0 - 0.7 10e9/L    Absolute Basophils 0.0 0.0 - 0.2 10e9/L    Abs Immature Granulocytes 0.0 0 - 0.4 10e9/L    Absolute Nucleated RBC 0.0    Comprehensive metabolic panel   Result Value Ref Range    Sodium 140 133 - 144 mmol/L    Potassium 3.8 3.4 - 5.3 mmol/L    Chloride 109 94 - 109 mmol/L    Carbon Dioxide 24 20 - 32 mmol/L    Anion Gap 7 3 - 14 mmol/L    Glucose 178 (H) 70 - 99 mg/dL    Urea Nitrogen 13 7 - 30 mg/dL    Creatinine 0.99 0.66 - 1.25 mg/dL    GFR Estimate 75 >60 mL/min/1.7m2    GFR Estimate If Black >90 >60 mL/min/1.7m2    Calcium 8.4 (L) 8.5 - 10.1 mg/dL    Bilirubin Total 0.4 0.2 - 1.3 mg/dL    Albumin 3.5 3.4 - 5.0 g/dL    Protein Total 7.7 6.8 - 8.8 g/dL    Alkaline Phosphatase 69 40 - 150 U/L    ALT 21 0 - 70 U/L    AST 19 0 - 45 U/L   Magnesium   Result Value Ref Range    Magnesium 2.1 1.6 - 2.3 mg/dL   CK total   Result Value Ref Range    CK Total 123 30 - 300 U/L[KW1.2]                 Critical Care time:[KW1.1]  none[KW1.3]               Results for orders placed or performed during the hospital encounter of 03/21/18 (from the past 24 hour(s))   CBC with platelets differential   Result Value Ref Range    WBC 6.9 4.0 - 11.0 10e9/L    RBC Count 5.74 4.4 - 5.9 10e12/L    Hemoglobin 16.2 13.3 - 17.7 g/dL    Hematocrit 48.2 40.0 - 53.0 %    MCV 84 78 - 100 fl    MCH 28.2 26.5 - 33.0 pg    MCHC 33.6 31.5 - 36.5 g/dL    RDW 14.6 10.0 - 15.0 %    Platelet Count 217 150 - 450 10e9/L    Diff Method Automated Method     % Neutrophils 70.9 %    % Lymphocytes 18.3 %    % Monocytes 8.4 %    % Eosinophils 1.5 %    % Basophils 0.6 %    % Immature Granulocytes 0.3 %    Nucleated RBCs 0 0 /100    Absolute Neutrophil 4.9 1.6 - 8.3 10e9/L    Absolute Lymphocytes 1.3 0.8 - 5.3 10e9/L    Absolute Monocytes 0.6 0.0 - 1.3 10e9/L    Absolute Eosinophils 0.1 0.0 - 0.7 10e9/L    Absolute Basophils 0.0 0.0 - 0.2 10e9/L    Abs Immature Granulocytes 0.0 0 - 0.4 10e9/L    Absolute Nucleated RBC 0.0    Comprehensive metabolic panel   Result Value Ref Range    Sodium 140 133 - 144 mmol/L    Potassium 3.8 3.4 - 5.3 mmol/L    Chloride 109 94 - 109 mmol/L    Carbon Dioxide 24 20 - 32 mmol/L    Anion Gap 7 3 - 14 mmol/L    Glucose 178 (H) 70 - 99 mg/dL    Urea Nitrogen 13 7 - 30 mg/dL    Creatinine 0.99 0.66 - 1.25 mg/dL    GFR Estimate 75 >60 mL/min/1.7m2    GFR Estimate If Black >90 >60 mL/min/1.7m2    Calcium 8.4 (L) 8.5 - 10.1 mg/dL    Bilirubin Total 0.4 0.2 - 1.3 mg/dL    Albumin 3.5 3.4 - 5.0 g/dL    Protein Total 7.7 6.8 - 8.8 g/dL    Alkaline Phosphatase 69 40 - 150 U/L    ALT 21 0 - 70 U/L    AST 19 0 - 45 U/L   Magnesium   Result Value Ref Range    Magnesium 2.1 1.6 - 2.3 mg/dL   CK total   Result Value Ref Range    CK Total 123 30 - 300 U/L       Medications   lisinopril (PRINIVIL/ZESTRIL) tablet 40 mg (40 mg Oral Given 3/21/18 4278)   metoprolol succinate (TOPROL-XL) 24 hr tablet 50 mg (50 mg Oral Given  3/21/18 1638)[KW1.2]       Assessments & Plan (with Medical Decision Making)[KW1.1]   Jose Antonio does not feel safe at home.  Lives alone. Multiple EMS calls. Vulnerable adult has been filed by EMS and ED.  Graciously accepted by Dr. Marcus.  Provided Lisinopril and Toprol here in the ED.[KW1.2]     I have reviewed the nursing notes.    I have reviewed the findings, diagnosis, plan and need for follow up with the patient.[KW1.1]       Current Discharge Medication List          Final diagnoses:   Falls frequently   Weakness[KW1.2]       3/21/2018   [facility][KW1.1]     Srinath Yeung PA-C  03/21/18 1900  [KW1.4]     Revision History        User Key Date/Time User Provider Type Action    > KW1.4 3/21/2018  7:00 PM Srinath Yeung PA-C Physician Assistant - GE[place]NA Sign     KW1.2 3/21/2018  6:57 PM Srinath Yeung PA-C Physician Assistant - C      KW1.3 3/21/2018  6:55 PM Srinath Yeung PA-C Physician Assistant - C      KW1.1 3/21/2018  4:29 PM Srinath Yeung PA-C Physician Assistant - C                   Procedure Notes     No notes of this type exist for this encounter.         Progress Notes - Therapies (Notes from 03/19/18 through 03/22/18)      Progress Notes by Marielena Dolan, PT at 3/22/2018 11:22 AM     Author:  Marielena Dolan, PT Service:  (none) Author Type:  Physical Therapist    Filed:  3/22/2018 11:22 AM Date of Service:  3/22/2018 11:22 AM Creation Time:  3/22/2018 11:22 AM    Status:  Cosign Needed :  Marielena Dolan, PT (Physical Therapist)    Cosign Required:  Yes              03/22/18 1000   Quick Adds   Type of Visit Initial PT Evaluation   Living Environment   Lives With alone   Living Arrangements house   Home Accessibility ramps present at home;stairs to enter home;stairs within home   Number of Stairs to Enter Home 4   Number of Stairs Within Home 12   Self-Care   Usual Activity Tolerance moderate   Current Activity Tolerance fair   Equipment Currently Used at Home walker,  "rolling   Functional Level Prior   Ambulation 1-->assistive equipment   Transferring 1-->assistive equipment   Toileting 1-->assistive equipment   Fall history within last six months yes   Number of times patient has fallen within last six months (Reports \"too many to count\")   Which of the above functional risks had a recent onset or change? ambulation;transferring;fall history   Prior Functional Level Comment Pt reports he ambulates with a 4WW at home. Reports he falls all the time. Reports son was living with him but now has moved out. Reports stairs are very difficult to so he pretty much just stays in his house. Has a neighbor who helps him when he can   General Information   Onset of Illness/Injury or Date of Surgery - Date 03/21/18   Referring Physician Dr. Marcus   Patient/Family Goals Statement Feels he is not safe to go home   Pertinent History of Current Problem (include personal factors and/or comorbidities that impact the POC) Pt hospitalized c falls, weakness   Precautions/Limitations fall precautions   Weight-Bearing Status - LLE full weight-bearing   Weight-Bearing Status - RLE full weight-bearing   Cognitive Status Examination   Orientation orientation to person, place and time   Level of Consciousness alert   Follows Commands and Answers Questions 100% of the time   Personal Safety and Judgment intact   Memory intact   Pain Assessment   Patient Currently in Pain No   Integumentary/Edema   Integumentary/Edema no deficits were identifed   Posture    Posture Forward head position;Protracted shoulders   Range of Motion (ROM)   ROM Quick Adds No deficits were identified   Strength   Strength Comments BLE strength grossly 3+ to 4-/5    Transfer Skills   Transfer Transfer Skill: Sit to Stand   Transfer Skill:  Sit to Stand   Level of [place]: Sit/Stand minimum assist (75% patients effort)   Physical Assist/Nonphysical Assist: Sit/Stand 2 persons   Weightbearing Restrictions: Sit/Stand full " weight-bearing   Assistive Device for Transfer: Sit/Stand rolling walker   Gait   Gait Gait Skill;Gait Analysis   Gait Skills   Level of [place]: Gait minimum assist (75% patients effort)   Physical Assist/Nonphysical Assist: Gait 1 person + 1 person to manage equipment   Weight-Bearing Restrictions: Gait full weight-bearing   Assistive Device for Transfer: Gait rolling walker   Gait Distance 25 feet   Gait Analysis   Gait Pattern Used swing-through gait   Gait Deviations Noted decreased azul;decreased step length;increased stride width   Impairments Contributing to Gait Deviations impaired balance;impaired coordination;decreased strength   Balance   Balance other (describe)   Standing Balance: Static fair balance   Standing Balance: Dynamic poor balance   Balance Quick Add Standing balance: static;Standing balance: dynamic   Muscle Tone   Muscle Tone no deficits were identified   Clinical Impression   Criteria for Skilled Therapeutic Intervention evaluation only  (OBSERVATION)   PT Diagnosis Impaired gait, impaired balance, weakness   Influenced by the following impairments Impaired gait, impaired balance, weakness, fall risk, impaired coordination, high fall risk   Functional limitations due to impairments Decreased safety and independence c functional mobility, high fall risk   Clinical Presentation Stable/Uncomplicated   Clinical Presentation Rationale Clinical judgement   Clinical Decision Making (Complexity) Low complexity   Predicted Duration of Therapy Intervention (days/wks) today   Anticipated Discharge Disposition Acute Rehabilitation Facility;Transitional Care Facility   Risk & Benefits of therapy have been explained Yes   Patient, Family & other staff in agreement with plan of care Yes   Clinical Impression Comments Pt is not safe to d/c home at this time due to impairments listed above. Recommending short term rehab and possibly more assistance in the long term due to his history. Pt is  "motivated and agreeable to short term rehab   [facility] AM-PAC TM \"6 Clicks\"   2016, Trustees of [facility], under license to [employer].  All rights reserved.   6 Clicks Short Forms Basic Mobility Inpatient Short Form   [facility] AM-PAC  \"6 Clicks\" V.2 Basic Mobility Inpatient Short Form   1. Turning from your back to your side while in a flat bed without using bedrails? 4 - None   2. Moving from lying on your back to sitting on the side of a flat bed without using bedrails? 3 - A Little   3. Moving to and from a bed to a chair (including a wheelchair)? 2 - A Lot   4. Standing up from a chair using your arms (e.g., wheelchair, or bedside chair)? 2 - A Lot   5. To walk in hospital room? 2 - A Lot   6. Climbing 3-5 steps with a railing? 2 - A Lot   Basic Mobility Raw Score (Score out of 24.Lower scores equate to lower levels of function) 15   Total Evaluation Time   Total Evaluation Time (Minutes) 15     I certify the need for these services furnished under this plan of treatment and while under my care. (Physician co-signature of this document indicates review and certification of the therapy plan).[NK1.1]         Revision History        User Key Date/Time User Provider Type Action    > NK1.1 3/22/2018 11:22 AM Marielena Dolan, PT Physical Therapist Sign                                                      INTERAGENCY TRANSFER FORM - LAB / IMAGING / EKG / EMG RESULTS   3/21/2018                       [facility]: [phone]            Unresulted Labs     None         Lab Results - 3 Days      Glucose by meter [[id number]] (Abnormal)  Resulted: 03/22/18 1106, Result status: Final result    Ordering provider: Kei Marcus MD  03/22/18 1054 Resulting lab: POINT OF CARE TEST, GLUCOSE    Specimen Information    Type Source Collected On     03/22/18 1054          Components       Value Reference Range Flag Lab   Glucose 106 70 - 99 mg/dL H 170            TSH [[id number]]  Resulted: " 03/22/18 0559, Result status: Final result    Ordering provider: Kei Marcus MD  03/22/18 0002 Resulting lab: [facility]    Specimen Information    Type Source Collected On   Blood  03/22/18 0526          Components       Value Reference Range Flag Lab   TSH 1.05 0.40 - 4.00 mU/L  HI            Basic metabolic panel [[id number]] (Abnormal)  Resulted: 03/22/18 0559, Result status: Final result    Ordering provider: Kei Marcus MD  03/22/18 0002 Resulting lab: [facility]    Specimen Information    Type Source Collected On   Blood  03/22/18 0526          Components       Value Reference Range Flag Lab   Sodium 142 133 - 144 mmol/L  HI   Potassium 3.7 3.4 - 5.3 mmol/L  HI   Chloride 109 94 - 109 mmol/L  HI   Carbon Dioxide 24 20 - 32 mmol/L  HI   Anion Gap 9 3 - 14 mmol/L  HI   Glucose 108 70 - 99 mg/dL H HI   Urea Nitrogen 12 7 - 30 mg/dL  HI   Creatinine 0.92 0.66 - 1.25 mg/dL  HI   GFR Estimate 81 >60 mL/min/1.7m2  HI   Comment:  Non  GFR Calc   GFR Estimate If Black >90 >60 mL/min/1.7m2  HI   Comment:  African American GFR Calc   Calcium 7.9 8.5 - 10.1 mg/dL L HI            CBC with platelets [[id number]]  Resulted: 03/22/18 0533, Result status: Final result    Ordering provider: Kei Marcus MD  03/22/18 0002 Resulting lab: [facility]    Specimen Information    Type Source Collected On   Blood  03/22/18 0526          Components       Value Reference Range Flag Lab   WBC 8.1 4.0 - 11.0 10e9/L  HI   RBC Count 5.32 4.4 - 5.9 10e12/L  HI   Hemoglobin 15.3 13.3 - 17.7 g/dL  HI   Hematocrit 44.7 40.0 - 53.0 %  HI   MCV 84 78 - 100 fl  HI   MCH 28.8 26.5 - 33.0 pg  HI   MCHC 34.2 31.5 - 36.5 g/dL  HI   RDW 14.6 10.0 - 15.0 %  HI   Platelet Count 205 150 - 450 10e9/L  HI            Glucose by meter [[id number]] (Abnormal)  Resulted: 03/22/18 0319, Result status: Final result    Ordering provider: Kei Marcus MD  03/22/18 0536  Resulting lab: POINT OF CARE TEST, GLUCOSE    Specimen Information    Type Source Collected On     03/22/18 0306          Components       Value Reference Range Flag Lab   Glucose 127 70 - 99 mg/dL H 170            UA reflex to Microscopic [[id number]] (Abnormal)  Resulted: 03/21/18 2302, Result status: Final result    Ordering provider: Srinath Yeung PA-C  03/21/18 1639 Resulting lab: [facility]    Specimen Information    Type Source Collected On   Midstream Urine  03/21/18 2240          Components       Value Reference Range Flag Lab   Color Urine Yellow   HI   Appearance Urine Clear   HI   Glucose Urine 300 NEG^Negative mg/dL A HI   Bilirubin Urine Negative NEG^Negative  HI   Ketones Urine Negative NEG^Negative mg/dL  HI   Specific Gravity Urine 1.019 1.003 - 1.035  HI   Blood Urine Negative NEG^Negative  HI   pH Urine 6.0 4.7 - 8.0 pH  HI   Protein Albumin Urine 30 NEG^Negative mg/dL A HI   Urobilinogen mg/dL 2.0 0.0 - 2.0 mg/dL  HI   Nitrite Urine Negative NEG^Negative  HI   Leukocyte Esterase Urine Trace NEG^Negative A HI   Source Midstream Urine   HI   RBC Urine 1 0 - 2 /HPF  HI   WBC Urine 4 0 - 5 /HPF  HI   Bacteria Urine None NEG^Negative /HPF A HI   Mucous Urine Present NEG^Negative /LPF A HI   Hyaline Casts 3 OTO2^O - 2 /LPF A HI            Glucose by meter [[id number]] (Abnormal)  Resulted: 03/21/18 2120, Result status: Final result    Ordering provider: Kei Marcus MD  03/21/18 2100 Resulting lab: POINT OF CARE TEST, GLUCOSE    Specimen Information    Type Source Collected On     03/21/18 2100          Components       Value Reference Range Flag Lab   Glucose 148 70 - 99 mg/dL H 170            Active MRSA Surveillance Culture [[id number]]  Resulted: 03/21/18 2113, Result status: In process    Ordering provider: Kei Marcus MD  03/21/18 2058 Resulting lab: [facility]    Specimen Information    Type Source Collected On   Swab Nose 03/21/18 2107            Comprehensive metabolic  panel [[id number]] (Abnormal)  Resulted: 03/21/18 1716, Result status: Final result    Ordering provider: Srinath Yeung PA-C  03/21/18 1639 Resulting lab: [facility]    Specimen Information    Type Source Collected On   Blood  03/21/18 1654          Components       Value Reference Range Flag Lab   Sodium 140 133 - 144 mmol/L  HI   Potassium 3.8 3.4 - 5.3 mmol/L  HI   Chloride 109 94 - 109 mmol/L  HI   Carbon Dioxide 24 20 - 32 mmol/L  HI   Anion Gap 7 3 - 14 mmol/L  HI   Glucose 178 70 - 99 mg/dL H HI   Urea Nitrogen 13 7 - 30 mg/dL  HI   Creatinine 0.99 0.66 - 1.25 mg/dL  HI   GFR Estimate 75 >60 mL/min/1.7m2  HI   Comment:  Non  GFR Calc   GFR Estimate If Black >90 >60 mL/min/1.7m2  HI   Comment:  African American GFR Calc   Calcium 8.4 8.5 - 10.1 mg/dL L HI   Bilirubin Total 0.4 0.2 - 1.3 mg/dL  HI   Albumin 3.5 3.4 - 5.0 g/dL  HI   Protein Total 7.7 6.8 - 8.8 g/dL  HI   Alkaline Phosphatase 69 40 - 150 U/L  HI   ALT 21 0 - 70 U/L  HI   AST 19 0 - 45 U/L  HI            Magnesium [[id number]]  Resulted: 03/21/18 1716, Result status: Final result    Ordering provider: Srinath Yeung PA-C  03/21/18 1639 Resulting lab: [facility]    Specimen Information    Type Source Collected On   Blood  03/21/18 1654          Components       Value Reference Range Flag Lab   Magnesium 2.1 1.6 - 2.3 mg/dL  HI            CK total [[id number]]  Resulted: 03/21/18 1716, Result status: Final result    Ordering provider: Srinath Yeung PA-C  03/21/18 1639 Resulting lab: [facility]    Specimen Information    Type Source Collected On   Blood  03/21/18 1654          Components       Value Reference Range Flag Lab   CK Total 123 30 - 300 U/L  HI            CBC with platelets differential [[id number]]  Resulted: 03/21/18 1700, Result status: Final result    Ordering provider: Srinath Yeung PA-C  03/21/18 1639 Resulting lab: [facility]    Specimen  Information    Type Source Collected On   Blood  03/21/18 1654          Components       Value Reference Range Flag Lab   WBC 6.9 4.0 - 11.0 10e9/L  HI   RBC Count 5.74 4.4 - 5.9 10e12/L  HI   Hemoglobin 16.2 13.3 - 17.7 g/dL  HI   Hematocrit 48.2 40.0 - 53.0 %  HI   MCV 84 78 - 100 fl  HI   MCH 28.2 26.5 - 33.0 pg  HI   MCHC 33.6 31.5 - 36.5 g/dL  HI   RDW 14.6 10.0 - 15.0 %  HI   Platelet Count 217 150 - 450 10e9/L  HI   Diff Method Automated Method   HI   % Neutrophils 70.9 %  HI   % Lymphocytes 18.3 %  HI   % Monocytes 8.4 %  HI   % Eosinophils 1.5 %  HI   % Basophils 0.6 %  HI   % Immature Granulocytes 0.3 %  HI   Nucleated RBCs 0 0 /100  HI   Absolute Neutrophil 4.9 1.6 - 8.3 10e9/L  HI   Absolute Lymphocytes 1.3 0.8 - 5.3 10e9/L  HI   Absolute Monocytes 0.6 0.0 - 1.3 10e9/L  HI   Absolute Eosinophils 0.1 0.0 - 0.7 10e9/L  HI   Absolute Basophils 0.0 0.0 - 0.2 10e9/L  HI   Abs Immature Granulocytes 0.0 0 - 0.4 10e9/L  HI   Absolute Nucleated RBC 0.0   HI            Testing Performed By     Lab - Abbreviation Name Director Address Valid Date Range    45 - IAL786 [facility] Unknown Unknown 01/28/02 0000 - Present    170 - Unknown POINT OF CARE TEST, GLUCOSE Unknown Unknown 10/31/11 1114 - Present    210 - [facility] Unknown [address] [address] 05/08/15 1057 - Present               Imaging Results - 3 Days      CT Head w/o Contrast [[id number]]  Resulted: 03/22/18 0843, Result status: Final result    Ordering provider: Kei Marcus MD  03/21/18 2138 Resulted by: Aga Knapp MD    Performed: 03/21/18 2141 - 03/21/18 2217 Resulting lab: RADIOLOGY RESULTS    Narrative:       PROCEDURE: CT HEAD W/O CONTRAST 3/21/2018 10:17 PM    HISTORY: fall;     COMPARISONS: 12/31/2017.    Meds/Dose Given: None.    TECHNIQUE: Axial noncontrast enhanced images with coronal and sagittal  reformatted images.    FINDINGS: There is some generalized atrophy. There are moderate  changes in the  periventricular white matter consistent with small  vessel ischemic change. This is similar to the prior exam.    No mass, midline shift or extra-axial fluid collection is seen. There  is no focal hemorrhage.    Bone windows show no fracture. Visualized paranasal sinuses show  mucoperiosteal thickening in the inferior maxillary sinuses. There is  no air-fluid level. Mastoid air cells are clear.         Impression:       IMPRESSION: Chronic appearing small vessel ischemic change without  acute mass effect or hemorrhage.    Findings are concordant with the original virtual radiology result.    STEVEN ROMERO MD      Testing Performed By     Lab - Abbreviation Name Director Address Valid Date Range    104 - Rad Rslts RADIOLOGY RESULTS Unknown Unknown 02/16/05 1553 - Present            Encounter-Level Documents:     There are no encounter-level documents.      Order-Level Documents:     There are no order-level documents.

## 2018-03-22 NOTE — ED NOTES
Face to face report given with opportunity to observe patient.    Report given to SYLVIA Mcdowell   3/21/2018  7:06 PM

## 2018-03-22 NOTE — PLAN OF CARE
Face to face report given with opportunity to observe patient.    Report given to Cora WARD.    Annetta Sutherland  3/22/2018, 12:01 AM

## 2018-03-22 NOTE — PLAN OF CARE
Problem: Patient Care Overview  Goal: Plan of Care/Patient Progress Review  diagnostic tests and consults completed and resulted   -vital signs normal or at patient baseline   Outcome: No Change  Received orders for OT evaluation, pt was d/c'd prior to evaluation. Will complete orders.

## 2018-03-22 NOTE — TELEPHONE ENCOUNTER
See message below, willing to do hospital follow up and establish care at the same time? Please advise.

## 2018-03-22 NOTE — DISCHARGE SUMMARY
[facility]    Discharge Summary  Hospitalist    Date of Admission:  3/21/2018  Date of Discharge:  3/22/2018  2:25 PM  Discharging Provider: Dangelo Alvarado  Date of Service (when I saw the patient): 03/22/18    Discharge Diagnoses   Multiple mechanical falls possibly related to gait disturbance or dizziness  Resistant hypertension  Difficulty following medical plan  Abnormal MRI suggesting diffuse white matter abnormality noted on prior admission    History of Present Illness   Jose Antonio Marmolejo is a 72 year old man with presenting for evaluation of fall. He was previously hospitalized in early January of this year for falls and weakness. He had abnormal MRI of Brain at that time and was encouraged to follow up with Neurology as outpatient.  At the time of discharge from that admission he was transferred to a skilled nursing facility for rehabilitation.  On social workers discussion with them it appeared that he responded well to therapy and was felt fit to leave their facility and return home.  On discussion with him however it is not certain whether his functional status in fact has worsened since he has been home.  The patient had another fall today, and likely has had several other falls although his history is somewhat inconsistent. He felt dizzy and lightheaded. He lowered himself/fell to floor and scraped his left knee. He did not lose consciousness. He denies seizure activity, stroke symptoms, nausea, vomiting. He has had previous falls where he hit his head but did not hit his head today. He felt unsafe to go home and thus was admitted under observation status. He denies chest pain, sob, fevers, chills. He states his is noncompliant with his medications and will frequently skip antihypertensive medications every few days. He has no other complaints.   Hospital Course   Jose Antonio Marmolejo was admitted on 3/21/2018.  The following problems were addressed during his hospitalization:    1.  Mechanical  falls  Evaluated by physical therapy as he has an past who found a gait disturbance and that he required at least some assistance and certainly was unsafe to return to an unsupervised setting.  He has a wide-based gait.  As noted above MRI obtained on his prior admission suggested white matter abnormality with differential including multiple sclerosis.  He also describes some dizziness which has some characteristics of orthostasis.  On detailed discussion with him he has had at least several falls at home.  Son does live with him but is not frequently present.  He is discharged to another skilled nursing facilities with plans for continued rehabilitation although it is not unlikely he may require prolonged residence in a supervised setting.  2.  MRI abnormality  Explanation is uncertain.  No evident hydrocephalus on that imaging.  Marked broad-based gait disturbance.  In any event full neurologic evaluation likely to be helpful.  To begin this process have strongly encouraged follow-up with [facility] physician both to facilitate this as well as, as below, improved treatment of his blood pressure.  3.  Resistant hypertension particularly on his last admission he had resistant hypertension requiring multiple agents including amlodipine, lisinopril, metoprolol, and thiazide.  Blood pressure on his initial evaluation here was quite elevated but did improve after resumption of lisinopril and metoprolol.  At the time of discharge continued these as well as a lower dose of amlodipine to ensure that his blood pressure is not excessively lowered.  If he continues to require multiple agents for blood pressure control renal evaluation for resistant hypertension may be indicated.    Dangelo Alvarado      Code Status   Full Code       Primary Care Physician   Physician No Ref-Primary    Vital signs:  Temp: 99  F (37.2  C) Temp src: Tympanic BP: 160/83   Heart Rate: 74 Resp: 18 SpO2: 95 % O2 Device: None (Room air)   Height:  182.9 cm (6') Weight: 119.6 kg (263 lb 10.7 oz)  Estimated body mass index is 35.76 kg/(m^2) as calculated from the following:    Height as of this encounter: 1.829 m (6').    Weight as of this encounter: 119.6 kg (263 lb 10.7 oz).        Awake, alert, somewhat distractible but overall concentration preserved.  Oriented ×3.  Pleasant and interactive.  HEENT: Pupils equal, conjugate. No icterus or nystagmus. Oral mucosa moist. No facial asymmetry.   Neck: Supple, jugular veins not elevated. Trachea midline   Chest: No chest wall movement asymmetry. Aeration preserved to bases.. Accessory muscles not in use. Expiratory time not increased. No tidal wheezes. No rhonchi. No discrete crackles.   Cardiac: PMI not displaced. S1, S2 unremarkable. No S3, S4. P2 not accentuated.    Abdomen: Soft. No palpation or percussion tenderness. No distention. Normoactive bowel sounds. Liver and spleen not increased in size. No bruits, masses, or pulsations.   Extremities: No lower extremity edema.  Extremities warm distally.  No eccymoses, clubbing.   Neurologic: Mental state above. Motor 5/5 and bilaterally equal. Tone preserved. No fasiculations or tremors.  No asterixis.    Discharge Disposition   Discharged to nursing home  Condition at discharge: Stable    Consultations This Hospital Stay   SOCIAL WORK IP CONSULT  CARE COORDINATOR IP CONSULT  PHYSICAL THERAPY ADULT IP CONSULT  OCCUPATIONAL THERAPY ADULT IP CONSULT  PHYSICAL THERAPY ADULT IP CONSULT  OCCUPATIONAL THERAPY ADULT IP CONSULT  NUTRITION SERVICES ADULT IP CONSULT    Time Spent on this Encounter   NBA, Dangelo Alvarado, personally saw the patient today and spent greater than 30 minutes discharging this patient.    Discharge Orders     General info for SNF   Length of Stay Estimate: Short Term Care: Estimated # of Days <30  Condition at Discharge: Improving  Level of care:skilled   Rehabilitation Potential: Good  Admission H&P remains valid and up-to-date: Yes  Recent  Chemotherapy: N/A  Use Nursing Home Standing Orders: Yes     Mantoux instructions   Give two-step Mantoux (PPD) Per Facility Policy Yes     Reason for your hospital stay   Jose Antonio Marmolejo is a 72 year old man presenting for evaluation of fall. He was previously hospitalized in early Januaryfor falls and weakness. He had a fall a home today, as well as several previously. No other prodromal symptoms.  It does appear his functional status has worsened since recent discharge from skilled nursing after his similar presentation.  Hypertension with poor compliance with medications.  He is discharged with plans for subacute inpatient rehabilitation as well as evaluation of blood pressure control and self-care abilities.     Glucose monitor nursing POCT   Twice daily     Activity - Up with assistive device     Activity - Up with nursing assistance   Per physical therapy recommendations     Follow Up and recommended labs and tests   Follow up with primary care physician   Neurology evaluation after primary care evaluation     Full Code     Physical Therapy Adult Consult   Evaluate and treat as clinically indicated.    Reason: Gait disturbance, generalized weakness, frequent falls.     Occupational Therapy Adult Consult   Evaluate and treat as clinically indicated.    Reason: Gait disturbance, frequent falls.     Nutrition Services Adult IP Consult   Reason: Diabetic education regarding prudent consistent carbohydrate diet     Fall precautions     Advance Diet as Tolerated   Follow this diet upon discharge: Orders Placed This Encounter     Consistent Carbohydrate Diet 0067-8871 Calories: Low Consistent CHO (3-5 CHO units/meal)       Discharge Medications   Discharge Medication List as of 3/22/2018  1:15 PM      START taking these medications    Details   acetaminophen (TYLENOL) 325 MG tablet Take 2 tablets (650 mg) by mouth every 4 hours as needed for mild pain, Disp-100 tablet, Transitional         CONTINUE these medications  which have NOT CHANGED    Details   ibuprofen (ADVIL/MOTRIN) 600 MG tablet Take 1 tablet (600 mg) by mouth every 6 hours as needed for other (mild pain), Disp-120 tablet, Transitional      lisinopril (PRINIVIL/ZESTRIL) 40 MG tablet Take 1 tablet (40 mg) by mouth daily, Disp-30 tablet, Transitional      metoprolol (TOPROL-XL) 50 MG 24 hr tablet Take 1 tablet (50 mg) by mouth daily, Disp-30 tablet, Transitional      hydrochlorothiazide (MICROZIDE) 12.5 MG capsule Take 1 capsule (12.5 mg) by mouth daily, Disp-30 capsule, Transitional      Blood Pressure Monitoring (ADULT BLOOD PRESSURE CUFF LG) KIT 1 each 2 times daily, Disp-1 kit, R-0, Local PrintCheck blood pressures twice per day: before breakfast and at bedtime.  Keep a log.      !! miconazole (MICATIN; MICRO GUARD) 2 % powder Apply topically 2 times dailyTransitional      senna-docusate (SENOKOT-S;PERICOLACE) 8.6-50 MG per tablet Take 1 tablet by mouth 2 times daily as needed for constipation, Disp-100 tablet, Transitional      !! miconazole (MICATIN; MICRO GUARD) 2 % powder Apply topically 2 times dailyDisp-90 g, R-1Local Print       !! - Potential duplicate medications found. Please discuss with provider.      STOP taking these medications       amLODIPine (NORVASC) 10 MG tablet Comments:   Reason for Stopping:             Allergies   Allergies   Allergen Reactions     Penicillins      Data   Most Recent 3 CBC's:  Recent Labs   Lab Test  03/22/18   0526 03/21/18   1654  01/04/18   0517   WBC  8.1  6.9  5.8   HGB  15.3  16.2  15.1   MCV  84  84  84   PLT  205  217  261      Most Recent 3 BMP's:  Recent Labs   Lab Test  03/22/18   0526  03/21/18   1654  01/04/18   0517   NA  142  140  139   POTASSIUM  3.7  3.8  4.2   CHLORIDE  109  109  107   CO2  24  24  25   BUN  12  13  17   CR  0.92  0.99  0.91   ANIONGAP  9  7  7   STALIN  7.9*  8.4*  8.0*   GLC  108*  178*  116*     Most Recent 2 LFT's:  Recent Labs   Lab Test  03/21/18   1654  01/01/18   0531   AST  19  28    ALT  21  19   ALKPHOS  69  53   BILITOTAL  0.4  0.4     Most Recent INR's and Anticoagulation Dosing History:  Anticoagulation Dose History     There is no flowsheet data to display.        Most Recent 3 Troponin's:No lab results found.  Most Recent Cholesterol Panel:No lab results found.  Most Recent 6 Bacteria Isolates From Any Culture (See EPIC Reports for Culture Details):  Recent Labs   Lab Test  12/31/17   1210  08/17/16   2155   CULT  No MRSA isolated  No MRSA isolated     Most Recent TSH, T4 and A1c Labs:  Recent Labs   Lab Test  03/22/18   0526  01/01/18   0531   TSH  1.05   --    A1C   --   6.7*     Results for orders placed or performed during the hospital encounter of 03/21/18   CT Head w/o Contrast    Narrative    PROCEDURE: CT HEAD W/O CONTRAST 3/21/2018 10:17 PM    HISTORY: fall;     COMPARISONS: 12/31/2017.    Meds/Dose Given: None.    TECHNIQUE: Axial noncontrast enhanced images with coronal and sagittal  reformatted images.    FINDINGS: There is some generalized atrophy. There are moderate  changes in the periventricular white matter consistent with small  vessel ischemic change. This is similar to the prior exam.    No mass, midline shift or extra-axial fluid collection is seen. There  is no focal hemorrhage.    Bone windows show no fracture. Visualized paranasal sinuses show  mucoperiosteal thickening in the inferior maxillary sinuses. There is  no air-fluid level. Mastoid air cells are clear.         Impression    IMPRESSION: Chronic appearing small vessel ischemic change without  acute mass effect or hemorrhage.    Findings are concordant with the original virtual radiology result.    STEVEN ROMERO MD

## 2018-03-22 NOTE — PLAN OF CARE
Patient discharged at 2:21 PM via wheel chair accompanied by staff. Prescriptions sent with patient to fill, per [facility] nursing [place].  All belongings sent with patient.     Discharge instructions reviewed with patient and Nomi WARD with [facility].  Listed belongings gathered and returned to patient. Yes     Patient discharged to skilled nursing facility. .   Report called to Nursing Home:  [facility].     Core Measures and Vaccines  Core Measures applicable during stay: N/A. If yes, state diagnosis: N/A  Pneumonia and Influenza given prior to discharge, if indicated: N/A    Surgical Patient   Surgical Procedures during stay: N/A  Did patient receive discharge instruction on wound care and recognition of infection symptoms? N/A    MISC  Follow up appointment made:  For skilled nursing facility to arrange.   Home and hospital aquired medications returned to patient: Yes  Patient reports pain was well managed at discharge: Yes

## 2018-03-22 NOTE — TELEPHONE ENCOUNTER
[facility]  needed this pt to get in for a hospital follow up within one week. Pt is also hoping to establish care with Dr. Briggs at the same appt. Please see if that would be possible. If not, please keep his follow up appt for that day and then he can establish care on some other date. If you have questions for Veronica, she can be reached at extension [phone].Thank you.

## 2018-03-22 NOTE — PROGRESS NOTES
Assessment completed see flowsheet.      LOC: alert    Others present: Patient    Dx: Weakness and falls    Lives with: Lives With: alone    Living at: Living Arrangements: house    Equipment used:  Walker, wheelchair ramps to garage and house, handicap accessible shower with shower chair.     Support System: Description of Support System: Supportive, Involved    Primary PCP: No PCP- had an appt in January with Dr. Briggs at [facility] but did not show up. He states he didn't realize he had that appt and missed it. Advised of the importance of f/u/establish care appt after this hospitalization and he agrees and verbalizes an understanding. He would like to try for a provider through [facility] again.     POA/Guardian: No     Health Care Directive: NO States he has one and will provide a copy    Pharmacy: [facility]    :  No    Homecare/County Services:   No    Adequate Resources for needs (housing, utilities, food/med): YES    Meds and appointments management: Patient states he doesn't reliably always take his medications. When asked why, he states he just gets lazy. Advised of the importance of medication compliance and risks of not taking medications. He verbalized an understanding and agreed to talk about his medications at his f/u appt with new PCP.    Work: NO- retired from [employer]    Transportation: YES - uses [employer]    Falls: Yes  Reason for falls risk:  Mobility- states many falls in the last six months and states it's caused by loss of balance    Able to Return to Prior Living Arrangements: No- patient would benefit from short-term rehab d/t weakness and falls    : YES- [facility], he states he tried getting established in the past but was told his income was too high. [place]   information given and patient plans to contact them to inquire about getting established    Goals: To  return home after short-term rehab    Barriers: None at this time    MARY KATE: Average    Discharge Plan: SNF for short-term rehab, transport by [employer]     Patient is not established with PCP, he had an appt scheduled after prior hospitalization in January of 2018 but did not show up. He states he did not realize he missed that appt, as he had been at [facility] for ten days and was followed by a provider there. Advised of the importance of following up and getting established after this hospitalization to help prevent further hospitalization and better health management and he agreed and verbalized an understanding. Patient wanted to try to get in with Dr. Briggs at [facility] again, as he was scheduled with before. This writer spoke to Yanet at [facility] to discuss how it went after patient stayed there last time. She stated that patient did well with therapy and was discharged after ten days after his insurance stated they would no longer cover his stay as he made progress and was doing well. She stated patient was not homebound at that time and would therefor have to pay privately for home care. Patient did not have any outpatient PT or home PT following his stay there. Patient states that everything he needs within the home is on one level and he has someone who comes to do his laundry and yard work, which he pays for. He states he sleeps in his recliner as he has (R) shoulder problems from his work history, which are aggravated when he sleeps in the bed, as he ends up laying on that shoulder in his sleep. He agrees to talk to PCP about this when he gets established. He denies any mood concerns. Patient denies any tobacco use, alcohol use or drug use.     Discussed with patient PT's recommendation here today for patient to go to inpatient rehab or SNF for short-term rehab, and he agreed to this, but stated he cannot pay privately and would not have the funds.  Choice of vendor reviewed with patient, referrals sent to [facility] in [place], [facility] in [place], [facility] in [place], [facility], and [facility]. [facility] responded that patient would need a $5000 deposit to be accepted. Patient stated he cannot afford that. [facility] stated they were screening patient. [facility] stated they are screening patient but does not anticipate accepting patient as he does not have a rehab diagnosis. [facility] responded that they can accept patient. Spoke with patient and he agreed to go to [facility]. Per Dr. Barragan, patient is ready to go today and patient agreed to this plan. Ride scheduled for 2:00 PM with [employer] [employer] per patient's request, and advised patient that the ride would cost $45.10 and he agreed to that. Appt scheduled with Dr. Briggs at [facility] for Tuesday the 27th at 8:45 AM and this writer spoke with Amber at [facility] to let her know that we scheduled that appt soon to ensure that patient will get to that appt while he is still at [facility] and she agreed to help with that. Patient also agreed to this plan.

## 2018-03-22 NOTE — PLAN OF CARE
"[place] Observation Note:  Patient is registered to observation and is in 3210/3210-1 at approximately 2000 via wheel chair accompanied by transport tech from emergency room . Report received from Alivia WARD in SBAR format at 1944 via telephone. Patient transferred to bed via self.. Patient is alert and oriented X 3, denies pain; rates at 0 on 0-10 scale.  Patient oriented to room, unit, hourly rounding, and plan of care. Explained the admission packet including \"What is Observation Status\" and patient handbook with patient bill of rights brochure. Will continue to monitor and document as needed.  Nursing Observation criteria listed below was met:    Health care directives status obtained and documented: Yes    Care Everywhere authorization completed No      MRSA swab completed for patient 65 years and older: Yes      Is initial lactic acid >2.0?: NA.    Patient identifies a surrogate decision maker: Yes If yes, who:Dre  Contact Information:[phone]    Vaccination assessment and education completed: Yes   Vaccinations received prior to hospitalization: Pneumovax no  Influenza(seasonal)  NO   Vaccination(s) ordered: patient declines      Skin issues/needs documented:Yes    Isolation Patient: no Education given and documented, correct sign in place and documentation row added to PCS:  No      Fall Prevention: Observation fall risk completed:yes Education given and documented, sticker and magnet in place: Yes      General Care Plan initiated with observation goal(s): Yes    Education (including assessment) Documented: Yes    New medication information given to patient and documented: No    Patient elects to use own medications from home during hospitalization:  No If yes, a MD order was obtained to use Medications from Home:  No and home medications were sent to Pharmacy for verification for use during hospitalization: No    Patient has discharge needs (If yes, please explain): Yes, pt increase falls.     "

## 2018-03-22 NOTE — PLAN OF CARE
Problem: Patient Care Overview  Goal: Plan of Care/Patient Progress Review  diagnostic tests and consults completed and resulted   -vital signs normal or at patient baseline   Outcome: No Change  Reason for hospital stay:  Falls     Most recent vitals: /81  Temp 98.1  F (36.7  C) (Tympanic)  Resp 16  Ht 1.829 m (6')  Wt 119.6 kg (263 lb 10.7 oz)  SpO2 95%  BMI 35.76 kg/m2  Pain Management:  C/o headache, administered Tylenol with relief  Orientation:  A&O  Cardiac:  HR reg. BP elevated but not outside parameters, no hydrazaline administered this shift  Respiratory:  LS diminished/clear. Denies sob. No use of O2  GI:  BS active. Denies nausea. BM this shift.  :  Voids without difficulty  Diet: CHO. BG at 0300 of 136.  Skin Issues:  Redness noted to groin folds, InterDry in place.  IVF:  NS   ABX:  None  Mobility:  A1 to standby with walker and gaitbelt. Gait is wobbly/unsteady, walks with feet pointing out  Safety:  Call light within reach. Bed alarm active.    Comments:    3/22/2018  5:16 AM  Cora Ferrari    Face to face report given with opportunity to observe patient.    Report given to SYLVIA Wyman   3/22/2018  7:16 AM

## 2018-03-22 NOTE — PROGRESS NOTES
" 03/22/18 1000   Quick Adds   Type of Visit Initial PT Evaluation   Living Environment   Lives With alone   Living Arrangements house   Home Accessibility ramps present at home;stairs to enter home;stairs within home   Number of Stairs to Enter Home 4   Number of Stairs Within Home 12   Self-Care   Usual Activity Tolerance moderate   Current Activity Tolerance fair   Equipment Currently Used at Home walker, rolling   Functional Level Prior   Ambulation 1-->assistive equipment   Transferring 1-->assistive equipment   Toileting 1-->assistive equipment   Fall history within last six months yes   Number of times patient has fallen within last six months (Reports \"too many to count\")   Which of the above functional risks had a recent onset or change? ambulation;transferring;fall history   Prior Functional Level Comment Pt reports he ambulates with a 4WW at home. Reports he falls all the time. Reports son was living with him but now has moved out. Reports stairs are very difficult to so he pretty much just stays in his house. Has a neighbor who helps him when he can   General Information   Onset of Illness/Injury or Date of Surgery - Date 03/21/18   Referring Physician Dr. Marcus   Patient/Family Goals Statement Feels he is not safe to go home   Pertinent History of Current Problem (include personal factors and/or comorbidities that impact the POC) Pt hospitalized c falls, weakness   Precautions/Limitations fall precautions   Weight-Bearing Status - LLE full weight-bearing   Weight-Bearing Status - RLE full weight-bearing   Cognitive Status Examination   Orientation orientation to person, place and time   Level of Consciousness alert   Follows Commands and Answers Questions 100% of the time   Personal Safety and Judgment intact   Memory intact   Pain Assessment   Patient Currently in Pain No   Integumentary/Edema   Integumentary/Edema no deficits were identifed   Posture    Posture Forward head position;Protracted " shoulders   Range of Motion (ROM)   ROM Quick Adds No deficits were identified   Strength   Strength Comments BLE strength grossly 3+ to 4-/5    Transfer Skills   Transfer Transfer Skill: Sit to Stand   Transfer Skill:  Sit to Stand   Level of Shannon: Sit/Stand minimum assist (75% patients effort)   Physical Assist/Nonphysical Assist: Sit/Stand 2 persons   Weightbearing Restrictions: Sit/Stand full weight-bearing   Assistive Device for Transfer: Sit/Stand rolling walker   Gait   Gait Gait Skill;Gait Analysis   Gait Skills   Level of [place]: Gait minimum assist (75% patients effort)   Physical Assist/Nonphysical Assist: Gait 1 person + 1 person to manage equipment   Weight-Bearing Restrictions: Gait full weight-bearing   Assistive Device for Transfer: Gait rolling walker   Gait Distance 25 feet   Gait Analysis   Gait Pattern Used swing-through gait   Gait Deviations Noted decreased azul;decreased step length;increased stride width   Impairments Contributing to Gait Deviations impaired balance;impaired coordination;decreased strength   Balance   Balance other (describe)   Standing Balance: Static fair balance   Standing Balance: Dynamic poor balance   Balance Quick Add Standing balance: static;Standing balance: dynamic   Muscle Tone   Muscle Tone no deficits were identified   Clinical Impression   Criteria for Skilled Therapeutic Intervention evaluation only  (OBSERVATION)   PT Diagnosis Impaired gait, impaired balance, weakness   Influenced by the following impairments Impaired gait, impaired balance, weakness, fall risk, impaired coordination, high fall risk   Functional limitations due to impairments Decreased safety and independence c functional mobility, high fall risk   Clinical Presentation Stable/Uncomplicated   Clinical Presentation Rationale Clinical judgement   Clinical Decision Making (Complexity) Low complexity   Predicted Duration of Therapy Intervention (days/wks) today   Anticipated  "Discharge Disposition Acute Rehabilitation Facility;Transitional Care Facility   Risk & Benefits of therapy have been explained Yes   Patient, Family & other staff in agreement with plan of care Yes   Clinical Impression Comments Pt is not safe to d/c home at this time due to impairments listed above. Recommending short term rehab and possibly more assistance in the long term due to his history. Pt is motivated and agreeable to short term rehab   [facility] TM \"6 Clicks\"   2016, Trustees of [facility], under license to [employer].  All rights reserved.   6 Clicks Short Forms Basic Mobility Inpatient Short Form   [facility]  \"6 Clicks\" V.2 Basic Mobility Inpatient Short Form   1. Turning from your back to your side while in a flat bed without using bedrails? 4 - None   2. Moving from lying on your back to sitting on the side of a flat bed without using bedrails? 3 - A Little   3. Moving to and from a bed to a chair (including a wheelchair)? 2 - A Lot   4. Standing up from a chair using your arms (e.g., wheelchair, or bedside chair)? 2 - A Lot   5. To walk in hospital room? 2 - A Lot   6. Climbing 3-5 steps with a railing? 2 - A Lot   Basic Mobility Raw Score (Score out of 24.Lower scores equate to lower levels of function) 15   Total Evaluation Time   Total Evaluation Time (Minutes) 15     I certify the need for these services furnished under this plan of treatment and while under my care. (Physician co-signature of this document indicates review and certification of the therapy plan).      "

## 2018-03-22 NOTE — PLAN OF CARE
Problem: Patient Care Overview  Goal: Plan of Care/Patient Progress Review  diagnostic tests and consults completed and resulted   -vital signs normal or at patient baseline   Outcome: Adequate for Discharge Date Met: 03/22/18  Patient discharging to [facility] today, patient is agreeable to discharge, patient hopes to increase his strength and return home.

## 2018-03-22 NOTE — TELEPHONE ENCOUNTER
Would it be possible to book him for an 1 hour visit? If not, we can try to do both, but may require another visit.  Thanks.

## 2018-03-22 NOTE — PLAN OF CARE
Problem: Patient Care Overview  Goal: Plan of Care/Patient Progress Review  diagnostic tests and consults completed and resulted   -vital signs normal or at patient baseline   Discharge Planner PT   Patient plan for discharge: TCU  Current status: Pt needs min to mod A for mobility due to poor balance, impaired coordination and weakness. Ambulated about 25ft c min A and close WC follow for safety. Pt is at high risk for falls   Barriers to return to prior living situation: High fall risk, needs min to mod A for mobility, impaired balance, weakness, impaired coordination  Recommendations for discharge: SNF or inpatient rehab  Rationale for recommendations: See evaluation        Entered by: Marielena Dolan 03/22/2018 11:23 AM

## 2018-03-22 NOTE — PLAN OF CARE
"Clothing changed d/t being soiled with urine. Loan cares provided as inner groin had white/yellow \"yeasty\" appearance with slit to L) inner groin. Intra Dry placed in groin folds.   "

## 2018-03-26 NOTE — MR AVS SNAPSHOT
"              After Visit Summary   3/26/2018    Jose Antonio Marmolejo    MRN: [id number]           Patient Information     Date Of Birth          1945        Visit Information        Provider Department      3/26/2018 1:32 PM Joe Brand MD [facility]        Today's Diagnoses     Nursing Home Visit    -  1       Follow-ups after your visit        Your next 10 appointments already scheduled     Apr 27, 2018  1:00 PM CDT   (Arrive by 12:45 PM)   SHORT with Juanita Briggs MD   [facility] [place] ([facility] [place] )    [address]  [place] [address]   [phone]              Who to contact     If you have questions or need follow up information about today's clinic visit or your schedule please contact [facility] directly at [phone].  Normal or non-critical lab and imaging results will be communicated to you by MyChart, letter or phone within 4 business days after the clinic has received the results. If you do not hear from us within 7 days, please contact the clinic through MyChart or phone. If you have a critical or abnormal lab result, we will notify you by phone as soon as possible.  Submit refill requests through [employer] or call your pharmacy and they will forward the refill request to us. Please allow 3 business days for your refill to be completed.          Additional Information About Your Visit        MyChart Information     [facility] lets you send messages to your doctor, view your test results, renew your prescriptions, schedule appointments and more. To sign up, go to [email or web address] . Click on \"Log in\" on the left side of the screen, which will take you to the Welcome page. Then click on \"Sign up Now\" on the right side of the page.     You will be asked to enter the access code listed below, as well as some personal information. Please follow the directions to create your username and password.     Your access code is: " [email or web address]  Expires: 2018  2:42 PM     Your access code will  in 90 days. If you need help or a new code, please call your [place] clinic or [phone].        Care EveryWhere ID     This is your Care EveryWhere ID. This could be used by other organizations to access your [place] medical records  [id number]        Your Vitals Were     Pulse Temperature Respirations Pulse Oximetry BMI (Body Mass Index)       71 98.1  F (36.7  C) 20 97% 35.8 kg/m2        Blood Pressure from Last 3 Encounters:   18 128/80   18 140/80   18 160/83    Weight from Last 3 Encounters:   18 262 lb (118.8 kg)   18 264 lb (119.7 kg)   18 263 lb 10.7 oz (119.6 kg)              Today, you had the following     No orders found for display       Primary Care Provider Fax #    Physician No Ref-Primary [phone] [address] [id number]        Equal Access to Services     [facility]: Hadii charles scott hadasho Soleela, waaxda luqadaha, qaybta kaalmada porsha, viki triplett . So [facility] [phone].    ATENCIÓN: Si habla español, tiene a garcia disposición servicios gratuitos de asistencia lingüística. Llame al [phone].    We comply with applicable federal civil rights laws and [place] laws. We do not discriminate on the basis of race, color, national origin, age, disability, sex, sexual orientation, or gender identity.            Thank you!     Thank you for choosing [facility]  for your care. Our goal is always to provide you with excellent care. Hearing back from our patients is one way we can continue to improve our services. Please take a few minutes to complete the written survey that you may receive in the mail after your visit with us. Thank you!             Your Updated Medication List - Protect others around you: Learn how to safely use, store and throw away your medicines at www.disposemymeds.org.          This list is  accurate as of 3/26/18 11:59 PM.  Always use your most recent med list.                   Brand Name Dispense Instructions for use Diagnosis    acetaminophen 325 MG tablet    TYLENOL    100 tablet    Take 2 tablets (650 mg) by mouth every 4 hours as needed for mild pain    Fall, initial encounter, Weakness       Adult Blood Pressure Cuff Lg Kit     1 kit    1 each 2 times daily    Benign essential hypertension, Malignant essential hypertension       hydrochlorothiazide 12.5 MG capsule    MICROZIDE    30 capsule    Take 1 capsule (12.5 mg) by mouth daily    Essential hypertension       ibuprofen 600 MG tablet    ADVIL/MOTRIN    120 tablet    Take 1 tablet (600 mg) by mouth every 6 hours as needed for other (mild pain)    Falls frequently       lisinopril 40 MG tablet    PRINIVIL/ZESTRIL    30 tablet    Take 1 tablet (40 mg) by mouth daily    Essential hypertension       metoprolol succinate 50 MG 24 hr tablet    TOPROL-XL    30 tablet    Take 1 tablet (50 mg) by mouth daily    Essential hypertension       miconazole 2 % powder    MICATIN; MICRO GUARD    90 g    Apply topically 2 times daily    Yeast infection of the skin       senna-docusate 8.6-50 MG per tablet    SENOKOT-S;PERICOLACE    100 tablet    Take 1 tablet by mouth 2 times daily as needed for constipation    Chronic idiopathic constipation

## 2018-03-27 NOTE — MR AVS SNAPSHOT
"              After Visit Summary   3/27/2018    Jose Antonio Marmolejo    MRN: [id number]           Patient Information     Date Of Birth          1945        Visit Information        Provider Department      3/27/2018 9:00 AM Juanita Briggs MD [facility] [place]        Today's Diagnoses     Rash and nonspecific skin eruption    -  1    Type 2 diabetes mellitus without complication, without long-term current use of insulin (H)        Weakness of both lower extremities        Neuropathy of left lower extremity        Recurrent falls           Follow-ups after your visit        Your next 10 appointments already scheduled     Apr 27, 2018  1:00 PM CDT   (Arrive by 12:45 PM)   SHORT with Juanita Briggs MD   [facility] [place] ([facility] )    [address]  [place] [address]   [phone]              Who to contact     If you have questions or need follow up information about today's clinic visit or your schedule please contact [facility] directly at [phone].  Normal or non-critical lab and imaging results will be communicated to you by MyChart, letter or phone within 4 business days after the clinic has received the results. If you do not hear from us within 7 days, please contact the clinic through [employer] or phone. If you have a critical or abnormal lab result, we will notify you by phone as soon as possible.  Submit refill requests through [employer] or call your pharmacy and they will forward the refill request to us. Please allow 3 business days for your refill to be completed.          Additional Information About Your Visit        MyChart Information     [employer] lets you send messages to your doctor, view your test results, renew your prescriptions, schedule appointments and more. To sign up, go to [email or web address] . Click on \"Log in\" on the left side of the screen, which will take you to the Welcome page. Then click on \"Sign up Now\" on the right " "side of the page.     You will be asked to enter the access code listed below, as well as some personal information. Please follow the directions to create your username and password.     Your access code is: [email or web address]  Expires: 2018  2:42 PM     Your access code will  in 90 days. If you need help or a new code, please call your [facility] or [phone].        Care EveryWhere ID     This is your Care EveryWhere ID. This could be used by other organizations to access your [facility] medical records  [id number]        Your Vitals Were     Pulse Temperature Respirations Height Pulse Oximetry BMI (Body Mass Index)    70 96.9  F (36.1  C) (Tympanic) 18 5' 10\" (1.778 m) 97% 37.59 kg/m2       Blood Pressure from Last 3 Encounters:   18 128/80   18 140/80   18 160/83    Weight from Last 3 Encounters:   18 262 lb (118.8 kg)   18 264 lb (119.7 kg)   18 263 lb 10.7 oz (119.6 kg)              Today, you had the following     No orders found for display         Today's Medication Changes          These changes are accurate as of 3/27/18  9:50 AM.  If you have any questions, ask your nurse or doctor.               Start taking these medicines.        Dose/Directions    hydrocortisone 1 % ointment   Used for:  Rash and nonspecific skin eruption   Started by:  Juanita Briggs MD        Apply sparingly to affected area three times daily for 14 days.   Quantity:  30 g   Refills:  0            Where to get your medicines      These medications were sent to [facility] [address] [address]     Phone:  [phone]     hydrocortisone 1 % ointment                Primary Care Provider Fax #    Physician No Ref-Primary [phone]       No address on file        Equal Access to Services     JEFF SALINAS AH: Bailey Brower, wadhavalda luqadaha, qaybta josealviki krishna " latere patterson. So [facility] [phone].    ATENCIÓN: Si habla ren, tiene a garcia disposición servicios gratuitos de asistencia lingüística. Sandra coleman [phone].    We comply with applicable federal civil rights laws and [place] laws. We do not discriminate on the basis of race, color, national origin, age, disability, sex, sexual orientation, or gender identity.            Thank you!     Thank you for [facility]  [facility] your care. Our goal is always to provide you with excellent care. Hearing back from our patients is one way we can continue to improve our services. Please take a few minutes to complete the written survey that you may receive in the mail after your visit with us. Thank you!             Your Updated Medication List - Protect others around you: Learn how to safely use, store and throw away your medicines at www.disposemymeds.org.          This list is accurate as of 3/27/18  9:50 AM.  Always use your most recent med list.                   Brand Name Dispense Instructions for use Diagnosis    acetaminophen 325 MG tablet    TYLENOL    100 tablet    Take 2 tablets (650 mg) by mouth every 4 hours as needed for mild pain    Fall, initial encounter, Weakness       Adult Blood Pressure Cuff Lg Kit     1 kit    1 each 2 times daily    Benign essential hypertension, Malignant essential hypertension       hydrochlorothiazide 12.5 MG capsule    MICROZIDE    30 capsule    Take 1 capsule (12.5 mg) by mouth daily    Essential hypertension       hydrocortisone 1 % ointment     30 g    Apply sparingly to affected area three times daily for 14 days.    Rash and nonspecific skin eruption       ibuprofen 600 MG tablet    ADVIL/MOTRIN    120 tablet    Take 1 tablet (600 mg) by mouth every 6 hours as needed for other (mild pain)    Falls frequently       lisinopril 40 MG tablet    PRINIVIL/ZESTRIL    30 tablet    Take 1 tablet (40 mg) by mouth daily    Essential hypertension       metoprolol succinate 50  MG 24 hr tablet    TOPROL-XL    30 tablet    Take 1 tablet (50 mg) by mouth daily    Essential hypertension       miconazole 2 % powder    MICATIN; MICRO GUARD    90 g    Apply topically 2 times daily    Yeast infection of the skin       senna-docusate 8.6-50 MG per tablet    SENOKOT-S;PERICOLACE    100 tablet    Take 1 tablet by mouth 2 times daily as needed for constipation    Chronic idiopathic constipation

## 2018-03-27 NOTE — NURSING NOTE
"Chief Complaint   Patient presents with     Hospital F/U       Initial /80 (BP Location: Left arm, Patient Position: Chair, Cuff Size: Adult Large)  Pulse 70  Temp 96.9  F (36.1  C) (Tympanic)  Resp 18  Ht 5' 10\" (1.778 m)  Wt 262 lb (118.8 kg)  SpO2 97%  BMI 37.59 kg/m2 Estimated body mass index is 37.59 kg/(m^2) as calculated from the following:    Height as of this encounter: 5' 10\" (1.778 m).    Weight as of this encounter: 262 lb (118.8 kg).  Medication Reconciliation: complete     Elmira Medley   "

## 2018-03-29 NOTE — PROGRESS NOTES
SUBJECTIVE:   Jose Antonio Marmolejo is a 72 year old male who presents to clinic today for the following health issues:      New Patient/Transfer of Care  Pt is a 73 yo M with history of HTN, DM2 diet controlled and morbid obesity who presents to clinic from his TCU to establish care. His care has previously been through [facility] records are not immediately available for review. Of note, he has been hospitalized four times in the last year for falls. He had an MRI of the brain back in December 2017 which was concerning for demyelinating process. I cannot see that he has seen Neurology. He states he was seen by Neuro in the TCU after last hospital stay, but these records are not available and he does not recall the outcome of this.     He is living at home alone. Son was living with him for a time, but recently bought his own place. Pt manages med and finances by himself, denies issues. Sisters help with shopping and bring him food often. Has a  and a nephew who help with lawn care. He has not driven in the last year or so because he cannot ambulate to his garage due to leg weakness. He states he feels dizzy and legs get weak and he falls down. Has numerous falls over the last year and a half per his report. He has added a handicapped bathroom on the main level in his home and grab bars throughout the home so that he can ambulate through.     He is continent of bowel, bladder. Has some nocuria (3-4) times a night and occasional urgency. He denies numbness tingling in the LE recently, although, his LLE was numb for a time after his ankle fracture. He denies speech changes, abnormal movements, other weakness. Appetite is good. Has lost weight which is on purpose. Has cut out soda.       Hospital Follow-up Visit:    Hospital/Nursing Home/IP Rehab Facility: [facility]  Date of Admission: 3/21/18  Date of Discharge: 3/22/18  Reason(s) for Admission: falls            Problems taking medications regularly:   "None       Medication changes since discharge: None       Problems adhering to non-medication therapy:  None    Summary of hospitalization:  [facility] discharge summary reviewed  Diagnostic Tests/Treatments reviewed.  Follow up needed: Needs Neurology follow up  Other Healthcare Providers Involved in Patient s Care:         PT/OT  Update since discharge: stable.  Pt is a [employer]. Like it there. Has been in PT twice since discharge. Feels about the same. He anticipates going home again. 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 Has rash on the right arm that is itchy, noted after admission to NH.     Post Discharge Medication Reconciliation: discharge medications reconciled, continue medications without change.  Plan of care communicated with patient            Problem list and histories reviewed & adjusted, as indicated.  Additional history: as documented    Labs reviewed in EPIC    Reviewed and updated as needed this visit by clinical staff  Tobacco  Allergies  Meds  Problems  Med Hx  Surg Hx  Fam Hx  Soc Hx        Reviewed and updated as needed this visit by Provider  Tobacco  Allergies  Meds  Problems  Med Hx  Surg Hx  Fam Hx  Soc Hx          ROS:  Constitutional, HEENT, cardiovascular, pulmonary, gi and gu systems are negative, except as otherwise noted.    OBJECTIVE:     /80 (BP Location: Left arm, Patient Position: Chair, Cuff Size: Adult Large)  Pulse 70  Temp 96.9  F (36.1  C) (Tympanic)  Resp 18  Ht 5' 10\" (1.778 m)  Wt 262 lb (118.8 kg)  SpO2 97%  BMI 37.59 kg/m2  Body mass index is 37.59 kg/(m^2).  GENERAL: healthy, alert and no distress  EYES: Eyes grossly normal to inspection, PERRL and conjunctivae and sclerae normal  HENT: ear canals and TM's normal, nose and mouth without ulcers or lesions  NECK: no adenopathy, no asymmetry, masses, or scars and thyroid normal to palpation  RESP: lungs clear to auscultation - no rales, rhonchi or wheezes  CV: regular rate and " rhythm, normal S1 S2, no S3 or S4, no murmur, click or rub, no peripheral edema   ABDOMEN: soft, nontender, no hepatosplenomegaly, no masses and bowel sounds normal  MS: No gross deformities. Strength grossly decreased mildly in LE, unable to get up out of wheelchair to toilet himself.   SKIN: Pt has slight area of erythema and excoriation over right arm  NEURO: mentation intact, speech normal, cranial nerves 2-12 intact and rapid alternating movements normal, no tremor or abnormal movement, tone wnl. Romberg not performed as patient unable to stand for very long.   PSYCH: affect normal/bright and appearance disheveled  Diabetic foot exam: no trophic changes or ulcerative lesions, normal monofilament exam, reduced vibratory sense, proprioception intact.     Diagnostic Test Results:  none     ASSESSMENT/PLAN:     1. Rash and nonspecific skin eruption  Noted in the NH, itchy. Reports history of sensitive skin. Steroid ordered.   - hydrocortisone 1 % ointment; Apply sparingly to affected area three times daily for 14 days.  Dispense: 30 g; Refill: 0    2. Type 2 diabetes mellitus without complication, without long-term current use of insulin (H)  Pt seemed confused about his diagnosis. Discussed this at some length. Will not add medications at this time. Good renal function. May be a candidate for Metformin, likely would tolerate ASA and statin. I am a little concerned based on previous reports about his compliance. No meds added today.     3. Weakness of both lower extremities / Recurrent falls / Abnormal MRI of head  Neurology consultation placed. Needs full evaluation and possible repeat MRI>     4. Hypertension  BP controlled. Not too low on current regimen. Pt has history of stopping meds for a period of time. Wonder if this contributes to his dysfunction at home, but improves with TCU placement. BMP next week to follow lytes and Ca which has been on the low side.     ==  Juanita Briggs MD  [facility]  [place]

## 2018-03-30 PROBLEM — Z78.9 NURSING HOME RESIDENT: Status: ACTIVE | Noted: 2018-01-01

## 2018-03-30 NOTE — PROGRESS NOTES
HISTORY OF PRESENT ILLNESS:  Jose Antonio is a 72 year old male (1945)  resident of [facility]  who is being seen today for a routine 30 day follow up. He was admitted after being hospitalized with recurrent falls and weakness. Jose Antonio has a history of medication noncompliance and was noted to have white matter abnormalities on his most recent MRI.  There was no formal diagnosis. He was seen by Physical Therapy .Hospital records are reviewed. Patient offers no other complaint.  Staff notes no other issues.    Current medications, allergies, and interdisciplinary care plan are reviewed.      Patient Active Problem List    Diagnosis Date Noted     Recurrent falls      Priority: Medium     Fall 03/21/2018     Priority: Medium     Falls frequently 12/31/2017     Priority: Medium     Type 2 diabetes mellitus without complication (H) 08/19/2016     Priority: Medium     Weakness of both lower extremities 08/19/2016     Priority: Medium     Yeast infection of the skin 08/19/2016     Priority: Medium     Essential hypertension 08/17/2016     Priority: Medium     Falling episodes 08/17/2016     Priority: Medium     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     Priority: Medium          Social History     Social History     Marital status: Single     Spouse name: N/A     Number of children: 3     Years of education: N/A     Occupational History     Not on file.     Social History Main Topics     Smoking status: Former Smoker     Quit date: 1/1/1978     Smokeless tobacco: Never Used     Alcohol use No      Comment: quit 1977, previously an alcoholic     Drug use: No     Sexual activity: Not Currently     Other Topics Concern     Not on file     Social History Narrative        Current Outpatient Prescriptions   Medication Sig     hydrocortisone 1 % ointment Apply sparingly to affected area three times daily for 14 days.     acetaminophen (TYLENOL) 325 MG tablet Take 2 tablets (650 mg) by mouth every 4 hours as  needed for mild pain     ibuprofen (ADVIL/MOTRIN) 600 MG tablet Take 1 tablet (600 mg) by mouth every 6 hours as needed for other (mild pain)     lisinopril (PRINIVIL/ZESTRIL) 40 MG tablet Take 1 tablet (40 mg) by mouth daily     metoprolol (TOPROL-XL) 50 MG 24 hr tablet Take 1 tablet (50 mg) by mouth daily     hydrochlorothiazide (MICROZIDE) 12.5 MG capsule Take 1 capsule (12.5 mg) by mouth daily     senna-docusate (SENOKOT-S;PERICOLACE) 8.6-50 MG per tablet Take 1 tablet by mouth 2 times daily as needed for constipation     miconazole (MICATIN; MICRO GUARD) 2 % powder Apply topically 2 times daily     Blood Pressure Monitoring (ADULT BLOOD PRESSURE CUFF LG) KIT 1 each 2 times daily     No current facility-administered medications for this visit.        Allergies   Allergen Reactions     Penicillins        I have reviewed the care plan and do agree with the plan.      ROS:  No chest pain, shortness of breath, fever, chills, headache, nausea, vomiting, dysuria, or changes in bowel habits.  Appetite is normal.  No pain noted.          OBJECTIVE:  /80  Pulse 71  Temp 98.1  F (36.7  C)  Resp 20  Wt 264 lb (119.7 kg)  SpO2 97%  BMI 35.8 kg/m2    GENERAL:  Chronically ill appearing, alert, and in no acute distress  RESP:  Lungs clear.  No rales, rhonchi, or wheezing  CV:  RRR.  S1 S2 without murmur. No clicks or rubs.  SKIN:  Age-related changes.  No suspicious lesions or rashes.  PSYCH:  Mentation intact, affect bright, and orientation intact.  EXTREM:  Trace edema.  Pulses palpable.          Lab/Diagnostic data:    Reviewed.    ASSESSMENT/ORDERS:  Nursing Home Visit  Above issues stable.  No changes in current medications or care plan.      Total time spent with patient visit was 25 min including patient visit, review of pertinent clinical information, and treatment plan.      Joe Brand MD

## 2018-05-04 PROBLEM — R52 PAIN MANAGEMENT: Status: ACTIVE | Noted: 2018-01-01

## 2018-05-04 NOTE — IP AVS SNAPSHOT
"` `           [facility]: [phone]                                              INTERAGENCY TRANSFER FORM - NURSING   2018                    Hospital Admission Date: 2018  NICOLE KEMP   : 1945  Sex: Male        Attending Provider: Krystal Olmedo NP     Allergies:  Penicillins    Infection:  None   Service:  GENERAL MEDI    Ht:  1.778 m (5' 10\")   Wt:  121.4 kg (267 lb 10.2 oz)   Admission Wt:  118.8 kg (262 lb)    BMI:  38.4 kg/m 2   BSA:  2.45 m 2            Patient PCP Information     Provider PCP Type    Juanita Briggs MD General      Current Code Status     Date Active Code Status Order ID Comments User Context       2018 11:09 PM Full Code [id number]  Rach Mills MD ED       Code Status History     Date Active Date Inactive Code Status Order ID Comments User Context    3/22/2018 12:50 PM 2018 11:09 PM Full Code [id number]  Dangelo Barragan MD Outpatient    3/21/2018  8:14 PM 3/22/2018 12:50 PM Full Code [id number]  Kei Marcus MD Inpatient    2018  1:37 PM 3/21/2018  8:14 PM Full Code [id number]  Leigh Ann Warren MD Outpatient    2017 12:26 PM 2018  1:37 PM Full Code [id number]  Kristofer Sena MD Inpatient    2016  8:09 AM 2017 12:26 PM Full Code [id number]  Eris Rosales, DO Outpatient    2016 10:22 PM 2016  8:09 AM Full Code [id number]  Kevin Connolly, DO Inpatient      Advance Directives        Scanned docmt in ACP Activity?           No scanned doc        Hospital Problems as of 2018              Priority Class Noted POA    Essential hypertension Medium  2016 Yes    Type 2 diabetes mellitus without complication (H) Medium  2016 Yes    Weakness of both lower extremities Medium  2016 Yes    Recurrent falls Medium  Unknown Yes    Pain management Medium  2018 Yes      Non-Hospital Problems as of 2018              Priority Class Noted    Falling episodes Medium  2016    Neuropathy of " left lower extremity Medium  8/17/2016    Yeast infection of the skin Medium  8/19/2016    Falls frequently Medium  12/31/2017    Fall Medium  3/21/2018    Nursing Home Visit Medium  3/30/2018      Immunizations     None         END      ASSESSMENT     Discharge Profile Flowsheet     DISCHARGE NEEDS ASSESSMENT     FINAL RESOURCES      Concerns To Be Addressed  discharge planning concerns 08/18/16 1505   Resources List  Skilled Nursing Facility 05/05/18 1252    Equipment Currently Used at Home  walker, rolling 05/06/18 1230   Other Resources  Transportation Services 05/05/18 1252    Transportation Available  agency transportation 05/05/18 1252   PAS Number  (P)  -- ([id number]) 03/22/18 1554    # of Referrals Placed by CTS  -- (SNF, Inpatient rehab) 01/02/18 1536   SKIN      Key Recommendations  F/U with PCP 01/02/18 1536   Inspection of bony prominences  Full except (identify areas not inspected) 05/07/18 1020    Does Patient Need a Referral for Clinic CC  No 01/02/18 1536   Inspection under devices  Full 05/07/18 1020    Primary Care Clinic Name  [facility] 05/05/18 1252   Skin WDL  WDL 05/07/18 1020    Primary Care MD Name  Dr. Briggs 05/05/18 1252   Skin Temperature  warm 05/07/18 1020    GASTROINTESTINAL (ADULT,PEDIATRIC,OB)     Skin Moisture  dry 05/07/18 1020    GI WDL  ex 05/07/18 1020   Skin Elasticity  quick return to original state 05/06/18 1710    Abdominal Appearance  obese 05/07/18 1020   Skin Integrity  scab(s) 05/07/18 1020    All Quadrants Bowel Sounds  audible and active in all quadrants 05/07/18 1020   Full except areas not inspected   Spine;Hip, left;Hip, right;Buttock, left;Buttock, right;Sacrum;Coccyx 05/07/18 1020    Last Bowel Movement  05/06/18 05/06/18 0933   SAFETY      Passing flatus  yes 05/07/18 1020   Safety WDL  WDL 05/07/18 1020    COMMUNICATION ASSESSMENT     Safety Factors  call light in reach 05/07/18 1020    Patient's communication style  spoken language  "(English or Bilingual) 05/04/18 1728   All Alarms  alarm(s) activated and audible 05/07/18 1020                 Assessment WDL (Within Defined Limits) Definitions           Safety WDL     Effective: 09/28/15    Row Information: <b>WDL Definition:</b> Bed in low position, wheels locked; call light in reach; upper side rails up x 2; ID band on<br> <font color=\"gray\"><i>Item=AS safety wdl>>List=AS safety wdl>>Version=F14</i></font>      Skin WDL     Effective: 09/28/15    Row Information: <b>WDL Definition:</b> Warm; dry; intact; elastic; without discoloration; pressure points without redness<br> <font color=\"gray\"><i>Item=AS skin wdl>>List=AS skin wdl>>Version=F14</i></font>      Vitals     Vital Signs Flowsheet     VITAL SIGNS     Height  1.778 m (5' 10\") 05/05/18 0013    Temp  98  F (36.7  C) 05/07/18 0802   Weight  121.4 kg (267 lb 10.2 oz) (wearing sweatpants) 05/05/18 0019    Temp src  Tympanic 05/07/18 0802   Weight Method  Bed scale 05/05/18 0019    Resp  18 05/07/18 0802   Bed Scale  Standard (fitted sheet, draw sheet/ pad, cover/flat sheet, blanket, two pillows);Call light (immobilizer) 05/05/18 0019    Pulse  71 05/05/18 1639   BSA (Calculated - sq m)  2.45 05/05/18 0019    Heart Rate  72 05/07/18 0802   BMI (Calculated)  38.48 05/05/18 0019    Pulse/Heart Rate Source  Monitor 05/07/18 0802   SANDEEP COMA SCALE      BP  135/68 05/07/18 0802   Best Eye Response  4-->(E4) spontaneous 05/06/18 1611    OXYGEN THERAPY     Best Motor Response  6-->(M6) obeys commands 05/06/18 1611    SpO2  94 % 05/07/18 0802   Best Verbal Response  5-->(V5) oriented 05/06/18 1611    O2 Device  None (Room air) 05/07/18 0802   [place] Coma Scale Score  15 05/06/18 1611    PAIN/COMFORT     POSITIONING      Patient Currently in Pain  (P)  denies 05/07/18 0744   Body Position  independently positioning 05/07/18 1020    Preferred Pain Scale  number (Numeric Rating Pain Scale) 05/05/18 0013   Head of Bed (HOB)  HOB at 20-30 degrees " 05/06/18 0108    Patient's Stated Pain Goal  7 05/05/18 0013   Positioning/Transfer Devices  pillows;in use 05/06/18 0108    0-10 Pain Scale  3 05/05/18 0002   Chair  Upright in chair 05/06/18 1611    Pain Location  Knee 05/04/18 1929   DAILY CARE      Pain Orientation  Left 05/04/18 1929   Activity Management  activity adjusted per tolerance 05/07/18 1020    Pain Descriptors  Aching 05/04/18 1929   Activity Assistance Provided  assistance, 2 people 05/06/18 1718    Response to Interventions  Absence of nonverbal indicators of pain 05/06/18 0518   Assistive Device Utilized  mechanical lift 05/06/18 1718    HEIGHT AND WEIGHT     Additional Documentation  Activity Device Assistance (Row) 05/05/18 0202            Patient Lines/Drains/Airways Status    Active LINES/DRAINS/AIRWAYS     Name: Placement date: Placement time: Site: Days: Last dressing change:    Peripheral IV 05/04/18 Right Hand 05/04/18      Hand   3             Patient Lines/Drains/Airways Status    Active PICC/CVC     None            Intake/Output Detail Report     Date Intake   Output Net    Shift P.O. IV Piggyback Total Urine Total       Noc 05/05/18 2300 - 05/06/18 0659 -- -- -- 825 825 -825    Day 05/06/18 0700 - 05/06/18 1459 480 -- 480 775 775 -295    Jeanette 05/06/18 1500 - 05/06/18 2259 480 -- 480 975 975 -495    Noc 05/06/18 2300 - 05/07/18 0659 240 -- 240 1175 1175 -935    Day 05/07/18 0700 - 05/07/18 1459 480 -- 480 150 150 330      Last Void/BM       Most Recent Value    Urine Occurrence 1 [pt urinated on floor] at 05/06/2018 1800    Stool Occurrence 1 at 05/06/2018 1800      Case Management/Discharge Planning     Case Management/Discharge Planning Flowsheet     REFERRAL INFORMATION     ASSESSMENT/CONCERNS TO BE ADDRESSED      Did the Initial Social Work Assessment result in a Social Work Case?  Yes 05/05/18 1252   Concerns To Be Addressed  discharge planning concerns 08/18/16 1505    Arrived From  home or self-care 01/02/18 1511   DISCHARGE  PLANNING      # of Referrals Placed by CTS  -- (SNF, Inpatient rehab) 01/02/18 1536   Transportation Available  agency transportation 05/05/18 1252    Reason For Consult  discharge planning 05/05/18 1252   Key Recommendations  F/U with PCP 01/02/18 1536    Primary Care Clinic Name  [facility] 05/05/18 1252   Does Patient Need a Referral for Clinic CC  No 01/02/18 1536    Primary Care MD Name  Dr. Briggs 05/05/18 1252   FINAL RESOURCES      LIVING ENVIRONMENT     Equipment Currently Used at Home  walker, rolling 05/06/18 1230    Lives With  alone 05/06/18 1230   Resources List  Skilled Nursing Facility 05/05/18 1252    Living Arrangements  house 05/06/18 1230   Other Resources  Transportation Services 05/05/18 1252    Able to Return to Prior Living Arrangements  no 05/05/18 1252   PAS Number  (P)  -- ([id number]) 03/22/18 1554    Living Arrangement Comments  Non-weight bearing status, cannot return home alone yet 05/05/18 1252   ABUSE RISK SCREEN      HOME SAFETY     QUESTION TO PATIENT:  Has a member of your family or a partner(now or in the past) intimidated, hurt, manipulated, or controlled you in any way?  no 05/04/18 1727    Patient Feels Safe Living in Home?  yes 05/05/18 1252   QUESTION TO PATIENT: Do you feel safe going back to the place where you are living?  yes 05/04/18 1727    ASSESSMENT OF FAMILY/SOCIAL SUPPORT     OBSERVATION: Is there reason to believe there has been maltreatment of a vulnerable adult (ie. Physical/Sexual/Emotional abuse, self neglect, lack of adequate food, shelter, medical care, or financial exploitation)?  no 05/04/18 1727    Who is your support system?  Children;Sibling(s) 05/05/18 1252   OTHER      Description of Support System  Supportive;Involved 05/05/18 1252   Are you depressed or being treated for depression?  No 05/05/18 0039    Support Assessment  Lacks necessary supervision and assistance;Patient communicates needs well met 05/05/18 1252   HOMICIDE  RISK      COPING/STRESS     Feels Like Hurting Others  no 05/04/18 1727    Major Change/Loss/Stressor  denies 05/05/18 0033

## 2018-05-04 NOTE — ED NOTES
Patient comes via [facility] with reports of generalized weakness. Pt reported he called ambulance this morning because he needed a lift assist. Pt declined to be brought to the ED.  Patient now reported that he was sitting in his recliner and his left leg started shaking and hurting.  Pt reported earlier he fell on his knees.  Denies hitting head. Pt also spoke with Jackelyn HERNANDEZ with [facility] today.  Patient reports he has some pain in his left knee, no deformities, no swelling and no bruising noted. Patient denies any other pains. Denies any vertigo. Denies any recent fevers or coughing. Patient reports he just cant get around anymore and thinks he may need an electronic wheelchair.

## 2018-05-04 NOTE — IP AVS SNAPSHOT
` `     [facility]: [phone]                 INTERAGENCY TRANSFER FORM - NOTES (H&P, Discharge Summary, Consults, Procedures, Therapies)   2018                    Hospital Admission Date: 2018  NICOLE KEMP   : 1945  Sex: Male        Patient PCP Information     Provider PCP Type    Juanita Briggs MD General         History & Physicals      H&P by Skip Jenkins MD at 2018  4:18 PM     Author:  Skip Jenkins MD Service:  Hospitalist Author Type:  Physician    Filed:  2018  8:53 PM Date of Service:  2018  4:18 PM Creation Time:  2018  4:18 PM    Status:  Signed :  Skip Jenkins MD (Physician)         [facility]    History and Physical  Hospitalist       Date of Admission:  2018    Assessment & Plan   Nicole Kemp is a 72 year old male[SS1.1] with history of abnormal white matter lesions on MRI brain with concern for possible multiple sclerosis, multiple falls, diabetes, hypertension and arthritis[SS1.2] who presents with[SS1.1] mechanical fall yesterday morning and imaging suggestive of possible left distal femur and left proximal fibula fracture.    1. Possible nondisplaced left distal femur and proximal fibula fractures:  Appreciate surgical consult recommendations by Dr. Ramon. Patient will remain in immobilizer until follow up with ortho next week outpatient. He will need further assessment with MRI to evaluate possible fractures, although this cannot be done over weekend and could be done as outpatient since he is non weight bearing. His pain is currently adequately controlled with oral Norco. Really does not complain of pain except with movement.     2. Possible multiple sclerosis:  Had abnormal brain MRI in January this year with white matter lesions involving bilateral hemispheres and brainstem. Will still need neurology evaluation as an outpatient. PCP has referred to [facility], but I see no appointments set up for him in the  [facility] system.     3. Recurrent falls:  May need more assistance at home. Will have case management meet with him. May likely be related to possible underlying multiple sclerosis.    4. Diabetes mellitus type 2:  Patient has been confused about this diagnosis. Recalls that he stopped seeing Dr. Rosales in the past after he feels that he was told he had diabetes on one visit and then on a follow up visit feels that when he inquired and had more questions concerning the diabetes, he was told by same provider that the provider had never told him he had diabetes. He recently did establish care with Dr. Briggs who noted his elevated hemoglobin A1C 6.7, and did discuss with him the diagnosis of diabetes. No medications added at the time. Although discussion initiated regarding him being a good candidate for ASA/statin and possibly Metformin.    5. Hypertension:  Has been on/off noncompliant with his antihypertensives in the past, at times going without his medications. Will continue his lisinopril, HCTZ and Toprol XL. Monitor BP.[SS1.2]       # Pain Assessment:  Current Pain Score 5/5/2018   Patient currently in pain? denies   Pain score (0-10) -   Pain location -   Pain descriptors -[SS1.1]   - Jose Antonio is experiencing pain due to possible left distal femur and left proximal fibula fracture. Pain management was discussed and the plan was created in a collaborative fashion.  Jose Antonio's response to the current recommendations: engaged  - Please see the plan for pain management as documented above[SS1.2]          DVT Prophylaxis:[SS1.1] Enoxaparin (Lovenox) SQ[SS1.2]  Code Status:[SS1.1] Full Code[SS1.2]    Disposition: Expected discharge in[SS1.1] 1-2[SS1.2] days once[SS1.1] SNF can be arranged and pain appropriately still is being managed on oral analgesics[SS1.2]    Skip Jenkins    Primary Care Physician   Juanita Briggs    Chief Complaint[SS1.1]   Fall    History is obtained from the patient[SS1.2]    History of Present  "Darling Marmolejo is a 72 year old male[SS1.1] with history of abnormal white matter lesions on MRI brain with concern for possible multiple sclerosis, multiple falls, diabetes, hypertension and arthritis[SS1.2] who presents with[SS1.1] mechanical fall yesterday morning. Patient states that he was using his walker to navigate from his kitchen to living area when his knees suddenly gave out from under him \"as if they were disconnected\". He pressed his lifeline button and EMS arrived to the home and help him back to his recliner chair. He did not feel he had any injuries, and so declined transport to the hospital. Subsequently, he was up several times to the bathroom using his walker and without any noted difficulty. However, later in the day as he was sitting in the recliner, he noted a terrific pain in his left leg when attempting to move it, so once again called the ambulance to his home. He denies that he had any preceding symptoms of dizziness, lightheadedness, diaphoresis, heart palpitations, chest pain, illness/fever or dyspnea prior to his fall. He had been in his usual state of health prior to the incident. Of note, he cites that he has probably had over 100 falls over the course of the past year, often because he gets off balance. He has worked on weight loss with an intentional weight loss of 60 pounds, yet he continues to have falls. Also of note, he had an abnormal brain MRI in January of this year showing multiple white matter lesions in both hemispheres and in the brainstem suggestive of a white matter disease such as multiple sclerosis. He was referred to neurology by his PCP, but has not yet had evaluation. He was evaluated with left knee plain films in the ED with no evidence of fracture. Yet he was unable to bear weight and still had pain. Further CT imaging of the left knee revealed questionable nondisplaced fractures of the distal femoral shaft and proximal fibula. Recommendation was for " follow up imaging with MRI. ED discussed with Dr. Tena of [facility] and recommendation was for immobilization and non-weight bearing other than slight toe touch, along with PT and then follow up next week in office. Patient was admitted given he will likely need SNF placement until he can further mobilize, despite the fact that he has worked to make his home handicap accessible with handicap shower, ramps, and grab rails. This is largely because he has been limited to use of his walker to ambulate due to his recurrent falls.    [SS1.2]    Past Medical History    Past Medical History:   Diagnosis Date     Arthritis     back and left ankle from fall     Diabetes (H)      Hypertension      Recurrent falls        Past Surgical History[SS1.1]   I have reviewed this patient's surgical history and updated it with pertinent information if needed.[SS1.2]  Past Surgical History:   Procedure Laterality Date     ORTHOPEDIC SURGERY      left ankle and removal of hardware[SS1.3]       Prior to Admission Medications   Prior to Admission Medications   Prescriptions Last Dose Informant Patient Reported? Taking?   Blood Pressure Monitoring (ADULT BLOOD PRESSURE CUFF LG) KIT   No Yes   Si each 2 times daily   acetaminophen (TYLENOL) 325 MG tablet Past Month at Unknown time  No Yes   Sig: Take 2 tablets (650 mg) by mouth every 4 hours as needed for mild pain   hydrochlorothiazide (MICROZIDE) 12.5 MG capsule More than a month at Unknown time  No No   Sig: Take 1 capsule (12.5 mg) by mouth daily   hydrocortisone 1 % ointment   No Yes   Sig: Apply sparingly to affected area three times daily for 14 days.   ibuprofen (ADVIL/MOTRIN) 600 MG tablet Past Month at Unknown time  No Yes   Sig: Take 1 tablet (600 mg) by mouth every 6 hours as needed for other (mild pain)   lisinopril (PRINIVIL/ZESTRIL) 40 MG tablet More than a month at Unknown time  No No   Sig: Take 1 tablet (40 mg) by mouth daily   metoprolol (TOPROL-XL) 50  MG 24 hr tablet Unknown at Unknown time  No No   Sig: Take 1 tablet (50 mg) by mouth daily   miconazole (MICATIN; MICRO GUARD) 2 % powder   No Yes   Sig: Apply topically 2 times daily   senna-docusate (SENOKOT-S;PERICOLACE) 8.6-50 MG per tablet   No Yes   Sig: Take 1 tablet by mouth 2 times daily as needed for constipation      Facility-Administered Medications: None     Allergies   Allergies   Allergen Reactions     Penicillins        Social History   Social History     Social History     Marital status: Single     Spouse name: N/A     Number of children: 3     Years of education: N/A     Occupational History     Not on file.     Social History Main Topics     Smoking status: Former Smoker     Quit date: 1/1/1978     Smokeless tobacco: Never Used     Alcohol use No      Comment: quit 1977, previously an alcoholic     Drug use: No     Sexual activity: Not Currently     Other Topics Concern     Not on file     Social History Narrative       Family History[SS1.1]   I have reviewed this patient's family history and updated it with pertinent information if needed.[SS1.2]   Family History   Problem Relation Age of Onset     Other - See Comments Mother      pneumonia     Obesity Mother      Other Cancer Father      hodgkins lymphoma     Coronary Artery Disease Father      pacemaker     Other Cancer Sister      unknown cancers     Colon Cancer Son[SS1.3]        Review of Systems[SS1.1]   The 10 point Review of Systems is negative other than noted in the HPI or here.[SS1.2]     Physical Exam   Temp: 97.7  F (36.5  C) Temp src: Tympanic BP: 166/84 Pulse: 78 Heart Rate: 68 Resp: 18 SpO2: 96 % O2 Device: None (Room air)    Vital Signs with Ranges  Temp:  [97.2  F (36.2  C)-98.1  F (36.7  C)] 97.7  F (36.5  C)  Pulse:  [78] 78  Heart Rate:  [] 68  Resp:  [16-18] 18  BP: (137-189)/() 166/84  SpO2:  [93 %-96 %] 96 %  267 lbs 10.22 oz[SS1.1]    Constitutional:   awake, alert, cooperative, no apparent distress, and  appears stated age     Eyes:   Lids and lashes normal, pupils equal, round and reactive to light, extra ocular muscles intact, sclera clear, conjunctiva normal     ENT:   Normocephalic, without obvious abnormality, atraumatic, external ears without lesions, oral pharynx with moist mucous membranes, tonsils without erythema or exudates, gums normal and good dentition.     Neck:   Supple, symmetrical, trachea midline, no adenopathy, thyroid symmetric, not enlarged and no tenderness, skin normal     Hematologic / Lymphatic:   no cervical lymphadenopathy and no supraclavicular lymphadenopathy     Lungs:   No increased work of breathing, good air exchange, clear to auscultation bilaterally, no crackles or wheezing     Cardiovascular:   Normal apical impulse, regular rate and rhythm, normal S1 and S2, no S3 or S4, and no murmur noted     Abdomen:   No scars, obese, normal bowel sounds, soft, non-distended, non-tender, no masses palpated, no hepatosplenomegally     Musculoskeletal:   There is no redness, warmth, or swelling of the joints. LLE in immobilizer.  Tone is normal.     Neurologic:   Awake, alert, oriented to name, place and time.  Cranial nerves II-XII are grossly intact.   Sensory is intact.      Neuropsychiatric:   General: normal, calm and normal eye contact  Affect: normal  Orientation: oriented to self, place, time and situation     Skin:   no redness, warmth, or swelling, no rashes and no lesions[SS1.2]       Data   Data reviewed today:  I personally reviewed[SS1.1] the left knee xr and left knee CT image(s) showing findings as discussed in HPI[SS1.2].    Recent Labs  Lab 05/05/18  0524 05/04/18  1751   WBC 6.4 8.5   HGB 14.4 16.0   MCV 85 85    286    140   POTASSIUM 4.1 3.9   CHLORIDE 109 109   CO2 24 24   BUN 16 18   CR 0.95 1.06   ANIONGAP 7 7   STALIN 8.2* 8.5   * 125*     Lactic Acid   Date Value Ref Range Status   08/17/2016 1.8 0.4 - 2.0 mmol/L Final       Recent Results (from the  past 24 hour(s))   XR Knee Left 3 Views    Narrative    PROCEDURE:  XR KNEE LT 3 VW    HISTORY: fall, knee pain, swelling;     COMPARISON:  None.    TECHNIQUE:  3 views of the left knee were obtained.    FINDINGS:  No fracture or dislocation is identified. There is some  mild joint space narrowing at the medial femoral tibial articulation..   Mild degenerative changes are seen at the patellofemoral  articulation.      Impression    IMPRESSION: No acute fracture.  Mild degenerative changes of the left  knee    MENDEZ MATAMOROS MD   CT Knee Left w/o Contrast    Narrative    PROCEDURE: CT KNEE LEFT W/O CONTRAST 5/4/2018 8:56 PM    HISTORY: fall, instability left knee;     COMPARISONS: None.    Meds/Dose Given:    TECHNIQUE: CT scan left knee with sagittal and coronal reconstructions    FINDINGS: Is a questionable nondisplaced fracture of the proximal  fibular shaft seen best on the sagittal images. On the sagittal views  there is a questionable nondisplaced fracture of the distal femoral  shaft. This is also best seen on the sagittal views. The  proximal  tibia is intact no fractures are noted. 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 There are  degenerative changes at the patellofemoral articulation. Small knee  effusion is seen.         Impression    IMPRESSION: Questionable nondisplaced fractures of the distal femoral  shaft seen on the sagittal image.  2. Questionable proximal fibular fracture also best seen on the  sagittal images.  3. Follow-up MRI scan for confirmation is recommended.    MENDEZ MATAMOROS MD[SS1.1]                  Revision History        User Key Date/Time User Provider Type Action    > SS1.3 5/5/2018  8:53 PM Skip Jenkins MD Physician Sign     SS1.2 5/5/2018  8:16 PM Skip Jenkins MD Physician      SS1.1 5/5/2018  4:18 PM Skip Jenkins MD Physician                   Discharge  Summaries     No notes of this type exist for this encounter.         Consult Notes      Consults by Petey Ramon MD at 5/5/2018 10:57 AM     Author:  Petey Ramon MD Service:  Surgery Author Type:  Physician    Filed:  5/5/2018 11:47 AM Date of Service:  5/5/2018 10:57 AM Creation Time:  5/5/2018 10:57 AM    Status:  Signed :  Petey Ramon MD (Physician)         [facility]    History and Physical / Consult note: Trauma Service     Date of Admission:  5/4/2018    Time of Admission/Consult Request (page/call):[JS1.1] 5/5 0003, request 830[JS1.2]    Time of my evaluation:[JS1.1] 1030[JS1.2]  Consulting services:[JS1.1]  Orthopedics already called.[JS1.2]     Assessment & Plan[JS1.1]    72 y.o. Male with recurrent falls presents with another fall, concern about possible femur and tibial fracture. Per report OA was talked too and recommended NWB status and follow up in clinic, have no actual documentation of this. There was reccomendation for MRI which seems reasonable though will defer to orthopedics in interpreting this. He does not need further imaging from a trauma standpoint. He would benefit from PT eval for safety returning home with new NWB status on left leg. Likely will require subacute stay.[JS1.2] Of note he has spent considerable ammount of money upgrading his home with ramps and railings to help him navigate with walker.[JS1.3]   Trauma mechanism:[JS1.1]Fall from standing.[JS1.2]   Time/date of injury:[JS1.1] 5/4 1730[JS1.3]  Known Injuries:[JS1.1]  1. Possible left distal femur and fibula fractures[JS1.3]   Other diagnoses:[JS1.1]   1. Obesity  2. Chronic falls[JS1.3]        Procedure:[JS1.1] none[JS1.2]     Plan:[JS1.1]  1. MRI of knee   2. NWB Left leg per orthopedics   3. Recommend PT eval before discharge home for safety with new activity restriction.[JS1.3]       Code status:[JS1.1] Full[JS1.2] confirmed with[JS1.1] EMR[JS1.2].       ETOH:[JS1.1] N/A[JS1.2]   Primary  Care Physician   Juanita Briggs    Chief Complaint[JS1.1]   Fall from standing     History is obtained from the patient[JS1.2]    History of Present Illness   Jose Antonio Marmolejo is a 72 year old male who presents with[JS1.1] severe left leg pain after falling at home. He reports that he has had multiple falls in the past with the cause being unclear. He uses a walker at baseline. He was in the kitchen when both his legs buckled. Landing on his knees. He called lift assist which came and helped him up. Over time his left leg became more and more painful he called EMS. CT scan showed concern for fracture but wanted MRI, apparently OA called and recommended NWB status and follow up in clinic. He was admitted for pain control.[JS1.2]     Past Medical History[JS1.1]    I have reviewed this patient's medical history and updated it with pertinent information if needed.[JS1.2]   Past Medical History:   Diagnosis Date     Arthritis     back and left ankle from fall     Diabetes (H)      Hypertension      Recurrent falls[JS1.4]        Past Surgical History[JS1.1]   I have reviewed this patient's surgical history and updated it with pertinent information if needed.[JS1.2]  Past Surgical History:   Procedure Laterality Date     ORTHOPEDIC SURGERY      left ankle and removal of hardware[JS1.4]     Prior to Admission Medications   Prior to Admission Medications   Prescriptions Last Dose Informant Patient Reported? Taking?   Blood Pressure Monitoring (ADULT BLOOD PRESSURE CUFF LG) KIT   No Yes   Si each 2 times daily   acetaminophen (TYLENOL) 325 MG tablet Past Month at Unknown time  No Yes   Sig: Take 2 tablets (650 mg) by mouth every 4 hours as needed for mild pain   hydrochlorothiazide (MICROZIDE) 12.5 MG capsule More than a month at Unknown time  No No   Sig: Take 1 capsule (12.5 mg) by mouth daily   hydrocortisone 1 % ointment   No Yes   Sig: Apply sparingly to affected area three times daily for 14 days.   ibuprofen  (ADVIL/MOTRIN) 600 MG tablet Past Month at Unknown time  No Yes   Sig: Take 1 tablet (600 mg) by mouth every 6 hours as needed for other (mild pain)   lisinopril (PRINIVIL/ZESTRIL) 40 MG tablet More than a month at Unknown time  No No   Sig: Take 1 tablet (40 mg) by mouth daily   metoprolol (TOPROL-XL) 50 MG 24 hr tablet Unknown at Unknown time  No No   Sig: Take 1 tablet (50 mg) by mouth daily   miconazole (MICATIN; MICRO GUARD) 2 % powder   No Yes   Sig: Apply topically 2 times daily   senna-docusate (SENOKOT-S;PERICOLACE) 8.6-50 MG per tablet   No Yes   Sig: Take 1 tablet by mouth 2 times daily as needed for constipation      Facility-Administered Medications: None     Allergies   Allergies   Allergen Reactions     Penicillins        Social History   Social History     Social History     Marital status: Single     Spouse name: N/A     Number of children: 3     Years of education: N/A     Occupational History     Not on file.     Social History Main Topics     Smoking status: Former Smoker     Quit date: 1/1/1978     Smokeless tobacco: Never Used     Alcohol use No      Comment: quit 1977, previously an alcoholic     Drug use: No     Sexual activity: Not Currently     Other Topics Concern     Not on file     Social History Narrative       Family History[JS1.1]   I have reviewed this patient's family history and updated it with pertinent information if needed.[JS1.2]   Family History   Problem Relation Age of Onset     Other - See Comments Mother      pneumonia     Obesity Mother      Other Cancer Father      hodgkins lymphoma     Coronary Artery Disease Father      pacemaker     Other Cancer Sister      unknown cancers     Colon Cancer Son[JS1.4]        Review of Systems   CONSTITUTIONAL: No fever, chills, sweats, fatigue   EYES: no visual blurring, no double vision or visual loss  ENT: no decrease in hearing, no tinnitus, no vertigo, no hoarseness  RESPIRATORY: no shortness of breath, no cough, no sputum    CARDIOVASCULAR: no palpitations, no chest  pain, no exertional chest pain or pressure  GASTROINTESTINAL: no nausea or vomiting, or abd pain  GENITOURINARY: no dysuria, no frequency or hesitancy, no hematuria  MUSCULOSKELETAL: no weakness,[JS1.1] left knee pain[JS1.2]   SKIN: no rashes, ecchymoses, abrasions or lacerations  NEUROLOGIC: no numbness or tingling of hands, no numbness or tingling  of feet, no syncope, no tremors or weakness  PSYCHIATRIC: no sleep disturbances, no anxiety or depression    Physical Exam   Temp: 97.2  F (36.2  C) Temp src: Oral BP: 166/84 Pulse: 78 Heart Rate: 68 Resp: 17 SpO2: 96 % O2 Device: None (Room air)    Vital Signs with Ranges  Temp:  [97.2  F (36.2  C)-98.1  F (36.7  C)] 97.2  F (36.2  C)  Pulse:  [78] 78  Heart Rate:  [] 68  Resp:  [16-18] 17  BP: (137-189)/() 166/84  SpO2:  [93 %-96 %] 96 % 267 lbs 10.22 oz    # Pain Assessment:  Current Pain Score 5/5/2018   Patient currently in pain? denies   Pain score (0-10) -   Pain location -   Pain descriptors -[JS1.1]   not primary[JS1.2]       Primary Survey:  Airway: patient talking  Breathing: symmetric respiratory effort bilaterally  Circulation: central pulses present and peripheral pulses present  Disability: Pupils - left 4 mm and brisk, right 4 mm and brisk     [place] Coma Scale - Total[JS1.1] 15[JS1.3]/15  Eye Response (E):[JS1.1] 4[JS1.3]  4= spontaneous,  3= to verbal/voice, 2=  to pain, 1= No response   Verbal Response (V):[JS1.1] 5[JS1.3]   5= Orientated, converses,  4= Confused, converses, 3= Inappropriate words,  2= Incomprehensible sounds,  1=No response   Motor Response (M):[JS1.1] 6[JS1.3]   6= Obeys commands, 5= Localizes to pain, 4= Withdrawal to pain, 3=Fexion to pain, 2= Extension to pain, 1= No response    Secondary Survey:  General: alert, oriented to person, place, time  Head: atraumatic, normocephalic, trachea midline  Eyes: PERRLA, pupils[JS1.1] 4[JS1.3] mm, EOMI, corneas and conjunctivae  clear  Ears:[JS1.1] no drainage[JS1.3]   Nose: nares patent, no drainage, nasal septum non-tender  Mouth/Throat: no exudates or erythema,  no dental tenderness or malocclusions, no tongue lacerations  Neck:   No midline posterior tenderness, full AROM without pain or tenderness   Chest/Pulmonary: normal respiratory rate and rhythm, no chest wall tenderness or deformities,   Cardiovascula[JS1.1]r[JS1.3]  normal and regular rate and rhythm  Abdomen: soft, non-tender, no guarding, no rebound tenderness and no tenderness to palpation  : , pelvis stable to lateral compression,  no nesbitt, no urine assess  Back/Spine: no deformity, no midline tenderness, no sacral tenderness,  no step-offs and no abrasions or contusions  Musculoskel/Extremities:[JS1.1] left knee in immobilzer[JS1.3], full AROM of major joints[JS1.1]  Other than left knee[JS1.3] without tenderness, edema, erythema, ecchymosis, or abrasions.[JS1.1] 2+[JS1.3] PP.[JS1.1] no[JS1.3] edema.   Hand: no gross deformities of hands or fingers. Full AROM of hand and fingers in flexion and extension.  strength equal and symmetric.   Skin: no rashes, laceration, ecchymosis, skin warm and dry.   Neuro: PERRLA, alert, oriented x[JS1.1] 3[JS1.3]. CN II-XII grossly intact. No focal deficits. Strength[JS1.1] 5[JS1.3]/5 x 4 extremities.  Sensation intact.  Psychiatric: affect/mood normal, cooperative, normal judgement/insight and memory intact    Data   UA RESULTS:  Recent Labs   Lab Test  05/04/18 2008   COLOR  Yellow   APPEARANCE  Clear   URINEGLC  Negative   URINEBILI  Negative   URINEKETONE  Negative   SG  1.019   UBLD  Negative   URINEPH  5.5   PROTEIN  10*   NITRITE  Negative   LEUKEST  Negative   RBCU  0   WBCU  <1          Studies:  CT Knee Left w/o Contrast   Final Result   IMPRESSION: Questionable nondisplaced fractures of the distal femoral   shaft seen on the sagittal image.   2. Questionable proximal fibular fracture also best seen on the   sagittal  images.   3. Follow-up MRI scan for confirmation is recommended.      MENDEZ MATAMOROS MD      XR Knee Left 3 Views   Final Result   IMPRESSION: No acute fracture.  Mild degenerative changes of the left   knee      MD Petey WEST[JS1.1]       Revision History        User Key Date/Time User Provider Type Action    > JS1.3 5/5/2018 11:47 AM Petey Ramon MD Physician Sign     JS1.4 5/5/2018 11:10 AM Petey Ramon MD Physician      JS1.2 5/5/2018 11:04 AM Petey Ramon MD Physician      JS1.1 5/5/2018 10:57 AM Petey Ramon MD Physician                      Progress Notes - Physician (Notes from 05/04/18 through 05/07/18)      Progress Notes by Amber Hoover at 5/7/2018 10:56 AM     Author:  Amber Hoover Service:  (none) Author Type:  Technician    Filed:  5/7/2018 10:58 AM Date of Service:  5/7/2018 10:56 AM Creation Time:  5/7/2018 10:56 AM    Status:  Signed :  Amber Hoover (Technician)         Jose Antonio Marmolejo 72 year old    Returned from mri  Exam Performed : mri left knee  Pushed to Reading Service?: No  Tolerated Procedure : well    May resume previous diet : Yes    Time Returned to Unit : 10:45  Report Given to : nurse amador    5/7/2018 10:56 AM   SABRINA HURD[TR1.1]       Revision History        User Key Date/Time User Provider Type Action    > TR1.1 5/7/2018 10:58 AM Amber Hoover Technician Sign            Progress Notes by Skip Jenkins MD at 5/6/2018 10:03 PM     Author:  Skip Jenkins MD Service:  Hospitalist Author Type:  Physician    Filed:  5/6/2018 10:20 PM Date of Service:  5/6/2018 10:03 PM Creation Time:  5/6/2018 10:03 PM    Status:  Signed :  Skip Jenkins MD (Physician)         [facility]    Hospitalist Progress Note      Assessment & Plan   Jose Antonio Marmolejo is a 72 year old male with history of abnormal white matter lesions on MRI brain with concern for possible multiple sclerosis, multiple falls, diabetes, hypertension and  arthritis who presents with mechanical fall yesterday morning and imaging suggestive of possible left distal femur and left proximal fibula fracture.     1. Possible nondisplaced left distal femur and proximal fibula fractures:  Appreciate surgical consult recommendations by Dr. Ramon. Patient will remain in immobilizer until follow up with ortho this week outpatient per recommendations from Dr. Tena. He will need further assessment with MRI to evaluate possible fractures, although this cannot be done over weekend and will try to get this done tomorrow. His pain is currently adequately controlled with oral Norco. Really does not complain of pain except with movement. Will need SNF. He has much difficulty maintaining NWB status upon assessment with PT and will need transfer and balance training, conditioning before safe to return home.     2. Possible multiple sclerosis:  Had abnormal brain MRI in January this year with white matter lesions involving bilateral hemispheres and brainstem. Will still need neurology evaluation as an outpatient. PCP has referred to [facility], but I see no appointments set up for him in the [facility] system. Yet patient seems to believe he has an appointment with Neurology for 5/8/18 although is not sure where. Will need to contact office of his PCP to see if they can help establish when/where his appointment is so arrangements can be made for his transfer from SNF to his appointment.    3. Recurrent falls:  May likely be related to possible underlying multiple sclerosis. He has made his home handicap accessible. He indicates having fallen over 100 times in the past year.     4. Diabetes mellitus type 2:  Patient had previously been confused about this diagnosis. Recalls that he stopped seeing Dr. Rosales in the past after he feels that he was told he had diabetes on one visit and then on a follow up visit feels that when he inquired and had more questions concerning the diabetes,  he was told by same provider that the provider had never told him he had diabetes. He recently did establish care with Dr. Briggs who noted his elevated hemoglobin A1C 6.7, and did discuss with him the diagnosis of diabetes. No medications added at the time. Although discussion initiated regarding him being a good candidate for ASA/statin and possibly Metformin. Will defer to PCP, but at least will put in referral for some initial diabetes education.      5. Hypertension:  Has been on/off noncompliant with his antihypertensives in the past, at times going without his medications. Will continue his lisinopril, HCTZ and Toprol XL. Monitor BP adequately controlled at present.       # Pain Assessment:  Current Pain Score 5/6/2018   Patient currently in pain? denies   Pain score (0-10) -   Pain location -   Pain descriptors -   Jose Antonio velarde pain level was assessed and he currently denies pain.        DVT Prophylaxis: Enoxaparin (Lovenox) SQ  Code Status: Full Code    Disposition: Expected discharge in 1-2 days once SNF can be arranged  Skip THOMAS Sharp    Interval History   Sitting up in chair. Pain not present at rest. Only with movement or attempt to transfer. Feeling much better being up to chair. No other complaints    -Data reviewed today: I reviewed all new labs and imaging results over the last 24 hours. I personally reviewed no images or EKG's today.    Physical Exam   Temp: 97.8  F (36.6  C) Temp src: Tympanic BP: 136/68   Heart Rate: 68 Resp: 16 SpO2: 95 % O2 Device: None (Room air)    Vitals:    05/04/18 1723 05/05/18 0008   Weight: 118.8 kg (262 lb) 121.4 kg (267 lb 10.2 oz)     Vital Signs with Ranges  Temp:  [97.1  F (36.2  C)-98.5  F (36.9  C)] 97.8  F (36.6  C)  Heart Rate:  [68-79] 68  Resp:  [16-17] 16  BP: (136-172)/(68-84) 136/68  SpO2:  [95 %-96 %] 95 %  I/O last 3 completed shifts:  In: 960 [P.O.:960]  Out: 2150 [Urine:2150]    Peripheral IV 05/04/18 Right Hand (Active)   Site Assessment WDL 5/6/2018  6:00  PM   Line Status Saline locked;Checked every 1-2 hour 5/6/2018  6:00 PM   Phlebitis Scale 0-->no symptoms 5/6/2018  6:00 PM   Infiltration Scale 0 5/6/2018  6:00 PM   Number of days:2     No line/device    Constitutional: Alert and oriented x3. No distress    HEENT: NC/AT, PERRL, EOMI, mouth clear, neck supple, no LN.     Cardiovascular: RRR. No murmur, no rubs, or gallops.      Respiratory: Clear to auscultation bilaterally    Abdomen: Obese, soft, NTND, no organomegaly. Bowel sounds present    Extremities: Warm/dry. No edema. Left leg in immobilizer    Neuro:   Non focal, cranial nerves intact, Moves all extremities.    Medications       enoxaparin  40 mg Subcutaneous Q24H     hydrochlorothiazide  12.5 mg Oral Daily     lisinopril  40 mg Oral Daily     metoprolol succinate  50 mg Oral Daily     senna-docusate  1 tablet Oral BID    Or     senna-docusate  2 tablet Oral BID       Data     Recent Labs  Lab 05/05/18  0524 05/04/18  1751   WBC 6.4 8.5   HGB 14.4 16.0   MCV 85 85    286    140   POTASSIUM 4.1 3.9   CHLORIDE 109 109   CO2 24 24   BUN 16 18   CR 0.95 1.06   ANIONGAP 7 7   STALIN 8.2* 8.5   * 125*       No results found for this or any previous visit (from the past 24 hour(s)).[SS1.1]       Revision History        User Key Date/Time User Provider Type Action    > SS1.1 5/6/2018 10:20 PM Skip Jenkins MD Physician Sign            Progress Notes by Petey Ramon MD at 5/6/2018  1:19 PM     Author:  Petey Ramon MD Service:  Surgery Author Type:  Physician    Filed:  5/6/2018  1:21 PM Date of Service:  5/6/2018  1:19 PM Creation Time:  5/6/2018  1:19 PM    Status:  Signed :  Petey Ramon MD (Physician)         Discussed with primary team no acute issues at this time, awaiting placement at this point, will obtain MRI tomorrow when able per discussion. Trauma surgery will sign off.[JS1.1]     Petey R. Skaja[JS1.2]       Revision History        User Key Date/Time User  Provider Type Action    > JS1.2 5/6/2018  1:21 PM Petey Ramon MD Physician Sign     JS1.1 5/6/2018  1:19 PM Petey Ramon MD Physician             Progress Notes by Radha Nash PT at 5/6/2018 12:37 PM     Author:  Radha Nash PT Service:  (none) Author Type:  Physical Therapist    Filed:  5/6/2018 12:37 PM Date of Service:  5/6/2018 12:37 PM Creation Time:  5/6/2018 12:37 PM    Status:  Signed :  Radha Nash PT (Physical Therapist)            05/06/18 1200   Quick Adds   Type of Visit Initial PT Evaluation   Living Environment   Lives With alone   Living Arrangements house   Home Accessibility ramps present at home;stairs to enter home;stairs within home   Number of Stairs to Enter Home 4   Number of Stairs Within Home 12   Living Environment Comment Patient has a handicap accessible home. Patient is able to stay on 1st level and has ramps to enter home.    Self-Care   Usual Activity Tolerance moderate   Current Activity Tolerance poor   Regular Exercise no   Equipment Currently Used at Home walker, rolling   Activity/Exercise/Self-Care Comment Patient has history of many falls recently. Is beeing assessed for multiple sclerosis    Functional Level Prior   Ambulation 1-->assistive equipment   Transferring 1-->assistive equipment   Toileting 1-->assistive equipment   Bathing 1-->assistive equipment   Dressing 0-->independent   Eating 0-->independent   Communication 0-->understands/communicates without difficulty   Swallowing 0-->swallows foods/liquids without difficulty   Cognition 1 - attention or memory deficits   Fall history within last six months yes   Number of times patient has fallen within last six months 10   Which of the above functional risks had a recent onset or change? ambulation;transferring;bathing;toileting   Prior Functional Level Comment Pt reports he ambulates with a 4WW at home. Reports he falls all the time. Reports son was living with him but now has moved  out. Reports stairs are very difficult to so he pretty much just stays in his house. Has a neighbor who helps him when he can   General Information   Onset of Illness/Injury or Date of Surgery - Date 05/04/18   Referring Physician Dr. Jenkins   Patient/Family Goals Statement to get better and be safe with mobility   Pertinent History of Current Problem (include personal factors and/or comorbidities that impact the POC) Patient experienced a fall where both LEs suddenly buckled resulting in a distal femur fracture and fibula  fracture on L LE. Patient is in an immobilizer and is NWB on L LE. Has upcoming assessment with ortho. Patient has some anxiety about mobility and falls. Patient has significant history of falls and is being tested for MS.    Precautions/Limitations fall precautions   Weight-Bearing Status - LLE nonweight-bearing   General Observations Anxious about mobility and falls   Cognitive Status Examination   Orientation orientation to person, place and time   Level of Consciousness alert   Follows Commands and Answers Questions (resistant to some cues, likes to control situation)   Personal Safety and Judgment intact   Memory intact   Pain Assessment   Patient Currently in Pain Yes, see Vital Sign flowsheet   Range of Motion (ROM)   ROM Comment Functional LE motion. L LE immobilzed   Strength   Strength Comments Weakness and deconditioning present. Patient was unable to maintain NWB on L LE when coming into stand and maintaining standing position.    Bed Mobility   Bed Mobility Comments Not tested. Patient up in chair   Transfer Skills   Transfer Comments Patient was Mod A for sit to stand transfer and during transfer and once in standing patient was unable to maintain NWB on L LE depite cuing. Patient fatigued quickly in standing.    Gait   Gait Comments Unable to perform. Patient lacks strength and body awareness to allow for gait with FWW with L LE NWB. Patient declined gait. Would only perform one  "stand after much encouragement by therapist.    Balance   Balance Comments Deficits present. Patient is unable to single leg stand with use of FWW   Clinical Impression   Criteria for Skilled Therapeutic Intervention evaluation only   PT Diagnosis Decreased functional mobility following L LE fx.    Clinical Presentation Stable/Uncomplicated   Clinical Presentation Rationale clinical judgement   Clinical Decision Making (Complexity) Low complexity   Clinical Impression Comments Patient is not safe for return to home. Patient will need placement at inpation rehab or TCU for strengthening, transfer training and balance training to allow safe return to home.    [facility] \"6 Clicks\"   2016, Trustees of [facility], under license to [employer].  All rights reserved.   6 Clicks Short Forms Basic Mobility Inpatient Short Form   [facility]  \"6 Clicks\" V.2 Basic Mobility Inpatient Short Form   1. Turning from your back to your side while in a flat bed without using bedrails? 3 - A Little   2. Moving from lying on your back to sitting on the side of a flat bed without using bedrails? 3 - A Little   3. Moving to and from a bed to a chair (including a wheelchair)? 1 - Total   4. Standing up from a chair using your arms (e.g., wheelchair, or bedside chair)? 2 - A Lot   5. To walk in hospital room? 1 - Total   6. Climbing 3-5 steps with a railing? 1 - Total   Basic Mobility Raw Score (Score out of 24.Lower scores equate to lower levels of function) 11   Total Evaluation Time   Total Evaluation Time (Minutes) 12[KS1.1]        Revision History        User Key Date/Time User Provider Type Action    > KS1.1 5/6/2018 12:37 PM Radha Nash PT Physical Therapist Sign            Progress Notes by Vero Ba CM at 5/5/2018 12:52 PM     Author:  Vero Ba CM Service:  Ancillary, Case Management Author Type:      Filed:  5/5/2018  1:20 PM Date of Service:  " 5/5/2018 12:52 PM Creation Time:  5/5/2018 12:52 PM    Status:  Signed :  Vero Ba CM ()         Assessment completed see flowsheet.      LOC: alert    Others present: Patient    Dx: (L) femur fracture    Lives with: Lives With: alone    Living at: Living Arrangements: house    Equipment used:  Rolling walker, wheelchair ramps to garage and house, handicap accessible shower with shower chair.     Support System: Description of Support System: Supportive, Involved    Primary Care Provider:[CL1.1] Juanita Briggs[CL1.2]    POA/Guardian: No     Health Care Directive: YES States he will provide information     Pharmacy: [facility]     :  No     Homecare/County Services:   No    Adequate Resources for needs (housing, utilities, food/med): YES    Meds and appointments management: NO- patient reports no medication routine concerns and states he does take his medications as ordered, but on chart review, patient has a history of missing appts at times     Work: NO- retired from [employer] where he was a      Transportation: YES - states he uses [employer] and would like to use on hospital discharge     Falls: Yes  Reason for falls risk:  Mobility- patient states he fell at home when his knees gave out and he states he doesn't know why they gave out (which resulted in this hospitalization). He states he was using walker at the time but still fell and ended up with this injury. Patient denies any other recent falls, besides the ones prior to his last hospitalization here, where he states he had been falling d/t loss of balance.     Able to Return to Prior Living Arrangements: NO- patient is non-weight bearing and cannot return home safely at this time.     : YES- not established. Has the information on getting established but has not yet done that.     Goals: To return home     Barriers: Patient needs short-term rehab as he is non-weight  bearing, he is agreeable to this.     MARY KATE: None     Discharge Plan: Short-term rehab, transport by [employer].     PCP is Dr. Briggs at [facility], he has not recently seen a dentist or eye doctor and states he can make those appts soon. Patient denies any medication routine concerns, sleep or mood concerns. Patient states he has a license and a vehicle but has not felt very safe to drive for the past year and a half so he has been using [employer] [employer] to get to appts. He states he does not get out of the house otherwise. Patient denies tobacco, alcohol, or other drug use.     Referrals placed to [facility] and [facility] in [place], as they are the only local SNFs who accept new admissions on weekends. [facility] responded that they do not have adequate staffing for a new referral over the weekend but could accept patient on Monday. [facility] stated that the admissions coordinator who processes [employer] prior authorizations is not there until Monday so they would not be able to start that process until then. Reviewed choice of vendor with patient and he would like 1) [facility] in [place] 2) [facility] in [place] and 3) [facility] in [place]. Patient adamantly refuses to go to [facility] again, as he had gone there on a prior hospital discharge and did not like the place at all. His complaints were the food, the small uncomfortable beds, the building was cold. He again reiterated that he would go home even if he was advised not to over going back there. This writer called [facility] to see if they could possibly do a weekend admission, as patient has been there recently, but staff stated they cannot accept weekend admissions (as per usual). Referrals sent to patient's SNF choices.[CL1.1]        Revision History        User Key Date/Time User Provider Type Action    > CL1.2 5/5/2018  1:20 PM Vero Ba CM Case  "Manager Sign     CL1.1 5/5/2018 12:52 PM Vero Ba CM              ED Notes by Amira Montanez RN at 5/5/2018 12:02 AM     Author:  Amira Montanez RN Service:  (none) Author Type:  Registered Nurse    Filed:  5/5/2018 12:03 AM Date of Service:  5/5/2018 12:02 AM Creation Time:  5/5/2018 12:03 AM    Status:  Signed :  Amira Montanez RN (Registered Nurse)         Report to SYLVIA Anderson and pt transferred to 3rd floor by .[KS1.1]      Revision History        User Key Date/Time User Provider Type Action    > KS1.1 5/5/2018 12:03 AM Amira Montanez RN Registered Nurse Sign            ED Provider Notes by Anthony Hernadez PA at 5/4/2018  5:23 PM     Author:  Anthony Hernadez PA Service:  Emergency Medicine Author Type:  Physician Assistant    Filed:  5/4/2018 11:33 PM Date of Service:  5/4/2018  5:23 PM Creation Time:  5/4/2018  6:00 PM    Status:  Signed :  Anthony Hernadez PA (Physician Assistant)           History[CM1.1]     Chief Complaint   Patient presents with     Generalized Weakness[CM1.2]     HPI  Jose Antonio Marmolejo is a 72 year old male with Hx DM, HTN who presents to ED via EMS for leg weakness after fall this morning. When asked about medication changes, he has stopped his \"blood pressure pills\" and arrived with /104. He denies headache, vision changes, chest pain, shortness of breath. He has left knee pain after fall onto his knees earlier today from sensation of knees giving way. He called EMS for transfer assist and decided he was OK. He was able to get to the bathroom x3 afterwards without too much discomfort, but as the day went on his left knee felt more unstable, prompting visit. Fall was mechanical in nature, he denies lightheadedness and denies syncope.     Problem List:[CM1.1]    Patient Active Problem List    Diagnosis Date Noted     Pain management 05/04/2018     Priority: Medium     Nursing Home Visit 03/30/2018     Priority: Medium     Recurrent falls      " Priority: Medium     Fall 03/21/2018     Priority: Medium     Falls frequently 12/31/2017     Priority: Medium     Type 2 diabetes mellitus without complication (H) 08/19/2016     Priority: Medium     Weakness of both lower extremities 08/19/2016     Priority: Medium     Yeast infection of the skin 08/19/2016     Priority: Medium     Essential hypertension 08/17/2016     Priority: Medium     Falling episodes 08/17/2016     Priority: Medium     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     Priority: Medium[CM1.2]        Past Medical History:[CM1.1]    Past Medical History:   Diagnosis Date     Arthritis      Diabetes (H)      Hypertension      Recurrent falls[CM1.2]        Past Surgical History:[CM1.1]    Past Surgical History:   Procedure Laterality Date     ORTHOPEDIC SURGERY      left ankle and removal of hardware[CM1.2]       Family History:[CM1.1]    Family History   Problem Relation Age of Onset     Other - See Comments Mother      pneumonia     Obesity Mother      Other Cancer Father      hodgkins lymphoma     Coronary Artery Disease Father      pacemaker     Other Cancer Sister      unknown cancers     Colon Cancer Son[CM1.2]        Social History:  Marital Status:  Single [1][CM1.1]  Social History   Substance Use Topics     Smoking status: Former Smoker     Quit date: 1/1/1978     Smokeless tobacco: Never Used     Alcohol use No      Comment: quit 1977, previously an alcoholic[CM1.2]        Medications:[CM1.1]      acetaminophen (TYLENOL) 325 MG tablet   Blood Pressure Monitoring (ADULT BLOOD PRESSURE CUFF LG) KIT   hydrochlorothiazide (MICROZIDE) 12.5 MG capsule   hydrocortisone 1 % ointment   ibuprofen (ADVIL/MOTRIN) 600 MG tablet   lisinopril (PRINIVIL/ZESTRIL) 40 MG tablet   miconazole (MICATIN; MICRO GUARD) 2 % powder   senna-docusate (SENOKOT-S;PERICOLACE) 8.6-50 MG per tablet   metoprolol (TOPROL-XL) 50 MG 24 hr tablet[CM1.2]         Review of Systems   Constitutional: Negative for fatigue and  fever.   Respiratory: Negative.  Negative for cough.    Cardiovascular: Negative.  Negative for chest pain.   Musculoskeletal: Positive for arthralgias and joint swelling.   Skin: Negative.  Negative for wound.   Neurological: Positive for weakness (sensation of knee giving way). Negative for syncope and light-headedness.   Psychiatric/Behavioral: Negative.        Physical Exam[CM1.1]   BP: (!) 159/104  Heart Rate: 119  Temp: 98.1  F (36.7  C)  Resp: 16  Weight: 118.8 kg (262 lb)  SpO2: 94 %[CM1.2]      Physical Exam   Constitutional: He is oriented to person, place, and time. He appears well-developed and well-nourished. No distress.   HENT:   Head: Normocephalic and atraumatic.   Eyes: Conjunctivae are normal.   Cardiovascular: Normal heart sounds.    Pulmonary/Chest: Effort normal and breath sounds normal.   Abdominal:[CM1.1] Soft[CM1.3].[CM1.1] Bowel sounds are normal[CM1.3].   Musculoskeletal:        Right knee: He exhibits[CM1.1] normal range of motion[CM1.3].[CM1.1] No tenderness[CM1.3] found.        Left knee: He exhibits[CM1.1] decreased range of motion (pain limits flexion, pt can fully extend)[CM1.3] and[CM1.1] swelling (mil)[CM1.3]. He exhibits[CM1.1] no deformity[CM1.3],[CM1.1] no laceration[CM1.3],[CM1.1] no erythema[CM1.3],[CM1.1] no LCL laxity[CM1.3] and[CM1.1] no MCL laxity[CM1.3].[CM1.1] Tenderness (anterior into lateral jt space)[CM1.3] found.   Neurological: He is alert and oriented to person, place, and time.   Skin: Skin is warm and dry.   Psychiatric: He has a normal mood and affect.   Nursing note and vitals reviewed.      ED Course[CM1.1]     ED Course[CM1.2]     Procedures[CM1.1]      Results for orders placed or performed during the hospital encounter of 05/04/18 (from the past 24 hour(s))   CBC with platelets   Result Value Ref Range    WBC 8.5 4.0 - 11.0 10e9/L    RBC Count 5.49 4.4 - 5.9 10e12/L    Hemoglobin 16.0 13.3 - 17.7 g/dL    Hematocrit 46.5 40.0 - 53.0 %    MCV 85 78 - 100 fl     MCH 29.1 26.5 - 33.0 pg    MCHC 34.4 31.5 - 36.5 g/dL    RDW 14.2 10.0 - 15.0 %    Platelet Count 286 150 - 450 10e9/L   Basic metabolic panel   Result Value Ref Range    Sodium 140 133 - 144 mmol/L    Potassium 3.9 3.4 - 5.3 mmol/L    Chloride 109 94 - 109 mmol/L    Carbon Dioxide 24 20 - 32 mmol/L    Anion Gap 7 3 - 14 mmol/L    Glucose 125 (H) 70 - 99 mg/dL    Urea Nitrogen 18 7 - 30 mg/dL    Creatinine 1.06 0.66 - 1.25 mg/dL    GFR Estimate 69 >60 mL/min/1.7m2    GFR Estimate If Black 83 >60 mL/min/1.7m2    Calcium 8.5 8.5 - 10.1 mg/dL   UA reflex to Microscopic   Result Value Ref Range    Color Urine Yellow     Appearance Urine Clear     Glucose Urine Negative NEG^Negative mg/dL    Bilirubin Urine Negative NEG^Negative    Ketones Urine Negative NEG^Negative mg/dL    Specific Gravity Urine 1.019 1.003 - 1.035    Blood Urine Negative NEG^Negative    pH Urine 5.5 4.7 - 8.0 pH    Protein Albumin Urine 10 (A) NEG^Negative mg/dL    Urobilinogen mg/dL 2.0 0.0 - 2.0 mg/dL    Nitrite Urine Negative NEG^Negative    Leukocyte Esterase Urine Negative NEG^Negative    Source Unspecified Urine     RBC Urine 0 0 - 2 /HPF    WBC Urine 1 0 - 5 /HPF    Bacteria Urine Few (A) NEG^Negative /HPF    Mucous Urine Present (A) NEG^Negative /LPF   XR Knee Left 3 Views    Narrative    PROCEDURE:  XR KNEE LT 3 VW    HISTORY: fall, knee pain, swelling;     COMPARISON:  None.    TECHNIQUE:  3 views of the left knee were obtained.    FINDINGS:  No fracture or dislocation is identified. There is some  mild joint space narrowing at the medial femoral tibial articulation..   Mild degenerative changes are seen at the patellofemoral  articulation.      Impression    IMPRESSION: No acute fracture.  Mild degenerative changes of the left  knee    MENDEZ MATAMOROS MD   UA reflex to Microscopic and Culture   Result Value Ref Range    Color Urine Yellow     Appearance Urine Clear     Glucose Urine Negative NEG^Negative mg/dL    Bilirubin Urine  Negative NEG^Negative    Ketones Urine Negative NEG^Negative mg/dL    Specific Gravity Urine 1.019 1.003 - 1.035    Blood Urine Negative NEG^Negative    pH Urine 5.5 4.7 - 8.0 pH    Protein Albumin Urine 10 (A) NEG^Negative mg/dL    Urobilinogen mg/dL 2.0 0.0 - 2.0 mg/dL    Nitrite Urine Negative NEG^Negative    Leukocyte Esterase Urine Negative NEG^Negative    Source Midstream Urine     RBC Urine 0 0 - 2 /HPF    WBC Urine <1 0 - 5 /HPF    Bacteria Urine None (A) NEG^Negative /HPF    Mucous Urine Present (A) NEG^Negative /LPF    sperm Present (A) NEG^Negative /HPF   CT Knee Left w/o Contrast    Narrative    PROCEDURE: CT KNEE LEFT W/O CONTRAST 5/4/2018 8:56 PM    HISTORY: fall, instability left knee;     COMPARISONS: None.    Meds/Dose Given:    TECHNIQUE: CT scan left knee with sagittal and coronal reconstructions    FINDINGS: Is a questionable nondisplaced fracture of the proximal  fibular shaft seen best on the sagittal images. On the sagittal views  there is a questionable nondisplaced fracture of the distal femoral  shaft. This is also best seen on the sagittal views. The  proximal  tibia is intact no fractures are noted. 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 There are  degenerative changes at the patellofemoral articulation. Small knee  effusion is seen.         Impression    IMPRESSION: Questionable nondisplaced fractures of the distal femoral  shaft seen on the sagittal image.  2. Questionable proximal fibular fracture also best seen on the  sagittal images.  3. Follow-up MRI scan for confirmation is recommended.    MENDEZ MATAMOROS MD       Medications   HYDROcodone-acetaminophen (NORCO) 7.5-325 MG per tablet 1 tablet (1 tablet Oral Given 5/4/18 0060)   acetaminophen (TYLENOL) tablet 650 mg (not administered)   acetaminophen (TYLENOL) Suppository 650 mg (not administered)   naloxone (NARCAN) injection  0.1-0.4 mg (not administered)   melatonin tablet 1 mg (not administered)   ondansetron (ZOFRAN-ODT) ODT tab 4 mg (not administered)     Or   ondansetron (ZOFRAN) injection 4 mg (not administered)   ibuprofen (ADVIL/MOTRIN) tablet 600 mg (not administered)   HYDROcodone-acetaminophen (NORCO) 5-325 MG per tablet 1-2 tablet (not administered)   morphine (PF) injection 1 mg (not administered)   senna-docusate (SENOKOT-S;PERICOLACE) 8.6-50 MG per tablet 1 tablet (not administered)     Or   senna-docusate (SENOKOT-S;PERICOLACE) 8.6-50 MG per tablet 2 tablet (not administered)   senna-docusate (SENOKOT-S;PERICOLACE) 8.6-50 MG per tablet 1 tablet (not administered)     Or   senna-docusate (SENOKOT-S;PERICOLACE) 8.6-50 MG per tablet 2 tablet (not administered)   polyethylene glycol (MIRALAX/GLYCOLAX) Packet 17 g (not administered)   bisacodyl (DULCOLAX) Suppository 10 mg (not administered)   0.9% sodium chloride BOLUS (0 mLs Intravenous Stopped 5/4/18 2023)   acetaminophen (TYLENOL) tablet 975 mg (975 mg Oral Given 5/4/18 1940)   ketorolac (TORADOL) injection 15 mg (15 mg Intravenous Given 5/4/18 2219)[CM1.2]       Assessments & Plan (with Medical Decision Making)     I have reviewed the nursing notes.  I have reviewed the findings, diagnosis, plan and need for follow up with the patient.[CM1.1]    Current Discharge Medication List          Final diagnoses:   Closed fracture of distal end of left femur, unspecified fracture morphology, initial encounter (H)   Inability to bear weight - Pain limiting[CM1.2]   We initially tried to get Jose Antonio up and weight bearing after XR negative. He was unable to bear weight, so CT of the knee was ordered and reveals questionable nondisplaced Fx of distal femur and proximal fibula. I spoke with Dr Tena with [facility] and he advises knee immobilizer, nonweight-bearing with touch-down weight bearing at [facility], and PT. Follow up with [facility] next week. I spoke with Dr Gene perez  "hospitalist who graciously accepts care for observation as we were again unable to have Jose Antonio weight bear after immobilizer and walker (he is not a candidate for crutches).[CM1.3]     5/4/2018   [facility][CM1.1]     Anthony Hernadez PA  05/04/18 2333  [CM1.4]     Revision History        User Key Date/Time User Provider Type Action    > CM1.4 5/4/2018 11:33 PM Anthony Hernadez PA Physician Assistant Sign     CM1.2 5/4/2018 11:28 PM Anthony Hernadez PA Physician Assistant      CM1.3 5/4/2018 11:17 PM Anthony Hernadez PA Physician Assistant      CM1.1 5/4/2018  6:00 PM Anthony Hernadez PA Physician Assistant             ED Notes by Amira Montanez RN at 5/4/2018  9:30 PM     Author:  Amira Montanez RN Service:  (none) Author Type:  Registered Nurse    Filed:  5/4/2018 10:58 PM Date of Service:  5/4/2018  9:30 PM Creation Time:  5/4/2018 10:58 PM    Status:  Signed :  Amira Montanez RN (Registered Nurse)         Pt on cart eating boxed lunch.[KS1.1]      Revision History        User Key Date/Time User Provider Type Action    > KS1.1 5/4/2018 10:58 PM Amira Montanez RN Registered Nurse Sign            ED Notes by Amira Montanez RN at 5/4/2018 10:57 PM     Author:  Amira Montanez RN Service:  (none) Author Type:  Registered Nurse    Filed:  5/4/2018 10:57 PM Date of Service:  5/4/2018 10:57 PM Creation Time:  5/4/2018 10:57 PM    Status:  Signed :  Amira Montanez RN (Registered Nurse)         Attempted to stand pt after knee immobilizer applied to left knee. Pt stating \"it makes me feel weak.\" Pt also stating that he will be unable to care for self at home d/t pain.[KS1.1]      Revision History        User Key Date/Time User Provider Type Action    > KS1.1 5/4/2018 10:57 PM Amira Montanez, RN Registered Nurse Sign            ED Notes by Amira Montanez RN at 5/4/2018 10:45 PM     Author:  Amira Montanez RN Service:  (none) Author Type:  Registered Nurse    Filed:  5/4/2018 10:46 PM Date of " "Service:  5/4/2018 10:45 PM Creation Time:  5/4/2018 10:46 PM    Status:  Signed :  Amira Montanez RN (Registered Nurse)         Pt stating \"I am not going to get up and walk on a broken knee. Just call [facility] and send me there. I want the surgery done.\"[KS1.1]      Revision History        User Key Date/Time User Provider Type Action    > KS1.1 5/4/2018 10:46 PM Amira Montanez RN Registered Nurse Sign            ED Notes by Shyann Villanueva RN at 5/4/2018  8:11 PM     Author:  Kay, Shyann, RN Service:  Emergency Medicine Author Type:  Registered Nurse    Filed:  5/4/2018  8:37 PM Date of Service:  5/4/2018  8:11 PM Creation Time:  5/4/2018  8:37 PM    Status:  Signed :  Shyann Villanueva RN (Registered Nurse)         Pt attempted to get up at bedside with SBA of 2 staff, walker and transfer belt.  Pt was able to pull himself off the bed using the walker but immediately was unable to stand with his left knee buckling and pt sat down. TRUDI Echevarria at bedside to witness.[TB1.1]     Revision History        User Key Date/Time User Provider Type Action    > TB1.1 5/4/2018  8:37 PM Shyann Villanueva RN Registered Nurse Sign            ED Notes by Lyndsay Olvera RN at 5/4/2018  7:05 PM     Author:  Lyndsay Olvera RN Service:  Emergency Medicine Author Type:  Registered Nurse    Filed:  5/4/2018  7:05 PM Date of Service:  5/4/2018  7:05 PM Creation Time:  5/4/2018  7:05 PM    Status:  Signed :  Lyndsay Olvera RN (Registered Nurse)         Face to face report given with opportunity to observe patient.    Report given to Amira OLVERA   5/4/2018  7:05 PM[CG1.1]         Revision History        User Key Date/Time User Provider Type Action    > CG1.1 5/4/2018  7:05 PM Lyndsay Olvera RN Registered Nurse Sign            ED Notes by Lyndsay Olvera RN at 5/4/2018  5:30 PM     Author:  Lyndsay Olvera, RN Service:  Emergency Medicine Author Type:  Registered Nurse    " Filed:  5/4/2018  5:36 PM Date of Service:  5/4/2018  5:30 PM Creation Time:  5/4/2018  5:36 PM    Status:  Signed :  Lyndsay Olvera RN (Registered Nurse)         Patient comes via [facility] with reports of generalized weakness. Pt reported he called ambulance this morning because he needed a lift assist. Pt declined to be brought to the ED.  Patient now reported that he was sitting in his recliner and his left leg started shaking and hurting.  Pt reported earlier he fell on his knees.  Denies hitting head. Pt also spoke with Jackelyn HERNANDEZ with [facility] today.  Patient reports he has some pain in his left knee, no deformities, no swelling and no bruising noted. Patient denies any other pains. Denies any vertigo. Denies any recent fevers or coughing. Patient reports he just cant get around anymore and thinks he may need an electronic wheelchair.[CG1.1]      Revision History        User Key Date/Time User Provider Type Action    > CG1.1 5/4/2018  5:36 PM Lyndsay Olvera RN Registered Nurse Sign                  Procedure Notes     No notes of this type exist for this encounter.         Progress Notes - Therapies (Notes from 05/04/18 through 05/07/18)      Progress Notes by Radha Nash PT at 5/6/2018 12:37 PM     Author:  Radha Nash PT Service:  (none) Author Type:  Physical Therapist    Filed:  5/6/2018 12:37 PM Date of Service:  5/6/2018 12:37 PM Creation Time:  5/6/2018 12:37 PM    Status:  Signed :  Radha Nash PT (Physical Therapist)            05/06/18 1200   Quick Adds   Type of Visit Initial PT Evaluation   Living Environment   Lives With alone   Living Arrangements house   Home Accessibility ramps present at home;stairs to enter home;stairs within home   Number of Stairs to Enter Home 4   Number of Stairs Within Home 12   Living Environment Comment Patient has a handicap accessible home. Patient is able to stay on 1st level and has ramps to enter home.    Self-Care    Usual Activity Tolerance moderate   Current Activity Tolerance poor   Regular Exercise no   Equipment Currently Used at Home walker, rolling   Activity/Exercise/Self-Care Comment Patient has history of many falls recently. Is beeing assessed for multiple sclerosis    Functional Level Prior   Ambulation 1-->assistive equipment   Transferring 1-->assistive equipment   Toileting 1-->assistive equipment   Bathing 1-->assistive equipment   Dressing 0-->independent   Eating 0-->independent   Communication 0-->understands/communicates without difficulty   Swallowing 0-->swallows foods/liquids without difficulty   Cognition 1 - attention or memory deficits   Fall history within last six months yes   Number of times patient has fallen within last six months 10   Which of the above functional risks had a recent onset or change? ambulation;transferring;bathing;toileting   Prior Functional Level Comment Pt reports he ambulates with a 4WW at home. Reports he falls all the time. Reports son was living with him but now has moved out. Reports stairs are very difficult to so he pretty much just stays in his house. Has a neighbor who helps him when he can   General Information   Onset of Illness/Injury or Date of Surgery - Date 05/04/18   Referring Physician Dr. Jenkins   Patient/Family Goals Statement to get better and be safe with mobility   Pertinent History of Current Problem (include personal factors and/or comorbidities that impact the POC) Patient experienced a fall where both LEs suddenly buckled resulting in a distal femur fracture and fibula  fracture on L LE. Patient is in an immobilizer and is NWB on L LE. Has upcoming assessment with ortho. Patient has some anxiety about mobility and falls. Patient has significant history of falls and is being tested for MS.    Precautions/Limitations fall precautions   Weight-Bearing Status - LLE nonweight-bearing   General Observations Anxious about mobility and falls   Cognitive  "Status Examination   Orientation orientation to person, place and time   Level of Consciousness alert   Follows Commands and Answers Questions (resistant to some cues, likes to control situation)   Personal Safety and Judgment intact   Memory intact   Pain Assessment   Patient Currently in Pain Yes, see Vital Sign flowsheet   Range of Motion (ROM)   ROM Comment Functional LE motion. L LE immobilzed   Strength   Strength Comments Weakness and deconditioning present. Patient was unable to maintain NWB on L LE when coming into stand and maintaining standing position.    Bed Mobility   Bed Mobility Comments Not tested. Patient up in chair   Transfer Skills   Transfer Comments Patient was Mod A for sit to stand transfer and during transfer and once in standing patient was unable to maintain NWB on L LE depite cuing. Patient fatigued quickly in standing.    Gait   Gait Comments Unable to perform. Patient lacks strength and body awareness to allow for gait with FWW with L LE NWB. Patient declined gait. Would only perform one stand after much encouragement by therapist.    Balance   Balance Comments Deficits present. Patient is unable to single leg stand with use of FWW   Clinical Impression   Criteria for Skilled Therapeutic Intervention evaluation only   PT Diagnosis Decreased functional mobility following L LE fx.    Clinical Presentation Stable/Uncomplicated   Clinical Presentation Rationale clinical judgement   Clinical Decision Making (Complexity) Low complexity   Clinical Impression Comments Patient is not safe for return to home. Patient will need placement at inpation rehab or TCU for strengthening, transfer training and balance training to allow safe return to home.    [facility] [employer] TM \"6 Clicks\"   2016, Trustees of [facility], under license to [employer].  All rights reserved.   6 Clicks Short Forms Basic Mobility Inpatient Short Form   [facility] AM-PAC  \"6 Clicks\" V.2 Basic Mobility " Inpatient Short Form   1. Turning from your back to your side while in a flat bed without using bedrails? 3 - A Little   2. Moving from lying on your back to sitting on the side of a flat bed without using bedrails? 3 - A Little   3. Moving to and from a bed to a chair (including a wheelchair)? 1 - Total   4. Standing up from a chair using your arms (e.g., wheelchair, or bedside chair)? 2 - A Lot   5. To walk in hospital room? 1 - Total   6. Climbing 3-5 steps with a railing? 1 - Total   Basic Mobility Raw Score (Score out of 24.Lower scores equate to lower levels of function) 11   Total Evaluation Time   Total Evaluation Time (Minutes) 12[KS1.1]        Revision History        User Key Date/Time User Provider Type Action    > KS1.1 5/6/2018 12:37 PM Radha Nash, CALLI Physical Therapist Sign

## 2018-05-04 NOTE — TELEPHONE ENCOUNTER
Call received from this patient who was instructed to call here.  He called them for lift assistance today as his legs gave out on him unexpectedly.    In review he has seen Dr Briggs in March, missed an appointment in April, which he indicates he was in the nursing home.    He did not see neurology as was recommended.  I spoke with her HUC who was given his phone number.    He now has an appointment to see Dr Briggs again and this process will re start.

## 2018-05-04 NOTE — IP AVS SNAPSHOT
` `     [facility]: [phone]            Medication Administration Report for Jose Antonio Marmolejo as of 05/07/18 1157   Legend:    Given Hold Not Given Due Canceled Entry Other Actions    Time Time (Time) Time  Time-Action       Inactive    Active    Linked        Medications 05/01/18 05/02/18 05/03/18 05/04/18 05/05/18 05/06/18 05/07/18    acetaminophen (TYLENOL) Suppository 650 mg  Dose: 650 mg  Freq: EVERY 4 HOURS PRN Route: RE  PRN Reason: mild pain  Start: 05/04/18 2304   Admin Instructions: Alternate ibuprofen (if ordered) with acetaminophen.  Maximum acetaminophen dose from all sources = 75 mg/kg/day not to exceed 4 grams/day.    Admin. Amount: 1 suppository (1 × 650 mg suppository)  Dispense Loc: [facility] [id number]               acetaminophen (TYLENOL) tablet 650 mg  Dose: 650 mg  Freq: EVERY 4 HOURS PRN Route: PO  PRN Reason: mild pain  Start: 05/04/18 2304   Admin Instructions: Alternate ibuprofen (if ordered) with acetaminophen.  Maximum acetaminophen dose from all sources = 75 mg/kg/day not to exceed 4 grams/day.    Admin. Amount: 2 tablet (2 × 325 mg tablet)  Dispense Loc: [facility] [id number]               bisacodyl (DULCOLAX) Suppository 10 mg  Dose: 10 mg  Freq: DAILY PRN Route: RE  PRN Reason: constipation  Start: 05/04/18 2309   Admin Instructions: Hold for loose stools.  This is the third step of a three step constipation treatment.    Admin. Amount: 1 suppository (1 × 10 mg suppository)  Dispense Loc: HI [id number]               enoxaparin (LOVENOX) injection 40 mg  Dose: 40 mg  Freq: EVERY 24 HOURS Route: SC  Start: 05/05/18 2045   Admin Instructions: HOLD if platelet count falls below 50% of baseline or less than 100,000/ L and notify provider.    Admin. Amount: 40 mg = 0.4 mL Conc: 40 mg/0.4 mL  Last Admin: 05/06/18 2025  Dispense Loc: HI [id number]  Volume: 0.4 mL         2041 (40 mg)-Given        2025 (40 mg)-Given        [ ] 2045           hydrochlorothiazide (MICROZIDE) capsule 12.5 mg  Dose: 12.5  mg  Freq: DAILY Route: PO  Start: 05/05/18 0100   Admin. Amount: 1 capsule (1 × 12.5 mg capsule)  Last Admin: 05/07/18 1055  Dispense Loc: HI [id number]         0141 (12.5 mg)-Given       1051 (12.5 mg)-Given        0840 (12.5 mg)-Given        1055 (12.5 mg)-Given           HYDROcodone-acetaminophen (NORCO) 5-325 MG per tablet 1-2 tablet  Dose: 1-2 tablet  Freq: EVERY 4 HOURS PRN Route: PO  PRN Reason: other  PRN Comment: pain control or improvement in physical function.  Start: 05/04/18 2308   Admin Instructions: Start with the lowest dose.    May adjust dose by 1 tablet every 4 hours as needed for pain control or improvement in physical function.  Hold dose for analgesic side effects.  Notify provider to assess for uncontrolled pain or analgesic side effects.  Maximum acetaminophen dose from all sources= 75 mg/kg/day not to exceed 4 grams    Admin. Amount: 1-2 tablet  Dispense Loc: HI [id number]               HYDROcodone-acetaminophen (NORCO) 7.5-325 MG per tablet 1 tablet  Dose: 1 tablet  Freq: EVERY 6 HOURS PRN Route: PO  PRN Reason: moderate to severe pain  Start: 05/04/18 2205   Admin Instructions: Maximum acetaminophen dose from all sources= 75 mg/kg/day not to exceed 4 grams    Admin. Amount: 1 tablet  Last Admin: 05/04/18 2218  Dispense Loc: HI [id number]        2218 (1 tablet)-Given              ibuprofen (ADVIL/MOTRIN) tablet 600 mg  Dose: 600 mg  Freq: EVERY 6 HOURS PRN Route: PO  PRN Reason: other  PRN Comment: mild pain  Start: 05/04/18 2308   Admin Instructions: Alternate acetaminophen (if ordered) with ibuprofen    Admin. Amount: 1 tablet (1 × 600 mg tablet)  Dispense Loc: HI [id number]               lisinopril (PRINIVIL/ZESTRIL) tablet 40 mg  Dose: 40 mg  Freq: DAILY Route: PO  Start: 05/05/18 0100   Admin. Amount: 1 tablet (1 × 40 mg tablet)  Last Admin: 05/06/18 2025  Dispense Loc: HI [id number]         0141 (40 mg)-Given              2036 (40 mg)-Given        2025 (40 mg)-Given        [ ] 2100            melatonin tablet 1 mg  Dose: 1 mg  Freq: AT BEDTIME PRN Route: PO  PRN Reason: sleep  Start: 05/04/18 2304   Admin Instructions: Do not give unless at least 6 hours of uninterrupted sleep is expected.    Admin. Amount: 1 tablet (1 × 1 mg tablet)  Dispense Loc: HI [id number]               metoprolol succinate (TOPROL-XL) 24 hr tablet 50 mg  Dose: 50 mg  Freq: DAILY Route: PO  Start: 05/05/18 0100   Admin Instructions: DO NOT CRUSH. Tablet may be split in half along score line.    Admin. Amount: 1 tablet (1 × 50 mg tablet)  Last Admin: 05/07/18 1055  Dispense Loc: HI [id number]         0141 (50 mg)-Given       1051 (50 mg)-Given        0840 (50 mg)-Given        1055 (50 mg)-Given           morphine (PF) injection 1 mg  Dose: 1 mg  Freq: EVERY 2 HOURS PRN Route: IV  PRN Reason: other  PRN Comment: IF unable to take PO. Pain control or improvement in physical function.  Start: 05/04/18 2308   Admin Instructions: Hold dose for analgesic side effects.  If needing IV pain meds for 2 or more doses call provider to have oral medications adjusted to get pain controlled on oral regimen prior to discharge.  For ordered doses up to 15 mg give IV Push undiluted over 4-5 minutes.    Admin. Amount: 1 mg  Dispense Loc: HI [id number]               naloxone (NARCAN) injection 0.1-0.4 mg  Dose: 0.1-0.4 mg  Freq: EVERY 2 MIN PRN Route: IV  PRN Reason: opioid reversal  Start: 05/04/18 2304   Admin Instructions: For respiratory rate LESS than or EQUAL to 8.  Partial reversal dose:  0.1 mg titrated q 2 minutes for Analgesia Side Effects Monitoring Sedation Level of 3 (frequently drowsy, arousable, drifts to sleep during conversation).Full reversal dose:  0.4 mg bolus for Analgesia Side Effects Monitoring Sedation Level of 4 (somnolent, minimal or no response to stimulation).  For ordered doses up to 2mg give IVP. Give each 0.4mg over 15 seconds in emergency situations. For non-emergent situations further dilute in 9mL of NS to facilitate  titration of response.    Admin. Amount: 0.1-0.4 mg = 0.25-1 mL Conc: 0.4 mg/mL  Dispense Loc: HI [id number]  Volume: 1 mL               ondansetron (ZOFRAN-ODT) ODT tab 4 mg  Dose: 4 mg  Freq: EVERY 6 HOURS PRN Route: PO  PRN Reasons: nausea,vomiting  Start: 05/04/18 2304   Admin Instructions: This is Step 1 of nausea and vomiting management.  If nausea not resolved in 15 minutes, go to Step 2 prochlorperazine (COMPAZINE). Do not push through foil backing. Peel back foil and gently remove. Place on tongue immediately. Administration with liquid unnecessary  With dry hands, peel back foil backing and gently remove tablet; do not push oral disintegrating tablet through foil backing; administer immediately on tongue and oral disintegrating tablet dissolves in seconds; then swallow with saliva; liquid not required.    Admin. Amount: 1 tablet (1 × 4 mg tablet)  Dispense Loc: HI [id number]              Or  ondansetron (ZOFRAN) injection 4 mg  Dose: 4 mg  Freq: EVERY 6 HOURS PRN Route: IV  PRN Reasons: nausea,vomiting  Start: 05/04/18 2304   Admin Instructions: This is Step 1 of nausea and vomiting management.  If nausea not resolved in 15 minutes, go to Step 2 prochlorperazine (COMPAZINE).  Irritant. For ordered doses up to 4 mg, give IV Push undiluted over 2-5 minutes.    Admin. Amount: 4 mg = 2 mL Conc: 4 mg/2 mL  Dispense Loc: HI [id number]  Infused Over: 2-5 Minutes  Volume: 2 mL               polyethylene glycol (MIRALAX/GLYCOLAX) Packet 17 g  Dose: 17 g  Freq: DAILY PRN Route: PO  PRN Reason: constipation  Start: 05/04/18 2309   Admin Instructions: Give in 8oz of  water, juice, or soda. Hold for loose stools.  This is the second step of a three step constipation treatment.  1 Packet = 17 grams. Mixed prescribed dose in 8 ounces of water. Follow with 8 oz. of water.    Admin. Amount: 17 g  Dispense Loc: HI [id number]               senna-docusate (SENOKOT-S;PERICOLACE) 8.6-50 MG per tablet 1 tablet  Dose: 1 tablet  Freq: 2  TIMES DAILY PRN Route: PO  PRN Reason: constipation  Start: 18   Admin Instructions: If no bowel movement in 24 hours, increase to 2 tablets PO.  Hold for loose stools.  This is the first step of a three step constipation treatment.    Admin. Amount: 1 tablet  Dispense Loc: HI [id number]              Or  senna-docusate (SENOKOT-S;PERICOLACE) 8.6-50 MG per tablet 2 tablet  Dose: 2 tablet  Freq: 2 TIMES DAILY PRN Route: PO  PRN Reason: constipation  Start: 18   Admin Instructions: Hold for loose stools.  This is the first step of a three step constipation treatment.    Admin. Amount: 2 tablet  Dispense Loc: HI [id number]               senna-docusate (SENOKOT-S;PERICOLACE) 8.6-50 MG per tablet 1 tablet  Dose: 1 tablet  Freq: 2 TIMES DAILY Route: PO  Start: 18   Admin Instructions: If no bowel movement in 24 hours, increase to 2 tablets PO.  Hold for loose stools.    Admin. Amount: 1 tablet  Dispense Loc: HI [id number]         (42)-Not Given       ()-Not Given       ()-Not Given        ()-Not Given       ()-Not Given        ()-Not Given       [ ] 2100          Or  senna-docusate (SENOKOT-S;PERICOLACE) 8.6-50 MG per tablet 2 tablet  Dose: 2 tablet  Freq: 2 TIMES DAILY Route: PO  Start: 18   Admin Instructions: Hold for loose stools.    Admin. Amount: 2 tablet  Dispense Loc: HI [id number]                                                     [ ] 2100          Completed Medications  Medications 18         Dose: 1,000 mL  Freq: ONCE Route: IV  Last Dose: Stopped (18)  Start: 18   End: 18   Admin. Amount: 1,000 mL  Last Admin: 18  Dispense Loc: ED Floorstock  Infused Over: 1 Hours  Administrations Remainin  Volume: 1,000 mL        1923 (1,000 mL)-New Bag       -Stopped                Dose: 975 mg  Freq: ONCE Route: PO  Start: 18   End: 18    Admin Instructions: Maximum acetaminophen dose from all sources = 75 mg/kg/day not to exceed 4 grams/day.    Admin. Amount: 3 tablet (3 × 325 mg tablet)  Last Admin: 18  Dispense Loc: HI MEED  Administrations Remainin         (975 mg)-Given                Dose: 15 mg  Freq: ONCE Route: IV  Start: 18   End: 18   Admin Instructions: Can cause pain on injection. Administer through a running maintenance fluid over 1 minute followed by a flush. If patient complains of pain on injection, may dilute 15-30 mg in 5 mL and push over 1 to 2 minutes.    Admin. Amount: 15 mg = 0.5 mL Conc: 30 mg/mL  Last Admin: 18  Dispense Loc: HI MEED  Infused Over: 2 Minutes  Administrations Remainin  Volume: 1 mL         (15 mg)-Given             Discontinued Medications  Medications 18         Start: 18   End: 18   Admin Instructions: Shyann Villanueva: cabinet override  Maximum acetaminophen dose from all sources= 75 mg/kg/day not to exceed 4 grams    Administrations Remainin               -Med Discontinued

## 2018-05-04 NOTE — IP AVS SNAPSHOT
"    [facility]: [phone]                                              INTERAGENCY TRANSFER FORM - PHYSICIAN ORDERS   2018                    Hospital Admission Date: 2018  NICOLE KEMP   : 1945  Sex: Male        Attending Provider: Krystal Olmedo NP     Allergies:  Penicillins    Infection:  None   Service:  GENERAL [facility]    Ht:  1.778 m (5' 10\")   Wt:  121.4 kg (267 lb 10.2 oz)   Admission Wt:  118.8 kg (262 lb)    BMI:  38.4 kg/m 2   BSA:  2.45 m 2            Patient PCP Information     Provider PCP Type    Juanita Briggs MD General      ED Clinical Impression     Diagnosis Description Comment Added By Time Added    Closed fracture of distal end of left femur, unspecified fracture morphology, initial encounter (H) [S72.402A] Closed fracture of distal end of left femur, unspecified fracture morphology, initial encounter (H) [S72.402A]  Anthony Hernadez PA 2018 11:17 PM    Inability to bear weight [R26.89] Inability to bear weight [R26.89] Pain limiting Anthony Hernadez PA 2018 11:17 PM    Weakness of both lower extremities [R29.898] Weakness of both lower extremities [R29.898]  Krystal Olmedo NP 2018 11:24 AM    Essential hypertension [I10] Essential hypertension [I10]  Krystal Olmedo NP 2018 11:24 AM    Type 2 diabetes mellitus without complication, without long-term current use of insulin (H) [E11.9] Type 2 diabetes mellitus without complication, without long-term current use of insulin (H) [E11.9]  Krystal Olmedo NP 2018 11:24 AM      Hospital Problems as of 2018              Priority Class Noted POA    Essential hypertension Medium  2016 Yes    Type 2 diabetes mellitus without complication (H) Medium  2016 Yes    Weakness of both lower extremities Medium  2016 Yes    Recurrent falls Medium  Unknown Yes    Pain management Medium  2018 Yes      Non-Hospital Problems as of 2018              Priority Class Noted    " Falling episodes Medium  8/17/2016    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    Yeast infection of the skin Medium  8/19/2016    Falls frequently Medium  12/31/2017    Fall Medium  3/21/2018    Nursing Home Visit Medium  3/30/2018      Code Status History     Date Active Date Inactive Code Status Order ID Comments User Context    3/22/2018 12:50 PM 5/4/2018 11:09 PM Full Code [id number]  Dangelo Barragan MD Outpatient    3/21/2018  8:14 PM 3/22/2018 12:50 PM Full Code [id number]  Kei Marcus MD Inpatient    1/5/2018  1:37 PM 3/21/2018  8:14 PM Full Code [id number]  Leigh Ann Warren MD Outpatient    12/31/2017 12:26 PM 1/5/2018  1:37 PM Full Code [id number]  Kristofer Sena MD Inpatient    8/19/2016  8:09 AM 12/31/2017 12:26 PM Full Code [id number]  Eris Rosales DO Outpatient    8/17/2016 10:22 PM 8/19/2016  8:09 AM Full Code [id number]  Kevin Connolly DO Inpatient         Medication Review      START taking        Dose / Directions Comments    HYDROcodone-acetaminophen 7.5-325 MG per tablet   Commonly known as:  NORCO   Used for:  Weakness of both lower extremities, Essential hypertension, Inability to bear weight        Dose:  1 tablet   Take 1 tablet by mouth every 6 hours as needed for moderate to severe pain   Quantity:  20 tablet   Refills:  0          CONTINUE these medications which have NOT CHANGED        Dose / Directions Comments    acetaminophen 325 MG tablet   Commonly known as:  TYLENOL   Used for:  Fall, initial encounter, Weakness        Dose:  650 mg   Take 2 tablets (650 mg) by mouth every 4 hours as needed for mild pain   Quantity:  100 tablet   Refills:  0        Adult Blood Pressure Cuff Lg Kit   Used for:  Benign essential hypertension, Malignant essential hypertension        Dose:  1 each   1 each 2 times daily   Quantity:  1 kit   Refills:  0    Check blood pressures twice per day: before breakfast and at bedtime.  Keep a log.       hydrochlorothiazide 12.5 MG  capsule   Commonly known as:  MICROZIDE   Used for:  Essential hypertension        Dose:  12.5 mg   Take 1 capsule (12.5 mg) by mouth daily   Quantity:  30 capsule   Refills:  0        hydrocortisone 1 % ointment   Used for:  Rash and nonspecific skin eruption        Apply sparingly to affected area three times daily for 14 days.   Quantity:  30 g   Refills:  0        ibuprofen 600 MG tablet   Commonly known as:  ADVIL/MOTRIN   Used for:  Falls frequently        Dose:  600 mg   Take 1 tablet (600 mg) by mouth every 6 hours as needed for other (mild pain)   Quantity:  120 tablet   Refills:  0        lisinopril 40 MG tablet   Commonly known as:  PRINIVIL/ZESTRIL   Used for:  Essential hypertension        Dose:  40 mg   Take 1 tablet (40 mg) by mouth daily   Quantity:  30 tablet   Refills:  0        metoprolol succinate 50 MG 24 hr tablet   Commonly known as:  TOPROL-XL   Used for:  Essential hypertension        Dose:  50 mg   Take 1 tablet (50 mg) by mouth daily   Quantity:  30 tablet   Refills:  0        miconazole 2 % powder   Commonly known as:  MICATIN; MICRO GUARD   Used for:  Yeast infection of the skin        Apply topically 2 times daily   Quantity:  90 g   Refills:  1        senna-docusate 8.6-50 MG per tablet   Commonly known as:  SENOKOT-S;PERICOLACE   Used for:  Chronic idiopathic constipation        Dose:  1 tablet   Take 1 tablet by mouth 2 times daily as needed for constipation   Quantity:  100 tablet   Refills:  0                Summary of Visit     Reason for your hospital stay       Frequent falls, right knee pain after fall             After Care     Activity - Up with nursing assistance       Must have knee immobilizer on while out of bed.       Additional Discharge Instructions       Kn ee immobilizer on unless in bed.       Advance Diet as Tolerated       Follow this diet upon discharge: Orders Placed This Encounter      Consistent Carbohydrate Diet 6977-6055 Calories: Moderate Consistent CHO  (4-6 CHO units/meal)       Fall precautions           General info for SNF       Length of Stay Estimate: Short Term Care: Estimated # of Days <30  Condition at Discharge: Stable  Level of care:skilled   Rehabilitation Potential: Fair  Admission H&P remains valid and up-to-date: Yes  Recent Chemotherapy: N/A  Use Nursing Home Standing Orders: Yes       Weight bearing status       Weight bear as tolerated             Referrals     Occupational Therapy Adult Consult       Evaluate and treat as clinically indicated.    Reason:  R knee pain, meniscal tear       Physical Therapy Adult Consult       Evaluate and treat as clinically indicated.    Reason:  R knee pain, meniscal tear             Your next 10 appointments already scheduled     May 08, 2018 11:30 AM CDT   (Arrive by 11:15 AM)   SHORT with Juanita Briggs MD   [facility] [place] ([facility] [place] )    [address]  [place] [address]   [phone]              Follow-Up Appointment Instructions     Future Labs/Procedures    Follow Up and recommended labs and tests     Comments:    Follow up with Nursing home physician.  No follow up labs or test are needed.  Follow-up with orthopedics in 2 weeks.      Follow-Up Appointment Instructions     Follow Up and recommended labs and tests       Follow up with Nursing home physician.  No follow up labs or test are needed.  Follow-up with orthopedics in 2 weeks.             Statement of Approval     Ordered          05/07/18 1153  I have reviewed and agree with all the recommendations and orders detailed in this document.  EFFECTIVE NOW     Approved and electronically signed by:  Adilson Ralph MD

## 2018-05-04 NOTE — IP AVS SNAPSHOT
[facility]    [address]    [facility] [address]    Phone:  [phone]    Fax:  [phone]                                       After Visit Summary   5/4/2018    Jose Antonio Marmolejo    MRN: [id number]           After Visit Summary Signature Page     I have received my discharge instructions, and my questions have been answered. I have discussed any challenges I see with this plan with the nurse or doctor.    ..........................................................................................................................................  Patient/Patient Representative Signature      ..........................................................................................................................................  Patient Representative Print Name and Relationship to Patient    ..................................................               ................................................  Date                                            Time    ..........................................................................................................................................  Reviewed by Signature/Title    ...................................................              ..............................................  Date                                                            Time

## 2018-05-04 NOTE — IP AVS SNAPSHOT
MRN:[id number]                      After Visit Summary   5/4/2018    Jose Antonio Marmolejo    MRN: [id number]           Thank you!     Thank you for choosing [facility] for your care. Our goal is always to provide you with excellent care. Hearing back from our patients is one way we can continue to improve our services. Please take a few minutes to complete the written survey that you may receive in the mail after you visit with us. Thank you!        Patient Information     Date Of Birth          1945        Designated Caregiver       Most Recent Value    Caregiver    Will someone help with your care after discharge? no      About your hospital stay     You were admitted on:  May 4, 2018 You last received care in the:  [facility]    You were discharged on:  May 7, 2018        Reason for your hospital stay       Frequent falls, right knee pain after fall                  Who to Call     For medical emergencies, please call 911.  For non-urgent questions about your medical care, please call your primary care provider or clinic, [phone]          Attending Provider     Provider Specialty    Anthony Hernadez PA Physician Assistant - Medical    Rach Mills MD Internal Medicine    Skip Jenkins MD Internal Medicine    Krystal Olmedo, NP Nurse Practitioner       Primary Care Provider Office Phone # Fax #    Juanita Briggs [phone]      After Care Instructions     Activity - Up with nursing assistance       Must have knee immobilizer on while out of bed.            Additional Discharge Instructions       Kn ee immobilizer on unless in bed.            Advance Diet as Tolerated       Follow this diet upon discharge: Orders Placed This Encounter      Consistent Carbohydrate Diet 4709-4916 Calories: Moderate Consistent CHO (4-6 CHO units/meal)            Fall precautions           General info for SNF       Length of Stay Estimate: Short Term Care: Estimated # of Days  "<30  Condition at Discharge: Stable  Level of care:skilled   Rehabilitation Potential: Fair  Admission H&P remains valid and up-to-date: Yes  Recent Chemotherapy: N/A  Use Nursing Home Standing Orders: Yes            Weight bearing status       Weight bear as tolerated                  Follow-up Appointments     Follow Up and recommended labs and tests       Follow up with Nursing home physician.  No follow up labs or test are needed.  Follow-up with orthopedics in 2 weeks.                  Your next 10 appointments already scheduled     May 08, 2018 11:30 AM CDT   (Arrive by 11:15 AM)   SHORT with Juanita Briggs MD   [facility] [place] ([facility] [place] )    [address]  [place] [address]   [phone]              Additional Services     Occupational Therapy Adult Consult       Evaluate and treat as clinically indicated.    Reason:  R knee pain, meniscal tear            Physical Therapy Adult Consult       Evaluate and treat as clinically indicated.    Reason:  R knee pain, meniscal tear                  Pending Results     No orders found from 5/2/2018 to 5/5/2018.            Statement of Approval     Ordered          05/07/18 1153  I have reviewed and agree with all the recommendations and orders detailed in this document.  EFFECTIVE NOW     Approved and electronically signed by:  Adilson Ralph MD             Admission Information     Date & Time Provider Department Dept. Phone    5/4/2018 Krystal Olmedo, NP [facility] [phone]      Your Vitals Were     Blood Pressure Pulse Temperature Respirations Height Weight    135/68 71 98  F (36.7  C) (Tympanic) 18 1.778 m (5' 10\") 121.4 kg (267 lb 10.2 oz)    Pulse Oximetry BMI (Body Mass Index)                94% 38.4 kg/m2          MyChart Information     Dialective lets you send messages to your doctor, view your test results, renew your prescriptions, schedule appointments and more. To sign up, go to " "[email or web address] . Click on \"Log in\" on the left side of the screen, which will take you to the Welcome page. Then click on \"Sign up Now\" on the right side of the page.     You will be asked to enter the access code listed below, as well as some personal information. Please follow the directions to create your username and password.     Your access code is: [email or web address]  Expires: 2018 11:57 AM     Your access code will  in 90 days. If you need help or a new code, please call your [facility] clinic or [phone].        Care EveryWhere ID     This is your Care EveryWhere ID. This could be used by other organizations to access your [facility] medical records  [id number]        Equal Access to Services     JEFF SALINAS : Bailey Brower, rochelle fletcher, chelle story, viki triplett . So [facility] [phone].    ATENCIÓN: Si habla español, tiene a garcia disposición servicios gratuitos de asistencia lingüística. Sandra al [phone].    We comply with applicable federal civil rights laws and [place] laws. We do not discriminate on the basis of race, color, national origin, age, disability, sex, sexual orientation, or gender identity.               Review of your medicines      START taking        Dose / Directions    HYDROcodone-acetaminophen 7.5-325 MG per tablet   Commonly known as:  NORCO   Used for:  Weakness of both lower extremities, Essential hypertension, Inability to bear weight        Dose:  1 tablet   Take 1 tablet by mouth every 6 hours as needed for moderate to severe pain   Quantity:  20 tablet   Refills:  0         CONTINUE these medicines which have NOT CHANGED        Dose / Directions    acetaminophen 325 MG tablet   Commonly known as:  TYLENOL   Used for:  Fall, initial encounter, Weakness        Dose:  650 mg   Take 2 tablets (650 mg) by mouth every 4 hours as needed for mild pain   Quantity:  100 tablet   Refills:  0       Adult Blood " Pressure Cuff Lg Kit   Used for:  Benign essential hypertension, Malignant essential hypertension        Dose:  1 each   1 each 2 times daily   Quantity:  1 kit   Refills:  0       hydrochlorothiazide 12.5 MG capsule   Commonly known as:  MICROZIDE   Used for:  Essential hypertension        Dose:  12.5 mg   Take 1 capsule (12.5 mg) by mouth daily   Quantity:  30 capsule   Refills:  0       hydrocortisone 1 % ointment   Used for:  Rash and nonspecific skin eruption        Apply sparingly to affected area three times daily for 14 days.   Quantity:  30 g   Refills:  0       ibuprofen 600 MG tablet   Commonly known as:  ADVIL/MOTRIN   Used for:  Falls frequently        Dose:  600 mg   Take 1 tablet (600 mg) by mouth every 6 hours as needed for other (mild pain)   Quantity:  120 tablet   Refills:  0       lisinopril 40 MG tablet   Commonly known as:  PRINIVIL/ZESTRIL   Used for:  Essential hypertension        Dose:  40 mg   Take 1 tablet (40 mg) by mouth daily   Quantity:  30 tablet   Refills:  0       metoprolol succinate 50 MG 24 hr tablet   Commonly known as:  TOPROL-XL   Used for:  Essential hypertension        Dose:  50 mg   Take 1 tablet (50 mg) by mouth daily   Quantity:  30 tablet   Refills:  0       miconazole 2 % powder   Commonly known as:  MICATIN; MICRO GUARD   Used for:  Yeast infection of the skin        Apply topically 2 times daily   Quantity:  90 g   Refills:  1       senna-docusate 8.6-50 MG per tablet   Commonly known as:  SENOKOT-S;PERICOLACE   Used for:  Chronic idiopathic constipation        Dose:  1 tablet   Take 1 tablet by mouth 2 times daily as needed for constipation   Quantity:  100 tablet   Refills:  0            Where to get your medicines      Some of these will need a paper prescription and others can be bought over the counter. Ask your nurse if you have questions.     Bring a paper prescription for each of these medications     HYDROcodone-acetaminophen 7.5-325 MG per tablet                 Protect others around you: Learn how to safely use, store and throw away your medicines at www.disposemymeds.org.        Information about OPIOIDS     PRESCRIPTION OPIOIDS: WHAT YOU NEED TO KNOW   You have a prescription for an opioid (narcotic) pain medicine. Opioids can cause addiction. If you have a history of chemical dependency of any type, you are at a higher risk of becoming addicted to opioids. Only take this medicine after all other options have been tried. Take it for as short a time and as few doses as possible.     Do not:    Drive. If you drive while taking these medicines, you could be arrested for driving under the influence (DUI).    Operate heavy machinery    Do any other dangerous activities while taking these medicines.     Drink any alcohol while taking these medicines.      Take with any other medicines that contain acetaminophen. Read all labels carefully. Look for the word  acetaminophen  or  Tylenol.  Ask your pharmacist if you have questions or are unsure.    Store your pills in a secure place, locked if possible. We will not replace any lost or stolen medicine. If you don t finish your medicine, please throw away (dispose) as directed by your pharmacist. The [place] Pollution Control Agency has more information about safe disposal: https://www.pca.[facility].[place].us/living-green/managing-unwanted-medications    All opioids tend to cause constipation. Drink plenty of water and eat foods that have a lot of fiber, such as fruits, vegetables, prune juice, apple juice and high-fiber cereal. Take a laxative (Miralax, milk of magnesia, Colace, Senna) if you don t move your bowels at least every other day.              Medication List: This is a list of all your medications and when to take them. Check marks below indicate your daily home schedule. Keep this list as a reference.      Medications           Morning Afternoon Evening Bedtime As Needed    acetaminophen 325 MG tablet   Commonly known as:   TYLENOL   Take 2 tablets (650 mg) by mouth every 4 hours as needed for mild pain   Last time this was given:  975 mg on 5/4/2018  7:40 PM                                Adult Blood Pressure Cuff Lg Kit   1 each 2 times daily                                hydrochlorothiazide 12.5 MG capsule   Commonly known as:  MICROZIDE   Take 1 capsule (12.5 mg) by mouth daily   Last time this was given:  12.5 mg on 5/7/2018 10:55 AM                                HYDROcodone-acetaminophen 7.5-325 MG per tablet   Commonly known as:  NORCO   Take 1 tablet by mouth every 6 hours as needed for moderate to severe pain   Last time this was given:  1 tablet on 5/4/2018 10:18 PM                                hydrocortisone 1 % ointment   Apply sparingly to affected area three times daily for 14 days.                                ibuprofen 600 MG tablet   Commonly known as:  ADVIL/MOTRIN   Take 1 tablet (600 mg) by mouth every 6 hours as needed for other (mild pain)                                lisinopril 40 MG tablet   Commonly known as:  PRINIVIL/ZESTRIL   Take 1 tablet (40 mg) by mouth daily   Last time this was given:  40 mg on 5/6/2018  8:25 PM                                metoprolol succinate 50 MG 24 hr tablet   Commonly known as:  TOPROL-XL   Take 1 tablet (50 mg) by mouth daily   Last time this was given:  50 mg on 5/7/2018 10:55 AM                                miconazole 2 % powder   Commonly known as:  MICATIN; MICRO GUARD   Apply topically 2 times daily                                senna-docusate 8.6-50 MG per tablet   Commonly known as:  SENOKOT-S;PERICOLACE   Take 1 tablet by mouth 2 times daily as needed for constipation

## 2018-05-04 NOTE — ED PROVIDER NOTES
"  History     Chief Complaint   Patient presents with     Generalized Weakness     HPI  Jose Antonio Marmolejo is a 72 year old male with Hx DM, HTN who presents to ED via EMS for leg weakness after fall this morning. When asked about medication changes, he has stopped his \"blood pressure pills\" and arrived with /104. He denies headache, vision changes, chest pain, shortness of breath. He has left knee pain after fall onto his knees earlier today from sensation of knees giving way. He called EMS for transfer assist and decided he was OK. He was able to get to the bathroom x3 afterwards without too much discomfort, but as the day went on his left knee felt more unstable, prompting visit. Fall was mechanical in nature, he denies lightheadedness and denies syncope.     Problem List:    Patient Active Problem List    Diagnosis Date Noted     Pain management 05/04/2018     Priority: Medium     Nursing Home Visit 03/30/2018     Priority: Medium     Recurrent falls      Priority: Medium     Fall 03/21/2018     Priority: Medium     Falls frequently 12/31/2017     Priority: Medium     Type 2 diabetes mellitus without complication (H) 08/19/2016     Priority: Medium     Weakness of both lower extremities 08/19/2016     Priority: Medium     Yeast infection of the skin 08/19/2016     Priority: Medium     Essential hypertension 08/17/2016     Priority: Medium     Falling episodes 08/17/2016     Priority: Medium     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     Priority: Medium        Past Medical History:    Past Medical History:   Diagnosis Date     Arthritis      Diabetes (H)      Hypertension      Recurrent falls        Past Surgical History:    Past Surgical History:   Procedure Laterality Date     ORTHOPEDIC SURGERY      left ankle and removal of hardware       Family History:    Family History   Problem Relation Age of Onset     Other - See Comments Mother      pneumonia     Obesity Mother      Other Cancer Father      " hodgkins lymphoma     Coronary Artery Disease Father      pacemaker     Other Cancer Sister      unknown cancers     Colon Cancer Son        Social History:  Marital Status:  Single [1]  Social History   Substance Use Topics     Smoking status: Former Smoker     Quit date: 1/1/1978     Smokeless tobacco: Never Used     Alcohol use No      Comment: quit 1977, previously an alcoholic        Medications:      acetaminophen (TYLENOL) 325 MG tablet   Blood Pressure Monitoring (ADULT BLOOD PRESSURE CUFF LG) KIT   hydrochlorothiazide (MICROZIDE) 12.5 MG capsule   hydrocortisone 1 % ointment   ibuprofen (ADVIL/MOTRIN) 600 MG tablet   lisinopril (PRINIVIL/ZESTRIL) 40 MG tablet   miconazole (MICATIN; MICRO GUARD) 2 % powder   senna-docusate (SENOKOT-S;PERICOLACE) 8.6-50 MG per tablet   metoprolol (TOPROL-XL) 50 MG 24 hr tablet         Review of Systems   Constitutional: Negative for fatigue and fever.   Respiratory: Negative.  Negative for cough.    Cardiovascular: Negative.  Negative for chest pain.   Musculoskeletal: Positive for arthralgias and joint swelling.   Skin: Negative.  Negative for wound.   Neurological: Positive for weakness (sensation of knee giving way). Negative for syncope and light-headedness.   Psychiatric/Behavioral: Negative.        Physical Exam   BP: (!) 159/104  Heart Rate: 119  Temp: 98.1  F (36.7  C)  Resp: 16  Weight: 118.8 kg (262 lb)  SpO2: 94 %      Physical Exam   Constitutional: He is oriented to person, place, and time. He appears well-developed and well-nourished. No distress.   HENT:   Head: Normocephalic and atraumatic.   Eyes: Conjunctivae are normal.   Cardiovascular: Normal heart sounds.    Pulmonary/Chest: Effort normal and breath sounds normal.   Abdominal: Soft. Bowel sounds are normal.   Musculoskeletal:        Right knee: He exhibits normal range of motion. No tenderness found.        Left knee: He exhibits decreased range of motion (pain limits flexion, pt can fully extend) and  swelling (mil). He exhibits no deformity, no laceration, no erythema, no LCL laxity and no MCL laxity. Tenderness (anterior into lateral jt space) found.   Neurological: He is alert and oriented to person, place, and time.   Skin: Skin is warm and dry.   Psychiatric: He has a normal mood and affect.   Nursing note and vitals reviewed.      ED Course     ED Course     Procedures      Results for orders placed or performed during the hospital encounter of 05/04/18 (from the past 24 hour(s))   CBC with platelets   Result Value Ref Range    WBC 8.5 4.0 - 11.0 10e9/L    RBC Count 5.49 4.4 - 5.9 10e12/L    Hemoglobin 16.0 13.3 - 17.7 g/dL    Hematocrit 46.5 40.0 - 53.0 %    MCV 85 78 - 100 fl    MCH 29.1 26.5 - 33.0 pg    MCHC 34.4 31.5 - 36.5 g/dL    RDW 14.2 10.0 - 15.0 %    Platelet Count 286 150 - 450 10e9/L   Basic metabolic panel   Result Value Ref Range    Sodium 140 133 - 144 mmol/L    Potassium 3.9 3.4 - 5.3 mmol/L    Chloride 109 94 - 109 mmol/L    Carbon Dioxide 24 20 - 32 mmol/L    Anion Gap 7 3 - 14 mmol/L    Glucose 125 (H) 70 - 99 mg/dL    Urea Nitrogen 18 7 - 30 mg/dL    Creatinine 1.06 0.66 - 1.25 mg/dL    GFR Estimate 69 >60 mL/min/1.7m2    GFR Estimate If Black 83 >60 mL/min/1.7m2    Calcium 8.5 8.5 - 10.1 mg/dL   UA reflex to Microscopic   Result Value Ref Range    Color Urine Yellow     Appearance Urine Clear     Glucose Urine Negative NEG^Negative mg/dL    Bilirubin Urine Negative NEG^Negative    Ketones Urine Negative NEG^Negative mg/dL    Specific Gravity Urine 1.019 1.003 - 1.035    Blood Urine Negative NEG^Negative    pH Urine 5.5 4.7 - 8.0 pH    Protein Albumin Urine 10 (A) NEG^Negative mg/dL    Urobilinogen mg/dL 2.0 0.0 - 2.0 mg/dL    Nitrite Urine Negative NEG^Negative    Leukocyte Esterase Urine Negative NEG^Negative    Source Unspecified Urine     RBC Urine 0 0 - 2 /HPF    WBC Urine 1 0 - 5 /HPF    Bacteria Urine Few (A) NEG^Negative /HPF    Mucous Urine Present (A) NEG^Negative /LPF   XR  Knee Left 3 Views    Narrative    PROCEDURE:  XR KNEE LT 3 VW    HISTORY: fall, knee pain, swelling;     COMPARISON:  None.    TECHNIQUE:  3 views of the left knee were obtained.    FINDINGS:  No fracture or dislocation is identified. There is some  mild joint space narrowing at the medial femoral tibial articulation..   Mild degenerative changes are seen at the patellofemoral  articulation.      Impression    IMPRESSION: No acute fracture.  Mild degenerative changes of the left  knee    MENDEZ MATAMOROS MD   UA reflex to Microscopic and Culture   Result Value Ref Range    Color Urine Yellow     Appearance Urine Clear     Glucose Urine Negative NEG^Negative mg/dL    Bilirubin Urine Negative NEG^Negative    Ketones Urine Negative NEG^Negative mg/dL    Specific Gravity Urine 1.019 1.003 - 1.035    Blood Urine Negative NEG^Negative    pH Urine 5.5 4.7 - 8.0 pH    Protein Albumin Urine 10 (A) NEG^Negative mg/dL    Urobilinogen mg/dL 2.0 0.0 - 2.0 mg/dL    Nitrite Urine Negative NEG^Negative    Leukocyte Esterase Urine Negative NEG^Negative    Source Midstream Urine     RBC Urine 0 0 - 2 /HPF    WBC Urine <1 0 - 5 /HPF    Bacteria Urine None (A) NEG^Negative /HPF    Mucous Urine Present (A) NEG^Negative /LPF    sperm Present (A) NEG^Negative /HPF   CT Knee Left w/o Contrast    Narrative    PROCEDURE: CT KNEE LEFT W/O CONTRAST 5/4/2018 8:56 PM    HISTORY: fall, instability left knee;     COMPARISONS: None.    Meds/Dose Given:    TECHNIQUE: CT scan left knee with sagittal and coronal reconstructions    FINDINGS: Is a questionable nondisplaced fracture of the proximal  fibular shaft seen best on the sagittal images. On the sagittal views  there is a questionable nondisplaced fracture of the distal femoral  shaft. This is also best seen on the sagittal views. The  proximal  tibia is intact no fractures are noted. There are osteoarthritic  changes seen at the medial femoral tibial articulation with joint  space narrowing and  mild degenerative osteophytes. There are  degenerative changes at the patellofemoral articulation. Small knee  effusion is seen.         Impression    IMPRESSION: Questionable nondisplaced fractures of the distal femoral  shaft seen on the sagittal image.  2. Questionable proximal fibular fracture also best seen on the  sagittal images.  3. Follow-up MRI scan for confirmation is recommended.    MENDEZ MATAMOROS MD       Medications   HYDROcodone-acetaminophen (NORCO) 7.5-325 MG per tablet 1 tablet (1 tablet Oral Given 5/4/18 2218)   acetaminophen (TYLENOL) tablet 650 mg (not administered)   acetaminophen (TYLENOL) Suppository 650 mg (not administered)   naloxone (NARCAN) injection 0.1-0.4 mg (not administered)   melatonin tablet 1 mg (not administered)   ondansetron (ZOFRAN-ODT) ODT tab 4 mg (not administered)     Or   ondansetron (ZOFRAN) injection 4 mg (not administered)   ibuprofen (ADVIL/MOTRIN) tablet 600 mg (not administered)   HYDROcodone-acetaminophen (NORCO) 5-325 MG per tablet 1-2 tablet (not administered)   morphine (PF) injection 1 mg (not administered)   senna-docusate (SENOKOT-S;PERICOLACE) 8.6-50 MG per tablet 1 tablet (not administered)     Or   senna-docusate (SENOKOT-S;PERICOLACE) 8.6-50 MG per tablet 2 tablet (not administered)   senna-docusate (SENOKOT-S;PERICOLACE) 8.6-50 MG per tablet 1 tablet (not administered)     Or   senna-docusate (SENOKOT-S;PERICOLACE) 8.6-50 MG per tablet 2 tablet (not administered)   polyethylene glycol (MIRALAX/GLYCOLAX) Packet 17 g (not administered)   bisacodyl (DULCOLAX) Suppository 10 mg (not administered)   0.9% sodium chloride BOLUS (0 mLs Intravenous Stopped 5/4/18 2023)   acetaminophen (TYLENOL) tablet 975 mg (975 mg Oral Given 5/4/18 1940)   ketorolac (TORADOL) injection 15 mg (15 mg Intravenous Given 5/4/18 2219)       Assessments & Plan (with Medical Decision Making)     I have reviewed the nursing notes.  I have reviewed the findings, diagnosis, plan and  need for follow up with the patient.    Current Discharge Medication List          Final diagnoses:   Closed fracture of distal end of left femur, unspecified fracture morphology, initial encounter (H)   Inability to bear weight - Pain limiting   We initially tried to get Jose Antonio up and weight bearing after XR negative. He was unable to bear weight, so CT of the knee was ordered and reveals questionable nondisplaced Fx of distal femur and proximal fibula. I spoke with Dr Tena with [facility] and he advises knee immobilizer, nonweight-bearing with touch-down weight bearing at [facility], and PT. Follow up with [facility] next week. I spoke with Dr Mills our hospitalist who graciously accepts care for observation as we were again unable to have Jose Antonio weight bear after immobilizer and walker (he is not a candidate for crutches).     5/4/2018   [facility]     Anthony Hernadez PA  05/04/18 4641

## 2018-05-04 NOTE — IP AVS SNAPSHOT
` ` Patient Information     Patient Name Sex     Jose Antonio Kemp ([id number]) Male 1945       Room Bed    4804 3224-1      Patient Demographics     Address Phone    [address] [address] [phone] (Home)      Patient Ethnicity & Race     Ethnic Group Patient Race    American White      Emergency Contact(s)     Name Relation Home Work Mobile    ROLANDO KEMP [phone]    Kailyn Howell Sister   [phone]      Documents on File        Status Date Received Description       Documents for the Patient    Consent for Services - Hospital/Clinic-ESign Received () 13     [place] Privacy Notice-ESign Received 13     Insurance Card Received 13 MDCR    Patient ID   DIDN'T HAVE    Consent for EHR Access-Received-ESign Received 13     Consent for EHR Access-Decline-ESign       Insurance Card Received () 13 BXMN    Consent for Services/Privacy Notice - Hospital/Clinic-Esign Received 17     Privacy Notice - [place] Received 16     Consent for Services/Privacy Notice - Hospital/Clinic Received () 16     Care Everywhere Prospective Auth Received 17     HIM LEAH Authorization  18 [facility]    HIM LEAH Authorization  18 [facility]    HIM LEAH Authorization  18 [facility]    Insurance Card   NEED AETNA    HIM LEAH Authorization - File Only  18 [facility]    Consent for Services - Hospital and Clinic       HIE Auth          Documents for the Encounter    CMS MEL[place] for Patient Signature Received 18       Admission Information     Attending Provider Admitting Provider Admission Type Admission Date/Time    Krystal Olmedo NP Jean, Nathalie, MD Emergency 18  1723    Discharge Date Hospital Service Auth/Cert Status Service Area     General Medicine Incomplete [facility]    Unit Room/Bed Admission Status       [facility] 3224/3224-1 Admission (Confirmed)        Admission     Complaint    Pain management      Hospital Account     Name Acct ID Class Status Primary Coverage    Jose Antonio Marmolejo [id number] Observation Open COMMERCIAL - AETNA MEDICARE ADVANTAGE            Guarantor Account (for Hospital Account #[id number])     Name Relation to Pt Service Area Active? Acct Type    Jose Antonio Marmolejo Self [facility] Yes Personal/Family    Address Phone          [address] [address] [phone](H)              Coverage Information (for Hospital Account #[id number])     F/O Payor/Plan Precert #    COMMERCIAL/AETNA MEDICARE ADVANTAGE     Subscriber Subscriber #    Jose Antonio Marmolejo [id number]    Address Phone    [id number]  [place] [address] [phone]

## 2018-05-04 NOTE — IP AVS SNAPSHOT
"          [facility]: [phone]                                              INTERAGENCY TRANSFER FORM - LAB / IMAGING / EKG / EMG RESULTS   2018                    Hospital Admission Date: 2018  NICOLE KEMP   : 1945  Sex: Male        Attending Provider: Krystal Olmedo NP     Allergies:  Penicillins    Infection:  None   Service:  GENERAL MEDI    Ht:  1.778 m (5' 10\")   Wt:  121.4 kg (267 lb 10.2 oz)   Admission Wt:  118.8 kg (262 lb)    BMI:  38.4 kg/m 2   BSA:  2.45 m 2            Patient PCP Information     Provider PCP Type    Juanita Briggs MD General         Lab Results - 3 Days      Active MRSA Surveillance Culture [[id number]]  Resulted: 18 0749, Result status: Final result    Ordering provider: Rach Mills MD  18 0045 Resulting lab: [facility]    Specimen Information    Type Source Collected On   Swab Nose 18 0047          Components       Value Reference Range Flag Lab   Specimen Description Swab      Culture Micro No MRSA isolated   HI            Basic metabolic panel [[id number]] (Abnormal)  Resulted: 18 0546, Result status: Final result    Ordering provider: Rach Mills MD  18 0500 Resulting lab: [facility]    Specimen Information    Type Source Collected On   Blood  18 0524          Components       Value Reference Range Flag Lab   Sodium 140 133 - 144 mmol/L  HI   Potassium 4.1 3.4 - 5.3 mmol/L  HI   Chloride 109 94 - 109 mmol/L  HI   Carbon Dioxide 24 20 - 32 mmol/L  HI   Anion Gap 7 3 - 14 mmol/L  HI   Glucose 130 70 - 99 mg/dL H HI   Urea Nitrogen 16 7 - 30 mg/dL  HI   Creatinine 0.95 0.66 - 1.25 mg/dL  HI   GFR Estimate 78 >60 mL/min/1.7m2  HI   Comment:  Non  GFR Calc   GFR Estimate If Black >90 >60 mL/min/1.7m2  HI   Comment:  African American GFR Calc   Calcium 8.2 8.5 - 10.1 mg/dL L HI            CBC with platelets [[id number]]  Resulted: 18 05, Result " status: Final result    Ordering provider: Rach Mills MD  05/05/18 0500 Resulting lab: [facility]    Specimen Information    Type Source Collected On   Blood  05/05/18 0524          Components       Value Reference [place] Flag Lab   WBC 6.4 4.0 - 11.0 10e9/L  HI   RBC Count 5.00 4.4 - 5.9 10e12/L  HI   Hemoglobin 14.4 13.3 - 17.7 g/dL  HI   Hematocrit 42.6 40.0 - 53.0 %  HI   MCV 85 78 - 100 fl  HI   MCH 28.8 26.5 - 33.0 pg  HI   MCHC 33.8 31.5 - 36.5 g/dL  HI   RDW 14.2 10.0 - 15.0 %  HI   Platelet Count 258 150 - 450 10e9/L  HI            UA reflex to Microscopic and Culture [[id number]] (Abnormal)  Resulted: 05/04/18 2018, Result status: Final result    Ordering provider: Anthony Hernadez PA  05/04/18 2007 Resulting lab: [facility]    Specimen Information    Type Source Collected On   Midstream Urine  05/04/18 2008          Components       Value Reference Range Flag Lab   Color Urine Yellow   HI   Appearance Urine Clear   HI   Glucose Urine Negative NEG^Negative mg/dL  HI   Bilirubin Urine Negative NEG^Negative  HI   Ketones Urine Negative NEG^Negative mg/dL  HI   Specific Gravity Urine 1.019 1.003 - 1.035  HI   Blood Urine Negative NEG^Negative  HI   pH Urine 5.5 4.7 - 8.0 pH  HI   Protein Albumin Urine 10 NEG^Negative mg/dL A HI   Urobilinogen mg/dL 2.0 0.0 - 2.0 mg/dL  HI   Nitrite Urine Negative NEG^Negative  HI   Leukocyte Esterase Urine Negative NEG^Negative  HI   Source Midstream Urine   HI   RBC Urine 0 0 - 2 /HPF  HI   WBC Urine <1 0 - 5 /HPF  HI   Bacteria Urine None NEG^Negative /HPF A HI   Mucous Urine Present NEG^Negative /LPF A HI   sperm Present NEG^Negative /HPF A HI            UA reflex to Microscopic [[id number]] (Abnormal)  Resulted: 05/04/18 1851, Result status: Final result    Ordering provider: Anthony Hernadez PA  05/04/18 1806 Resulting lab: [facility]    Specimen Information    Type Source Collected On   Unspecified Urine   05/04/18 1835          Components       Value Reference Range Flag Lab   Color Urine Yellow   HI   Appearance Urine Clear   HI   Glucose Urine Negative NEG^Negative mg/dL  HI   Bilirubin Urine Negative NEG^Negative  HI   Ketones Urine Negative NEG^Negative mg/dL  HI   Specific Gravity Urine 1.019 1.003 - 1.035  HI   Blood Urine Negative NEG^Negative  HI   pH Urine 5.5 4.7 - 8.0 pH  HI   Protein Albumin Urine 10 NEG^Negative mg/dL A HI   Urobilinogen mg/dL 2.0 0.0 - 2.0 mg/dL  HI   Nitrite Urine Negative NEG^Negative  HI   Leukocyte Esterase Urine Negative NEG^Negative  HI   Source Unspecified Urine   HI   RBC Urine 0 0 - 2 /HPF  HI   WBC Urine 1 0 - 5 /HPF  HI   Bacteria Urine Few NEG^Negative /HPF A HI   Mucous Urine Present NEG^Negative /LPF A HI            Basic metabolic panel [[id number]] (Abnormal)  Resulted: 05/04/18 1811, Result status: Final result    Ordering provider: Anthony Hernadez PA  05/04/18 1744 Resulting lab: [facility]    Specimen Information    Type Source Collected On   Blood  05/04/18 1751          Components       Value Reference [place] Flag Lab   Sodium 140 133 - 144 mmol/L  HI   Potassium 3.9 3.4 - 5.3 mmol/L  HI   Chloride 109 94 - 109 mmol/L  HI   Carbon Dioxide 24 20 - 32 mmol/L  HI   Anion Gap 7 3 - 14 mmol/L  HI   Glucose 125 70 - 99 mg/dL H HI   Urea Nitrogen 18 7 - 30 mg/dL  HI   Creatinine 1.06 0.66 - 1.25 mg/dL  HI   GFR Estimate 69 >60 mL/min/1.7m2  HI   Comment:  Non  GFR Calc   GFR Estimate If Black 83 >60 mL/min/1.7m2  HI   Comment:  African American GFR Calc   Calcium 8.5 8.5 - 10.1 mg/dL  HI            CBC with platelets [[id number]]  Resulted: 05/04/18 1756, Result status: Final result    Ordering provider: Anthony Hernadez PA  05/04/18 1744 Resulting lab: [facility]    Specimen Information    Type Source Collected On   Blood  05/04/18 1751          Components       Value Reference [place] Flag Lab   WBC 8.5 4.0 - 11.0 10e9/L   HI   RBC Count 5.49 4.4 - 5.9 10e12/L  HI   Hemoglobin 16.0 13.3 - 17.7 g/dL  HI   Hematocrit 46.5 40.0 - 53.0 %  HI   MCV 85 78 - 100 fl  HI   MCH 29.1 26.5 - 33.0 pg  HI   MCHC 34.4 31.5 - 36.5 g/dL  HI   RDW 14.2 10.0 - 15.0 %  HI   Platelet Count 286 150 - 450 10e9/L  HI            Testing Performed By     Lab - Abbreviation Name Director Address Valid Date Range    210 - HI [facility] Unknown [address] [facility] [place] [address] 05/08/15 1057 - Present            Unresulted Labs (24h ago through future)    Start       Ordered    05/08/18 0600  Platelet count  (Pharmacological Prophylaxis - enoxaparin (LOVENOX) *Use only if creatinine clearance is greater than 30 mL/min)  EVERY THREE DAYS,   Routine     Comments:  Repeat every 3 days while on VTE prophylaxis.  Notify provider and hold enoxaparin if platelet count falls by 50% of baseline. If no result is listed, this lab has not been done the past 365 days. LATEST LAB RESULT: Platelet Count (10e9/L)       Date                     Value                 05/05/2018               258              ----------    05/05/18 2037 05/08/18 0000  Creatinine  (Pharmacological Prophylaxis - enoxaparin (LOVENOX) *Use only if creatinine clearance is greater than 30 mL/min)  EVERY THREE DAYS,   Routine     Comments:  Repeat every 3 days while on VTE prophylaxis.    05/05/18 2037         Imaging Results - 3 Days      MR Knee Left w/o Contrast [[id number]]  Resulted: 05/07/18 1120, Result status: Final result    Ordering provider: Skip Jenkins MD  05/07/18 0005 Resulted by: Mike Aaron MD    Performed: 05/07/18 0922 - 05/07/18 1038 Resulting lab: RADIOLOGY RESULTS    Narrative:       Exam:MR KNEE LEFT W/O CONTRAST    History:72 years Male  with left knee pain    Comparisons:None    Technique: Sagittal and PD fat sat, sagittal T2, coronal PD fat-sat,   coronal T1, axial PD fat-sat and axial PD imaging of the knee  was  performed.    Findings:    Fluid: There is a small joint effusion. There is a joint body within  the anterior knee at the midline measuring 8 x 4 mm in diameter.    Medial Compartment:          Meniscus: There is a tear of the midportion of the meniscus  with a peripherally displaced flap type component in the medial  gutter.          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    Lateral Compartment:         Meniscus: There is abnormal signal within the anterior horn  suggestive of mucoid degeneration.         Cartilage: Cartilage thickness and signal are normal.    Patellofemoral Compartment:           There is degenerative enthesopathy of the inferior patella at  the attachment of the patellar tendon. There is mild cartilage  thinning and mild cartilage signal heterogeneity.    Ligaments:           The extensor mechanism is intact. The anterior and posterior  cruciate ligaments are intact. The medial collateral ligament and  lateral collateral complex are intact.    Soft tissues:          Unremarkable    Osseous:          No bone marrow signal abnormality is present to suggest  contusion or fracture. There is no evidence of bone marrow edema.        Impression:       Impression: There is a flap-type tear of the midportion of the medial  meniscus with a peripherally displaced component.    There is mucoid degeneration, possibly from a nondisplaced tear of the  anterior horn of the lateral meniscus.    A moderate-sized joint effusion is present.    There is an 8 x 4 mm joint body anterior to the tibial attachment of  the anterior cruciate ligament.    Advanced chondromalacia the medial compartment.     ADELFO ROSE MD      CT Knee Left w/o Contrast [[id number]]  Resulted: 05/04/18 2107, Result status: Final result    Ordering provider: Anthony Hernadez PA  05/04/18 2005 Resulted by: Ace Hedrick MD    Performed:  05/04/18 2029 - 05/04/18 2056 Resulting lab: RADIOLOGY RESULTS    Narrative:       PROCEDURE: CT KNEE LEFT W/O CONTRAST 5/4/2018 8:56 PM    HISTORY: fall, instability left knee;     COMPARISONS: None.    Meds/Dose Given:    TECHNIQUE: CT scan left knee with sagittal and coronal reconstructions    FINDINGS: Is a questionable nondisplaced fracture of the proximal  fibular shaft seen best on the sagittal images. On the sagittal views  there is a questionable nondisplaced fracture of the distal femoral  shaft. This is also best seen on the sagittal views. The  proximal  tibia is intact no fractures are noted. 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 There are  degenerative changes at the patellofemoral articulation. Small knee  effusion is seen.         Impression:       IMPRESSION: Questionable nondisplaced fractures of the distal femoral  shaft seen on the sagittal image.  2. Questionable proximal fibular fracture also best seen on the  sagittal images.  3. Follow-up MRI scan for confirmation is recommended.    ACE MATAMOROS MD      XR Knee Left 3 Views [[id number]]  Resulted: 05/04/18 1940, Result status: Final result    Ordering provider: Anthony Hernadez PA  05/04/18 1821 Resulted by: Ace Matamoros MD    Performed: 05/04/18 1857 - 05/04/18 1912 Resulting lab: RADIOLOGY RESULTS    Narrative:       PROCEDURE:  XR KNEE LT 3 VW    HISTORY: fall, knee pain, swelling;     COMPARISON:  None.    TECHNIQUE:  3 views of the left knee were obtained.    FINDINGS:  No fracture or dislocation is identified. There is some  mild joint space narrowing at the medial femoral tibial articulation..   Mild degenerative changes are seen at the patellofemoral  articulation.      Impression:       IMPRESSION: No acute fracture.  Mild degenerative changes of the left  knee    ACE MATAMOROS MD      Testing Performed By     Lab -  Abbreviation Name Director Address Valid Date Range    104 - Rad [facility] RADIOLOGY RESULTS Unknown Unknown 02/16/05 1553 - Present            Encounter-Level Documents:     There are no encounter-level documents.      Order-Level Documents:     There are no order-level documents.

## 2018-05-05 NOTE — PROGRESS NOTES
Assessment completed see flowsheet.      LOC: alert    Others present: Patient    Dx: (L) femur fracture    Lives with: Lives With: alone    Living at: Living Arrangements: house    Equipment used:  Rolling walker, wheelchair ramps to garage and house, handicap accessible shower with shower chair.     Support System: Description of Support System: Supportive, Involved    Primary Care Provider: Juanita Briggs    POA/Guardian: No     Health Care Directive: YES States he will provide information     Pharmacy: [facility]     :  No     Homecare/County Services:   No    Adequate Resources for needs (housing, utilities, food/med): YES    Meds and appointments management: NO- patient reports no medication routine concerns and states he does take his medications as ordered, but on chart review, patient has a history of missing appts at times     Work: NO- retired from [employer] where he was a      Transportation: YES - states he uses [facility] and would like to use on hospital discharge     Falls: Yes  Reason for falls risk:  Mobility- patient states he fell at home when his knees gave out and he states he doesn't know why they gave out (which resulted in this hospitalization). He states he was using walker at the time but still fell and ended up with this injury. Patient denies any other recent falls, besides the ones prior to his last hospitalization here, where he states he had been falling d/t loss of balance.     Able to Return to Prior Living Arrangements: NO- patient is non-weight bearing and cannot return home safely at this time.     [place]: YES- not established. Has the information on getting established but has not yet done that.     Goals: To return home     Barriers: Patient needs short-term rehab as he is non-weight bearing, he is agreeable to this.     MARY KATE: None     Discharge Plan: Short-term rehab, transport by [facility].     PCP is   Brigitte at [facility], he has not recently seen a dentist or eye doctor and states he can make those appts soon. Patient denies any medication routine concerns, sleep or mood concerns. Patient states he has a license and a vehicle but has not felt very safe to drive for the past year and a half so he has been using [facility] to get to appts. He states he does not get out of the house otherwise. Patient denies tobacco, alcohol, or other drug use.     Referrals placed to [facility] and [facility] in [place], as they are the only local SNFs who accept new admissions on weekends. [facility] responded that they do not have adequate staffing for a new referral over the weekend but could accept patient on Monday. [facility] stated that the admissions coordinator who processes [employer] prior authorizations is not there until Monday so they would not be able to start that process until then. Reviewed choice of vendor with patient and he would like 1) [email or web address] in [place] 2) [facility] in [place] and 3) [facility] in [place]. Patient adamantly refuses to go to [facility] again, as he had gone there on a prior hospital discharge and did not like the place at all. His complaints were the food, the small uncomfortable beds, the building was cold. He again reiterated that he would go home even if he was advised not to over going back there. This writer called [facility] to see if they could possibly do a weekend admission, as patient has been there recently, but staff stated they cannot accept weekend admissions (as per usual). Referrals sent to patient's SNF choices.

## 2018-05-05 NOTE — ED NOTES
Face to face report given with opportunity to observe patient.    Report given to Amiar MADRID   5/4/2018  7:05 PM

## 2018-05-05 NOTE — ED NOTES
"Pt stating \"I am not going to get up and walk on a broken knee. Just call [facility] and send me there. I want the surgery done.\"   "

## 2018-05-05 NOTE — H&P
[facility]    History and Physical  Hospitalist       Date of Admission:  5/4/2018    Assessment & Plan   Jose Antonio Marmolejo is a 72 year old male with history of abnormal white matter lesions on MRI brain with concern for possible multiple sclerosis, multiple falls, diabetes, hypertension and arthritis who presents with mechanical fall yesterday morning and imaging suggestive of possible left distal femur and left proximal fibula fracture.    1. Possible nondisplaced left distal femur and proximal fibula fractures:  Appreciate surgical consult recommendations by Dr. Ramon. Patient will remain in immobilizer until follow up with ortho next week outpatient. He will need further assessment with MRI to evaluate possible fractures, although this cannot be done over weekend and could be done as outpatient since he is non weight bearing. His pain is currently adequately controlled with oral Norco. Really does not complain of pain except with movement.     2. Possible multiple sclerosis:  Had abnormal brain MRI in January this year with white matter lesions involving bilateral hemispheres and brainstem. Will still need neurology evaluation as an outpatient. PCP has referred to [facility], but I see no appointments set up for him in the [facility] system.     3. Recurrent falls:  May need more assistance at home. Will have case management meet with him. May likely be related to possible underlying multiple sclerosis.    4. Diabetes mellitus type 2:  Patient has been confused about this diagnosis. Recalls that he stopped seeing Dr. Rosales in the past after he feels that he was told he had diabetes on one visit and then on a follow up visit feels that when he inquired and had more questions concerning the diabetes, he was told by same provider that the provider had never told him he had diabetes. He recently did establish care with Dr. Briggs who noted his elevated hemoglobin A1C 6.7, and did discuss with him the  "diagnosis of diabetes. No medications added at the time. Although discussion initiated regarding him being a good candidate for ASA/statin and possibly Metformin.    5. Hypertension:  Has been on/off noncompliant with his antihypertensives in the past, at times going without his medications. Will continue his lisinopril, HCTZ and Toprol XL. Monitor BP.       # Pain Assessment:  Current Pain Score 5/5/2018   Patient currently in pain? denies   Pain score (0-10) -   Pain location -   Pain descriptors -   - Jose Antonio is experiencing pain due to possible left distal femur and left proximal fibula fracture. Pain management was discussed and the plan was created in a collaborative fashion.  Jose Antonio's response to the current recommendations: engaged  - Please see the plan for pain management as documented above          DVT Prophylaxis: Enoxaparin (Lovenox) SQ  Code Status: Full Code    Disposition: Expected discharge in 1-2 days once SNF can be arranged and pain appropriately still is being managed on oral analgesics    Skip Jenkins    Primary Care Physician   Juanita Briggs    Chief Complaint   Fall    History is obtained from the patient    History of Present Illness   Jose Antonio Marmolejo is a 72 year old male with history of abnormal white matter lesions on MRI brain with concern for possible multiple sclerosis, multiple falls, diabetes, hypertension and arthritis who presents with mechanical fall yesterday morning. Patient states that he was using his walker to navigate from his kitchen to living area when his knees suddenly gave out from under him \"as if they were disconnected\". He pressed his lifeline button and EMS arrived to the home and help him back to his recliner chair. He did not feel he had any injuries, and so declined transport to the hospital. Subsequently, he was up several times to the bathroom using his walker and without any noted difficulty. However, later in the day as he was sitting in the recliner, he noted a " terrific pain in his left leg when attempting to move it, so once again called the ambulance to his home. He denies that he had any preceding symptoms of dizziness, lightheadedness, diaphoresis, heart palpitations, chest pain, illness/fever or dyspnea prior to his fall. He had been in his usual state of health prior to the incident. Of note, he cites that he has probably had over 100 falls over the course of the past year, often because he gets off balance. He has worked on weight loss with an intentional weight loss of 60 pounds, yet he continues to have falls. Also of note, he had an abnormal brain MRI in January of this year showing multiple white matter lesions in both hemispheres and in the brainstem suggestive of a white matter disease such as multiple sclerosis. He was referred to neurology by his PCP, but has not yet had evaluation. He was evaluated with left knee plain films in the ED with no evidence of fracture. Yet he was unable to bear weight and still had pain. Further CT imaging of the left knee revealed questionable nondisplaced fractures of the distal femoral shaft and proximal fibula. Recommendation was for follow up imaging with MRI. ED discussed with Dr. Tena of [facility] and recommendation was for immobilization and non-weight bearing other than slight toe touch, along with PT and then follow up next week in office. Patient was admitted given he will likely need SNF placement until he can further mobilize, despite the fact that he has worked to make his home handicap accessible with handicap shower, ramps, and grab rails. This is largely because he has been limited to use of his walker to ambulate due to his recurrent falls.        Past Medical History    Past Medical History:   Diagnosis Date     Arthritis     back and left ankle from fall     Diabetes (H)      Hypertension      Recurrent falls        Past Surgical History   I have reviewed this patient's surgical history and  updated it with pertinent information if needed.  Past Surgical History:   Procedure Laterality Date     ORTHOPEDIC SURGERY      left ankle and removal of hardware       Prior to Admission Medications   Prior to Admission Medications   Prescriptions Last Dose Informant Patient Reported? Taking?   Blood Pressure Monitoring (ADULT BLOOD PRESSURE CUFF LG) KIT   No Yes   Si each 2 times daily   acetaminophen (TYLENOL) 325 MG tablet Past Month at Unknown time  No Yes   Sig: Take 2 tablets (650 mg) by mouth every 4 hours as needed for mild pain   hydrochlorothiazide (MICROZIDE) 12.5 MG capsule More than a month at Unknown time  No No   Sig: Take 1 capsule (12.5 mg) by mouth daily   hydrocortisone 1 % ointment   No Yes   Sig: Apply sparingly to affected area three times daily for 14 days.   ibuprofen (ADVIL/MOTRIN) 600 MG tablet Past Month at Unknown time  No Yes   Sig: Take 1 tablet (600 mg) by mouth every 6 hours as needed for other (mild pain)   lisinopril (PRINIVIL/ZESTRIL) 40 MG tablet More than a month at Unknown time  No No   Sig: Take 1 tablet (40 mg) by mouth daily   metoprolol (TOPROL-XL) 50 MG 24 hr tablet Unknown at Unknown time  No No   Sig: Take 1 tablet (50 mg) by mouth daily   miconazole (MICATIN; MICRO GUARD) 2 % powder   No Yes   Sig: Apply topically 2 times daily   senna-docusate (SENOKOT-S;PERICOLACE) 8.6-50 MG per tablet   No Yes   Sig: Take 1 tablet by mouth 2 times daily as needed for constipation      Facility-Administered Medications: None     Allergies   Allergies   Allergen Reactions     Penicillins        Social History   Social History     Social History     Marital status: Single     Spouse name: N/A     Number of children: 3     Years of education: N/A     Occupational History     Not on file.     Social History Main Topics     Smoking status: Former Smoker     Quit date: 1978     Smokeless tobacco: Never Used     Alcohol use No      Comment: quit , previously an alcoholic      Drug use: No     Sexual activity: Not Currently     Other Topics Concern     Not on file     Social History Narrative       Family History   I have reviewed this patient's family history and updated it with pertinent information if needed.   Family History   Problem Relation Age of Onset     Other - See Comments Mother      pneumonia     Obesity Mother      Other Cancer Father      hodgkins lymphoma     Coronary Artery Disease Father      pacemaker     Other Cancer Sister      unknown cancers     Colon Cancer Son        Review of Systems   The 10 point Review of Systems is negative other than noted in the HPI or here.     Physical Exam   Temp: 97.7  F (36.5  C) Temp src: Tympanic BP: 166/84 Pulse: 78 Heart Rate: 68 Resp: 18 SpO2: 96 % O2 Device: None (Room air)    Vital Signs with Ranges  Temp:  [97.2  F (36.2  C)-98.1  F (36.7  C)] 97.7  F (36.5  C)  Pulse:  [78] 78  Heart Rate:  [] 68  Resp:  [16-18] 18  BP: (137-189)/() 166/84  SpO2:  [93 %-96 %] 96 %  267 lbs 10.22 oz    Constitutional:   awake, alert, cooperative, no apparent distress, and appears stated age     Eyes:   Lids and lashes normal, pupils equal, round and reactive to light, extra ocular muscles intact, sclera clear, conjunctiva normal     ENT:   Normocephalic, without obvious abnormality, atraumatic, external ears without lesions, oral pharynx with moist mucous membranes, tonsils without erythema or exudates, gums normal and good dentition.     Neck:   Supple, symmetrical, trachea midline, no adenopathy, thyroid symmetric, not enlarged and no tenderness, skin normal     Hematologic / Lymphatic:   no cervical lymphadenopathy and no supraclavicular lymphadenopathy     Lungs:   No increased work of breathing, good air exchange, clear to auscultation bilaterally, no crackles or wheezing     Cardiovascular:   Normal apical impulse, regular rate and rhythm, normal S1 and S2, no S3 or S4, and no murmur noted     Abdomen:   No scars, obese,  normal bowel sounds, soft, non-distended, non-tender, no masses palpated, no hepatosplenomegally     Musculoskeletal:   There is no redness, warmth, or swelling of the joints. LLE in immobilizer.  Tone is normal.     Neurologic:   Awake, alert, oriented to name, place and time.  Cranial nerves II-XII are grossly intact.   Sensory is intact.      Neuropsychiatric:   General: normal, calm and normal eye contact  Affect: normal  Orientation: oriented to self, place, time and situation     Skin:   no redness, warmth, or swelling, no rashes and no lesions       Data   Data reviewed today:  I personally reviewed the left knee xr and left knee CT image(s) showing findings as discussed in HPI.    Recent Labs  Lab 05/05/18  0524 05/04/18  1751   WBC 6.4 8.5   HGB 14.4 16.0   MCV 85 85    286    140   POTASSIUM 4.1 3.9   CHLORIDE 109 109   CO2 24 24   BUN 16 18   CR 0.95 1.06   ANIONGAP 7 7   STALIN 8.2* 8.5   * 125*     Lactic Acid   Date Value Ref Range Status   08/17/2016 1.8 0.4 - 2.0 mmol/L Final       Recent Results (from the past 24 hour(s))   XR Knee Left 3 Views    Narrative    PROCEDURE:  XR KNEE LT 3 VW    HISTORY: fall, knee pain, swelling;     COMPARISON:  None.    TECHNIQUE:  3 views of the left knee were obtained.    FINDINGS:  No fracture or dislocation is identified. There is some  mild joint space narrowing at the medial femoral tibial articulation..   Mild degenerative changes are seen at the patellofemoral  articulation.      Impression    IMPRESSION: No acute fracture.  Mild degenerative changes of the left  knee    MENDEZ MATAMOROS MD   CT Knee Left w/o Contrast    Narrative    PROCEDURE: CT KNEE LEFT W/O CONTRAST 5/4/2018 8:56 PM    HISTORY: fall, instability left knee;     COMPARISONS: None.    Meds/Dose Given:    TECHNIQUE: CT scan left knee with sagittal and coronal reconstructions    FINDINGS: Is a questionable nondisplaced fracture of the proximal  fibular shaft seen best on the  sagittal images. On the sagittal views  there is a questionable nondisplaced fracture of the distal femoral  shaft. This is also best seen on the sagittal views. The  proximal  tibia is intact no fractures are noted. 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 There are  degenerative changes at the patellofemoral articulation. Small knee  effusion is seen.         Impression    IMPRESSION: Questionable nondisplaced fractures of the distal femoral  shaft seen on the sagittal image.  2. Questionable proximal fibular fracture also best seen on the  sagittal images.  3. Follow-up MRI scan for confirmation is recommended.    MENDEZ MATAMOROS MD

## 2018-05-05 NOTE — ED NOTES
Pt attempted to get up at bedside with SBA of 2 staff, walker and transfer belt.  Pt was able to pull himself off the bed using the walker but immediately was unable to stand with his left knee buckling and pt sat down. TRUDI Echevarria at bedside to witness.

## 2018-05-05 NOTE — PLAN OF CARE
Problem: Patient Care Overview  Goal: Plan of Care/Patient Progress Review  Outcome: No Change  BP elevated 172/85 on admit, Metoprolol, Lisinopril and Microzide restarted from home meds. BP recheck 137/86 Denies pain.Immobilizer in place, removed to assess skin underneath, pt flailing LLE w/o pain. Prior to removal of immobilizer pt stated feeling some numbness to Lt foot, after reapplication he stated numbness had gone away. Disoriented to time, unable to remember which clinic he uses only that he recently changed PCP's but didn't remember her name. States he no longer takes his home BP meds and his home BP cuff is malfunctioning. .  Ambulated w/FWW at home but admits to having more than 10 falls in the last 6 mos, estimated at 2-3 per week. Blanchable reddened area to crease of buttocks, pt reluctant to remove pants after encouragement. Pt slept well after being settled into rm & unit routine. IV SL'd flushing w/o difficulty.     Face to face report given with opportunity to observe patient.    Report given to Georgina WARD.    Justin Earl   5/5/2018  8:02 AM

## 2018-05-05 NOTE — ED NOTES
"Attempted to stand pt after knee immobilizer applied to left knee. Pt stating \"it makes me feel weak.\" Pt also stating that he will be unable to care for self at home d/t pain.   "

## 2018-05-05 NOTE — PLAN OF CARE
[facility] Inpatient Admission Note:    Patient admitted to 3224/3224-1 at approximately 00:04 via cart accompanied by transport tech from emergency room . Report received from Amira WARD in SBAR format at 23:56 via telephone. Patient transferred to bed via slide board.. Patient is alert and oriented X 2, reports pain; rates at 1 on 0-10 scale.  Patient oriented to room, unit, hourly rounding, and plan of care. Explained admission packet and patient handbook with patient bill of rights brochure. Will continue to monitor and document as needed.     Inpatient Nursing criteria listed below was met:      Health care directives status obtained and documented: Yes      Care Everywhere authorization obtained No      MRSA swab completed for patient 65 years and older: Yes      Patient identifies a surrogate decision maker: Yes If yes, who:Dre (son) Contact Information:[phone], but if son unreachable sister available Kailyn Howell [phone]      Core Measure diagnosis present:No. If yes, state diagnosis: n/a       Is initial lactic acid >2.0? NA.       Vaccination assessment and education completed: Yes   Influenza(seasonal)  N/A   Vaccination(s) ordered: pt declines       Clergy visit ordered if patient requests: N/A      Skin issues/needs documented: No      Isolation Patient: no Education given, correct sign in place and documentation row added to PCS:  n/a      Fall Prevention Yes: Care plan updated, education given and documented, sticker and magnet in place: Yes      Care Plan initiated: Yes      Education Documented (including assessment): Yes      Patient has discharge needs : Yes If yes, please explain:increase in falls at home, increased weakness, abrupt DC of BP meds, unable to check home BP.

## 2018-05-05 NOTE — CONSULTS
[facility]    History and Physical / Consult note: Trauma Service     Date of Admission:  5/4/2018    Time of Admission/Consult Request (page/call): 5/5 0003, request 830    Time of my evaluation: 1030  Consulting services:  Orthopedics already called.     Assessment & Plan    72 y.o. Male with recurrent falls presents with another fall, concern about possible femur and tibial fracture. Per report OA was talked too and recommended NWB status and follow up in clinic, have no actual documentation of this. There was reccomendation for MRI which seems reasonable though will defer to orthopedics in interpreting this. He does not need further imaging from a trauma standpoint. He would benefit from PT eval for safety returning home with new NWB status on left leg. Likely will require subacute stay. Of note he has spent considerable ammount of money upgrading his home with ramps and railings to help him navigate with walker.   Trauma mechanism:Fall from standing.   Time/date of injury: 5/4 1730  Known Injuries:  1. Possible left distal femur and fibula fractures   Other diagnoses:   1. Obesity  2. Chronic falls        Procedure: none     Plan:  1. MRI of knee   2. NWB Left leg per orthopedics   3. Recommend PT eval before discharge home for safety with new activity restriction.       Code status: Full confirmed with EMR.       ETOH: N/A   Primary Care Physician   Juanita Briggs    Chief Complaint   Fall from standing     History is obtained from the patient    History of Present Illness   Jose Antonio Marmolejo is a 72 year old male who presents with severe left leg pain after falling at home. He reports that he has had multiple falls in the past with the cause being unclear. He uses a walker at baseline. He was in the kitchen when both his legs buckled. Landing on his knees. He called lift assist which came and helped him up. Over time his left leg became more and more painful he called EMS. CT scan showed concern for  fracture but wanted MRI, apparently OA called and recommended NWB status and follow up in clinic. He was admitted for pain control.     Past Medical History    I have reviewed this patient's medical history and updated it with pertinent information if needed.   Past Medical History:   Diagnosis Date     Arthritis     back and left ankle from fall     Diabetes (H)      Hypertension      Recurrent falls        Past Surgical History   I have reviewed this patient's surgical history and updated it with pertinent information if needed.  Past Surgical History:   Procedure Laterality Date     ORTHOPEDIC SURGERY      left ankle and removal of hardware     Prior to Admission Medications   Prior to Admission Medications   Prescriptions Last Dose Informant Patient Reported? Taking?   Blood Pressure Monitoring (ADULT BLOOD PRESSURE CUFF LG) KIT   No Yes   Si each 2 times daily   acetaminophen (TYLENOL) 325 MG tablet Past Month at Unknown time  No Yes   Sig: Take 2 tablets (650 mg) by mouth every 4 hours as needed for mild pain   hydrochlorothiazide (MICROZIDE) 12.5 MG capsule More than a month at Unknown time  No No   Sig: Take 1 capsule (12.5 mg) by mouth daily   hydrocortisone 1 % ointment   No Yes   Sig: Apply sparingly to affected area three times daily for 14 days.   ibuprofen (ADVIL/MOTRIN) 600 MG tablet Past Month at Unknown time  No Yes   Sig: Take 1 tablet (600 mg) by mouth every 6 hours as needed for other (mild pain)   lisinopril (PRINIVIL/ZESTRIL) 40 MG tablet More than a month at Unknown time  No No   Sig: Take 1 tablet (40 mg) by mouth daily   metoprolol (TOPROL-XL) 50 MG 24 hr tablet Unknown at Unknown time  No No   Sig: Take 1 tablet (50 mg) by mouth daily   miconazole (MICATIN; MICRO GUARD) 2 % powder   No Yes   Sig: Apply topically 2 times daily   senna-docusate (SENOKOT-S;PERICOLACE) 8.6-50 MG per tablet   No Yes   Sig: Take 1 tablet by mouth 2 times daily as needed for constipation       Facility-Administered Medications: None     Allergies   Allergies   Allergen Reactions     Penicillins        Social History   Social History     Social History     Marital status: Single     Spouse name: N/A     Number of children: 3     Years of education: N/A     Occupational History     Not on file.     Social History Main Topics     Smoking status: Former Smoker     Quit date: 1/1/1978     Smokeless tobacco: Never Used     Alcohol use No      Comment: quit 1977, previously an alcoholic     Drug use: No     Sexual activity: Not Currently     Other Topics Concern     Not on file     Social History Narrative       Family History   I have reviewed this patient's family history and updated it with pertinent information if needed.   Family History   Problem Relation Age of Onset     Other - See Comments Mother      pneumonia     Obesity Mother      Other Cancer Father      hodgkins lymphoma     Coronary Artery Disease Father      pacemaker     Other Cancer Sister      unknown cancers     Colon Cancer Son        Review of Systems   CONSTITUTIONAL: No fever, chills, sweats, fatigue   EYES: no visual blurring, no double vision or visual loss  ENT: no decrease in hearing, no tinnitus, no vertigo, no hoarseness  RESPIRATORY: no shortness of breath, no cough, no sputum   CARDIOVASCULAR: no palpitations, no chest  pain, no exertional chest pain or pressure  GASTROINTESTINAL: no nausea or vomiting, or abd pain  GENITOURINARY: no dysuria, no frequency or hesitancy, no hematuria  MUSCULOSKELETAL: no weakness, left knee pain   SKIN: no rashes, ecchymoses, abrasions or lacerations  NEUROLOGIC: no numbness or tingling of hands, no numbness or tingling  of feet, no syncope, no tremors or weakness  PSYCHIATRIC: no sleep disturbances, no anxiety or depression    Physical Exam   Temp: 97.2  F (36.2  C) Temp src: Oral BP: 166/84 Pulse: 78 Heart Rate: 68 Resp: 17 SpO2: 96 % O2 Device: None (Room air)    Vital Signs with Ranges  Temp:   [97.2  F (36.2  C)-98.1  F (36.7  C)] 97.2  F (36.2  C)  Pulse:  [78] 78  Heart Rate:  [] 68  Resp:  [16-18] 17  BP: (137-189)/() 166/84  SpO2:  [93 %-96 %] 96 % 267 lbs 10.22 oz    # Pain Assessment:  Current Pain Score 5/5/2018   Patient currently in pain? denies   Pain score (0-10) -   Pain location -   Pain descriptors -   not primary       Primary Survey:  Airway: patient talking  Breathing: symmetric respiratory effort bilaterally  Circulation: central pulses present and peripheral pulses present  Disability: Pupils - left 4 mm and brisk, right 4 mm and brisk     Edu Coma Scale - Total 15/15  Eye Response (E): 4  4= spontaneous,  3= to verbal/voice, 2=  to pain, 1= No response   Verbal Response (V): 5   5= Orientated, converses,  4= Confused, converses, 3= Inappropriate words,  2= Incomprehensible sounds,  1=No response   Motor Response (M): 6   6= Obeys commands, 5= Localizes to pain, 4= Withdrawal to pain, 3=Fexion to pain, 2= Extension to pain, 1= No response    Secondary Survey:  General: alert, oriented to person, place, time  Head: atraumatic, normocephalic, trachea midline  Eyes: PERRLA, pupils 4 mm, EOMI, corneas and conjunctivae clear  Ears: no drainage   Nose: nares patent, no drainage, nasal septum non-tender  Mouth/Throat: no exudates or erythema,  no dental tenderness or malocclusions, no tongue lacerations  Neck:   No midline posterior tenderness, full AROM without pain or tenderness   Chest/Pulmonary: normal respiratory rate and rhythm, no chest wall tenderness or deformities,   Cardiovascular  normal and regular rate and rhythm  Abdomen: soft, non-tender, no guarding, no rebound tenderness and no tenderness to palpation  : , pelvis stable to lateral compression,  no nesbitt, no urine assess  Back/Spine: no deformity, no midline tenderness, no sacral tenderness,  no step-offs and no abrasions or contusions  Musculoskel/Extremities: left knee in immobilzer, full AROM of major  joints  Other than left knee without tenderness, edema, erythema, ecchymosis, or abrasions. 2+ PP. no edema.   Hand: no gross deformities of hands or fingers. Full AROM of hand and fingers in flexion and extension.  strength equal and symmetric.   Skin: no rashes, laceration, ecchymosis, skin warm and dry.   Neuro: PERRLA, alert, oriented x 3. CN II-XII grossly intact. No focal deficits. Strength 5/5 x 4 extremities.  Sensation intact.  Psychiatric: affect/mood normal, cooperative, normal judgement/insight and memory intact    Data   UA RESULTS:  Recent Labs   Lab Test  05/04/18 2008   COLOR  Yellow   APPEARANCE  Clear   URINEGLC  Negative   URINEBILI  Negative   URINEKETONE  Negative   SG  1.019   UBLD  Negative   URINEPH  5.5   PROTEIN  10*   NITRITE  Negative   LEUKEST  Negative   RBCU  0   WBCU  <1          Studies:  CT Knee Left w/o Contrast   Final Result   IMPRESSION: Questionable nondisplaced fractures of the distal femoral   shaft seen on the sagittal image.   2. Questionable proximal fibular fracture also best seen on the   sagittal images.   3. Follow-up MRI scan for confirmation is recommended.      MENDEZ MATAMOROS MD      XR Knee Left 3 Views   Final Result   IMPRESSION: No acute fracture.  Mild degenerative changes of the left   knee      MD Petey WEST

## 2018-05-05 NOTE — PLAN OF CARE
Problem: Patient Care Overview  Goal: Plan of Care/Patient Progress Review  Outcome: No Change  A&O, VS, denies pain when not moving. States he has mild pain to LLE with movement.  Up to BSC x 1 with assist x 2, gait belt and WW.  Stand pivot  on R-leg-no signs pain-no grimace or hesitancy moving. Immobilizer kept in place t/o shift. Urinal used for the rest of shift. Bilat CMS intact--2+ pedal pulses, warm to touch, cap refill <3 sec.   Denies SOB or chest pain.   Refuses senna this AM-reports no BM for at least 3 days. Discuss in depth the need to have BM's on a regular bases and not waiting too long inbetween BM's. Also explain we can use bedpan as he is worried about getting to bathroom.   Pleasant demeanor.  Face to face report given with opportunity to observe patient.    Report given to SYLVIA Paulino.    Georgina Brink  5/5/2018, 3:41 PM

## 2018-05-06 NOTE — PLAN OF CARE
"Problem: Patient Care Overview  Goal: Plan of Care/Patient Progress Review  Outcome: No Change  BP elevated 172/76 & 172/84, denies pain. Immobilizer in place, absent of bruising or swelling to LLE, CMS intact. LS CTA bilat. BS normoactive. States feeling the need to have to get up to have a BM, however refused assistance to commode, stating he would prefer the bedpan because seat too small. Discussed that the bedpan is smaller than the seat of the commode or that we could look for a larger commode, he stated that he would just hold it. Encouraged to use commode, refused, even though he is c/o \"being in bed so long\". Pt up in chair asst x2 w/FWW & GB. Pt did eventually use commode produced large BM. Voiding clear yellow urine into urinal. Encouraged independent weight shifting in bed, assisted w/turning and repositioning. Call light w/in reach, alarms on. IV SL'd, flushing w/o difficulty.     Face to face report given with opportunity to observe patient.    Report given to Belia MASTERSON RN.     Justin Earl   5/6/2018  7:54 AM          "

## 2018-05-06 NOTE — PROGRESS NOTES
Discussed with primary team no acute issues at this time, awaiting placement at this point, will obtain MRI tomorrow when able per discussion. Trauma surgery will sign off.     Petey Ramon

## 2018-05-06 NOTE — PROGRESS NOTES
05/06/18 1200   Quick Adds   Type of Visit Initial PT Evaluation   Living Environment   Lives With alone   Living Arrangements house   Home Accessibility ramps present at home;stairs to enter home;stairs within home   Number of Stairs to Enter Home 4   Number of Stairs Within Home 12   Living Environment Comment Patient has a handicap accessible home. Patient is able to stay on 1st level and has ramps to enter home.    Self-Care   Usual Activity Tolerance moderate   Current Activity Tolerance poor   Regular Exercise no   Equipment Currently Used at Home walker, rolling   Activity/Exercise/Self-Care Comment Patient has history of many falls recently. Is beeing assessed for multiple sclerosis    Functional Level Prior   Ambulation 1-->assistive equipment   Transferring 1-->assistive equipment   Toileting 1-->assistive equipment   Bathing 1-->assistive equipment   Dressing 0-->independent   Eating 0-->independent   Communication 0-->understands/communicates without difficulty   Swallowing 0-->swallows foods/liquids without difficulty   Cognition 1 - attention or memory deficits   Fall history within last six months yes   Number of times patient has fallen within last six months 10   Which of the above functional risks had a recent onset or change? ambulation;transferring;bathing;toileting   Prior Functional Level Comment Pt reports he ambulates with a 4WW at home. Reports he falls all the time. Reports son was living with him but now has moved out. Reports stairs are very difficult to so he pretty much just stays in his house. Has a neighbor who helps him when he can   General Information   Onset of Illness/Injury or Date of Surgery - Date 05/04/18   Referring Physician Dr. Jenkins   Patient/Family Goals Statement to get better and be safe with mobility   Pertinent History of Current Problem (include personal factors and/or comorbidities that impact the POC) Patient experienced a fall where both LEs suddenly buckled  resulting in a distal femur fracture and fibula  fracture on L LE. Patient is in an immobilizer and is NWB on L LE. Has upcoming assessment with ortho. Patient has some anxiety about mobility and falls. Patient has significant history of falls and is being tested for MS.    Precautions/Limitations fall precautions   Weight-Bearing Status - LLE nonweight-bearing   General Observations Anxious about mobility and falls   Cognitive Status Examination   Orientation orientation to person, place and time   Level of Consciousness alert   Follows Commands and Answers Questions (resistant to some cues, likes to control situation)   Personal Safety and Judgment intact   Memory intact   Pain Assessment   Patient Currently in Pain Yes, see Vital Sign flowsheet   Range of Motion (ROM)   ROM Comment Functional LE motion. L LE immobilzed   Strength   Strength Comments Weakness and deconditioning present. Patient was unable to maintain NWB on L LE when coming into stand and maintaining standing position.    Bed Mobility   Bed Mobility Comments Not tested. Patient up in chair   Transfer Skills   Transfer Comments Patient was Mod A for sit to stand transfer and during transfer and once in standing patient was unable to maintain NWB on L LE depite cuing. Patient fatigued quickly in standing.    Gait   Gait Comments Unable to perform. Patient lacks strength and body awareness to allow for gait with FWW with L LE NWB. Patient declined gait. Would only perform one stand after much encouragement by therapist.    Balance   Balance Comments Deficits present. Patient is unable to single leg stand with use of FWW   Clinical Impression   Criteria for Skilled Therapeutic Intervention evaluation only   PT Diagnosis Decreased functional mobility following L LE fx.    Clinical Presentation Stable/Uncomplicated   Clinical Presentation Rationale clinical judgement   Clinical Decision Making (Complexity) Low complexity   Clinical Impression Comments  "Patient is not safe for return to home. Patient will need placement at inpation rehab or TCU for strengthening, transfer training and balance training to allow safe return to home.    [facility] TM \"6 Clicks\"   2016, Trustees of [facility], under license to [employer].  All rights reserved.   6 Clicks Short Forms Basic Mobility Inpatient Short Form   [facility]  \"6 Clicks\" V.2 Basic Mobility Inpatient Short Form   1. Turning from your back to your side while in a flat bed without using bedrails? 3 - A Little   2. Moving from lying on your back to sitting on the side of a flat bed without using bedrails? 3 - A Little   3. Moving to and from a bed to a chair (including a wheelchair)? 1 - Total   4. Standing up from a chair using your arms (e.g., wheelchair, or bedside chair)? 2 - A Lot   5. To walk in hospital room? 1 - Total   6. Climbing 3-5 steps with a railing? 1 - Total   Basic Mobility Raw Score (Score out of 24.Lower scores equate to lower levels of function) 11   Total Evaluation Time   Total Evaluation Time (Minutes) 12     "

## 2018-05-06 NOTE — PLAN OF CARE
Problem: Patient Care Overview  Goal: Plan of Care/Patient Progress Review  Outcome: No Change  Discharge Planner PT   Patient plan for discharge: Patient is open to placement. His home is handicap accessible, but needs physical assist for mobility since injury and is fearful of falling.   Current status: Patient Mod A for sit to stand transfer using FWW. During transfer and in standing, patient is unable to maintain NWB status and is more PWB despite cues. Patient fatigued quickly in standing position and declines further assessment such as transferring or gait.   Barriers to return to prior living situation: Patient is at risk for falls, is unable to maintain NWB status on L LE and requires assistance with mobility.    Recommendations for discharge: Inpatient rehab or TCU  Rationale for recommendations: Patient will need transfer training, conditioning and balance training to allow for safe return to home.        Entered by: Radha Nash 05/06/2018 12:38 PM

## 2018-05-06 NOTE — PLAN OF CARE
1607: Pt up in chair. States he likes the chair better then the bed and would like to remain up in chair. Ate a early supper with good appetite. Denies pain, nausea or dyspnea at this time. Knee immobilizer on.  Iv lock patent and wdl. Weight shifted in chair.  1720: up to commode via lisa, passing gas, and voided had formed brown bowel movement. Pt back to chair with alarm on. Repositioned in chair for comfort.  1945: vitals as charted on flowsheet. Pt requests to stay in chair. Repositioned in chair with pillows.  2030:  Pt took hs med without difficulty. Cms intact to ble.  Knee immobilizer repositioned.  Pt offer no complaints.uses urinal appropriately  2230: pt asleep in chair. resp even and unlabored. Requested to sleep in chair. We have been repositioning q 2 hours and as needed. Pt is able to repostion self but need encouragement to do so.

## 2018-05-06 NOTE — PLAN OF CARE
Problem: Patient Care Overview  Goal: Plan of Care/Patient Progress Review  Outcome: No Change   05/05/18 1421   OTHER   Plan Of Care Reviewed With patient   Plan of Care Review   Progress no change   1500:  Assessment as charted. Cms intact to ble. Skin assessed under the knee immobilzer. Pt able to lift legs by self without pain with immobilizer on. Pt denies pain, nausea or shortness of breath. Talked with pt about repositioning himself every hour or 2 to prevent skin breakdown. Pt verbalized understanding and we will also help him as well. Iv lock patent and wdl.    1700: repositioned with assist of one.  1800: pt eating, good appetite offers no complaints.  2030: pt repositioned, denies pain, nausea or dyspnea.  Cms intact ble. Pt refused senna, states he doesn't want it. I talked with him about constipation and decrease in mobility and meds etc. Pt verbalized understanding but states he doesn't want the medication.encouraging fluids. Fan in room for comfort  2245: pt being repositioned q2hr and as needed. Pt able to help with repositioning. Offers no complaints at this time.  Face to face report given with opportunity to observe patient.    Report given to cindy esteban   5/5/2018  11:20 PM      Problem: Fall Risk (Adult)  Goal: Identify Related Risk Factors and Signs and Symptoms  Related risk factors and signs and symptoms are identified upon initiation of Human Response Clinical Practice Guideline (CPG).   Outcome: No Change   05/05/18 1500   Fall Risk   Related Risk Factors (Fall Risk) gait/mobility problems   Signs and Symptoms (Fall Risk) presence of risk factors

## 2018-05-06 NOTE — PLAN OF CARE
Face to face report given with opportunity to observe patient.    Report given to SYLVIA Paulino   5/6/2018  2:59 PM

## 2018-05-06 NOTE — PLAN OF CARE
1500:  Assessment as charted. Cms intact to ble. Skin assessed under the knee immobilzer. Pt able to lift legs by self without pain with immobilizer on. Pt denies pain, nausea or shortness of breath. Talked with pt about repositioning himself every hour or 2 to prevent skin breakdown. Pt verbalized understanding and we will also help him as well. Iv lock patent and wdl.    1700: repositioned with assist of one.  1800: pt eating, good appetite offers no complaints.  2030: pt repositioned, denies pain, nausea or dyspnea.  Cms intact ble. Pt refused senna, states he doesn't want it. I talked with him about constipation and decrease in mobility and meds etc. Pt verbalized understanding but states he doesn't want the medication.encouraging fluids. Fan in room for comfort  2245: pt being repositioned q2hr and as needed. Pt able to help with repositioning. Offers no complaints at this time.

## 2018-05-06 NOTE — PLAN OF CARE
"Problem: Patient Care Overview  Goal: Plan of Care/Patient Progress Review  Outcome: Improving   05/06/18 0337 05/06/18 1435   OTHER   Plan Of Care Reviewed With patient --    Plan of Care Review   Progress --  improving   Reason for hospital stay:  Inability to bear weight    Most recent vitals: /71  Pulse 71  Temp 97.1  F (36.2  C) (Oral)  Resp 17  Ht 1.778 m (5' 10\")  Wt 121.4 kg (267 lb 10.2 oz)  SpO2 95%  BMI 38.4 kg/m2  Pain Management:  Pt has no complaints of pain in left leg today. Immobilizer on whole shift  Orientation:  A&o no confusion noted  Cardiac:  wnl  Respiratory:  wnl  GI:  wnl  :  wnl  Diet:regular  Skin Issues:  Skin intact  IVF:  SL  ABX:  none  Mobility:  Non wt bearing on Left leg, per PT to use lisa during transfers  Safety:  Pt has had no falls this shift and uses call light approprietly    Comments:    5/6/2018  2:35 PM  YARELY HOLCOMB      Problem: Fall Risk (Adult)  Goal: Identify Related Risk Factors and Signs and Symptoms  Related risk factors and signs and symptoms are identified upon initiation of Human Response Clinical Practice Guideline (CPG).   Outcome: Improving   05/06/18 0900   Fall Risk   Related Risk Factors (Fall Risk) age-related changes   Signs and Symptoms (Fall Risk) presence of risk factors         "

## 2018-05-07 NOTE — PLAN OF CARE
"Problem: Patient Care Overview  Goal: Plan of Care/Patient Progress Review  Outcome: Therapy, progress toward functional goals as expected  Reason for hospital stay:  Falls, Pain Management        Most recent vitals: /68  Pulse 71  Temp 98  F (36.7  C) (Tympanic)  Resp 18  Ht 1.778 m (5' 10\")  Wt 121.4 kg (267 lb 10.2 oz)  SpO2 94%  BMI 38.4 kg/m2    Pain Management:  Denies    Orientation:  A/O    Cardiac:  WDL    Respiratory:  Lungs clear bilat    GI:  Bowel sounds audible et active x4, declined scheduled Senna    :  WDL, using urinal     Skin Issues:  Abd folds yeasty, reddened on R side, Intra dry applied    IVF:  Saline locked    ABX:  n/a    Mobility:  Alma lift, NWB to LLE Imobilizer on at all times except for cares et MRI    Safety:  A/O calls for assist appropriately         Comments:         5/7/2018  11:33 AM  Lyndsay Fox          "

## 2018-05-07 NOTE — PROGRESS NOTES
Jose Antonio Marmolejo 72 year old    Returned from mri  Exam Performed : mri left knee  Pushed to Reading Service?: No  Tolerated Procedure : well    May resume previous diet : Yes    Time Returned to Unit : 10:45  Report Given to : nurse amador    5/7/2018 10:56 AM   SABRINA HURD

## 2018-05-07 NOTE — PROGRESS NOTES
Name: Jose Antonio Marmolejo    MRN#: [id number]    Reason for Hospitalization: Inability to bear weight [R26.89]  Closed fracture of distal end of left femur, unspecified fracture morphology, initial encounter (H) [S72.402A]    Discharge Date: 5/7/2018    Patient / Family response to discharge plan: Agreed    Follow-Up Appt: Future Appointments  Date Time Provider Department Center   5/8/2018 11:30 AM Juanita Briggs MD [facility]       Other Providers (Care Coordinator, County Services, PCA services etc): Yes: [facility]- discharge orders faxed there.     Discharge Disposition: short-term care facility, [facility], transport by [facility]    Vero Ba

## 2018-05-07 NOTE — PROGRESS NOTES
[facility]    Hospitalist Progress Note      Assessment & Plan   Jose Antonio Marmolejo is a 72 year old male with history of abnormal white matter lesions on MRI brain with concern for possible multiple sclerosis, multiple falls, diabetes, hypertension and arthritis who presents with mechanical fall yesterday morning and imaging suggestive of possible left distal femur and left proximal fibula fracture.     1. Possible nondisplaced left distal femur and proximal fibula fractures:  Appreciate surgical consult recommendations by Dr. Ramon. Patient will remain in immobilizer until follow up with ortho this week outpatient per recommendations from Dr. Tena. He will need further assessment with MRI to evaluate possible fractures, although this cannot be done over weekend and will try to get this done tomorrow. His pain is currently adequately controlled with oral Norco. Really does not complain of pain except with movement. Will need SNF. He has much difficulty maintaining NWB status upon assessment with PT and will need transfer and balance training, conditioning before safe to return home.     2. Possible multiple sclerosis:  Had abnormal brain MRI in January this year with white matter lesions involving bilateral hemispheres and brainstem. Will still need neurology evaluation as an outpatient. PCP has referred to [facility], but I see no appointments set up for him in the [facility] system. Yet patient seems to believe he has an appointment with Neurology for 5/8/18 although is not sure where. Will need to contact office of his PCP to see if they can help establish when/where his appointment is so arrangements can be made for his transfer from SNF to his appointment.    3. Recurrent falls:  May likely be related to possible underlying multiple sclerosis. He has made his home handicap accessible. He indicates having fallen over 100 times in the past year.     4. Diabetes mellitus type 2:  Patient had  previously been confused about this diagnosis. Recalls that he stopped seeing Dr. Rosales in the past after he feels that he was told he had diabetes on one visit and then on a follow up visit feels that when he inquired and had more questions concerning the diabetes, he was told by same provider that the provider had never told him he had diabetes. He recently did establish care with Dr. Briggs who noted his elevated hemoglobin A1C 6.7, and did discuss with him the diagnosis of diabetes. No medications added at the time. Although discussion initiated regarding him being a good candidate for ASA/statin and possibly Metformin. Will defer to PCP, but at least will put in referral for some initial diabetes education.      5. Hypertension:  Has been on/off noncompliant with his antihypertensives in the past, at times going without his medications. Will continue his lisinopril, HCTZ and Toprol XL. Monitor BP adequately controlled at present.       # Pain Assessment:  Current Pain Score 5/6/2018   Patient currently in pain? denies   Pain score (0-10) -   Pain location -   Pain descriptors -   Jose Antonio velarde pain level was assessed and he currently denies pain.        DVT Prophylaxis: Enoxaparin (Lovenox) SQ  Code Status: Full Code    Disposition: Expected discharge in 1-2 days once SNF can be arranged  Sereen D. Sharp    Interval History   Sitting up in chair. Pain not present at rest. Only with movement or attempt to transfer. Feeling much better being up to chair. No other complaints    -Data reviewed today: I reviewed all new labs and imaging results over the last 24 hours. I personally reviewed no images or EKG's today.    Physical Exam   Temp: 97.8  F (36.6  C) Temp src: Tympanic BP: 136/68   Heart Rate: 68 Resp: 16 SpO2: 95 % O2 Device: None (Room air)    Vitals:    05/04/18 1723 05/05/18 0008   Weight: 118.8 kg (262 lb) 121.4 kg (267 lb 10.2 oz)     Vital Signs with Ranges  Temp:  [97.1  F (36.2  C)-98.5  F (36.9  C)] 97.8   F (36.6  C)  Heart Rate:  [68-79] 68  Resp:  [16-17] 16  BP: (136-172)/(68-84) 136/68  SpO2:  [95 %-96 %] 95 %  I/O last 3 completed shifts:  In: 960 [P.O.:960]  Out: 2150 [Urine:2150]    Peripheral IV 05/04/18 Right Hand (Active)   Site Assessment WDL 5/6/2018  6:00 PM   Line Status Saline locked;Checked every 1-2 hour 5/6/2018  6:00 PM   Phlebitis Scale 0-->no symptoms 5/6/2018  6:00 PM   Infiltration Scale 0 5/6/2018  6:00 PM   Number of days:2     No line/device    Constitutional: Alert and oriented x3. No distress    HEENT: NC/AT, PERRL, EOMI, mouth clear, neck supple, no LN.     Cardiovascular: RRR. No murmur, no rubs, or gallops.      Respiratory: Clear to auscultation bilaterally    Abdomen: Obese, soft, NTND, no organomegaly. Bowel sounds present    Extremities: Warm/dry. No edema. Left leg in immobilizer    Neuro:   Non focal, cranial nerves intact, Moves all extremities.    Medications       enoxaparin  40 mg Subcutaneous Q24H     hydrochlorothiazide  12.5 mg Oral Daily     lisinopril  40 mg Oral Daily     metoprolol succinate  50 mg Oral Daily     senna-docusate  1 tablet Oral BID    Or     senna-docusate  2 tablet Oral BID       Data     Recent Labs  Lab 05/05/18  0524 05/04/18  1751   WBC 6.4 8.5   HGB 14.4 16.0   MCV 85 85    286    140   POTASSIUM 4.1 3.9   CHLORIDE 109 109   CO2 24 24   BUN 16 18   CR 0.95 1.06   ANIONGAP 7 7   STALIN 8.2* 8.5   * 125*       No results found for this or any previous visit (from the past 24 hour(s)).

## 2018-05-07 NOTE — DISCHARGE SUMMARY
"[facility]    Discharge Summary  Hospitalist    Date of Admission:  5/4/2018  Date of Discharge:  5/7/2018 12:10 PM  Discharging Provider: Krystal Olmedo CNP  Date of Service (when I saw the patient): 5/7/18    Discharge Diagnoses   Active Problems:    Essential hypertension    Type 2 diabetes mellitus without complication (H)    Weakness of both lower extremities    Recurrent falls    Pain management      History of Present Illness   From admission:Jose Antonio Marmolejo is a 72 year old male with history of abnormal white matter lesions on MRI brain with concern for possible multiple sclerosis, multiple falls, diabetes, hypertension and arthritis who presents with mechanical fall yesterday morning. Patient states that he was using his walker to navigate from his kitchen to living area when his knees suddenly gave out from under him \"as if they were disconnected\". He pressed his lifeline button and EMS arrived to the home and help him back to his recliner chair. He did not feel he had any injuries, and so declined transport to the hospital. Subsequently, he was up several times to the bathroom using his walker and without any noted difficulty. However, later in the day as he was sitting in the recliner, he noted a terrific pain in his left leg when attempting to move it, so once again called the ambulance to his home. He denies that he had any preceding symptoms of dizziness, lightheadedness, diaphoresis, heart palpitations, chest pain, illness/fever or dyspnea prior to his fall. He had been in his usual state of health prior to the incident. Of note, he cites that he has probably had over 100 falls over the course of the past year, often because he gets off balance. He has worked on weight loss with an intentional weight loss of 60 pounds, yet he continues to have falls. Also of note, he had an abnormal brain MRI in January of this year showing multiple white matter lesions in both hemispheres and in the " brainstem suggestive of a white matter disease such as multiple sclerosis. He was referred to neurology by his PCP, but has not yet had evaluation. He was evaluated with left knee plain films in the ED with no evidence of fracture. Yet he was unable to bear weight and still had pain. Further CT imaging of the left knee revealed questionable nondisplaced fractures of the distal femoral shaft and proximal fibula. Recommendation was for follow up imaging with MRI. ED discussed with Dr. Tena of [facility] and recommendation was for immobilization and non-weight bearing other than slight toe touch, along with PT and then follow up next week in office. Patient was admitted given he will likely need SNF placement until he can further mobilize, despite the fact that he has worked to make his home handicap accessible with handicap shower, ramps, and grab rails. This is largely because he has been limited to use of his walker to ambulate due to his recurrent falls.     Hospital Course   Jose Antonio CONSTANZA Marmolejo was admitted on 5/4/2018.  The following problems were addressed during his hospitalization:      Left knee pain: CT scan concerning for proximal femur fracture, however MRI did not show any osseous abnormality. He does however have mild to moderate joint effusion and medial meniscus tear and possible nondisplaced tear of anterior horn lateral meniscus. He is no longer having pain with movement but does have pain with weight bearing. He is discharged to SNF for further mobility training. He will wear knee immobilizer while up and weight bear as tolerated, follow up with orthopedics in 1-2 weeks.       Essential hypertension: Stable after initial presentation, no changes made to his medications-ACe, BB,HCTZ      Type 2 diabetes mellitus without complication (H): No hyperglycemia while here, he is not on any long term controller medications ,continue diet modification.      Weakness of both lower extremities: MRI done  1/2018 showed white matter lesions. He has had multiple falls, several hospital and acute rehab stays for weakness and falls. He reports he has had progressive weakness for many years but particularly bad over the last 6 months to a year.  He has not seen a neurologist since his original abnormal MRI. Will get him set up for neurology follow-up for the near future.       Recurrent falls: As above. To [facility] for further rehab and strengthening, will need neurological follow-up.     Krystal Olmedo, CNP      Code Status   Full Code       Primary Care Physician   Juanita Briggs      # Discharge Pain Plan:   - During his hospitalization, Jose Antonio experienced pain due to knee pain/fall.  The pain plan for discharge was discussed with Jose Antonio and the plan was created in a collaborative fashion.    - Opioids prescribed on discharge: hydrocodone  - Duration of opioids after discharge: Per CDC opioid prescribing guidelines, a 3 day prescription of opioids was provided.  - Bowel regimen: per NH protocol      Discharge Disposition   Discharged to nursing home  Condition at discharge: Stable    Consultations This Hospital Stay   ORTHOPEDIC SURGERY IP CONSULT  PHYSICAL THERAPY ADULT IP CONSULT  DIABETES EDUCATION IP CONSULT  PHYSICAL THERAPY ADULT IP CONSULT  OCCUPATIONAL THERAPY ADULT IP CONSULT    Time Spent on this Encounter   I, Krystal Olmedo, personally saw the patient today and spent less than or equal to 30 minutes discharging this patient.    Discharge Orders     General info for SNF   Length of Stay Estimate: Short Term Care: Estimated # of Days <30  Condition at Discharge: Stable  Level of care:skilled   Rehabilitation Potential: Fair  Admission H&P remains valid and up-to-date: Yes  Recent Chemotherapy: N/A  Use Nursing Home Standing Orders: Yes     Reason for your hospital stay   Frequent falls, right knee pain after fall     Additional Discharge Instructions   Kn ee immobilizer on unless in bed.      Activity - Up with nursing assistance   Must have knee immobilizer on while out of bed.     Weight bearing status   Weight bear as tolerated     Follow Up and recommended labs and tests   Follow up with Nursing home physician.  No follow up labs or test are needed.  Follow-up with orthopedics in 2 weeks.     Physical Therapy Adult Consult   Evaluate and treat as clinically indicated.    Reason:  R knee pain, meniscal tear     Occupational Therapy Adult Consult   Evaluate and treat as clinically indicated.    Reason:  R knee pain, meniscal tear     Fall precautions     Advance Diet as Tolerated   Follow this diet upon discharge: Orders Placed This Encounter     Consistent Carbohydrate Diet 5846-2199 Calories: Moderate Consistent CHO (4-6 CHO units/meal)       Discharge Medications   Current Discharge Medication List      START taking these medications    Details   HYDROcodone-acetaminophen (NORCO) 7.5-325 MG per tablet Take 1 tablet by mouth every 6 hours as needed for moderate to severe pain  Qty: 20 tablet, Refills: 0    Associated Diagnoses: Weakness of both lower extremities; Essential hypertension; Type 2 diabetes mellitus without complication, without long-term current use of insulin (H); Inability to bear weight         CONTINUE these medications which have NOT CHANGED    Details   acetaminophen (TYLENOL) 325 MG tablet Take 2 tablets (650 mg) by mouth every 4 hours as needed for mild pain  Qty: 100 tablet    Associated Diagnoses: Fall, initial encounter; Weakness      Blood Pressure Monitoring (ADULT BLOOD PRESSURE CUFF LG) KIT 1 each 2 times daily  Qty: 1 kit, Refills: 0    Comments: Check blood pressures twice per day: before breakfast and at bedtime.  Keep a log.  Associated Diagnoses: Benign essential hypertension; Malignant essential hypertension      hydrocortisone 1 % ointment Apply sparingly to affected area three times daily for 14 days.  Qty: 30 g, Refills: 0    Associated Diagnoses: Rash and  nonspecific skin eruption      ibuprofen (ADVIL/MOTRIN) 600 MG tablet Take 1 tablet (600 mg) by mouth every 6 hours as needed for other (mild pain)  Qty: 120 tablet    Associated Diagnoses: Falls frequently      miconazole (MICATIN; MICRO GUARD) 2 % powder Apply topically 2 times daily  Qty: 90 g, Refills: 1    Associated Diagnoses: Yeast infection of the skin      senna-docusate (SENOKOT-S;PERICOLACE) 8.6-50 MG per tablet Take 1 tablet by mouth 2 times daily as needed for constipation  Qty: 100 tablet    Associated Diagnoses: Chronic idiopathic constipation      hydrochlorothiazide (MICROZIDE) 12.5 MG capsule Take 1 capsule (12.5 mg) by mouth daily  Qty: 30 capsule    Associated Diagnoses: Essential hypertension      lisinopril (PRINIVIL/ZESTRIL) 40 MG tablet Take 1 tablet (40 mg) by mouth daily  Qty: 30 tablet    Associated Diagnoses: Essential hypertension      metoprolol (TOPROL-XL) 50 MG 24 hr tablet Take 1 tablet (50 mg) by mouth daily  Qty: 30 tablet    Associated Diagnoses: Essential hypertension           Allergies   Allergies   Allergen Reactions     Penicillins      Data   Most Recent 3 CBC's:  Recent Labs   Lab Test  05/05/18   0524  05/04/18   1751  03/22/18   0526   WBC  6.4  8.5  8.1   HGB  14.4  16.0  15.3   MCV  85  85  84   PLT  258  286  205      Most Recent 3 BMP's:  Recent Labs   Lab Test  05/05/18   0524  05/04/18   1751  03/22/18   0526   NA  140  140  142   POTASSIUM  4.1  3.9  3.7   CHLORIDE  109  109  109   CO2  24  24  24   BUN  16  18  12   CR  0.95  1.06  0.92   ANIONGAP  7  7  9   STALIN  8.2*  8.5  7.9*   GLC  130*  125*  108*     Most Recent 2 LFT's:  Recent Labs   Lab Test  03/21/18   1654  01/01/18   0531   AST  19  28   ALT  21  19   ALKPHOS  69  53   BILITOTAL  0.4  0.4     Most Recent INR's and Anticoagulation Dosing History:  Anticoagulation Dose History     There is no flowsheet data to display.        Most Recent 3 Troponin's:No lab results found.  Most Recent Cholesterol  Panel:No lab results found.  Most Recent 6 Bacteria Isolates From Any Culture (See EPIC Reports for Culture Details):  Recent Labs   Lab Test  05/05/18   0047  03/21/18   2107  12/31/17   1210  08/17/16   2155   CULT  No MRSA isolated  No MRSA isolated  No MRSA isolated  No MRSA isolated     Most Recent TSH, T4 and A1c Labs:  Recent Labs   Lab Test  03/22/18   0526  01/01/18   0531   TSH  1.05   --    A1C   --   6.7*     Results for orders placed or performed during the hospital encounter of 05/04/18   XR Knee Left 3 Views    Narrative    PROCEDURE:  XR KNEE LT 3 VW    HISTORY: fall, knee pain, swelling;     COMPARISON:  None.    TECHNIQUE:  3 views of the left knee were obtained.    FINDINGS:  No fracture or dislocation is identified. There is some  mild joint space narrowing at the medial femoral tibial articulation..   Mild degenerative changes are seen at the patellofemoral  articulation.      Impression    IMPRESSION: No acute fracture.  Mild degenerative changes of the left  knee    MENDEZ MATAMOROS MD   CT Knee Left w/o Contrast    Narrative    PROCEDURE: CT KNEE LEFT W/O CONTRAST 5/4/2018 8:56 PM    HISTORY: fall, instability left knee;     COMPARISONS: None.    Meds/Dose Given:    TECHNIQUE: CT scan left knee with sagittal and coronal reconstructions    FINDINGS: Is a questionable nondisplaced fracture of the proximal  fibular shaft seen best on the sagittal images. On the sagittal views  there is a questionable nondisplaced fracture of the distal femoral  shaft. This is also best seen on the sagittal views. The  proximal  tibia is intact no fractures are noted. 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 There are  degenerative changes at the patellofemoral articulation. Small knee  effusion is seen.         Impression    IMPRESSION: Questionable nondisplaced fractures of the distal femoral  shaft seen on  the sagittal image.  2. Questionable proximal fibular fracture also best seen on the  sagittal images.  3. Follow-up MRI scan for confirmation is recommended.    MENDEZ MATAMOROS MD   MR Knee Left w/o Contrast    Narrative    Exam:MR KNEE LEFT W/O CONTRAST    History:72 years Male  with left knee pain    Comparisons:None    Technique: Sagittal and PD fat sat, sagittal T2, coronal PD fat-sat,   coronal T1, axial PD fat-sat and axial PD imaging of the knee was  performed.    Findings:    Fluid: There is a small joint effusion. There is a joint body within  the anterior knee at the midline measuring 8 x 4 mm in diameter.    Medial Compartment:          Meniscus: There is a tear of the midportion of the meniscus  with a peripherally displaced flap type component in the medial  gutter.          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    Lateral Compartment:         Meniscus: There is abnormal signal within the anterior horn  suggestive of mucoid degeneration.         Cartilage: Cartilage thickness and signal are normal.    Patellofemoral Compartment:           There is degenerative enthesopathy of the inferior patella at  the attachment of the patellar tendon. There is mild cartilage  thinning and mild cartilage signal heterogeneity.    Ligaments:           The extensor mechanism is intact. The anterior and posterior  cruciate ligaments are intact. The medial collateral ligament and  lateral collateral complex are intact.    Soft tissues:          Unremarkable    Osseous:          No bone marrow signal abnormality is present to suggest  contusion or fracture. There is no evidence of bone marrow edema.        Impression    Impression: There is a flap-type tear of the midportion of the medial  meniscus with a peripherally displaced component.    There is mucoid degeneration, possibly from a nondisplaced tear of the  anterior horn of the  lateral meniscus.    A moderate-sized joint effusion is present.    There is an 8 x 4 mm joint body anterior to the tibial attachment of  the anterior cruciate ligament.    Advanced chondromalacia the medial compartment.     ADELFO ROSE MD

## 2018-05-07 NOTE — PLAN OF CARE
Problem: Patient Care Overview  Goal: Plan of Care/Patient Progress Review  Outcome: No Change   05/06/18 0337 05/06/18 1435   OTHER   Plan Of Care Reviewed With patient --    Plan of Care Review   Progress --  improving     1607: Pt up in chair. States he likes the chair better then the bed and would like to remain up in chair. Ate a early supper with good appetite. Denies pain, nausea or dyspnea at this time. Knee immobilizer on.  Iv lock patent and wdl. Weight shifted in chair.  1720: up to commode via lisa, passing gas, and voided had formed brown bowel movement. Pt back to chair with alarm on. Repositioned in chair for comfort.  1945: vitals as charted on flowsheet. Pt requests to stay in chair. Repositioned in chair with pillows.  2030:  Pt took hs med without difficulty. Cms intact to ble.  Knee immobilizer repositioned.  Pt offer no complaints.uses urinal appropriately  2230: pt asleep in chair. resp even and unlabored. Requested to sleep in chair. We have been repositioning q 2 hours and as needed. Pt is able to repostion self but need encouragement to do so. Chair alarm on.    Problem: Fall Risk (Adult)  Goal: Identify Related Risk Factors and Signs and Symptoms  Related risk factors and signs and symptoms are identified upon initiation of Human Response Clinical Practice Guideline (CPG).    05/06/18 1607   Fall Risk   Related Risk Factors (Fall Risk) age-related changes;history of falls   Signs and Symptoms (Fall Risk) presence of risk factors       Face to face report given with opportunity to observe patient.    Report given to cindy amador   5/6/2018  11:12 PM

## 2018-05-07 NOTE — PLAN OF CARE
Patient discharged at 12:00 PM via wheel chair accompanied by other:hlt and staff. Prescriptions - None ordered for discharge. All belongings sent with patient.     Discharge instructions reviewed with pt. Listed belongings gathered and returned to patient. yes    Patient discharged to [facility].   Report called to Nursing Home:  [facility]    Core Measures and Vaccines  Core Measures applicable during stay: No. If yes, state diagnosis: n/a  Pneumonia and Influenza given prior to discharge, if indicated: N/A    Surgical Patient   Surgical Procedures during stay: no  Did patient receive discharge instruction on wound care and recognition of infection symptoms? N/A    MISC  Follow up appointment made:  No  Home and hospital aquired medications returned to patient: N/A  Patient reports pain was well managed at discharge: Yes

## 2018-05-07 NOTE — PLAN OF CARE
"Problem: Patient Care Overview  Goal: Plan of Care/Patient Progress Review  Reason for hospital stay:  Pain Management        Most recent vitals: /67  Pulse 71  Temp 98.3  F (36.8  C)  Resp 16  Ht 1.778 m (5' 10\")  Wt 121.4 kg (267 lb 10.2 oz)  SpO2 93%  BMI 38.4 kg/m2    Pain Management:  Denies pain    Orientation:  A/O    Cardiac:  WDL    Respiratory:  Lungs clear et diminished bilat Sats 93% on r/a    GI:  Bowel sounds audible et active x4    :  WDL    Skin Issues:  Scattered scabs, no other alterations noted    IVF:  Saline locked    ABX:  n/a    Mobility:  Chair bound, using urinal in , lisa lift for al;l transfers. Immobilizer in use, frequent readjustments needed    Safety:  Alarms audible et active        Comments:        5/7/2018  4:29 AM  Lyndsay Fox          "

## 2018-05-08 NOTE — PLAN OF CARE
Chart accessed to facilitate neurology appointment per LAURA Olmedo NP. Appointment made with Dr Suzi Bernal [facility] for Oct 16th at 1:15 PM. [facility] Giron called and informed of appointment . They will fax patient records to Dr Bernal's office . Fax number provided [phone]

## 2018-05-08 NOTE — PROGRESS NOTES
SUBJECTIVE:   Jose Antonio Marmolejo is a 72 year old male who presents to clinic today for the following health issues:      Hypertension Follow-up      Outpatient blood pressures are being checked at home.  Results are normal, lower then before.    Low Salt Diet: low salt    Amount of exercise or physical activity: varies, as much as he is able to do he will do    Problems taking medications regularly: No    Medication side effects: none    Diet: low salt      Falls      Duration: on going    Description (location/character/radiation): moved to nursing home. No falls at nursing home    Intensity:  NA    Accompanying signs and symptoms: NA    History (similar episodes/previous evaluation): None    Precipitating or alleviating factors: None    Therapies tried and outcome: moved to nursing home     Pt is a 73 yo M with history of HTN, DM2 (diet controlled) and Recurrent falls who presents to clinic from TCU for hospital follow up after a fall at home. Patient was seen by me last on 3/27 as a hospital follow up for similar issue. He completed rehab at [facility] and returned to his home where he lives alone. He was apparently home a couple of weeks. He reports he had no falls prior to the fall that lead to this hospitalization. Reports that this fall was unusual in that he had no prodrome and his legs simply buckled under him. He used his life alert to get help and off the ground. Apparently initially declined evaluation in the Ed, but due to ongoing pain in the left leg called EMS again for transport. He had CT of the LLE which was suggestive of possible non displaced fracture of the distal femur and proximal tibia. MR was completed, however, and no fracture noted. He was discharged in a knee immobilzer. Per his report and [facility] paperwork, he is NWB on the left leg. He has not been compliant with this, however, and finds this difficult. He has mild pain with weight bearing. Needing minimal narcotics per his  "report.     He currently otherwise feels well. He denies lightheadedness, dizziness. He does note, however, that he had not been taking in BP medications at home because his BP was normal when he would check it. Of note, on admission, pts BP was high.     Additional history: as documented    Labs reviewed in EPIC    Reviewed and updated as needed this visit by clinical staff  Tobacco  Allergies  Meds  Problems  Med Hx  Surg Hx  Fam Hx  Soc Hx        Reviewed and updated as needed this visit by Provider  Tobacco  Allergies  Meds  Problems  Med Hx  Surg Hx  Soc Hx        ROS:  Constitutional, HEENT, cardiovascular, pulmonary, gi and gu systems are negative, except as otherwise noted.    OBJECTIVE:     /78 (BP Location: Left arm, Patient Position: Chair, Cuff Size: Adult Large)  Pulse 81  Temp 97  F (36.1  C) (Tympanic)  Resp 16  Ht 5' 10\" (1.778 m)  SpO2 100%  There is no height or weight on file to calculate BMI.  GENERAL: healthy, alert and no distress  EYES: Eyes grossly normal to inspection, PERRL and conjunctivae and sclerae normal  NECK: no adenopathy, no asymmetry, masses, or scars and thyroid normal to palpation  RESP: lungs clear to auscultation - no rales, rhonchi or wheezes  CV: regular rate and rhythm, normal S1 S2, no S3 or S4, no murmur, click or rub, no peripheral edema   ABDOMEN: soft, nontender, no hepatosplenomegaly, no masses  MS: Strength grossly wnl in RLE, LLE is in immobilizer, placed fairly haphazardly appears too loose and ill fitting. There is no significant bruising or edema of knee. ROM is limited in flexion due to pain, but otherwise pt moves the knee well.     Diagnostic Test Results:  Results for orders placed or performed in visit on 05/08/18   Albumin Random Urine Quantitative with Creat Ratio   Result Value Ref Range    Creatinine Urine 117 mg/dL    Albumin Urine mg/L 10 mg/L    Albumin Urine mg/g Cr 8.45 0 - 17 mg/g Cr   UA reflex to Microscopic and Culture - " [place]   Result Value Ref Range    Color Urine Yellow     Appearance Urine Clear     Glucose Urine Negative NEG^Negative mg/dL    Bilirubin Urine Negative NEG^Negative    Ketones Urine Negative NEG^Negative mg/dL    Specific Gravity Urine 1.016 1.003 - 1.035    Blood Urine Negative NEG^Negative    pH Urine 5.5 4.7 - 8.0 pH    Protein Albumin Urine 10 (A) NEG^Negative mg/dL    Urobilinogen mg/dL Normal 0.0 - 2.0 mg/dL    Nitrite Urine Negative NEG^Negative    Leukocyte Esterase Urine Negative NEG^Negative    Source Midstream Urine     RBC Urine 1 0 - 2 /HPF    WBC Urine 1 0 - 5 /HPF    Bacteria Urine Few (A) NEG^Negative /HPF    Mucous Urine Present (A) NEG^Negative /LPF       ASSESSMENT/PLAN:     Type 2 diabetes mellitus without complication, without long-term current use of insulin (H)  Pt requested to use restroom, so urine studies obtained. He will get fasting cholesterol and additional labwork at [facility].   - Albumin Random Urine Quantitative with Creat Ratio  - UA reflex to Microscopic and Culture - [place]  - Lipid Profile (Chol, Trig, HDL, LDL calc); Future  - Comprehensive metabolic panel (BMP + Alb, Alk Phos, ALT, AST, Total. Bili, TP); Future  - CARE COORDINATION REFERRAL    Falling episodes / Weakness of both lower extremities / Abnormal MRI of head  Needs Neurology follow up. Pt is not able to go to [facility]. Appointment was made for him with Dr. Rocha at this location in July. He will be on a list for cancellation slots. Will get Care Coordination involved as he has had a difficult time with follow up in the past.   - CARE COORDINATION REFERRAL    Tear of meniscus of left knee as current injury, unspecified meniscus, unspecified tear type, subsequent encounter  Advised [facility] that pt may bear weight as tolerated. New, smaller knee brace fitted today. Ortho referral made.   - ORTHOPEDICS ADULT REFERRAL    Essential hypertension  Pt is getting his BP medications at [facility]. His BP  today does not suggest he is overtreated. Will continue current regimen. Encouarged him to stick with this at home as well.  OT eval pending at [facility], I am concerned a bit about cognition. No formal testing done today. BP readings to be faxed from [facility] for follow up.       Juanita Briggs MD  [facility]

## 2018-05-08 NOTE — MR AVS SNAPSHOT
After Visit Summary   5/8/2018    Jose Antonio Marmolejo    MRN: [id number]           Patient Information     Date Of Birth          1945        Visit Information        Provider Department      5/8/2018 11:30 AM Juanita Briggs MD [facility] [place]        Today's Diagnoses     Type 2 diabetes mellitus without complication, without long-term current use of insulin (H)    -  1    Falling episodes        Weakness of both lower extremities        Essential hypertension        Abnormal MRI of head        Proteinuria, unspecified type        Tear of meniscus of left knee as current injury, unspecified meniscus, unspecified tear type, subsequent encounter           Follow-ups after your visit        Additional Services     ORTHOPEDICS ADULT REFERRAL       Your provider has referred you to: [facility] [phone]  [email or web address]    Please be aware that coverage of these services is subject to the terms and limitations of your health insurance plan.  Call member services at your health plan with any benefit or coverage questions.      Please bring the following to your appointment:    >>   Any x-rays, CTs or MRIs which have been performed.  Contact the facility where they were done to arrange for  prior to your scheduled appointment.    >>   List of current medications   >>   This referral request   >>   Any documents/labs given to you for this referral                  Your next 10 appointments already scheduled     May 23, 2018 11:30 AM CDT   (Arrive by 11:15 AM)   SHORT with Juanita Briggs MD   [facility] ([facility] )    [address] [place] [address]   [phone]              Future tests that were ordered for you today     Open Future Orders        Priority Expected Expires Ordered    Lipid Profile (Chol, Trig, HDL, LDL calc) Routine  5/22/2018 5/8/2018    Comprehensive metabolic panel  "(BMP + Alb, Alk Phos, ALT, AST, Total. Bili, TP) Routine  2018            Who to contact     If you have questions or need follow up information about today's clinic visit or your schedule please contact [facility] directly at [phone].  Normal or non-critical lab and imaging results will be communicated to you by MyChart, letter or phone within 4 business days after the clinic has received the results. If you do not hear from us within 7 days, please contact the clinic through MyChart or phone. If you have a critical or abnormal lab result, we will notify you by phone as soon as possible.  Submit refill requests through [employer] or call your pharmacy and they will forward the refill request to us. Please allow 3 business days for your refill to be completed.          Additional Information About Your Visit        MyChart Information     [employer] lets you send messages to your doctor, view your test results, renew your prescriptions, schedule appointments and more. To sign up, go to [email or web address] . Click on \"Log in\" on the left side of the screen, which will take you to the Welcome page. Then click on \"Sign up Now\" on the right side of the page.     You will be asked to enter the access code listed below, as well as some personal information. Please follow the directions to create your username and password.     Your access code is: [email or web address]  Expires: 2018 11:57 AM     Your access code will  in 90 days. If you need help or a new code, please call your [facility] or [phone].        Care EveryWhere ID     This is your Care EveryWhere ID. This could be used by other organizations to access your [facility] medical records  [id number]        Your Vitals Were     Pulse Temperature Respirations Height Pulse Oximetry       81 97  F (36.1  C) (Tympanic) 16 5' 10\" (1.778 m) 100%        Blood Pressure from Last 3 Encounters:   18 130/78   18 135/68 "   03/27/18 128/80    Weight from Last 3 Encounters:   05/05/18 267 lb 10.2 oz (121.4 kg)   03/27/18 262 lb (118.8 kg)   03/26/18 264 lb (119.7 kg)              We Performed the Following     Albumin Random Urine Quantitative with Creat Ratio     ORTHOPEDICS ADULT REFERRAL     UA reflex to Microscopic and Culture - [place]        Primary Care Provider Office Phone # Fax #    Juanita Briggs [phone]       [facility] [address]        Equal Access to Services     [facility]: Hadii aad tyler hadasho Soomaali, waaxda luqadaha, qaybta kaalmada adeegyaalen, viki triplett . So [facility] [phone].    ATENCIÓN: Si habla español, tiene a garcia disposición servicios gratuitos de asistencia lingüística. [facility] [phone].    We comply with applicable federal civil rights laws and [place] laws. We do not discriminate on the basis of race, color, national origin, age, disability, sex, sexual orientation, or gender identity.            Thank you!     Thank you for choosing [facility]  for your care. Our goal is always to provide you with excellent care. Hearing back from our patients is one way we can continue to improve our services. Please take a few minutes to complete the written survey that you may receive in the mail after your visit with us. Thank you!             Your Updated Medication List - Protect others around you: Learn how to safely use, store and throw away your medicines at www.disposemymeds.org.          This list is accurate as of 5/8/18 12:34 PM.  Always use your most recent med list.                   Brand Name Dispense Instructions for use Diagnosis    acetaminophen 325 MG tablet    TYLENOL    100 tablet    Take 2 tablets (650 mg) by mouth every 4 hours as needed for mild pain    Fall, initial encounter, Weakness       Adult Blood Pressure Cuff Lg Kit     1 kit    1 each 2 times daily    Benign essential hypertension, Malignant  essential hypertension       hydrochlorothiazide 12.5 MG capsule    MICROZIDE    30 capsule    Take 1 capsule (12.5 mg) by mouth daily    Essential hypertension       HYDROcodone-acetaminophen 7.5-325 MG per tablet    NORCO    20 tablet    Take 1 tablet by mouth every 6 hours as needed for moderate to severe pain    Weakness of both lower extremities, Essential hypertension, Type 2 diabetes mellitus without complication, without long-term current use of insulin (H), Inability to bear weight       hydrocortisone 1 % ointment     30 g    Apply sparingly to affected area three times daily for 14 days.    Rash and nonspecific skin eruption       ibuprofen 600 MG tablet    ADVIL/MOTRIN    120 tablet    Take 1 tablet (600 mg) by mouth every 6 hours as needed for other (mild pain)    Falls frequently       lisinopril 40 MG tablet    PRINIVIL/ZESTRIL    30 tablet    Take 1 tablet (40 mg) by mouth daily    Essential hypertension       metoprolol succinate 50 MG 24 hr tablet    TOPROL-XL    30 tablet    Take 1 tablet (50 mg) by mouth daily    Essential hypertension       miconazole 2 % powder    MICATIN; MICRO GUARD    90 g    Apply topically 2 times daily    Yeast infection of the skin       senna-docusate 8.6-50 MG per tablet    SENOKOT-S;PERICOLACE    100 tablet    Take 1 tablet by mouth 2 times daily as needed for constipation    Chronic idiopathic constipation

## 2018-05-08 NOTE — TELEPHONE ENCOUNTER
Spoke with Nalini. Cancel my follow up appointment. Wesley Giron will send labs and BPs to me via fax.  For ortho referral, please call Nalini rather than patient.

## 2018-05-08 NOTE — TELEPHONE ENCOUNTER
Nalini called from [facility]. Would like call back regarding patient at [phone] (direct line) She would like labs done and B/P done at facility and faxed over to us due to the fact patient has to pay out of pocket for can service to get too and from appointments.

## 2018-05-09 NOTE — PROGRESS NOTES
Clinic Care Coordination Contact  Care Team Conversations    Care Coordinator received a referral for patient to receive CC services.  He is currently residing at [facility].  CC left a message for Nalini, Health Coordinator, at [facility] to return call for update on plan of care.    Lauren Pipkin, BS-RN   CHF and General Care Coordinator  [phone] Option # 1

## 2018-05-10 NOTE — PROGRESS NOTES
Clinic Care Coordination Contact  Care Team Conversations    Care Coordinator LM again for Nalini to return call to request update on plan of care for patient.    Lauren Pipkin, BS-RN   CHF and General Care Coordinator  [phone] Option # 1

## 2018-05-14 NOTE — PROGRESS NOTES
Patient presents today on referral for evaluation of his left knee.  Approximately 2 months ago he states he collapsed on the floor at his home going from the kitchen to the living room, he states he thought he broke his legs.  He was sent to a nursing home for these frequent falling episodes, and  has been undergoing physical therapy.  He states he has medial sided joint pain, denies effusions, denies locking or other mechanical symptoms, and he does not want any surgery unless it is absolutely necessary.    At the present time he is undergoing physical therapy this includes stairs and gait training as well as balance and control and coordination.    Examination of the left knee demonstrates no effusion, he has tenderness along the medial joint line, no mechanical symptoms are noted with the Mone's or other meniscus stress exams.  The knee is stable to varus valgus and drawer.  Overall he is very deconditioned.  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    Assessment and plan.  In light of his improvement with physical therapy, I am recommending the following one wear the immobilizer only when needed for ambulation, stress safety, coordination, and overall conditioning.  If he experiences mechanical symptoms such as locking and recurrent effusions and similar findings, he should follow up for repeat evaluation and consider arthroscopic intervention./80  Pulse 65  Temp 97.4  F (36.3  C) (Tympanic)  SpO2 95%

## 2018-05-14 NOTE — NURSING NOTE
"Chief Complaint   Patient presents with     Musculoskeletal Problem     NPT Left Knee Pain X 1 month       Initial /80  Pulse 65  Temp 97.4  F (36.3  C) (Tympanic)  SpO2 95% Estimated body mass index is 38.4 kg/(m^2) as calculated from the following:    Height as of 5/5/18: 5' 10\" (1.778 m).    Weight as of 5/5/18: 267 lb 10.2 oz (121.4 kg).  Medication Reconciliation: complete    Joanne Rendon LPN    "

## 2018-05-14 NOTE — MR AVS SNAPSHOT
"              After Visit Summary   2018    Jose Antonio Marmolejo    MRN: [id number]           Patient Information     Date Of Birth          1945        Visit Information        Provider Department      2018 9:20 AM Rylan Dunne, DO  ORTHOPEDICS        Today's Diagnoses     Falling episodes        Tear of meniscus of left knee as current injury, unspecified meniscus, unspecified tear type, subsequent encounter           Follow-ups after your visit        Who to contact     If you have questions or need follow up information about today's clinic visit or your schedule please contact  ORTHOPEDICS directly at [phone].  Normal or non-critical lab and imaging results will be communicated to you by [employer]art, letter or phone within 4 business days after the clinic has received the results. If you do not hear from us within 7 days, please contact the clinic through [employer] or phone. If you have a critical or abnormal lab result, we will notify you by phone as soon as possible.  Submit refill requests through [employer] or call your pharmacy and they will forward the refill request to us. Please allow 3 business days for your refill to be completed.          Additional Information About Your Visit        MyChart Information     [employer] lets you send messages to your doctor, view your test results, renew your prescriptions, schedule appointments and more. To sign up, go to [email or web address] . Click on \"Log in\" on the left side of the screen, which will take you to the Welcome page. Then click on \"Sign up Now\" on the right side of the page.     You will be asked to enter the access code listed below, as well as some personal information. Please follow the directions to create your username and password.     Your access code is: [email or web address]  Expires: 2018 11:57 AM     Your access code will  in 90 days. If you need help or a new code, please call your [place] clinic or [phone].        Care " EveryWhere ID     This is your Care EveryWhere ID. This could be used by other organizations to access your [place] medical records  [id number]        Your Vitals Were     Pulse Temperature Pulse Oximetry             65 97.4  F (36.3  C) (Tympanic) 95%          Blood Pressure from Last 3 Encounters:   05/14/18 110/80   05/08/18 130/78   05/07/18 135/68    Weight from Last 3 Encounters:   05/05/18 267 lb 10.2 oz (121.4 kg)   03/27/18 262 lb (118.8 kg)   03/26/18 264 lb (119.7 kg)              Today, you had the following     No orders found for display       Primary Care Provider Office Phone # Fax #    Juanita Briggs [phone]       [facility] [address]        Equal Access to Services     [facility] : Hadii charles montalvo Soleela, waaxda luqadaha, qaybta kaalmada porsha, viki triplett . So [facility] [phone].    ATENCIÓN: Si habla español, tiene a garcia disposición servicios gratuitos de asistencia lingüística. Sandra al [phone].    We comply with applicable federal civil rights laws and [place] laws. We do not discriminate on the basis of race, color, national origin, age, disability, sex, sexual orientation, or gender identity.            Thank you!     Thank you for choosing  ORTHOPEDICS  for your care. Our goal is always to provide you with excellent care. Hearing back from our patients is one way we can continue to improve our services. Please take a few minutes to complete the written survey that you may receive in the mail after your visit with us. Thank you!             Your Updated Medication List - Protect others around you: Learn how to safely use, store and throw away your medicines at www.disposemymeds.org.          This list is accurate as of 5/14/18  9:48 AM.  Always use your most recent med list.                   Brand Name Dispense Instructions for use Diagnosis    acetaminophen 325 MG tablet    TYLENOL    100 tablet     Take 2 tablets (650 mg) by mouth every 4 hours as needed for mild pain    Fall, initial encounter, Weakness       Adult Blood Pressure Cuff Lg Kit     1 kit    1 each 2 times daily    Benign essential hypertension, Malignant essential hypertension       hydrochlorothiazide 12.5 MG capsule    MICROZIDE    30 capsule    Take 1 capsule (12.5 mg) by mouth daily    Essential hypertension       HYDROcodone-acetaminophen 7.5-325 MG per tablet    NORCO    20 tablet    Take 1 tablet by mouth every 6 hours as needed for moderate to severe pain    Weakness of both lower extremities, Essential hypertension, Type 2 diabetes mellitus without complication, without long-term current use of insulin (H), Inability to bear weight       hydrocortisone 1 % ointment     30 g    Apply sparingly to affected area three times daily for 14 days.    Rash and nonspecific skin eruption       ibuprofen 600 MG tablet    ADVIL/MOTRIN    120 tablet    Take 1 tablet (600 mg) by mouth every 6 hours as needed for other (mild pain)    Falls frequently       lisinopril 40 MG tablet    PRINIVIL/ZESTRIL    30 tablet    Take 1 tablet (40 mg) by mouth daily    Essential hypertension       metoprolol succinate 50 MG 24 hr tablet    TOPROL-XL    30 tablet    Take 1 tablet (50 mg) by mouth daily    Essential hypertension       miconazole 2 % powder    MICATIN; MICRO GUARD    90 g    Apply topically 2 times daily    Yeast infection of the skin       senna-docusate 8.6-50 MG per tablet    SENOKOT-S;PERICOLACE    100 tablet    Take 1 tablet by mouth 2 times daily as needed for constipation    Chronic idiopathic constipation

## 2018-05-30 NOTE — PROGRESS NOTES
Clinic Care Coordination Contact  Care Team Conversations    Patient is living at [facility].  Messages left for Nalini, Health Coordinator, have not been returned.  Will discontinue outreach to determine interest in Care Coordination at this time.  Patient is not a candidate while residing in a facility.  It is unclear of discharge plans due to no contact from the facility.  Will update PCP.  He can be referred again once he is discharged if PCP would like.    Lauren Pipkin, BS-RN   CHF and General Care Coordinator  [phone] Option # 1

## 2018-06-04 NOTE — NURSING NOTE
"Chief Complaint   Patient presents with     Care Coordination Nursing Home       Initial /60  Pulse 74  Temp 98.2  F (36.8  C)  Resp 20  Wt 270 lb (122.5 kg)  SpO2 96%  BMI 38.74 kg/m2 Estimated body mass index is 38.74 kg/(m^2) as calculated from the following:    Height as of 5/8/18: 5' 10\" (1.778 m).    Weight as of this encounter: 270 lb (122.5 kg).  Medication Reconciliation: complete    Deanna Raymond LPN    "

## 2018-06-04 NOTE — MR AVS SNAPSHOT
"              After Visit Summary   2018    Jose Antonio Marmolejo    MRN: [id number]           Patient Information     Date Of Birth          1945        Visit Information        Provider Department      2018 11:14 AM Joe Brand MD [facility]        Today's Diagnoses     Nursing Home Visit    -  1       Follow-ups after your visit        Who to contact     If you have questions or need follow up information about today's clinic visit or your schedule please contact [facility] directly at [phone].  Normal or non-critical lab and imaging results will be communicated to you by MyChart, letter or phone within 4 business days after the clinic has received the results. If you do not hear from us within 7 days, please contact the clinic through [employer] or phone. If you have a critical or abnormal lab result, we will notify you by phone as soon as possible.  Submit refill requests through [employer] or call your pharmacy and they will forward the refill request to us. Please allow 3 business days for your refill to be completed.          Additional Information About Your Visit        MyChart Information     [employer] lets you send messages to your doctor, view your test results, renew your prescriptions, schedule appointments and more. To sign up, go to [email or web address] . Click on \"Log in\" on the left side of the screen, which will take you to the Welcome page. Then click on \"Sign up Now\" on the right side of the page.     You will be asked to enter the access code listed below, as well as some personal information. Please follow the directions to create your username and password.     Your access code is: [email or web address]  Expires: 2018 11:57 AM     Your access code will  in 90 days. If you need help or a new code, please call your [facility] or [phone].        Care EveryWhere ID     This is your Care EveryWhere ID. This could be used by other organizations to " access your [place] medical records  [id number]        Your Vitals Were     Pulse Temperature Respirations Pulse Oximetry BMI (Body Mass Index)       74 98.2  F (36.8  C) 20 96% 38.74 kg/m2        Blood Pressure from Last 3 Encounters:   06/04/18 124/60   05/14/18 110/80   05/08/18 130/78    Weight from Last 3 Encounters:   06/04/18 270 lb (122.5 kg)   05/05/18 267 lb 10.2 oz (121.4 kg)   03/27/18 262 lb (118.8 kg)              Today, you had the following     No orders found for display       Primary Care Provider Office Phone # Fax #    Juanita Briggs [phone] [phone]        Equal Access to Services     KASHIF SALINAS : Bailey Brower, waaxda luqadaha, qaybta kaalmada porsha, viki triplett . So [facility] [place] [phone].    ATENCIÓN: Si habla español, tiene a garcia disposición servicios gratuitos de asistencia lingüística. Sandra al [phone].    We comply with applicable federal civil rights laws and [place] laws. We do not discriminate on the basis of race, color, national origin, age, disability, sex, sexual orientation, or gender identity.            Thank you!     Thank you for choosing [facility]  for your care. Our goal is always to provide you with excellent care. Hearing back from our patients is one way we can continue to improve our services. Please take a few minutes to complete the written survey that you may receive in the mail after your visit with us. Thank you!             Your Updated Medication List - Protect others around you: Learn how to safely use, store and throw away your medicines at www.disposemymeds.org.          This list is accurate as of 6/4/18 11:59 PM.  Always use your most recent med list.                   Brand Name Dispense Instructions for use Diagnosis    acetaminophen 325 MG tablet    TYLENOL    100 tablet    Take 2 tablets (650 mg) by mouth every 4 hours as  needed for mild pain    Fall, initial encounter, Weakness       Adult Blood Pressure Cuff Lg Kit     1 kit    1 each 2 times daily    Benign essential hypertension, Malignant essential hypertension       hydrochlorothiazide 12.5 MG capsule    MICROZIDE    30 capsule    Take 1 capsule (12.5 mg) by mouth daily    Essential hypertension       HYDROcodone-acetaminophen 7.5-325 MG per tablet    NORCO    20 tablet    Take 1 tablet by mouth every 6 hours as needed for moderate to severe pain    Weakness of both lower extremities, Essential hypertension, Type 2 diabetes mellitus without complication, without long-term current use of insulin (H), Inability to bear weight       ibuprofen 600 MG tablet    ADVIL/MOTRIN    120 tablet    Take 1 tablet (600 mg) by mouth every 6 hours as needed for other (mild pain)    Falls frequently       lisinopril 40 MG tablet    PRINIVIL/ZESTRIL    30 tablet    Take 1 tablet (40 mg) by mouth daily    Essential hypertension       metoprolol succinate 50 MG 24 hr tablet    TOPROL-XL    30 tablet    Take 1 tablet (50 mg) by mouth daily    Essential hypertension       miconazole 2 % powder    MICATIN; MICRO GUARD    90 g    Apply topically 2 times daily    Yeast infection of the skin       senna-docusate 8.6-50 MG per tablet    SENOKOT-S;PERICOLACE    100 tablet    Take 1 tablet by mouth 2 times daily as needed for constipation    Chronic idiopathic constipation

## 2018-06-05 NOTE — PROGRESS NOTES
HISTORY OF PRESENT ILLNESS:  Jose Antonio is a 72 year old male (1945)  resident of [facility]  who is being seen today for a routine 30 day follow up. He was readmitted after suffering recurrent falls and weakness. Jose Antonio has a history of medication noncompliance and was noted to have white matter abnormalities on his most recent MRI.  There was no formal diagnosis. He was seen by Physical Therapy.  Patient offers no other complaint.  Staff notes no other issues. He will be discharged in the near future.    Current medications, allergies, and interdisciplinary care plan are reviewed.      Patient Active Problem List    Diagnosis Date Noted     Pain management 05/04/2018     Priority: Medium     Recurrent falls      Priority: Medium     Fall 03/21/2018     Priority: Medium     Falls frequently 12/31/2017     Priority: Medium     Type 2 diabetes mellitus without complication (H) 08/19/2016     Priority: Medium     Weakness of both lower extremities 08/19/2016     Priority: Medium     Yeast infection of the skin 08/19/2016     Priority: Medium     Essential hypertension 08/17/2016     Priority: Medium     Falling episodes 08/17/2016     Priority: Medium     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     Priority: Medium     Nursing Home Visit 03/30/2018     Priority: Low          Social History     Social History     Marital status: Single     Spouse name: N/A     Number of children: 3     Years of education: N/A     Occupational History     Not on file.     Social History Main Topics     Smoking status: Former Smoker     Quit date: 1/1/1978     Smokeless tobacco: Never Used     Alcohol use No      Comment: quit 1977, previously an alcoholic     Drug use: No     Sexual activity: Not Currently     Other Topics Concern     Not on file     Social History Narrative        Current Outpatient Prescriptions   Medication Sig     acetaminophen (TYLENOL) 325 MG tablet Take 2 tablets (650 mg) by mouth every 4  hours as needed for mild pain     Blood Pressure Monitoring (ADULT BLOOD PRESSURE CUFF LG) KIT 1 each 2 times daily     hydrochlorothiazide (MICROZIDE) 12.5 MG capsule Take 1 capsule (12.5 mg) by mouth daily     HYDROcodone-acetaminophen (NORCO) 7.5-325 MG per tablet Take 1 tablet by mouth every 6 hours as needed for moderate to severe pain     ibuprofen (ADVIL/MOTRIN) 600 MG tablet Take 1 tablet (600 mg) by mouth every 6 hours as needed for other (mild pain)     lisinopril (PRINIVIL/ZESTRIL) 40 MG tablet Take 1 tablet (40 mg) by mouth daily     metoprolol (TOPROL-XL) 50 MG 24 hr tablet Take 1 tablet (50 mg) by mouth daily     miconazole (MICATIN; MICRO GUARD) 2 % powder Apply topically 2 times daily     senna-docusate (SENOKOT-S;PERICOLACE) 8.6-50 MG per tablet Take 1 tablet by mouth 2 times daily as needed for constipation     No current facility-administered medications for this visit.        Allergies   Allergen Reactions     Penicillins        I have reviewed the care plan and do agree with the plan.      ROS:  No chest pain, shortness of breath, fever, chills, headache, nausea, vomiting, dysuria, or changes in bowel habits.  Appetite is normal.  No pain noted.          OBJECTIVE:  /60  Pulse 74  Temp 98.2  F (36.8  C)  Resp 20  Wt 270 lb (122.5 kg)  SpO2 96%  BMI 38.74 kg/m2    GENERAL:  Chronically ill appearing, alert, and in no acute distress  RESP:  Lungs clear.  No rales, rhonchi, or wheezing  CV:  RRR.  S1 S2 without murmur. No clicks or rubs.  SKIN:  Age-related changes.  No suspicious lesions or rashes.  PSYCH:  Mentation intact, affect bright, and orientation intact.  EXTREM:  Trace edema.  Pulses palpable.          Lab/Diagnostic data:    Reviewed.    ASSESSMENT/ORDERS:  Nursing Home Visit  Above issues stable.  No changes in current medications or care plan.      Total time spent with patient visit was 25 min including patient visit, review of pertinent clinical information, and  treatment plan.      Joe Brand MD

## 2018-07-04 PROBLEM — L03.90 CELLULITIS: Status: ACTIVE | Noted: 2018-01-01

## 2018-07-04 NOTE — IP AVS SNAPSHOT
[facility]    [address]    [facility] [address]    Phone:  [phone]    Fax:  [phone]                                       After Visit Summary   7/4/2018    Jose Antonio Marmolejo    MRN: [id number]           After Visit Summary Signature Page     I have received my discharge instructions, and my questions have been answered. I have discussed any challenges I see with this plan with the nurse or doctor.    ..........................................................................................................................................  Patient/Patient Representative Signature      ..........................................................................................................................................  Patient Representative Print Name and Relationship to Patient    ..................................................               ................................................  Date                                            Time    ..........................................................................................................................................  Reviewed by Signature/Title    ...................................................              ..............................................  Date                                                            Time

## 2018-07-04 NOTE — ED NOTES
"Pt unable to state what his meds are. Does not know when he took his medications last, States no one sets his meds up for him \"I didn't even know I was suppose to take any daily meds\".  "

## 2018-07-04 NOTE — PLAN OF CARE
Pt with fiery red, excoriated rash under breasts, butt and posterior legs. Rash also noted around penis.

## 2018-07-04 NOTE — IP AVS SNAPSHOT
MRN:[id number]                      After Visit Summary   7/4/2018    Jose Antonio Marmolejo    MRN: [id number]           Visit Information        Department      7/4/2018  2:17 PM [facility]          Review of your medicines      UNREVIEWED medicines. Ask your doctor about these medicines        Dose / Directions    acetaminophen 325 MG tablet   Commonly known as:  TYLENOL   Used for:  Fall, initial encounter, Weakness        Dose:  650 mg   Take 2 tablets (650 mg) by mouth every 4 hours as needed for mild pain   Quantity:  100 tablet   Refills:  0       hydrochlorothiazide 12.5 MG capsule   Commonly known as:  MICROZIDE   Used for:  Essential hypertension        Dose:  12.5 mg   Take 1 capsule (12.5 mg) by mouth daily   Quantity:  30 capsule   Refills:  0       lisinopril 40 MG tablet   Commonly known as:  PRINIVIL/ZESTRIL   Used for:  Essential hypertension        Dose:  40 mg   Take 1 tablet (40 mg) by mouth daily   Quantity:  30 tablet   Refills:  0       metoprolol succinate 50 MG 24 hr tablet   Commonly known as:  TOPROL-XL   Used for:  Essential hypertension        Dose:  50 mg   Take 1 tablet (50 mg) by mouth daily   Quantity:  30 tablet   Refills:  0       miconazole 2 % powder   Commonly known as:  MICATIN; MICRO GUARD   Used for:  Yeast infection of the skin        Apply topically 2 times daily   Quantity:  90 g   Refills:  1         CONTINUE these medicines which have NOT CHANGED        Dose / Directions    Adult Blood Pressure Cuff Lg Kit   Used for:  Benign essential hypertension, Malignant essential hypertension        Dose:  1 each   1 each 2 times daily   Quantity:  1 kit   Refills:  0                Protect others around you: Learn how to safely use, store and throw away your medicines at www.disposemymeds.org.         Follow-ups after your visit        Follow-up Appointments     Follow Up and recommended labs and tests       Patient to have consultation with Dr. Saucedo, Neurology  "at [facility], on July 17, 2018 at 8:30 AM for evaluation of possible multiple sclerosis. Patient to arrive 15 minutes early as he is first appointment of the day. Location: [address] [place] [address]. Located on 5th floor and use Green parking ramp                   Care Instructions        Further instructions from your care team           DISCHARGE INSTRUCTIONS  Lumbar Puncture      IMPORTANT: As you prepare for discharge, the following information will help you return to your best level of health.       This Information Is About Your Follow Up Care    Call your doctor if you do not get better. Call sooner if you feel worse. You can reach your doctor by calling their clinic phone number.                                    This Information Is About Procedures      LUMBAR PUNCTURE (Spinal Tap).  A lumbar puncture (\"spinal tap\") was done to remove a small amount of your cerebrospinal fluid (CSF). This protective fluid surrounds your brain and spinal cord. A lumbar puncture measures the pressure in the cerebrospinal fluid. The sample of spinal fluid will be tested in the lab. We will let you know the results as soon as they are done.  The results of a lumbar puncture may help diagnose:    serious infections, such as meningitis    brain or spinal cord cancer    certain diseases such as multiple sclerosis or Guillain-Springdale syndrome    bleeding in the brain, such as a subarachnoid bleed    At home, please follow these instructions:    Drink plenty of liquids. Drink liquids with caffeine. The caffeine helps replace the CSF more quickly.    Lie down flat for the next 6 to 8 hours when you get home.    Some people get a headache that lasts a day or two after this procedure. If you do get a headache, try lying down flat.    Call your doctor if you have:    a severe headache that will not go away.    redness, swelling or drainage from the puncture site.    neck stiffness.    numbness or weakness below the area of the " puncture site.    any new or bothersome symptoms.                        This Information Is About Your Illness and Diagnosis    LOW BACK PAIN  The muscles of the low back are put under a lot of strain in everyday life. Most of us do not keep our backs very strong. A number of medicines and other treatments help with low back symptoms.    Some treatments may include:    Medicine often helps low back symptoms. Medicine works well to control the pain and reduce inflammation and irritation of the muscle. The type of medicine chosen for you will match the type of symptoms you have.    Heat or cold applied to your back during the first 48 hours after the pain starts may help. Putting a cold pack to the painful area for 5 to 10 minutes at a time.  If your symptoms last longer than 48 hours, use a heating pad, hot shower or bath to help your back pain.    Follow these instructions:    Limit your bedrest to 2-3 days to avoid weakening your muscles.    When lying down, get up every few hours and walk around.    Wear comfortable, low-heeled shoes.    Make sure your work surface is at a comfortable height for you.    Use a chair with a good lower back support that may recline slightly.    Rest your feet on the floor or on a low stool, if you must sit for long periods of time.    Use a pillow or rolled up towel behind the small of your back if you must drive long distances.  Stop often and walk around a few minutes every hour or so.    Sleep on your back with a pillow under your knees, or sleep on your side with your knees bent and a pillow between your knees to avoid trouble sleeping.    Slowly return to your normal activities including exercise. Slowly build up the speed and length of time you do the exercise.  Try the following:  o Walking short distances.  o Using a stationary bicycle.  o Swimming.    USE THE FOLLOWING BODY MECHANICS TO REDUCE YOUR RISK OF BACK INJURY:  Avoid the following:    Bending from your  "waist.    Lifting heavy objects higher than your waist.    Carrying unbalanced loads and sudden movements.    Activities that arch and strain your back (bending backwards or bending forward touching your toes).    Avoid lifting when twisting, bending forward, and reaching.    Avoid sitting for long periods of time.    Do the following:    Bend from your knees and face the object you lift.    Hold heavy objects close to your body.    Change your positions often.    Work with tools close to your body when mopping, vacuuming, raking, hoeing, etc.    Sit down to put your shoes and socks on.    Wear shoes with low heels and good support.    Reach for things close to your body.    Round your back and bend your knees slightly when you cough or sneeze.    Kneel when making a bed.    Stand tall with your chin in, back flat, pelvis tucked under and relax your knees.    Call your doctor if you have:    increased pain.    no improvement.    any new problems or concerns.                                   Additional Information About Your Visit        [employer] Information     [employer] lets you send messages to your doctor, view your test results, renew your prescriptions, schedule appointments and more. To sign up, go to [email or web address] . Click on \"Log in\" on the left side of the screen, which will take you to the Welcome page. Then click on \"Sign up Now\" on the right side of the page.     You will be asked to enter the access code listed below, as well as some personal information. Please follow the directions to create your username and password.     Your access code is: [email or web address]  Expires: 2018 11:57 AM     Your access code will  in 90 days. If you need help or a new code, please call your [place] clinic or [phone].        Care EveryWhere ID     This is your Care EveryWhere ID. This could be used by other organizations to access your [place] medical records  [id number]        Your Vitals Were     " "Blood Pressure Pulse Temperature Respirations Height Weight    161/79 118 97.8  F (36.6  C) (Temporal) 18 1.778 m (5' 10\") 134.7 kg (296 lb 15.4 oz)    Pulse Oximetry BMI (Body Mass Index)                97% 42.61 kg/m2           Primary Care Provider Office Phone # Fax #    Juanita Briggs [phone]      Equal Access to Services     KASHIF SALINAS : Hadii aad ku hadasho Soomaali, waaxda luqadaha, qaybta kaalmada adeegyada, waxay adain haydianan claudette damonkim triplett . So [facility] [phone].    ATENCIÓN: Si michelle mcclure, tiene a garcia disposición servicios gratuitos de asistencia lingüística. Llame al [phone].    We comply with applicable federal civil rights laws and [place] laws. We do not discriminate on the basis of race, color, national origin, age, disability, sex, sexual orientation, or gender identity.            Thank you!     Thank you for choosing [facility] for your care. Our goal is always to provide you with excellent care. Hearing back from our patients is one way we can continue to improve our services. Please take a few minutes to complete the written survey that you may receive in the mail after you visit with us. Thank you!             Medication List: This is a list of all your medications and when to take them. Check marks below indicate your daily home schedule. Keep this list as a reference.      Medications           Morning Afternoon Evening Bedtime As Needed    acetaminophen 325 MG tablet   Commonly known as:  TYLENOL   Take 2 tablets (650 mg) by mouth every 4 hours as needed for mild pain   Last time this was given:  650 mg on 7/5/2018  5:25 AM                                Adult Blood Pressure Cuff Lg Kit   1 each 2 times daily                                hydrochlorothiazide 12.5 MG capsule   Commonly known as:  MICROZIDE   Take 1 capsule (12.5 mg) by mouth daily                                lisinopril 40 MG tablet   Commonly known as:  PRINIVIL/ZESTRIL   Take 1 " tablet (40 mg) by mouth daily                                metoprolol succinate 50 MG 24 hr tablet   Commonly known as:  TOPROL-XL   Take 1 tablet (50 mg) by mouth daily   Last time this was given:  50 mg on 7/6/2018  8:58 AM                                miconazole 2 % powder   Commonly known as:  MICATIN; MICRO GUARD   Apply topically 2 times daily   Last time this was given:  7/6/2018 10:47 AM

## 2018-07-04 NOTE — ED NOTES
"The patient arrived via [facility] from his home where he used his lifeline button around his neck to call the ambulance. He reports he had used his power wheelchair to get into the bathroom and then didn't have enough strength to get back into the wheelchair and fell to the floor. He states he has \"been doing that a lot lately\" and reports the excoriated red skin areas with yellowish scabbed area on his anterior and posterior groin and perineal areas, and bilateral buttocks and bilateral posterior thighs are from dragging himself across the carpeting at his home. The ambulance staff reported they have done multiple life assists for the patient in the past. The patient's two sisters arrived at the bedside. The patient reports he has no assistance in setting up or taking his meds and has not taken them for one week approximately.  "

## 2018-07-04 NOTE — PLAN OF CARE
[facility] Inpatient Admission Note:    Patient admitted to 3226/3226-1 at approximately 4:30 via cart accompanied by transport tech from emergency room . Report received from Janie in SBAR format at 1612 via telephone. Patient transferred to bed via slide board.. Patient is alert and oriented X 3, denies pain; rates at 0 on 0-10 scale.  Patient oriented to room, unit, hourly rounding, and plan of care. Explained admission packet and patient handbook with patient bill of rights brochure. Will continue to monitor and document as needed.     Inpatient Nursing criteria listed below was met:    Health care directives status obtained and documented: Yes    Care Everywhere authorization obtained No    MRSA swab completed for patient 65 years and older: Yes    Patient identifies a surrogate decision maker: Yes If yes, who:see index Contact Information see index    Core Measure diagnosis present:No. If yes, state diagnosis:      If initial lactic acid >2.0, repeat lactic acid drawn within one hour of arrival to unit: Yes. If no, state reason:     Vaccination assessment and education completed: Yes   Vaccinations received prior to admission: Pneumovax no  Influenza(seasonal)  NO   Vaccination(s) ordered: patient declines    Clergy visit ordered if patient requests: N/A    Skin issues/needs documented: Yes    Isolation Patient: no Education given, correct sign in place and documentation row added to PCS:  No    Fall Prevention Yes: Care plan updated, education given and documented, sticker and magnet in place: Yes    Care Plan initiated: Yes    Education Documented (including assessment): Yes    Patient has discharge needs : Yes If yes, please explain:NHP

## 2018-07-04 NOTE — IP AVS SNAPSHOT
MRN:[id number]                      After Visit Summary   7/4/2018    Jose Antonio Marmolejo    MRN: [id number]           Thank you!     Thank you for choosing [facility] for your care. Our goal is always to provide you with excellent care. Hearing back from our patients is one way we can continue to improve our services. Please take a few minutes to complete the written survey that you may receive in the mail after you visit with us. Thank you!        Patient Information     Date Of Birth          1945        About your hospital stay     You were admitted on:  July 4, 2018 You last received care in the:  [facility]    You were discharged on:  July 9, 2018        Reason for your hospital stay       Recurrent falls, cellulitis                  Who to Call     For medical emergencies, please call 911.  For non-urgent questions about your medical care, please call your primary care provider or clinic, [phone]          Attending Provider     Provider Specialty    Srinath Yeung PA-C Emergency Medicine    Leigh Ann Warren MD Internal Medicine    Skip Jenkins MD Internal Medicine    Dangelo Barragan MD Internal Medicine       Primary Care Provider Office Phone # Fax #    Juanita Briggs [phone]      After Care Instructions     Activity - Up with nursing assistance           Advance Diet as Tolerated       Follow this diet upon discharge: Orders Placed This Encounter      Combination Diet Regular Diet Adult; 5893-3220 Calories: Moderate Consistent CHO (4-6 CHO units/meal)            Fall precautions           General info for SNF       Length of Stay Estimate: Short Term Care: Estimated # of Days <30  Condition at Discharge: Stable  Level of care:skilled   Rehabilitation Potential: Fair  Admission H&P remains valid and up-to-date: Yes  Recent Chemotherapy: N/A  Use Nursing Home Standing Orders: Yes            Glucose monitor nursing POCT       Once daily AM          "   Mantoux instructions       Give two-step Mantoux (PPD) Per Facility Policy Yes                  Follow-up Appointments     Follow Up and recommended labs and tests       Patient to have consultation with Dr. Saucedo, Neurology at [facility], on July 17, 2018 at 8:30 AM for evaluation of possible multiple sclerosis. Patient to arrive 15 minutes early as he is first appointment of the day. Location: [address] [place] [address]. Located on 5th floor and use [address] parking ramp            Follow Up and recommended labs and tests       Follow up with custodial physician.  The following labs/tests are recommended: none.                  Additional Services     Occupational Therapy Adult Consult       Evaluate and treat as clinically indicated.    Reason:  weakness            Physical Therapy Adult Consult       Evaluate and treat as clinically indicated.    Reason:  weakness                  Further instructions from your care team           DISCHARGE INSTRUCTIONS  Lumbar Puncture      IMPORTANT: As you prepare for discharge, the following information will help you return to your best level of health.       This Information Is About Your Follow Up Care    Call your doctor if you do not get better. Call sooner if you feel worse. You can reach your doctor by calling their clinic phone number.                                    This Information Is About Procedures      LUMBAR PUNCTURE (Spinal Tap).  A lumbar puncture (\"spinal tap\") was done to remove a small amount of your cerebrospinal fluid (CSF). This protective fluid surrounds your brain and spinal cord. A lumbar puncture measures the pressure in the cerebrospinal fluid. The sample of spinal fluid will be tested in the lab. We will let you know the results as soon as they are done.  The results of a lumbar puncture may help diagnose:    serious infections, such as meningitis    brain or spinal cord cancer    certain diseases such as multiple sclerosis or " Guillain-Leland syndrome    bleeding in the brain, such as a subarachnoid bleed    At home, please follow these instructions:    Drink plenty of liquids. Drink liquids with caffeine. The caffeine helps replace the CSF more quickly.    Lie down flat for the next 6 to 8 hours when you get home.    Some people get a headache that lasts a day or two after this procedure. If you do get a headache, try lying down flat.    Call your doctor if you have:    a severe headache that will not go away.    redness, swelling or drainage from the puncture site.    neck stiffness.    numbness or weakness below the area of the puncture site.    any new or bothersome symptoms.                        This Information Is About Your Illness and Diagnosis    LOW BACK PAIN  The muscles of the low back are put under a lot of strain in everyday life. Most of us do not keep our backs very strong. A number of medicines and other treatments help with low back symptoms.    Some treatments may include:    Medicine often helps low back symptoms. Medicine works well to control the pain and reduce inflammation and irritation of the muscle. The type of medicine chosen for you will match the type of symptoms you have.    Heat or cold applied to your back during the first 48 hours after the pain starts may help. Putting a cold pack to the painful area for 5 to 10 minutes at a time.  If your symptoms last longer than 48 hours, use a heating pad, hot shower or bath to help your back pain.    Follow these instructions:    Limit your bedrest to 2-3 days to avoid weakening your muscles.    When lying down, get up every few hours and walk around.    Wear comfortable, low-heeled shoes.    Make sure your work surface is at a comfortable height for you.    Use a chair with a good lower back support that may recline slightly.    Rest your feet on the floor or on a low stool, if you must sit for long periods of time.    Use a pillow or rolled up towel behind the  small of your back if you must drive long distances.  Stop often and walk around a few minutes every hour or so.    Sleep on your back with a pillow under your knees, or sleep on your side with your knees bent and a pillow between your knees to avoid trouble sleeping.    Slowly return to your normal activities including exercise. Slowly build up the speed and length of time you do the exercise.  Try the following:  o Walking short distances.  o Using a stationary bicycle.  o Swimming.    USE THE FOLLOWING BODY MECHANICS TO REDUCE YOUR RISK OF BACK INJURY:  Avoid the following:    Bending from your waist.    Lifting heavy objects higher than your waist.    Carrying unbalanced loads and sudden movements.    Activities that arch and strain your back (bending backwards or bending forward touching your toes).    Avoid lifting when twisting, bending forward, and reaching.    Avoid sitting for long periods of time.    Do the following:    Bend from your knees and face the object you lift.    Hold heavy objects close to your body.    Change your positions often.    Work with tools close to your body when mopping, vacuuming, raking, hoeing, etc.    Sit down to put your shoes and socks on.    Wear shoes with low heels and good support.    Reach for things close to your body.    Round your back and bend your knees slightly when you cough or sneeze.    Kneel when making a bed.    Stand tall with your chin in, back flat, pelvis tucked under and relax your knees.    Call your doctor if you have:    increased pain.    no improvement.    any new problems or concerns.                                  Pending Results     Date and Time Order Name Status Description    7/6/2018 1553 Oligoclonal banding In process     7/6/2018 1413 Immunoglobulin G CSF Index: Tube 4 In process     7/6/2018 1413 CSF Culture Aerobic Bacterial Preliminary     7/4/2018 1849 Blood culture Preliminary     7/4/2018 1849 Blood culture Preliminary            "  Statement of Approval     Ordered          18 0925  I have reviewed and agree with all the recommendations and orders detailed in this document.  EFFECTIVE NOW     Approved and electronically signed by:  Carlie Call MD             Admission Information     Date & Time Provider Department Dept. Phone    2018 Dangelo Barragan MD [facility] [phone]      Your Vitals Were     Blood Pressure Pulse Temperature Respirations Height Weight    148/64 118 97.2  F (36.2  C) (Tympanic) 18 1.778 m (5' 10\") 134.7 kg (296 lb 15.4 oz)    Pulse Oximetry BMI (Body Mass Index)                95% 42.61 kg/m2          MyChart Information     [employer] lets you send messages to your doctor, view your test results, renew your prescriptions, schedule appointments and more. To sign up, go to [email or web address] . Click on \"Log in\" on the left side of the screen, which will take you to the Welcome page. Then click on \"Sign up Now\" on the right side of the page.     You will be asked to enter the access code listed below, as well as some personal information. Please follow the directions to create your username and password.     Your access code is: [email or web address]  Expires: 2018 11:57 AM     Your access code will  in 90 days. If you need help or a new code, please call your [facility] clinic or [phone].        Care EveryWhere ID     This is your Care EveryWhere ID. This could be used by other organizations to access your [facility] medical records  [id number]        Equal Access to Services     [facility]: Hadii charles scott hadretao Soleela, waaxda luqadaha, qaybta kaalmada viki story . So [facility] [phone].    ATENCIÓN: Si habla español, tiene a garcia disposición servicios gratuitos de asistencia lingüística. Llame al [phone].    We comply with applicable federal civil rights laws and [place] laws. We do not discriminate on the basis of race, color, " national origin, age, disability, sex, sexual orientation, or gender identity.               Review of your medicines      START taking        Dose / Directions    cephALEXin 500 MG capsule   Commonly known as:  KEFLEX   Indication:  Infection of the Skin and/or Related Soft Tissue   Used for:  Recurrent falls, Acute kidney injury (H), Decubitus ulcer of buttock, unspecified laterality, unspecified ulcer stage        Dose:  500 mg   Take 1 capsule (500 mg) by mouth every 6 hours for 7 days   Quantity:  28 capsule   Refills:  0         CONTINUE these medicines which have NOT CHANGED        Dose / Directions    acetaminophen 325 MG tablet   Commonly known as:  TYLENOL   Used for:  Fall, initial encounter, Weakness        Dose:  650 mg   Take 2 tablets (650 mg) by mouth every 4 hours as needed for mild pain   Quantity:  100 tablet   Refills:  0       Adult Blood Pressure Cuff Lg Kit   Used for:  Benign essential hypertension, Malignant essential hypertension        Dose:  1 each   1 each 2 times daily   Quantity:  1 kit   Refills:  0       hydrochlorothiazide 12.5 MG capsule   Commonly known as:  MICROZIDE   Used for:  Essential hypertension        Dose:  12.5 mg   Take 1 capsule (12.5 mg) by mouth daily   Quantity:  30 capsule   Refills:  0       lisinopril 40 MG tablet   Commonly known as:  PRINIVIL/ZESTRIL   Used for:  Essential hypertension        Dose:  40 mg   Take 1 tablet (40 mg) by mouth daily   Quantity:  30 tablet   Refills:  0       metoprolol succinate 50 MG 24 hr tablet   Commonly known as:  TOPROL-XL   Used for:  Essential hypertension        Dose:  50 mg   Take 1 tablet (50 mg) by mouth daily   Quantity:  30 tablet   Refills:  0       miconazole 2 % powder   Commonly known as:  MICATIN; MICRO GUARD   Used for:  Yeast infection of the skin        Apply topically 2 times daily   Quantity:  90 g   Refills:  1            Where to get your medicines      These medications were sent to [facility] [place] [facility]  [facility] - [address]  [address] [address]     Phone:  [phone]     cephALEXin 500 MG capsule                Protect others around you: Learn how to safely use, store and throw away your medicines at www.disposemymeds.org.        ANTIBIOTIC INSTRUCTION     You've Been Prescribed an Antibiotic - Now What?  Your healthcare team thinks that you or your loved one might have an infection. Some infections can be treated with antibiotics, which are powerful, life-saving drugs. Like all medications, antibiotics have side effects and should only be used when necessary. There are some important things you should know about your antibiotic treatment.      Your healthcare team may run tests before you start taking an antibiotic.    Your team may take samples (e.g., from your blood, urine or other areas) to run tests to look for bacteria. These test can be important to determine if you need an antibiotic at all and, if you do, which antibiotic will work best.      Within a few days, your healthcare team might change or even stop your antibiotic.    Your team may start you on an antibiotic while they are working to find out what is making you sick.    Your team might change your antibiotic because test results show that a different antibiotic would be better to treat your infection.    In some cases, once your team has more information, they learn that you do not need an antibiotic at all. They may find out that you don't have an infection, or that the antibiotic you're taking won't work against your infection. For example, an infection caused by a virus can't be treated with antibiotics. Staying on an antibiotic when you don't need it is more likely to be harmful than helpful.      You may experience side effects from your antibiotic.    Like all medications, antibiotics have side effects. Some of these can be serious.    Let you healthcare team know if you have any known allergies when you  are admitted to the hospital.    One significant side effect of nearly all antibiotics is the risk of severe and sometimes deadly diarrhea caused by Clostridium difficile (C. Difficile). This occurs when a person takes antibiotics because some good germs are destroyed. Antibiotic use allows C. diificile to take over, putting patients at high risk for this serious infection.    As a patient or caregiver, it is important to understand your or your loved one's antibiotic treatment. It is especially important for caregivers to speak up when patients can't speak for themselves. Here are some important questions to ask your healthcare team.    What infection is this antibiotic treating and how do you know I have that infection?    What side effects might occur from this antibiotic?    How long will I need to take this antibiotic?    Is it safe to take this antibiotic with other medications or supplements (e.g., vitamins) that I am taking?     Are there any special directions I need to know about taking this antibiotic? For example, should I take it with food?    How will I be monitored to know whether my infection is responding to the antibiotic?    What tests may help to make sure the right antibiotic is prescribed for me?      Information provided by:  www.cdc.gov/getsmart  U.S. Department of Health and Human Services  Centers for disease Control and Prevention  National Center for Emerging and Zoonotic Infectious Diseases  Division of Healthcare Quality Promotion             Medication List: This is a list of all your medications and when to take them. Check marks below indicate your daily home schedule. Keep this list as a reference.      Medications           Morning Afternoon Evening Bedtime As Needed    acetaminophen 325 MG tablet   Commonly known as:  TYLENOL   Take 2 tablets (650 mg) by mouth every 4 hours as needed for mild pain   Last time this was given:  650 mg on 7/8/2018 11:56 PM                                 Adult Blood Pressure Cuff Lg Kit   1 each 2 times daily                                cephALEXin 500 MG capsule   Commonly known as:  KEFLEX   Take 1 capsule (500 mg) by mouth every 6 hours for 7 days   Last time this was given:  500 mg on 7/9/2018 11:21 AM                                hydrochlorothiazide 12.5 MG capsule   Commonly known as:  MICROZIDE   Take 1 capsule (12.5 mg) by mouth daily                                lisinopril 40 MG tablet   Commonly known as:  PRINIVIL/ZESTRIL   Take 1 tablet (40 mg) by mouth daily   Last time this was given:  40 mg on 7/9/2018 10:29 AM                                metoprolol succinate 50 MG 24 hr tablet   Commonly known as:  TOPROL-XL   Take 1 tablet (50 mg) by mouth daily   Last time this was given:  50 mg on 7/9/2018 10:30 AM                                miconazole 2 % powder   Commonly known as:  MICATIN; MICRO GUARD   Apply topically 2 times daily   Last time this was given:  7/8/2018  9:45 PM                                          More Information        Cephalexin tablets or capsules  Brand Names: Biocef, Daxbia, Keflex  What is this medicine?  CEPHALEXIN (sef a TOBY in) is a cephalosporin antibiotic. It is used to treat certain kinds of bacterial infections It will not work for colds, flu, or other viral infections.  How should I use this medicine?  Take this medicine by mouth with a full glass of water. Follow the directions on the prescription label. This medicine can be taken with or without food. Take your medicine at regular intervals. Do not take your medicine more often than directed. Take all of your medicine as directed even if you think you are better. Do not skip doses or stop your medicine early.  Talk to your pediatrician regarding the use of this medicine in children. While this drug may be prescribed for selected conditions, precautions do apply.  What side effects may I notice from receiving this medicine?  Side effects that you should  report to your doctor or health care professional as soon as possible:    allergic reactions like skin rash, itching or hives, swelling of the face, lips, or tongue    breathing problems    pain or trouble passing urine    redness, blistering, peeling or loosening of the skin, including inside the mouth    severe or watery diarrhea    unusually weak or tired    yellowing of the eyes, skin  Side effects that usually do not require medical attention (report to your doctor or health care professional if they continue or are bothersome):    gas or heartburn    genital or anal irritation    headache    joint or muscle pain    nausea, vomiting  What may interact with this medicine?    probenecid    some other antibiotics    What if I miss a dose?  If you miss a dose, take it as soon as you can. If it is almost time for your next dose, take only that dose. Do not take double or extra doses. There should be at least 4 to 6 hours between doses.  Where should I keep my medicine?  Keep out of the reach of children.  Store at room temperature between 59 and 86 degrees F (15 and 30 degrees C). Throw away any unused medicine after the expiration date.  What should I tell my health care provider before I take this medicine?  They need to know if you have any of these conditions:    kidney disease    stomach or intestine problems, especially colitis    an unusual or allergic reaction to cephalexin, other cephalosporins, penicillins, other antibiotics, medicines, foods, dyes or preservatives    pregnant or trying to get pregnant    breast-feeding  What should I watch for while using this medicine?  Tell your doctor or health care professional if your symptoms do not begin to improve in a few days.  Do not treat diarrhea with over the counter products. Contact your doctor if you have diarrhea that lasts more than 2 days or if it is severe and watery.  If you have diabetes, you may get a false-positive result for sugar in your urine.  Check with your doctor or health care professional.  NOTE:This sheet is a summary. It may not cover all possible information. If you have questions about this medicine, talk to your doctor, pharmacist, or health care provider. Copyright  2018 [employer]                Discharge Instructions for Cellulitis  You have been diagnosed with cellulitis. This is an infection in the deepest layer of the skin. In some cases, the infection also affects the muscle. Cellulitis is caused by bacteria. The bacteria can enter the body through broken skin. This can happen with a cut, scratch, animal bite, or an insect bite that has been scratched. You may have been treated in the hospital with antibiotics and fluids. You will likely be given a prescription for antibiotics to take at home. This sheet will help you take care of yourself at home.  Home care  When you are home:    Take the prescribed antibiotic medicine you are given as directed until it is gone. Take it even if you feel better. It treats the infection and stops it from returning. Not taking all the medicine can make future infections hard to treat.    Keep the infected area clean.    When possible, raise the infected area above the level of your heart. This helps keep swelling down.    Talk with your healthcare provider if you are in pain. Ask what kind of over-the-counter medicine you can take for pain.    Apply clean bandages as advised.    Take your temperature once a day for a week.    Wash your hands often to prevent spreading the infection.  In the future, wash your hands before and after you touch cuts, scratches, or bandages. This will help prevent infection.   When to call your healthcare provider  Call your healthcare provider immediately if you have any of the following:    Difficulty or pain when moving the joints above or below the infected area    Discharge or pus draining from the area    Fever of 100.4 F (38 C) or higher, or as directed by your healthcare  provider    Pain that gets worse in or around the infected     Redness that gets worse in or around the infected area, particularly if the area of redness expands to a wider area    Shaking chills    Swelling of the infected area    Vomiting   Date Last Reviewed: 8/1/2016 2000-2017 [employer]. [address] [place] [address]. All rights reserved. This information is not intended as a substitute for professional medical care. Always follow your healthcare professional's instructions.                Understanding Contact Dermatitis     A cool, moist compress can help reduce itching.     Contact dermatitis is a common type of skin rash. It s caused by something that touches the skin and makes it irritated and inflamed. It can occur on skin on any part of the body, such as the face, neck, hands, arms, and legs. Contact dermatitis is not spread from person to person.  Often, the reaction of contact dermatitis occurs 1 to 2 days after contact with the offending agent.  How to say it  TAYLOR-tact [id number]   What causes contact dermatitis?  It s caused by something that irritates the skin, or that creates an allergic reaction on the skin. People can get contact dermatitis from many kinds of things. These include:    Plant oils in poison ivy, oak, and sumac    Chemicals in household , solvents, and glue    Chemicals in makeup, soap, laundry detergent, perfume, acne cream, and hair products    Certain medicines, such as neomycin, bacitracin, benzocaine, and thimerosal    Metals such as nickel, found in some jewelry and watch bands     The sticky material on the back of bandages and tape (adhesive)    Things that can cause tiny breaks in the skin, such as wood, fiberglass, metal tools, and plant thorns    Rubber latex in surgical gloves and other medical supplies  Dermatitis can also be caused by the skin being damp for long periods of time. This can happen from washing your hands too  often, or working with wet materials.  Symptoms of contact dermatitis  Symptoms can include skin that is:    Blistered    Burning    Cracked    Dry    Itchy    Painful    Red    Rough, thickened, and leathery    Swollen    Warm  The blisters may ooze fluid and form crusts.  Treatment for contact dermatitis  Treatment is done to help relieve itching and reduce inflammation. The rash should go away in a few days to a few weeks. Treatments include:    Cool, moist compress. Use a clean damp cloth. Put it on the area for 20 to 30 minutes, 5 to 6 times a day for the first 3 days.    Steroid cream or ointment. You can apply this medicine several times a day on clean skin.    Oral corticosteroid. Your healthcare provider may prescribe this medicine if you have severe skin symptoms on a large part of your body.  Your healthcare provider may give you a steroid injection instead of pills.    Oral antihistamine. This medicine can help reduce itching.    Colloidal oatmeal bath. Soaking in water with colloidal oatmeal can help soothe skin.    Plain cream, lotion, or ointment. Cream, lotion, or ointment without medicine can help to soothe and protect your skin.  Living with contact dermatitis  Talk with your healthcare provider about what may have caused your contact dermatitis. Patch testing may help you figure out what caused the rash so you can avoid further contact with it. Once you learn what caused your rash, make sure to avoid that substance. If your skin comes into contact with it again, make sure to wash your skin right away. If you can t avoid the substance, wear gloves or other protective clothing before you touch it. Or use a cream, lotion, or ointment to protect your skin.  When to call your healthcare provider  Call your healthcare provider right away if you have any of these:    Fever of 100.4 F (38 C) or higher, or as directed    Symptoms that don t get better, or get worse    New symptoms   Date Last Reviewed:  5/1/2016 2000-2017 [employer] [address] [facility], [place] [address]. All rights reserved. This information is not intended as a substitute for professional medical care. Always follow your healthcare professional's instructions.                Fall Prevention  Falls often occur due to slipping, tripping or losing your balance. Millions of people fall every year and injure themselves. Here are ways to reduce your risk of falling again.    Think about your fall, was there anything that caused your fall that can be fixed, removed, or replaced?    Make your home safe by keeping walkways clear of objects you may trip over.    Use non-slip pads under rugs. Do not use area rugs or small throw rugs.    Use non-slip mats in bathtubs and showers.    Install handrails and lights on staircases.    Do not walk in poorly lit areas.    Do not stand on chairs or wobbly ladders.    Use caution when reaching overhead or looking upward. This position can cause a loss of balance.    Be sure your shoes fit properly, have non-slip bottoms and are in good condition.     Wear shoes both inside and out. Avoid going barefoot or wearing slippers.    Be cautious when going up and down stairs, curbs, and when walking on uneven sidewalks.    If your balance is poor, consider using a cane or walker.    If your fall was related to alcohol use, stop or limit alcohol intake.     If your fall was related to use of sleeping medicines, talk to your doctor about this. You may need to reduce your dosage at bedtime if you awaken during the night to go to the bathroom.      To reduce the need for nighttime bathroom trips:  ? Avoid drinking fluids for several hours before going to bed  ? Empty your bladder before going to bed  ? Men can keep a urinal at the bedside    Stay as active as you can. Balance, flexibility, strength, and endurance all come from exercise. They all play a role in preventing falls. Ask your healthcare provider  which types of activity are right for you.    Get your vision checked on a regular basis.    If you have pets, know where they are before you stand up or walk so you don't trip over them.    Use night lights.  Date Last Reviewed: 11/5/2015 2000-2017 [employer] [address] [facility], [place] [address]. All rights reserved. This information is not intended as a substitute for professional medical care. Always follow your healthcare professional's instructions.                Fall Because of Dizziness, Weakness, or Loss of Balance  The symptoms that led to your fall have been evaluated. Your doctor feels it is safe for you to return home.  Many things can cause you to become dizzy. Everyone means a little something different by the word dizzy. People may describe their symptoms using these words:    It doesn t feel right in my head    It feels like spinning in my head    It seems like the room is spinning    My balance feels off    I feel lightheaded, like I am going to pass out  All these descriptions can have real causes.   Your balance mechanism is in your inner ear. Anything disturbing it can make you feel dizzy, whether it is from a cold, an injury, or many other things. Anything that causes your blood pressure to drop suddenly can make you feel lightheaded, or like you are going to faint. This is because at that moment there might not be enough blood flowing to your brain. Causes include:    Medicines    Dehydration    Standing up or bending over too quickly    Becoming overheated    Taking a hot shower or bath    Straining hard while lifting something or using the toilet    Strokes, heart attack, heart valve disease, very slow or very fast heart rate    Low blood sugar    Ear infection    Hyperventilation    Anemia    Trauma    Infection    Panic attack    Pregnancy  You may be at risk of repeat falls. Take precautions described below to prevent another fall.  Home care    If you become  lightheaded or dizzy, lie down immediately or sit and lean forward with your head down. It is better to do this than fall and seriously hurt or injure yourself.    Rest today. When changing position, take a moment to be sure any dizziness goes away before standing and walking.    If you have been prescribed a walker, be sure to use it whenever you walk, even if it is a short distance.    If you were injured during the fall, follow the advice from your doctor regarding care of your injury.    Be sure your doctor knows all the medicines, herbs, and supplements you take. Some medicines can cause dizziness.  Follow-up care  Unless given other advice, call your doctor on the next office day to advise of your fall and to schedule an appointment. You may require further treatment for the underlying condition that caused today s fall.  If X-ray or a CT scan were done, you will be notified of the results, especially if it affects treatment.  Call 911  Call 911 if any of these occur:    Trouble breathing    Confused or difficulty arousing    Fainting or loss of consciousness    Rapid or very slow heart rate    Seizure    Difficulty with speech or vision, weakness of an arm or leg    Difficulty walking or talking, loss of balance    Numbness or weakness in one side of your body, facial droop  When to seek medical advice  Call your healthcare provider right away if any of the following occur:    Another fall    Continued dizzy spells    Severe headache    Blood in vomit or stools (black or red color)  Date Last Reviewed: 11/5/2015 2000-2017 [employer]. [address] [place] [id number]. All rights reserved. This information is not intended as a substitute for professional medical care. Always follow your healthcare professional's instructions.

## 2018-07-04 NOTE — IP AVS SNAPSHOT
Jose Antonio Kemp #[id number] (CSN: [id number])  (72 year old M)  (Adm: 18)     [id number]               [facility]: [phone]            Patient Demographics     Patient Name Sex          Age SSN Address Phone    Jose Antonio Kemp Male 1945 (72 year old) [id number]  [place] [address] [phone] (Home)  [phone] (Mobile)      Emergency Contact(s)     Name Relation Home Work Mobile    ROLANDO KEMP Son [phone]    Kailyn Howell Sister [phone]    Sherry rBo Sister [phone]      Admission Information     Attending Provider Admitting Provider Admission Type Admission Date/Time    Dangelo Barragan MD Urbonas, Arvydas, MD Emergency 18  1417    Discharge Date Hospital Service Auth/Cert Status Service Area     General Medicine Incomplete [facility] SERVICES    Unit Room/Bed Admission Status       [facility] 3226-1 Admission (Confirmed)       Admission     Complaint    Cellulitis      Hospital Account     Name Acct ID Class Status Primary Coverage    Jose Antonio Kemp [id number] Inpatient Open COMMERCIAL - [employer]ET[employer] MEDICARE ADVANTAGE            Guarantor Account (for Hospital Account #[id number])     Name Relation to Pt Service Area Active? Acct Type    Jose Antonio Kemp Self RANGE Yes Personal/Family    Address Phone          [address]  [place] [address] [phone](H)              Coverage Information (for Hospital Account #[id number])     F/O Payor/Plan Precert #    COMMERCIAL/[employer]ET[employer] MEDICARE ADVANTAGE     Subscriber Subscriber #    Jose Antonio Kemp [id number]    Address Phone    [address]  [place] [address] [phone]                                                INTERAGENCY TRANSFER FORM - PHYSICIAN ORDERS   2018                       [facility]: [phone]            Attending Provider: Dangelo Barragan MD     Allergies:  Penicillins    Infection:  None  "  Service:  GENERAL [facility]    Ht:  1.778 m (5' 10\")   Wt:  134.7 kg (296 lb 15.4 oz)   Admission Wt:  134.7 kg (296 lb 15.4 oz)    BMI:  42.61 kg/m 2   BSA:  2.58 m 2            ED Clinical Impression     Diagnosis Description Comment Added By Time Added    Recurrent falls [R29.6] Recurrent falls [R29.6]  Srinath Yeung PA-C 7/4/2018  3:39 PM    Acute kidney injury (H) [N17.9] Acute kidney injury (H) [N17.9]  Srinath Yeung PA-C 7/4/2018  3:39 PM    Decubitus ulcer of buttock, unspecified laterality, unspecified ulcer stage [L89.309] Decubitus ulcer of buttock, unspecified laterality, unspecified ulcer stage [L89.309]  Srinath Yeung PA-C 7/4/2018  3:40 PM      Hospital Problems as of 7/9/2018              Priority Class Noted POA    Essential hypertension Medium  8/17/2016 Yes    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 Yes    Type 2 diabetes mellitus without complication (H) Medium  8/19/2016 Yes    Weakness of both lower extremities Medium  8/19/2016 Yes    Yeast infection of the skin Medium  8/19/2016 Yes    Recurrent falls Medium  Unknown Yes    Cellulitis Medium  7/4/2018 Yes      Non-Hospital Problems as of 7/9/2018              Priority Class Noted    Falling episodes Medium  8/17/2016    Falls frequently Medium  12/31/2017    Fall Medium  3/21/2018    Nursing Home Visit Medium  3/30/2018    Pain management Medium  5/4/2018      Code Status History     Date Active Date Inactive Code Status Order ID Comments User Context    7/9/2018  9:24 AM  Full Code [id number]  Carlie Call MD Outpatient    7/4/2018  6:49 PM 7/9/2018  9:24 AM Full Code [id number]  Leigh Ann Warren MD Inpatient    5/4/2018 11:09 PM 5/7/2018  2:27 PM Full Code [id number]  Rach Mills MD ED    3/22/2018 12:50 PM 5/4/2018 11:09 PM Full Code [id number]  Dangelo Barragan MD Outpatient    3/21/2018  8:14 PM 3/22/2018 12:50 PM Full Code [id number]  Kei Marcus MD Inpatient    1/5/2018  1:37 PM 3/21/2018  8:14 PM Full Code " [id number]  Leigh Ann Warren MD Outpatient    12/31/2017 12:26 PM 1/5/2018  1:37 PM Full Code [id number]  Kristofer Sena MD Inpatient    8/19/2016  8:09 AM 12/31/2017 12:26 PM Full Code [id number]  Eris Rosales,  Outpatient    8/17/2016 10:22 PM 8/19/2016  8:09 AM Full Code [id number]  Kevin Connolly,  Inpatient      Current Code Status     Date Active Code Status Order ID Comments User Context       Prior      Summary of Visit     Reason for your hospital stay       Recurrent falls, cellulitis                Medication Review      START taking        Dose / Directions Comments    cephALEXin 500 MG capsule   Commonly known as:  KEFLEX   Indication:  Infection of the Skin and/or Related Soft Tissue   Used for:  Recurrent falls, Acute kidney injury (H), Decubitus ulcer of buttock, unspecified laterality, unspecified ulcer stage        Dose:  500 mg   Take 1 capsule (500 mg) by mouth every 6 hours for 7 days   Quantity:  28 capsule   Refills:  0          CONTINUE these medications which have NOT CHANGED        Dose / Directions Comments    acetaminophen 325 MG tablet   Commonly known as:  TYLENOL   Used for:  Fall, initial encounter, Weakness        Dose:  650 mg   Take 2 tablets (650 mg) by mouth every 4 hours as needed for mild pain   Quantity:  100 tablet   Refills:  0        Adult Blood Pressure Cuff Lg Kit   Used for:  Benign essential hypertension, Malignant essential hypertension        Dose:  1 each   1 each 2 times daily   Quantity:  1 kit   Refills:  0    Check blood pressures twice per day: before breakfast and at bedtime.  Keep a log.       hydrochlorothiazide 12.5 MG capsule   Commonly known as:  MICROZIDE   Used for:  Essential hypertension        Dose:  12.5 mg   Take 1 capsule (12.5 mg) by mouth daily   Quantity:  30 capsule   Refills:  0        lisinopril 40 MG tablet   Commonly known as:  PRINIVIL/ZESTRIL   Used for:  Essential hypertension        Dose:  40 mg   Take 1 tablet (40 mg) by  "mouth daily   Quantity:  30 tablet   Refills:  0        metoprolol succinate 50 MG 24 hr tablet   Commonly known as:  TOPROL-XL   Used for:  Essential hypertension        Dose:  50 mg   Take 1 tablet (50 mg) by mouth daily   Quantity:  30 tablet   Refills:  0        miconazole 2 % powder   Commonly known as:  MICATIN; MICRO GUARD   Used for:  Yeast infection of the skin        Apply topically 2 times daily   Quantity:  90 g   Refills:  1                After Care     Activity - Up with nursing assistance           Advance Diet as Tolerated       Follow this diet upon discharge: Orders Placed This Encounter      Combination Diet Regular Diet Adult; 8150-1044 Calories: Moderate Consistent CHO (4-6 CHO units/meal)       Fall precautions           General info for SNF       Length of Stay Estimate: Short Term Care: Estimated # of Days <30  Condition at Discharge: Stable  Level of care:skilled   Rehabilitation Potential: Fair  Admission H&P remains valid and up-to-date: Yes  Recent Chemotherapy: N/A  Use Nursing Home Standing Orders: Yes       Glucose monitor nursing POCT       Once daily AM       Mantoux instructions       Give two-step Mantoux (PPD) Per Facility Policy Yes               Further instructions from your care team           DISCHARGE INSTRUCTIONS  Lumbar Puncture      IMPORTANT: As you prepare for discharge, the following information will help you return to your best level of health.       This Information Is About Your Follow Up Care    Call your doctor if you do not get better. Call sooner if you feel worse. You can reach your doctor by calling their clinic phone number.                                    This Information Is About Procedures      LUMBAR PUNCTURE (Spinal Tap).  A lumbar puncture (\"spinal tap\") was done to remove a small amount of your cerebrospinal fluid (CSF). This protective fluid surrounds your brain and spinal cord. A lumbar puncture measures the pressure in the cerebrospinal fluid. " The sample of spinal fluid will be tested in the lab. We will let you know the results as soon as they are done.  The results of a lumbar puncture may help diagnose:    serious infections, such as meningitis    brain or spinal cord cancer    certain diseases such as multiple sclerosis or Guillain-Quincy syndrome    bleeding in the brain, such as a subarachnoid bleed    At home, please follow these instructions:    Drink plenty of liquids. Drink liquids with caffeine. The caffeine helps replace the CSF more quickly.    Lie down flat for the next 6 to 8 hours when you get home.    Some people get a headache that lasts a day or two after this procedure. If you do get a headache, try lying down flat.    Call your doctor if you have:    a severe headache that will not go away.    redness, swelling or drainage from the puncture site.    neck stiffness.    numbness or weakness below the area of the puncture site.    any new or bothersome symptoms.                        This Information Is About Your Illness and Diagnosis    LOW BACK PAIN  The muscles of the low back are put under a lot of strain in everyday life. Most of us do not keep our backs very strong. A number of medicines and other treatments help with low back symptoms.    Some treatments may include:    Medicine often helps low back symptoms. Medicine works well to control the pain and reduce inflammation and irritation of the muscle. The type of medicine chosen for you will match the type of symptoms you have.    Heat or cold applied to your back during the first 48 hours after the pain starts may help. Putting a cold pack to the painful area for 5 to 10 minutes at a time.  If your symptoms last longer than 48 hours, use a heating pad, hot shower or bath to help your back pain.    Follow these instructions:    Limit your bedrest to 2-3 days to avoid weakening your muscles.    When lying down, get up every few hours and walk around.    Wear comfortable,  low-heeled shoes.    Make sure your work surface is at a comfortable height for you.    Use a chair with a good lower back support that may recline slightly.    Rest your feet on the floor or on a low stool, if you must sit for long periods of time.    Use a pillow or rolled up towel behind the small of your back if you must drive long distances.  Stop often and walk around a few minutes every hour or so.    Sleep on your back with a pillow under your knees, or sleep on your side with your knees bent and a pillow between your knees to avoid trouble sleeping.    Slowly return to your normal activities including exercise. Slowly build up the speed and length of time you do the exercise.  Try the following:  o Walking short distances.  o Using a stationary bicycle.  o Swimming.    USE THE FOLLOWING BODY MECHANICS TO REDUCE YOUR RISK OF BACK INJURY:  Avoid the following:    Bending from your waist.    Lifting heavy objects higher than your waist.    Carrying unbalanced loads and sudden movements.    Activities that arch and strain your back (bending backwards or bending forward touching your toes).    Avoid lifting when twisting, bending forward, and reaching.    Avoid sitting for long periods of time.    Do the following:    Bend from your knees and face the object you lift.    Hold heavy objects close to your body.    Change your positions often.    Work with tools close to your body when mopping, vacuuming, raking, hoeing, etc.    Sit down to put your shoes and socks on.    Wear shoes with low heels and good support.    Reach for things close to your body.    Round your back and bend your knees slightly when you cough or sneeze.    Kneel when making a bed.    Stand tall with your chin in, back flat, pelvis tucked under and relax your knees.    Call your doctor if you have:    increased pain.    no improvement.    any new problems or concerns.                                  Referrals     Occupational Therapy Adult  "Consult       Evaluate and treat as clinically indicated.    Reason:  weakness       Physical Therapy Adult Consult       Evaluate and treat as clinically indicated.    Reason:  weakness             Follow-Up Appointment Instructions     Follow Up and recommended labs and tests       Patient to have consultation with Dr. Saucedo, Neurology at [facility], on July 17, 2018 at 8:30 AM for evaluation of possible multiple sclerosis. Patient to arrive 15 minutes early as he is first appointment of the day. Location: [address] [place] [address]. Located on 5th floor and use Green parking ramp       Follow Up and recommended labs and tests       Follow up with [facility] physician.  The following labs/tests are recommended: none.             Statement of Approval     Ordered          07/09/18 0925  I have reviewed and agree with all the recommendations and orders detailed in this document.  EFFECTIVE NOW     Approved and electronically signed by:  Carlie Call MD                                                 INTERAGENCY TRANSFER FORM - NURSING   7/4/2018                       [facility]: [phone]            Attending Provider: Dangelo Barragan MD     Allergies:  Penicillins    Infection:  None   Service:  GENERAL MEDI    Ht:  1.778 m (5' 10\")   Wt:  134.7 kg (296 lb 15.4 oz)   Admission Wt:  134.7 kg (296 lb 15.4 oz)    BMI:  42.61 kg/m 2   BSA:  2.58 m 2            Advance Directives        Scanned docmt in ACP Activity?           Yes, scanned ACP docmt        Immunizations     None      ASSESSMENT     Discharge Profile Flowsheet     DISCHARGE NEEDS ASSESSMENT     FINAL RESOURCES      Concerns To Be Addressed  discharge planning concerns 08/18/16 1505   Resources List  Skilled Nursing Facility 05/05/18 1252    Patient/family verbalizes understanding of discharge plan recommendations?  Yes 07/05/18 1517   Other Resources  Transportation Services 05/05/18 1252    Medical Team notified of plan?  yes " "07/05/18 1517   PAS Number  -- ([id number]) 05/07/18 1640    Readmission Within The Last 30 Days  no previous admission in last 30 days 07/05/18 1517   SKIN      Equipment Currently Used at Home  walker, rolling;wheelchair, power (life alert, ramp) 07/05/18 1517   Inspection of bony prominences  Full 07/09/18 1116    Transportation Available  agency transportation 07/05/18 1517   Inspection under devices  Full 07/08/18 2119    # of Referrals Placed by CTS  -- (SNF, Inpatient rehab) 01/02/18 1536   Skin WDL  ex 07/09/18 1116    Key Recommendations  F/U with PCP 01/02/18 1536   Skin Color/Characteristics  redness blanchable 07/09/18 1116    Does Patient Need a Referral for Clinic CC  No 01/02/18 1536   Skin Temperature  warm 07/09/18 1116    [facility] Name  [facility] 07/05/18 1517   Skin Moisture  dry;flaky 07/09/18 1116    Primary Care MD Name  Dr. Briggs 07/05/18 1517   Skin Elasticity  quick return to original state 07/09/18 1116    GASTROINTESTINAL (ADULT,PEDIATRIC,OB)     Skin Integrity  bruise(s);drain/device(s);rash(es) 07/09/18 1116    GI WDL  WDL 07/09/18 1116   Full except areas not inspected   -- (pt in chair ) 07/06/18 1354    Last Bowel Movement  07/08/18 07/08/18 2119   SAFETY      Passing flatus  yes 07/08/18 2119   Safety WDL  WDL 07/09/18 1116    COMMUNICATION ASSESSMENT     All Alarms  alarm(s) activated and audible 07/09/18 1116    Patient's communication style  spoken language (English or Bilingual) 07/04/18 1424                      Assessment WDL (Within Defined Limits) Definitions           Safety WDL     Effective: 09/28/15    Row Information: <b>WDL Definition:</b> Bed in low position, wheels locked; call light in reach; upper side rails up x 2; ID band on<br> <font color=\"gray\"><i>Item=AS safety wdl>>List=AS safety wdl>>Version=F14</i></font>      Skin WDL     Effective: 09/28/15    Row Information: <b>WDL Definition:</b> Warm; dry; intact; elastic; without " "discoloration; pressure points without redness<br> <font color=\"gray\"><i>Item=AS skin wdl>>List=AS skin wdl>>Version=F14</i></font>      Vitals     Vital Signs Flowsheet     VITAL SIGNS     HEIGHT AND WEIGHT      Temp  97.2  F (36.2  C) 07/09/18 0759   Height  1.778 m (5' 10\") 07/04/18 1705    Temp src  Tympanic 07/09/18 0759   Height Method  Stated 07/04/18 1705    Resp  18 07/09/18 0759   Weight  134.7 kg (296 lb 15.4 oz) 07/04/18 1704    Pulse  118 07/06/18 0858   Weight Method  Bed scale 07/04/18 1705    Heart Rate  81 07/09/18 0758   Estimated Weight (From ED)  136.1 kg (300 lb) 07/04/18 1423    Pulse/Heart Rate Source  Monitor 07/08/18 2354   BSA (Calculated - sq m)  2.58 07/04/18 1705    BP  148/64 07/09/18 0758   BMI (Calculated)  42.7 07/04/18 1705    OXYGEN THERAPY     SANDEEP COMA SCALE      SpO2  95 % 07/08/18 2354   Best Eye Response  4-->(E4) spontaneous 07/09/18 1117    O2 Device  None (Room air) 07/08/18 2354   Best Motor Response  6-->(M6) obeys commands 07/09/18 1117    Oxygen Delivery  1 LPM 07/05/18 0829   Best Verbal Response  5-->(V5) oriented 07/09/18 1117    PAIN/COMFORT     Sandeep Coma Scale Score  15 07/09/18 1117    Patient Currently in Pain  denies 07/09/18 1117   EKG MONITORING      Preferred Pain Scale  number (Numeric Rating Pain Scale) 07/09/18 0131   Cardiac Regularity  Regular 07/05/18 0211    Patient's Stated Pain Goal  No pain 07/08/18 1611   Cardiac Rhythm  NSR (SR 80's With BBB per ICU written report) 07/05/18 0211    0-10 Pain Scale  6 07/09/18 0131   POSITIONING      Pain Location  Shoulder 07/09/18 0131   Body Position  independently positioning 07/09/18 1116    Pain Orientation  Right 07/09/18 0131   Head of Bed (HOB)  HOB at 30-45 degrees 07/09/18 0135    Pain Descriptors  Sore 07/09/18 0131   Positioning/Transfer Devices  pillows;in use 07/08/18 2119    Pain Intervention(s)  Medication (See eMAR) 07/09/18 0131   Chair  Recline and up in chair 07/09/18 0527    Response to " Interventions  Absence of nonverbal indicators of pain 07/09/18 0135   DAILY CARE      POINT OF CARE TESTING     Activity Management  up in chair 07/09/18 1116    Puncture Site  fingertip 07/09/18 1124   Activity Assistance Provided  assistance, 2 people 07/09/18 0527    Bedside Glucose (mg/dl )   177 mg/dl 07/09/18 1124                 Patient Lines/Drains/Airways Status    Active LINES/DRAINS/AIRWAYS     Name: Placement date: Placement time: Site: Days: Last dressing change:    Peripheral IV 07/06/18 Left Upper arm 07/06/18   2230   Upper arm   2             Patient Lines/Drains/Airways Status    Active PICC/CVC     None            Intake/Output Detail Report     Date Intake     Output Net    Shift P.O. I.V. IV Piggyback Total Urine Total       Noc 07/07/18 2300 - 07/08/18 0659 -- -- -- -- 3200 3200 -3200    Day 07/08/18 0700 - 07/08/18 1459 1420 -- -- 1420 825 825 595    Jeanette 07/08/18 1500 - 07/08/18 2259 740 -- -- 740 2000 2000 -1260    Noc 07/08/18 2300 - 07/09/18 0659 240 -- -- 240 1290 1290 -1050    Day 07/09/18 0700 - 07/09/18 1459 1000 -- -- 1000 -- -- 1000      Last Void/BM       Most Recent Value    Urine Occurrence 1 at 07/09/2018 1220    Stool Occurrence 1 at 07/08/2018 1946      Case Management/Discharge Planning     Case Management/Discharge Planning Flowsheet     REFERRAL INFORMATION     Major Change/Loss/Stressor  denies 07/04/18 1703    Did the Initial Social Work Assessment result in a Social Work Case?  Yes 07/05/18 1517   ASSESSMENT/CONCERNS TO BE ADDRESSED      Arrived From  home or self-care 01/02/18 1511   Concerns To Be Addressed  discharge planning concerns 08/18/16 1505    # of Referrals Placed by CTS  -- (SNF, Inpatient rehab) 01/02/18 1536   DISCHARGE PLANNING      Reason For Consult  discharge planning 07/05/18 1517   Patient/family verbalizes understanding of discharge plan recommendations?  Yes 07/05/18 1517    [facility] Clinic Name  [facility] 07/05/18 1517    Medical Team notified of plan?  yes 07/05/18 1517    Primary Care MD Name  Dr. Briggs 07/05/18 1517   Readmission Within The Last 30 Days  no previous admission in last 30 days 07/05/18 1517    LIVING ENVIRONMENT     Transportation Available  agency transportation 07/05/18 1517    Lives With  alone 07/05/18 1517   Key Recommendations  F/U with PCP 01/02/18 1536    Living Arrangements  house 07/05/18 1517   Does Patient Need a Referral for Clinic CC  No 01/02/18 1536    Provides Primary Care For  no one 07/05/18 1517   FINAL RESOURCES      Quality Of Family Relationships  involved 07/05/18 1517   Equipment Currently Used at Home  walker, rolling;wheelchair, power (life alert, ramp) 07/05/18 1517    Able to Return to Prior Living Arrangements  no 07/05/18 1517   Resources List  Skilled Nursing Facility 05/05/18 1252    Living Arrangement Comments  needs short term rehab 07/05/18 1517   Other Resources  Transportation Services 05/05/18 1252    HOME SAFETY     PAS Number  -- ([id number]) 05/07/18 1640    Patient Feels Safe Living in Home?  yes 07/05/18 1517   ABUSE RISK SCREEN      ASSESSMENT OF FAMILY/SOCIAL SUPPORT     QUESTION TO PATIENT:  Has a member of your family or a partner(now or in the past) intimidated, hurt, manipulated, or controlled you in any way?  no 07/04/18 1426    Marital Status  Single 07/05/18 1517   QUESTION TO PATIENT: Do you feel safe going back to the place where you are living?  yes 07/04/18 1426    Who is your support system?  Children;Other (specify) (friend) 07/05/18 1517   OBSERVATION: Is there reason to believe there has been maltreatment of a vulnerable adult (ie. Physical/Sexual/Emotional abuse, self neglect, lack of adequate food, shelter, medical care, or financial exploitation)?  no 07/04/18 1426    Description of Support System  Involved;Supportive 07/05/18 1517   OTHER      Support Assessment  Adequate family and caregiver support 07/05/18 1517   Are you depressed or being  treated for depression?  No 07/04/18 1703    Communication preferences  phone 07/05/18 151   HOMICIDE RISK      COPING/STRESS     Feels Like Hurting Others  no 07/04/18 1426                  [facility]: [phone]            Medication Administration Report for Jose Antonio Marmolejo as of 07/09/18 1321   Legend:    Given Hold Not Given Due Canceled Entry Other Actions    Time Time (Time) Time  Time-Action       Inactive    Active    Linked        Medications 07/03/18 07/04/18 07/05/18 07/06/18 07/07/18 07/08/18 07/09/18    acetaminophen (TYLENOL) tablet 650 mg  Dose: 650 mg  Freq: EVERY 4 HOURS PRN Route: PO  PRN Reason: mild pain  Start: 07/04/18 1839   Admin Instructions: Alternate ibuprofen (if ordered) with acetaminophen.  Maximum acetaminophen dose from all sources = 75 mg/kg/day not to exceed 4 grams/day.    Admin. Amount: 2 tablet (2 × 325 mg tablet)  Last Admin: 07/08/18 2356  Dispense Loc: [facility] [id number]       0525 (650 mg)-Given         0111 (650 mg)-Given        2356 (650 mg)-Given            cephALEXin (KEFLEX) capsule 500 mg  Dose: 500 mg  Freq: EVERY 6 HOURS SCHEDULED Route: PO  Indications of Use: SKIN AND SOFT TISSUE INFECTION  Start: 07/06/18 1600   Admin. Amount: 1 capsule (1 × 500 mg capsule)  Last Admin: 07/09/18 1121  Dispense Loc: [facility] [id number]        1631 (500 mg)-Given        0102 (500 mg)-Given       0628 (500 mg)-Given       1141 (500 mg)-Given       1839 (500 mg)-Given       2359 (500 mg)-Given        0636 (500 mg)-Given       1257 (500 mg)-Given       1834 (500 mg)-Given       2356 (500 mg)-Given        0643 (500 mg)-Given       1121 (500 mg)-Given       [ ] 1800           enoxaparin (LOVENOX) injection 40 mg  Dose: 40 mg  Freq: EVERY 24 HOURS Route: SC  Start: 07/04/18 1900   Admin Instructions: HOLD if platelet count falls below 50% of baseline or less than 100,000/ L and notify provider.    Admin. Amount: 40 mg = 0.4 mL Conc: 40 mg/0.4 mL  Last Admin: 07/08/18 1834  Dispense Loc: [facility]  [id number]  Volume: 0.4 mL      194 (40 mg)-Given         (40 mg)-Given         (40 mg)-Given         (40 mg)-Given        183 (40 mg)-Given        [ ] 1900           gadobutrol (GADAVIST) injection 7.5 mL  Dose: 7.5 mL  Freq: ONCE Route: IV  Start: 18   Admin. Amount: 7.5 mL  Dispense Loc: [facility] Radiology Floor Stock  Administrations Remainin  Volume: 7.5 mL       ()-Not Given               gadobutrol (GADAVIST) injection 7.5 mL  Dose: 7.5 mL  Freq: ONCE Route: IV  Start: 18   Admin. Amount: 7.5 mL  Dispense Loc: [facility] Radiology Floor Stock  Administrations Remainin  Volume: 7.5 mL       ()-Not Given               glucose gel 15-30 g  Dose: 15-30 g  Freq: EVERY 15 MIN PRN Route: PO  PRN Reason: low blood sugar  Start: 18   Admin Instructions: Give 15 g for BG 51 to 69 mg/dL IF patient is conscious and able to swallow. Give 30 g for BG less than or equal to 50 mg/dL IF patient is conscious and able to swallow. Do NOT give glucose gel via enteral tube.  IF patient has enteral tube: give apple juice 120 mL (4 oz or 15 g of CHO) via enteral tube for BG 51 to 69 mg/dL.  Give apple juice 240 mL (8 oz or 30 g of CHO) via enteral tube for BG less than or equal to 50 mg/dL.    ~Oral gel is preferable for conscious and able to swallow patient.   ~IF gel unavailable or patient refuses may provide apple juice 120 mL (4 oz or 15 g of CHO). Document juice on I and O flowsheet.    Admin. Amount: 15-30 g  Dispense Loc: HI [id number]  Volume: 93.75 mL              Or  dextrose 50 % injection 25-50 mL  Dose: 25-50 mL  Freq: EVERY 15 MIN PRN Route: IV  PRN Reason: low blood sugar  Start: 18   Admin Instructions: Use if have IV access, BG less than 70 mg/dL and meet dose criteria below:  Dose if conscious and alert (or disorientated) and NPO = 25 mL  Dose if unconscious / not alert = 50 mL  Vesicant. For ordered doses up to 25 g, give IV Push undiluted. Give each 5g over  1 minute.    Admin. Amount: 25-50 mL  Dispense Loc: HI [id number]  Infused Over: 1-5 Minutes  Volume: 50 mL              Or  glucagon injection 1 mg  Dose: 1 mg  Freq: EVERY 15 MIN PRN Route: SC  PRN Reason: low blood sugar  PRN Comment: May repeat x 1 only  Start: 07/04/18 1847   Admin Instructions: May give SQ or IM. ONLY use glucagon IF patient has NO IV access AND is UNABLE to swallow AND blood glucose is LESS than or EQUAL to 50 mg/dL.  If ordered IV, give IV Push over 1 minute. Reconstitute with 1mL sterile water.    Admin. Amount: 1 mg  Dispense Loc: HI [id number]               insulin aspart (NovoLOG) inj (RAPID ACTING)  Dose: 1-5 Units  Freq: AT BEDTIME Route: SC  Start: 07/04/18 2200   Admin Instructions: MEDIUM INSULIN RESISTANCE DOSING    Do Not give Bedtime Correction Insulin if BG less than  200.   For  - 249 give 1 units.   For  - 299 give 2 units.   For  - 349 give 3 units.   For  -399 give 4 units.   For BG greater than or equal to 400 give 5 units.  Notify provider if glucose greater than or equal to 350 mg/dL after administration of correction dose.  If given at mealtime, must be administered 5 min before meal or immediately after.    Admin. Amount: 1-5 Units  Dispense Loc: [facility]  Volume: 3 mL      (2140)-Not Given        (2112)-Not Given        (2127)-Not Given [C]        (2208)-Not Given [C]        (2148)-Not Given [C]        [ ] 2200           insulin aspart (NovoLOG) inj (RAPID ACTING)  Dose: 1-7 Units  Freq: 3 TIMES DAILY BEFORE MEALS Route: SC  Start: 07/05/18 0730   Admin Instructions: Correction Scale - MEDIUM INSULIN RESISTANCE DOSING     Do Not give Correction Insulin if Pre-Meal BG less than 140.   For Pre-Meal  - 189 give 1 unit.   For Pre-Meal  - 239 give 2 units.   For Pre-Meal  - 289 give 3 units.   For Pre-Meal  - 339 give 4 units.   For Pre-Meal - 399 give 5 units.   For Pre-Meal -449 give 6 units  For Pre-Meal BG  greater than or equal to 450 give 7 units.   To be given with prandial insulin, and based on pre-meal blood glucose.    Notify provider if glucose greater than or equal to 350 mg/dL after administration of correction dose.  If given at mealtime, must be administered 5 min before meal or immediately after.    Admin. Amount: 1-7 Units  Last Admin: 07/09/18 1125  Dispense Loc: [facility]  Volume: 3 mL       (0655)-Not Given [C]       1306 (1 Units)-Given [C]       1611 (2 Units)-Given        0729 (1 Units)-Given [C]       (1231)-Not Given [C]       (1646)-Not Given [C]        0712 (1 Units)-Given       (1141)-Not Given       (1648)-Not Given [C]        0643 (1 Units)-Given       1257 (1 Units)-Given [C]       (1803)-Not Given [C]        0643 (2 Units)-Given       1125 (1 Units)-Given       [ ] 1630           lisinopril (PRINIVIL/ZESTRIL) tablet 40 mg  Dose: 40 mg  Freq: DAILY Route: PO  Start: 07/06/18 1830   Admin. Amount: 1 tablet (1 × 40 mg tablet)  Last Admin: 07/09/18 1029  Dispense Loc: HI [id number]        1940 (40 mg)-Given        1026 (40 mg)-Given        0833 (40 mg)-Given        1029 (40 mg)-Given           medication instruction  Freq: CONTINUOUS PRN Route: XX  Start: 07/06/18 1515   Admin Instructions: Take other usual AM meds with small amount of water    Dispense Loc: [facility]  POC: IR Pre-procedure               melatonin tablet 1 mg  Dose: 1 mg  Freq: AT BEDTIME PRN Route: PO  PRN Reason: sleep  Start: 07/04/18 1825   Admin Instructions: Do not give unless at least 6 hours of uninterrupted sleep is expected.    Admin. Amount: 1 tablet (1 × 1 mg tablet)  Dispense Loc: HI [id number]               metoprolol succinate (TOPROL-XL) 24 hr tablet 50 mg  Dose: 50 mg  Freq: DAILY Route: PO  Start: 07/04/18 1900   Admin Instructions: DO NOT CRUSH. Tablet may be split in half along score line.    Admin. Amount: 1 tablet (1 × 50 mg tablet)  Last Admin: 07/09/18 1030  Dispense Loc: HI [id number]      1944  (50 mg)-Given        (0846)-Not Given        0858 (50 mg)-Given        1026 (50 mg)-Given        0833 (50 mg)-Given        1030 (50 mg)-Given           miconazole (MICATIN; MICRO GUARD) 2 % powder  Freq: 2 TIMES DAILY Route: Top  Start: 07/04/18 2100   Admin Instructions: Apply to affected skin areas    Last Admin: 07/08/18 2145  Dispense Loc: [facility]      2008 ( )-Given        1313 ( )-Given       2014 ( )-Given        1047 ( )-Given       2128 ( )-Given        1107 ( )-Given       2208 ( )-Given        0952 ( )-Given       2145 ( )-Given        (1032)-Not Given       [ ] 2100           naloxone (NARCAN) injection 0.1-0.4 mg  Dose: 0.1-0.4 mg  Freq: EVERY 2 MIN PRN Route: IV  PRN Reason: opioid reversal  Start: 07/04/18 1825   Admin Instructions: For respiratory rate LESS than or EQUAL to 8.  Partial reversal dose:  0.1 mg titrated q 2 minutes for Analgesia Side Effects Monitoring Sedation Level of 3 (frequently drowsy, arousable, drifts to sleep during conversation).Full reversal dose:  0.4 mg bolus for Analgesia Side Effects Monitoring Sedation Level of 4 (somnolent, minimal or no response to stimulation).  For ordered doses up to 2mg give IVP. Give each 0.4mg over 15 seconds in emergency situations. For non-emergent situations further dilute in 9mL of NS to facilitate titration of response.    Admin. Amount: 0.1-0.4 mg = 0.25-1 mL Conc: 0.4 mg/mL  Dispense Loc: HI [id number]  Volume: 1 mL               nystatin-triamcinolone (MYCOLOG II) cream  Freq: 2 TIMES DAILY Route: Top  Start: 07/05/18 1000   Admin Instructions: Apply to affected buttocks/upper thighs, perineum, scrotum    Last Admin: 07/08/18 2145  Dispense Loc: [facility]       1313 ( )-Given       2014 ( )-Given        1047 ( )-Given       2128 ( )-Given        1444 ( )-Given       2208 ( )-Given        0952 ( )-Given       2145 ( )-Given        (1031)-Not Given       [ ] 2100           ondansetron (ZOFRAN-ODT) ODT tab 4 mg  Dose: 4  mg  Freq: EVERY 6 HOURS PRN Route: PO  PRN Reasons: nausea,vomiting  Start: 07/04/18 1839   Admin Instructions: This is Step 1 of nausea and vomiting management.  If nausea not resolved in 15 minutes, go to Step 2 prochlorperazine (COMPAZINE). Do not push through foil backing. Peel back foil and gently remove. Place on tongue immediately. Administration with liquid unnecessary  With dry hands, peel back foil backing and gently remove tablet; do not push oral disintegrating tablet through foil backing; administer immediately on tongue and oral disintegrating tablet dissolves in seconds; then swallow with saliva; liquid not required.    Admin. Amount: 1 tablet (1 × 4 mg tablet)  Dispense Loc: HI [id number]              Or  ondansetron (ZOFRAN) injection 4 mg  Dose: 4 mg  Freq: EVERY 6 HOURS PRN Route: IV  PRN Reasons: nausea,vomiting  Start: 07/04/18 1839   Admin Instructions: This is Step 1 of nausea and vomiting management.  If nausea not resolved in 15 minutes, go to Step 2 prochlorperazine (COMPAZINE).  Irritant. For ordered doses up to 4 mg, give IV Push undiluted over 2-5 minutes.    Admin. Amount: 4 mg = 2 mL Conc: 4 mg/2 mL  Dispense Loc: HI [id number]  Infused Over: 2-5 Minutes  Volume: 2 mL               sodium chloride (PF) 0.9% PF flush 3 mL  Dose: 3 mL  Freq: EVERY 8 HOURS Route: IK  Start: 07/07/18 1115   Admin. Amount: 3 mL  Last Admin: 07/09/18 1033  Dispense Loc: [facility]  Volume: 3 mL   Current Line: Peripheral IV 07/06/18 Left Upper arm        1113 (3 mL)-Given       1838 (3 mL)-Given        0353 (3 mL)-Given       0835 (3 mL)-Given       1834 (3 mL)-Given        0135 (3 mL)-Given       1033 (3 mL)-Given       [ ] 1630          Completed Medications  Medications 07/03/18 07/04/18 07/05/18 07/06/18 07/07/18 07/08/18 07/09/18         Dose: 10 mg  Freq: ONCE Route: PO  Start: 07/08/18 0015   End: 07/08/18 0007   Admin. Amount: 1 tablet (1 × 10 mg tablet)  Last Admin: 07/08/18 0007  Dispense Loc:  HI [id number]  Administrations Remainin          0007 (10 mg)-Given              Dose: 5 mL  Freq: ONCE Route: SC  Start: 18 1515   End: 18 1634   Admin. Amount: 5 mL  Last Admin: 18 1634  Dispense Loc: HI [id number]  Administrations Remainin  Volume: 6 mL        1634 (5 mL)-Given by Other Clinician             Discontinued Medications  Medications 18         Start: 18 0007   End: 18 1214   Admin Instructions: Sujatha Larkin: cabinet override    Administrations Remainin                 1214-Med Discontinued          Dose: 1 g  Freq: EVERY 8 HOURS Route: IV  Indications of Use: SKIN AND SOFT TISSUE INFECTION  Start: 18 0800   End: 18 1449   Admin. Amount: 1 g = 50 mL Conc: 1 g/50 mL  Last Admin: 18 0729  Dispense Loc: [facility]  Infused Over: 30 Minutes  Volume: 50 mL        0729 (1 g)-New Bag       1449-Med Discontinued            Start: 18 1509   End: 18 1609   Admin Instructions: Shyanne Hatfield: cabinet override    Administrations Remainin               1609-Med Discontinued            Rate: 100 mL/hr Dose: 1000 mL  Freq: CONTINUOUS Route: IV  Last Dose: Stopped (18 1130)  Start: 18   End: 18 113   Admin Instructions: Administer after the bolus.    Admin. Amount: 1,000 mL  Last Admin: 18  Dispense Loc: [facility]  Volume: 1,000 mL        (1,000 mL)-New Bag        1130-Infusion stopped per MD order        1134-Med Discontinued           Dose: 1 puff  Freq: EVERY 24 HOURS Route: IN  Start: 18 1900   End: 18 0941   Admin. Amount: 1 puff          0941-Med Discontinued     Medications 18               INTERAGENCY TRANSFER FORM - NOTES (H&P, Discharge Summary, Consults, Procedures, Therapies)   2018                       [facility]: [phone]        "        History & Physicals      H&P by Leigh Ann Warren MD at 7/4/2018  6:55 PM     Author:  Leigh Ann Warren MD Service:  Hospitalist Author Type:  Physician    Filed:  7/4/2018  7:26 PM Date of Service:  7/4/2018  6:55 PM Creation Time:  7/4/2018  6:55 PM    Status:  Signed :  Leigh Ann Warren MD (Physician)         [facility]    History and Physical  Hospitalist       Date of Admission:  7/4/2018    Assessment & Plan   Jose Antonio Marmolejo is a 72 year old male who presents with weakness and s/p fall at home. Brought by ambulance for further evaluation after wound found.    Active Problems:    Cellulitis    Assessment: many factors contribute: poor hygiene, moisture, intertrigo. He has mild leukocytosis, local infection, elevated lactic acid, fever, but normal procalcitonin. My clinical suspicion for possible systemic infection remains quite high - will use Abx.     Plan: Gonzales, vancomycin, antifungal powder. Will repeat lactic acid and procalcitonin. Will f/u cBC and wound care consult    Essential hypertension    Assessment: he did not take BP pills for more than 1.5 mo. His blood pressure acceptable.     Plan: introduce BP med gradually, continue BB, hold Lisinopril until stable      Neuropathy of left lower extremity    Assessment: neurology consult pending    Plan: PT eval,       Type 2 diabetes mellitus without complication (H)    Assessment: not antidiabetics listed on home meds.    Plan: Will start with sliding scale      Weakness of both lower extremities    Assessment: remains unanswered etiology    Plan: consider \"pushing\" MRI images to [place] for neurology to review.      Yeast infection of the skin    Assessment: due to maceration, fungal infection    Plan: will use powder      Recurrent falls    Plan: PT evaluation, neurology evaluation, but eventually he will need placement.     TASIA  - present on admission  - will hydrate (given several boluses). Will monitor renal function        # " "Pain Assessment:  Current Pain Score 7/4/2018   Patient currently in pain? -   Pain score (0-10) 4   Pain location -   Pain descriptors -   Jose Antonio velarde pain level was assessed and he currently denies pain.        DVT Prophylaxis: Enoxaparin (Lovenox) SQ  Code Status: Full Code    Disposition: Expected discharge in 3-4 days once placement found, infection treated, TASIA resolved.    Leigh Ann Warren      Primary Care Physician   Juanita Briggs    Chief Complaint   Weakness, fall    Reason for admission: frequent falls, weakness, cellulitis.     History is obtained from the patient, electronic health record, emergency department physician and pt is also known to me from previous admission.     History of Present Illness   This individual is 72 yr man, with PMH of non-compliance with med treatment, morbid obesity, DM-2, HTN, frequent falls, abnormal MRI brain (white matter lesions, ? MS but not established definitive diagnosis) was brought to ED by EMS after falling at home. Pt was admitted for the same several mo ago, was discharged to NH. He now lives at his home, moves around with wheelchair. Has 4 sisters in town. \"\" recently moved in into his house to stay with him.   Pt admits that since he returned home (1.5 mo prior to admission), he stopped taking all his meds including BP meds.   He has been admitted 3 times for similar complaints since December.  He is usually transition to the nursing home with rehabilitation and does well, when he is discharged back to home he gradually fails.  Denies any fevers or chills.  Denies any shortness of breath or cough.  Denies any abdominal pain.  Reports rather appreciable weakness.  He has had an abnormal MRI in the past without apparent workup.  ED w/u: given 1 L NS. His CBC showed mild leukocytosis, and BMP showed elevated lactic acid, and elevated creatinine which when compared to previous one, allows us to dgn acute kidney injury  He will be admitted for placement, " cellulitis treatment, PT    Past Medical History    I have reviewed this patient's medical history and updated it with pertinent information if needed.   Past Medical History:   Diagnosis Date     Arthritis     back and left ankle from fall     Diabetes (H)      Hypertension      Recurrent falls        Past Surgical History   I have reviewed this patient's surgical history and updated it with pertinent information if needed.  Past Surgical History:   Procedure Laterality Date     ORTHOPEDIC SURGERY      left ankle and removal of hardware       Prior to Admission Medications   Prior to Admission Medications   Prescriptions Last Dose Informant Patient Reported? Taking?   Blood Pressure Monitoring (ADULT BLOOD PRESSURE CUFF LG) KIT   No Yes   Si each 2 times daily   acetaminophen (TYLENOL) 325 MG tablet   No Yes   Sig: Take 2 tablets (650 mg) by mouth every 4 hours as needed for mild pain   hydrochlorothiazide (MICROZIDE) 12.5 MG capsule   No Yes   Sig: Take 1 capsule (12.5 mg) by mouth daily   lisinopril (PRINIVIL/ZESTRIL) 40 MG tablet Unknown  No Yes   Sig: Take 1 tablet (40 mg) by mouth daily   metoprolol (TOPROL-XL) 50 MG 24 hr tablet Unknown  No Yes   Sig: Take 1 tablet (50 mg) by mouth daily   miconazole (MICATIN; MICRO GUARD) 2 % powder Unknown  No Yes   Sig: Apply topically 2 times daily      Facility-Administered Medications: None     Allergies   Allergies   Allergen Reactions     Penicillins        Social History   I have reviewed this patient's social history and updated it with pertinent information if needed. Jose Antonio APODACA Francie  reports that he quit smoking about 40 years ago. He has never used smokeless tobacco. He reports that he does not drink alcohol or use illicit drugs.    Family History   I have reviewed this patient's family history and updated it with pertinent information if needed.   Family History   Problem Relation Age of Onset     Other - See Comments Mother      pneumonia     Obesity Mother       Other Cancer Father      hodgkins lymphoma     Coronary Artery Disease Father      pacemaker     Other Cancer Sister      unknown cancers     Colon Cancer Son        Review of Systems   14 points obtained. Pertinent +ves and -ves included in HPI. The rest is -ve.     Physical Exam   Temp: 101.2  F (38.4  C) Temp src: Tympanic BP: 138/95   Heart Rate: 102 Resp: 16 SpO2: 97 % O2 Device: None (Room air)    Vital Signs with Ranges  Temp:  [69.9  F (21.1  C)-101.2  F (38.4  C)] 101.2  F (38.4  C)  Heart Rate:  [102-121] 102  Resp:  [16-20] 16  BP: (125-167)/(80-98) 138/95  SpO2:  [93 %-97 %] 97 %  296 lbs 15.35 oz    Physical exam;   General: morbidly obese, not in acute distress. Fully awake, oriented x 4. Moves in bed with difficulty  NC/AT. MM dry.   Neck: no JVD. Supple  Cardiac: regular, but tachycardic. No S3  Lungs; diminished due to body habitus, but clear bilaterally  Abd; soft, obese, no HSM  Skin: large areas between wrinkles, under armpits, groins with red macerated skin. Posterior R thigh - looks cellulitic.   Circulatory: diminished pulses distant both legs  Neurological: I cannot elicit DTR both legs. He required 5 attempts with 2 people assist to stand from sitting position on the edge of the bed. No focal weakness.     Data   Data reviewed today:  I personally reviewed the EKG tracing showing NSR, RBBB, LAFB and the chest x-ray image(s) showing clear lungs .    Recent Labs  Lab 07/04/18  1459   WBC 12.6*   HGB 15.9   MCV 87         POTASSIUM 3.9   CHLORIDE 106   CO2 23   BUN 21   CR 1.26*   ANIONGAP 11   STALIN 8.5   *   ALBUMIN 3.1*   PROTTOTAL 8.0   BILITOTAL 1.0   ALKPHOS 65   ALT 18   AST 5       No results found for this or any previous visit (from the past 24 hour(s)).[AU1.1]       Revision History        User Key Date/Time User Provider Type Action    > AU1.1 7/4/2018  7:26 PM Leigh Ann Warren MD Physician Sign                  Discharge Summaries     No notes of this type  exist for this encounter.         Consult Notes      Consults by Hawa Gonzalez RN at 7/5/2018  9:30 AM     Author:  Hawa Gonzalez RN Service:  [facility] Nurse Author Type:  Registered Nurse    Filed:  7/5/2018 10:54 AM Date of Service:  7/5/2018  9:30 AM Creation Time:  7/5/2018 10:45 AM    Status:  Signed :  Hawa Gonzalez RN (Registered Nurse)     Consult Orders:    1. Wound Ostomy Continence Nurse IP Consult [[id number]] ordered by Leigh Ann Warren MD at 07/04/18 1848                Pt seen with Dr Jenkins for wound consult.  He was admitted s/p fall at home with extensive fungal type rash under pannus, breasts, arm pits, buttocks, penis.  Apparently he has a history of non-compliance.  He also had not been bathing at home.  He stopped all meds.  He currently is non-ambulatory but able to reposition himself.  A Gonzales was placed due to multiple skin issues.  We did evaluate his buttocks, posterior thighs, and pannus with Dr Jenkins.  His buttocks and upper thighs have confluent erythema and scaling skin.  He had some incontinence of a small amount of stool.  Jose Antonio is on a Styker alternating pressure, low air loss bed so the Moisture Associated Skin Damage is improving.  Jose Antonio tells us it does hurt.  The area under his left pan has some resolving erythema.      Discussed using Mycolog II cream to buttocks area to deal with both inflammation and fungal infection.  Continue with Miconazole powder to other areas.      Recommendations given to his nurse Brandy- limit amount of linen on Ranjana support surface to allow for air flow, no briefs, when using Miconazole powder lightly dust it on and remove excess.[PR1.1]       Revision History        User Key Date/Time User Provider Type Action    > PR1.1 7/5/2018 10:54 AM Hawa Gonzalez RN Registered Nurse Sign                     Progress Notes - Physician (Notes for yesterday and today)      Progress Notes by Dangelo Barragan MD at 7/8/2018  10:41 AM     Author:  Dangelo Barragan MD Service:  Hospitalist Author Type:  Physician    Filed:  7/8/2018 10:52 AM Date of Service:  7/8/2018 10:41 AM Creation Time:  7/8/2018 10:41 AM    Status:  Signed :  Dangelo Barragan MD (Physician)         [facility]    Hospitalist Progress Note      Assessment & Plan   Jose Antonio Marmolejo is a 72 year old man who presented with weakness and following a fall at home. Brought by ambulance for further evaluation after wound identified.     Active Problems:    Cellulitis versus dermatitis of buttocks, posterior thighs, peritoneum  Multiple contributing factors including poor hygiene and moisture particularly at the intertrigo. He had initial mild leukocytosis, local apparently superficial infection, elevated lactic acid, fever, but normal procalcitonin, not suggesting any systemic infection. Given clinical suspicion he was initially started on Vancomycin empirically. Nesbitt placed as well and mycolog II cream applied to affected areas after evaluation from wound consult team. Repeat lactic acid normalized and procalcitonin remained below detectable levels. Initially started on IV vancomycin since he did spike fever to 101.2 shortly after admission. Subsequently, WBC has normalized 12.6-->7.6. Blood cultures with no growth to date. Changed to cefazolin briefly and then converted to oral cephalexin at this point. MRSA nasal swab negative; I think unlikely MRSA involved. Not entirely certain there is bacterial infection involved here given low pro calcitonin. May simply be dermatitis from urine and stool as well as yeast infection. However, given the fever he had and difficult to sort out if bacterial component, will plan to treat for 7 day course oral cephalexin. Overall, the skin integrity is markedly improved from admission and would consider removing nesbitt in another day particularly if it is certain on nursing staff working with patient for guidance that he is  able to consistently appropriately use urinal to avoid further spilling of urine.    Primary hypertension  He did not take BP pills for more than 1.5 months simply because he discontinued taking his medications after the last time he returned home from SNF. Have now reinitiated his beta blocker and lisinopril with BP adequately controlled at present, with several isolated increases in pressure. However unlikely further alteration will be fruitful.        Type 2 diabetes mellitus without complication  No antidiabetics listed on home meds. A1c 6.5. Continue with sliding scale, which he is used at low doses, 1 unit / 24 hours over the past several days.  Particularly with increased activity it is likely this will not be required on an ongoing basis.       Cutaneous candidiasis  Due to maceration, fungal infection. Continue with miconazole powder to groin folds, under breasts, pannus. Mycolog II to the buttocks and perineal area where he has more intense erythema and scaling of the skin       Recurrent falls  Continue with PT/OT.  Cause is uncertain. May simply be deconditioning and gait disturbance but a demyelinating condition must also be considered as detailed below.  For the time being needs placement to SNF for PT/OT. Still awaiting accepting facility as the SNF in [place] has now declined. Patient is only agreeable to staying local in [place] or [place]. States he will discharge to home before he would go outside the local area to a SNF. Have advised against this, but patient is adamant he will not go outside [place] or [place], thus may need to arrange for home services if we cannot get a SNF locally.    Possible multiple sclerosis versus other demyelinating condition:  Spoke once again with [facility]'s neurology, Dr. Bray, regarding patient's previous brain MRI imaging in January 2018 with white matter lesions, at least one of which was ring enhancing. Question for possible multiple sclerosis. Recommendation  was for repeat MRI imaging. Repeat MRI of the brain, cervical and thoracic spine shows brain with scattered nonspecific white matter changes suggesting small vessel disease, a 3 mm enhancing focus lying along the medial aspect of the left temporal horn, which is new when compared to prior exam and otherwise with white matter changes similar in appearance to his last study in January 2018, cervical spine with non enhancing white matter lesions concerning for demyelinating process as well as multilevel degenerative changes, thoracic spine with no spinal cord lesions or enhancement but again with multilevel degenerative changes. Although he does have one small enhancing lesion, on discussion with neurology was felt that steroids are not indicated given his no clinical focal deficits, optic neuritis or transverse myelitis and also his current state with possible infectious skin process. Lumbar puncture 7/6/18. Gram stain unremarkable, culture pending, standard CSF studies unremarkable. Oligoclonal bands and IgG index of CSF pending. The CSF studies, when results available, will need to be faxed to Dr. Saucedo's office at [phone].  Have arranged for patient to be seen by Dr. Saucedo, [facility], on 7/16/18 for consultation and evaluation. Case management is arranging a rider per [employer]. I discussed with the patient today, as has Dr. Jenkins, the importance of this evaluation.     Weakness of both lower extremities  As above demyelination versus generalized weakness and gait disturbance.    TASIA  Volume depletion  Both resolved with supportive treatment.  Present on admission with Cr. 1.26. Baseline 0.9-1.       # Pain Assessment:  Current Pain Score 7/8/2018   Patient currently in pain? denies   Pain score (0-10) -   Pain location -   Pain descriptors -   Jose Antonio s pain level was assessed and he currently denies pain.        DVT Prophylaxis: Enoxaparin (Lovenox) SQ  Code Status: Full Code    Disposition: Expected  discharge in 1-2 days. Safest and most reasonable plan would be skilled nursing for further rehabilitation.  If this is not possible, will need to arrange for home services.    Dangelo Alvarado    Interval History   Awake alert and interactive.  Hungry.  No dyspnea.  Minimal pain.  Awake alert and interactive.     -Data reviewed today: I reviewed all new labs and imaging results over the last 24 hours.     Physical Exam   Temp: 97.3  F (36.3  C) Temp src: Tympanic BP: 161/86   Heart Rate: 81 Resp: 20 SpO2: 96 % O2 Device: None (Room air)    Vitals:    07/04/18 1700   Weight: 134.7 kg (296 lb 15.4 oz)     Vital Signs with Ranges  Temp:  [97.3  F (36.3  C)-98.8  F (37.1  C)] 97.3  F (36.3  C)  Heart Rate:  [67-83] 81  Resp:  [18-20] 20  BP: (145-221)/(71-98) 161/86  SpO2:  [93 %-97 %] 96 %  I/O last 3 completed shifts:  In: 1680 [P.O.:1680]  Out: 5100 [Urine:5100]    Peripheral IV 07/06/18 Left Upper arm (Active)   Site Assessment WDL 7/8/2018  8:35 AM   Line Status Saline locked;Checked every 1-2 hour 7/8/2018  8:35 AM   Phlebitis Scale 0-->no symptoms 7/8/2018  8:35 AM   Infiltration Scale 0 7/8/2018  8:35 AM   Number of days:2       Urethral Catheter Straight-tip;Double-lumen;Non-latex 16 fr (Active)   Tube Description Positional 7/8/2018  9:52 AM   Catheter Care Done 7/8/2018  9:52 AM   Collection Container Standard 7/8/2018  9:52 AM   Securement Method Securing device (Describe) 7/8/2018  9:52 AM   Rationale for Continued Use Wound Healing 7/8/2018  9:52 AM   Urine Output 3200 mL 7/8/2018  6:44 AM   Number of days:4     Reviewed.  July 8.    Constitutional: Alert and oriented x3. No distress    HEENT: NC/AT, PERRL, EOMI, mouth clear, neck supple, no LN.     Cardiovascular: PMI not displaced. RRR. No murmur, no rubs, or gallops.      Respiratory: Clear to auscultation bilaterally. Aeration to bases.    Abdomen: Morbidly obese, Soft, no tenderness to palpation or percussion.  No distention, no organomegaly. Bowel  sounds present in all quadrants.    Extremities: Warm/dry. No edema    Neuro:  Non focal, cranial nerves intact, Moves all extremities.    Skin: Groin folds, penis and scrotum and breast folds mild diffuse erythema. Buttocks, posterior upper thighs and perineal area somewhat more erythematous, still with some scaling skin but improved overall. No significant drainage.    Medications     - MEDICATION INSTRUCTIONS -         amLODIPine         cephalexin  500 mg Oral Q6H TENZIN     enoxaparin  40 mg Subcutaneous Q24H     gadobutrol  7.5 mL Intravenous Once     gadobutrol  7.5 mL Intravenous Once     insulin aspart  1-7 Units Subcutaneous TID AC     insulin aspart  1-5 Units Subcutaneous At Bedtime     lisinopril  40 mg Oral Daily     metoprolol succinate  50 mg Oral Daily     miconazole   Topical BID     nystatin-triamcinolone   Topical BID     sodium chloride (PF)  3 mL Intracatheter Q8H       Data     Recent Labs  Lab 07/08/18  0541 07/07/18  0519 07/06/18  0517 07/05/18  0522 07/04/18  1459   WBC 6.3 9.1 7.6 7.5 12.6*   HGB 15.5 13.8 14.1 13.9 15.9   MCV 87 85 88 87 87    251 246 243 281    138 139 142 140   POTASSIUM 3.9 3.4 4.2 4.1 3.9   CHLORIDE 108 106 109 111* 106   CO2 24 24 25 24 23   BUN 15 13 16 17 21   CR 0.72 0.74 0.89 0.95 1.26*   ANIONGAP 7 8 5 7 11   STALIN 8.6 8.0* 8.1* 7.7* 8.5   * 170* 175* 139* 174*   ALBUMIN  --   --   --  2.3* 3.1*   PROTTOTAL  --   --   --  6.3* 8.0   BILITOTAL  --   --   --  0.5 1.0   ALKPHOS  --   --   --  51 65   ALT  --   --   --  14 18   AST  --   --   --  12 5       No results found for this or any previous visit (from the past 24 hour(s)).[AM1.1]       Revision History        User Key Date/Time User Provider Type Action    > AM1.1 7/8/2018 10:52 AM Dangelo Barragan MD Physician Sign                  Procedure Notes     No notes of this type exist for this encounter.         Progress Notes - Therapies (Notes from 07/06/18 through 07/09/18)      Progress  "Notes by Tasha Rivera, PT at 7/8/2018 11:36 AM     Author:  Tasha Rivera, PT Service:  Physical Medicine and Rehabilitation Author Type:  Physical Therapist    Filed:  7/8/2018 11:37 AM Date of Service:  7/8/2018 11:36 AM Creation Time:  7/8/2018 11:36 AM    Status:  Signed :  Tasha Rivera, PT (Physical Therapist)         Pt refused x 2 attempts for physical therapy treatment this date. Pt understands risks of refusing and benefits of participating. Pt states \"I am too tired today.\"[DC1.1]     Revision History        User Key Date/Time User Provider Type Action    > DC1.1 7/8/2018 11:37 AM Tasha Rivera, PT Physical Therapist Sign                                                      INTERAGENCY TRANSFER FORM - LAB / IMAGING / EKG / EMG RESULTS   7/4/2018                       [facility]: [phone]            Unresulted Labs (24h ago through future)    Start       Ordered    07/07/18 0600  Platelet count  (Pharmacological Prophylaxis - enoxaparin (LOVENOX) *Use only if creatinine clearance is greater than 30 mL/min)  EVERY THREE DAYS,   Routine     Comments:  Repeat every 3 days while on VTE prophylaxis.  Notify provider and hold enoxaparin if platelet count falls by 50% of baseline. If no result is listed, this lab has not been done the past 365 days. LATEST LAB RESULT: Platelet Count (10e9/L)       Date                     Value                 07/04/2018               281              ----------    07/04/18 1848    07/07/18 0000  Creatinine  (Pharmacological Prophylaxis - enoxaparin (LOVENOX) *Use only if creatinine clearance is greater than 30 mL/min)  EVERY THREE DAYS,   Routine     Comments:  Repeat every 3 days while on VTE prophylaxis.    07/04/18 1848 07/05/18 0600  Creatinine  AM DRAW,   Routine     Comments:  Last Lab Result: Creatinine (mg/dL)       Date                     Value                 07/04/2018               1.26 (H)         ----------    " 07/04/18 2011         Lab Results - 3 Days      Glucose by meter [[id number]] (Abnormal)  Resulted: 07/09/18 1145, Result status: Final result    Ordering provider: Leigh Ann Warren MD  07/09/18 1123 Resulting lab: POINT OF CARE TEST, GLUCOSE    Specimen Information    Type Source Collected On     07/09/18 1123          Components       Value Reference Range Flag Lab   Glucose 177 70 - 99 mg/dL H 170            Oligoclonal banding [[id number]]  Resulted: 07/09/18 0910, Result status: In process    Ordering provider: Skip Jenkins MD  07/06/18 1553 Resulting lab: [facility]    Specimen Information    Type Source Collected On     07/06/18 1553            CSF Culture Aerobic Bacterial [[id number]]  Resulted: 07/09/18 0736, Result status: Preliminary result    Ordering provider: Skip Jenkins MD  07/06/18 1413 Resulting lab: [facility]    Specimen Information    Type Source Collected On   Cerebrospinal fluid Cerebral spinal fluid 07/06/18 1545          Components       Value Reference Range Flag Lab   Specimen Description Cerebrospinal fluid      Culture Micro No growth after 3 days   HI            Basic metabolic panel [[id number]] (Abnormal)  Resulted: 07/09/18 0601, Result status: Final result    Ordering provider: Dangelo Barragan MD  07/08/18 2300 Resulting lab: [facility]    Specimen Information    Type Source Collected On   Blood  07/09/18 0527          Components       Value Reference Range Flag Lab   Sodium 138 133 - 144 mmol/L  HI   Potassium 3.8 3.4 - 5.3 mmol/L  HI   Chloride 106 94 - 109 mmol/L  HI   Carbon Dioxide 24 20 - 32 mmol/L  HI   Anion Gap 8 3 - 14 mmol/L  HI   Glucose 195 70 - 99 mg/dL H HI   Urea Nitrogen 13 7 - 30 mg/dL  HI   Creatinine 0.79 0.66 - 1.25 mg/dL  HI   GFR Estimate >90 >60 mL/min/1.7m2  HI   Comment:  Non  GFR Calc   GFR Estimate If Black >90 >60 mL/min/1.7m2  HI   Comment:  African American GFR Calc   Calcium 8.4 8.5 - 10.1  mg/dL L HI            CBC with platelets differential [[id number]]  Resulted: 07/09/18 0541, Result status: Final result    Ordering provider: Dangelo Barragan MD  07/08/18 2300 Resulting lab: [facility]    Specimen Information    Type Source Collected On   Blood  07/09/18 0527          Components       Value Reference Range Flag Lab   WBC 7.3 4.0 - 11.0 10e9/L  HI   RBC Count 5.06 4.4 - 5.9 10e12/L  HI   Hemoglobin 14.7 13.3 - 17.7 g/dL  HI   Hematocrit 43.6 40.0 - 53.0 %  HI   MCV 86 78 - 100 fl  HI   MCH 29.1 26.5 - 33.0 pg  HI   MCHC 33.7 31.5 - 36.5 g/dL  HI   RDW 13.5 10.0 - 15.0 %  HI   Platelet Count 281 150 - 450 10e9/L  HI   Diff Method Automated Method   HI   % Neutrophils 56.7 %  HI   % Lymphocytes 28.4 %  HI   % Monocytes 8.5 %  HI   % Eosinophils 5.5 %  HI   % Basophils 0.6 %  HI   % Immature Granulocytes 0.3 %  HI   Nucleated RBCs 0 0 /100  HI   Absolute Neutrophil 4.1 1.6 - 8.3 10e9/L  HI   Absolute Lymphocytes 2.1 0.8 - 5.3 10e9/L  HI   Absolute Monocytes 0.6 0.0 - 1.3 10e9/L  HI   Absolute Eosinophils 0.4 0.0 - 0.7 10e9/L  HI   Absolute Basophils 0.0 0.0 - 0.2 10e9/L  HI   Abs Immature Granulocytes 0.0 0 - 0.4 10e9/L  HI   Absolute Nucleated RBC 0.0   HI            Glucose by meter [[id number]] (Abnormal)  Resulted: 07/08/18 2341, Result status: Final result    Ordering provider: Leigh Ann Warren MD  07/08/18 2145 Resulting lab: POINT OF CARE TEST, GLUCOSE    Specimen Information    Type Source Collected On     07/08/18 2145          Components       Value Reference Range Flag Lab   Glucose 164 70 - 99 mg/dL H 170            Glucose by meter [[id number]] (Abnormal)  Resulted: 07/08/18 1933, Result status: Final result    Ordering provider: Leigh Ann Warren MD  07/08/18 1728 Resulting lab: POINT OF CARE TEST, GLUCOSE    Specimen Information    Type Source Collected On     07/08/18 1728          Components       Value Reference Range Flag Lab   Glucose 116 70 - 99 mg/dL H 170             Blood culture [[id number]]  Resulted: 07/08/18 1416, Result status: Preliminary result    Ordering provider: Leigh Ann Warren MD  07/04/18 1849 Resulting lab: [facility]    Specimen Information    Type Source Collected On   Blood  07/04/18 1935          Components       Value Reference Range Flag Lab   Specimen Description Blood      Culture Micro No growth after 4 days   HI            Blood culture [[id number]]  Resulted: 07/08/18 1416, Result status: Preliminary result    Ordering provider: Leigh Ann Warren MD  07/04/18 1849 Resulting lab: [facility]    Specimen Information    Type Source Collected On   Blood  07/04/18 1914          Components       Value Reference Range Flag Lab   Specimen Description Blood      Special Requests --   HI   Result:         Right Arm  6mL aerobic only     Culture Micro No growth after 4 days   HI   Result:              Glucose by meter [[id number]] (Abnormal)  Resulted: 07/08/18 1311, Result status: Final result    Ordering provider: Leigh Ann Warren MD  07/08/18 1256 Resulting lab: POINT OF CARE TEST, GLUCOSE    Specimen Information    Type Source Collected On     07/08/18 1256          Components       Value Reference Range Flag Lab   Glucose 175 70 - 99 mg/dL H 170            CRP inflammation [[id number]] (Abnormal)  Resulted: 07/08/18 0617, Result status: Final result    Ordering provider: Skip Jenkins MD  07/08/18 0000 Resulting lab: [facility]    Specimen Information    Type Source Collected On   Blood  07/08/18 0541          Components       Value Reference Range Flag Lab   CRP Inflammation 35.4 0.0 - 8.0 mg/L H HI            Basic metabolic panel [[id number]] (Abnormal)  Resulted: 07/08/18 0617, Result status: Final result    Ordering provider: Skip Jenkins MD  07/08/18 0000 Resulting lab: [facility]    Specimen Information    Type Source Collected On   Blood  07/08/18 0541           Components       Value Reference Range Flag Lab   Sodium 139 133 - 144 mmol/L  HI   Potassium 3.9 3.4 - 5.3 mmol/L  HI   Chloride 108 94 - 109 mmol/L  HI   Carbon Dioxide 24 20 - 32 mmol/L  HI   Anion Gap 7 3 - 14 mmol/L  HI   Glucose 155 70 - 99 mg/dL H HI   Urea Nitrogen 15 7 - 30 mg/dL  HI   Creatinine 0.72 0.66 - 1.25 mg/dL  HI   GFR Estimate >90 >60 mL/min/1.7m2  HI   Comment:  Non  GFR Calc   GFR Estimate If Black >90 >60 mL/min/1.7m2  HI   Comment:  African American GFR Calc   Calcium 8.6 8.5 - 10.1 mg/dL  HI            CBC with platelets differential [[id number]]  Resulted: 07/08/18 0602, Result status: Final result    Ordering provider: Skip Jenkins MD  07/07/18 1555 Resulting lab: [facility]    Specimen Information    Type Source Collected On   Blood  07/08/18 0513          Components       Value Reference Range Flag Lab   WBC 6.3 4.0 - 11.0 10e9/L  HI   RBC Count 5.39 4.4 - 5.9 10e12/L  HI   Hemoglobin 15.5 13.3 - 17.7 g/dL  HI   Hematocrit 46.8 40.0 - 53.0 %  HI   MCV 87 78 - 100 fl  HI   MCH 28.8 26.5 - 33.0 pg  HI   MCHC 33.1 31.5 - 36.5 g/dL  HI   RDW 13.4 10.0 - 15.0 %  HI   Platelet Count 232 150 - 450 10e9/L  HI   Diff Method Automated Method   HI   % Neutrophils 51.1 %  HI   % Lymphocytes 31.6 %  HI   % Monocytes 10.7 %  HI   % Eosinophils 5.6 %  HI   % Basophils 0.8 %  HI   % Immature Granulocytes 0.2 %  HI   Nucleated RBCs 0 0 /100  HI   Absolute Neutrophil 3.2 1.6 - 8.3 10e9/L  HI   Absolute Lymphocytes 2.0 0.8 - 5.3 10e9/L  HI   Absolute Monocytes 0.7 0.0 - 1.3 10e9/L  HI   Absolute Eosinophils 0.4 0.0 - 0.7 10e9/L  HI   Absolute Basophils 0.1 0.0 - 0.2 10e9/L  HI   Abs Immature Granulocytes 0.0 0 - 0.4 10e9/L  HI   Absolute Nucleated RBC 0.0   HI            Glucose by meter [[id number]] (Abnormal)  Resulted: 07/07/18 2221, Result status: Final result    Ordering provider: Leigh Ann Warren MD  07/07/18 2207 Resulting lab: POINT OF CARE TEST, GLUCOSE     Specimen Information    Type Source Collected On     07/07/18 2207          Components       Value Reference Range Flag Lab   Glucose 171 70 - 99 mg/dL H 170            Glucose by meter [[id number]] (Abnormal)  Resulted: 07/07/18 1658, Result status: Final result    Ordering provider: Leigh Ann Warren MD  07/07/18 1646 Resulting lab: POINT OF CARE TEST, GLUCOSE    Specimen Information    Type Source Collected On     07/07/18 1646          Components       Value Reference Range Flag Lab   Glucose 128 70 - 99 mg/dL H 170            Gram stain [[id number]]  Resulted: 07/07/18 1202, Result status: Final result    Ordering provider: Skip Jenkins MD  07/06/18 1413 Resulting lab: [facility]    Specimen Information    Type Source Collected On   Cerebrospinal fluid Cerebral spinal fluid 07/06/18 1545          Components       Value Reference Range Flag Lab   Specimen Description Cerebrospinal fluid      Gram Stain No bacteria seen   HI   Gram Stain Gram stain result is preliminary and awaits review of Microbiology Staff.   HI   Gram Stain Gram stain review consistent with reported results.   HI            Glucose by meter [[id number]] (Abnormal)  Resulted: 07/07/18 1149, Result status: Final result    Ordering provider: Leigh Ann Warren MD  07/07/18 1137 Resulting lab: POINT OF CARE TEST, GLUCOSE    Specimen Information    Type Source Collected On     07/07/18 1137          Components       Value Reference Range Flag Lab   Glucose 124 70 - 99 mg/dL H 170            CRP inflammation [[id number]] (Abnormal)  Resulted: 07/07/18 0553, Result status: Final result    Ordering provider: Skip Jenkins MD  07/07/18 0000 Resulting lab: [facility]    Specimen Information    Type Source Collected On   Blood  07/07/18 0519          Components       Value Reference Range Flag Lab   CRP Inflammation 28.2 0.0 - 8.0 mg/L H HI            Basic metabolic panel [[id number]] (Abnormal)  Resulted:  07/07/18 0553, Result status: Final result    Ordering provider: Skip Jenkins MD  07/07/18 0000 Resulting lab: [facility]    Specimen Information    Type Source Collected On   Blood  07/07/18 0519          Components       Value Reference Range Flag Lab   Sodium 138 133 - 144 mmol/L  HI   Potassium 3.4 3.4 - 5.3 mmol/L  HI   Chloride 106 94 - 109 mmol/L  HI   Carbon Dioxide 24 20 - 32 mmol/L  HI   Anion Gap 8 3 - 14 mmol/L  HI   Glucose 170 70 - 99 mg/dL H HI   Urea Nitrogen 13 7 - 30 mg/dL  HI   Creatinine 0.74 0.66 - 1.25 mg/dL  HI   GFR Estimate >90 >60 mL/min/1.7m2  HI   Comment:  Non  GFR Calc   GFR Estimate If Black >90 >60 mL/min/1.7m2  HI   Comment:  African American GFR Calc   Calcium 8.0 8.5 - 10.1 mg/dL L HI            CBC with platelets differential [[id number]]  Resulted: 07/07/18 0534, Result status: Final result    Ordering provider: Skip Jenkins MD  07/06/18 2300 Resulting lab: [facility]    Specimen Information    Type Source Collected On   Blood  07/07/18 0519          Components       Value Reference Range Flag Lab   WBC 9.1 4.0 - 11.0 10e9/L  HI   RBC Count 4.76 4.4 - 5.9 10e12/L  HI   Hemoglobin 13.8 13.3 - 17.7 g/dL  HI   Hematocrit 40.3 40.0 - 53.0 %  HI   MCV 85 78 - 100 fl  HI   MCH 29.0 26.5 - 33.0 pg  HI   MCHC 34.2 31.5 - 36.5 g/dL  HI   RDW 13.1 10.0 - 15.0 %  HI   Platelet Count 251 150 - 450 10e9/L  HI   Diff Method Automated Method   HI   % Neutrophils 68.8 %  HI   % Lymphocytes 18.6 %  HI   % Monocytes 8.6 %  HI   % Eosinophils 3.3 %  HI   % Basophils 0.4 %  HI   % Immature Granulocytes 0.3 %  HI   Nucleated RBCs 0 0 /100  HI   Absolute Neutrophil 6.3 1.6 - 8.3 10e9/L  HI   Absolute Lymphocytes 1.7 0.8 - 5.3 10e9/L  HI   Absolute Monocytes 0.8 0.0 - 1.3 10e9/L  HI   Absolute Eosinophils 0.3 0.0 - 0.7 10e9/L  HI   Absolute Basophils 0.0 0.0 - 0.2 10e9/L  HI   Abs Immature Granulocytes 0.0 0 - 0.4 10e9/L  HI   Absolute  Nucleated RBC 0.0   HI            Glucose by meter [[id number]] (Abnormal)  Resulted: 07/07/18 0405, Result status: Final result    Ordering provider: Leigh Ann Warren MD  07/07/18 0352 Resulting lab: POINT OF CARE TEST, GLUCOSE    Specimen Information    Type Source Collected On     07/07/18 0352          Components       Value Reference Range Flag Lab   Glucose 179 70 - 99 mg/dL H 170            Glucose by meter [[id number]] (Abnormal)  Resulted: 07/07/18 0308, Result status: Final result    Ordering provider: Leigh Ann Warren MD  07/07/18 0109 Resulting lab: POINT OF CARE TEST, GLUCOSE    Specimen Information    Type Source Collected On     07/07/18 0109          Components       Value Reference Range Flag Lab   Glucose 131 70 - 99 mg/dL H 170            Glucose by meter [[id number]] (Abnormal)  Resulted: 07/06/18 2210, Result status: Final result    Ordering provider: Leigh Ann Warren MD  07/06/18 2124 Resulting lab: POINT OF CARE TEST, GLUCOSE    Specimen Information    Type Source Collected On     07/06/18 2124          Components       Value Reference Range Flag Lab   Glucose 103 70 - 99 mg/dL H 170            [facility] Miscellaneous Test [[id number]]  Resulted: 07/06/18 1901, Result status: Final result    Ordering provider: Skip Jenkins MD  07/06/18 1553 Resulting lab: [facility]    Specimen Information    Type Source Collected On     07/06/18 1553          Components       Value Reference Range Flag Lab   Result --   HI   Result:         Specimen Received, Reordered and sent to Performing laboratory - Report to follow upon   completion.     Test Name OLIGO   HI   Result:     Send Outs Misc Test Code 27296   HI   Send Outs Misc Test Specimen CSF   HI            Glucose by meter [[id number]] (Abnormal)  Resulted: 07/06/18 1647, Result status: Final result    Ordering provider: Leigh Ann Warren MD  07/06/18 1620 Resulting lab: POINT OF CARE TEST, GLUCOSE    Specimen Information    Type Source  Collected On     07/06/18 1620          Components       Value Reference Range Flag Lab   Glucose 136 70 - 99 mg/dL H 170            Glucose CSF: Tube 1 [[id number]] (Abnormal)  Resulted: 07/06/18 1632, Result status: Final result    Ordering provider: Skip Jenkins MD  07/06/18 1413 Resulting lab: [facility]    Specimen Information    Type Source Collected On   CSF  07/06/18 1545          Components       Value Reference Range Flag Lab   Glucose CSF 85 40 - 70 mg/dL H HI   Comment:  CSF glucose concentrations are about 60 percent of normal plasma glucose.            Protein total CSF: Tube 1 [[id number]]  Resulted: 07/06/18 1632, Result status: Final result    Ordering provider: Skip Jenkins MD  07/06/18 1413 Resulting lab: [facility]    Specimen Information    Type Source Collected On   CSF Cerebral spinal fluid 07/06/18 1545          Components       Value Reference Range Flag Lab   Protein Total CSF 59 15 - 60 mg/dL  HI            Cell count with differential CSF: Tube 4 [[id number]] (Abnormal)  Resulted: 07/06/18 1629, Result status: Final result    Ordering provider: Skip Jenkins MD  07/06/18 1413 Resulting lab: [facility]    Specimen Information    Type Source Collected On   CSF Cerebral spinal fluid 07/06/18 1545          Components       Value Reference Range Flag Lab   WBC CSF 2 0 - 5 /uL  HI   RBC CSF 20 0 - 2 /uL H HI            Immunoglobulin G CSF Index: Tube 4 [[id number]]  Resulted: 07/06/18 1608, Result status: In process    Ordering provider: Skip Jenkins MD  07/06/18 1413 Resulting lab: [facility]    Specimen Information    Type Source Collected On     07/06/18 1545            Glucose by meter [[id number]] (Abnormal)  Resulted: 07/06/18 1242, Result status: Final result    Ordering provider: Leigh Ann Warren MD  07/06/18 1228 Resulting lab: POINT OF CARE TEST, GLUCOSE    Specimen Information    Type Source Collected On     07/06/18  1228          Components       Value Reference Range Flag Lab   Glucose 124 70 - 99 mg/dL H 170            Glucose by meter [[id number]] (Abnormal)  Resulted: 07/06/18 1212, Result status: Final result    Ordering provider: Leigh Ann Warren MD  07/06/18 0726 Resulting lab: POINT OF CARE TEST, GLUCOSE    Specimen Information    Type Source Collected On     07/06/18 0726          Components       Value Reference Range Flag Lab   Glucose 150 70 - 99 mg/dL H 170            Active MRSA Surveillance Culture [[id number]]  Resulted: 07/06/18 0639, Result status: Final result    Ordering provider: Skip Jenkins MD  07/04/18 1653 Resulting lab: [facility]    Specimen Information    Type Source Collected On   Swab Nose 07/04/18 1655          Components       Value Reference Range Flag Lab   Specimen Description Swab      Culture Micro No MRSA isolated   HI            CRP inflammation [[id number]] (Abnormal)  Resulted: 07/06/18 0539, Result status: Final result    Ordering provider: Skip Jenkins MD  07/06/18 0000 Resulting lab: [facility]    Specimen Information    Type Source Collected On   Blood  07/06/18 0517          Components       Value Reference Range Flag Lab   CRP Inflammation 51.8 0.0 - 8.0 mg/L H HI            Basic metabolic panel [[id number]] (Abnormal)  Resulted: 07/06/18 0539, Result status: Final result    Ordering provider: Skip Jenkins MD  07/06/18 0000 Resulting lab: [facility]    Specimen Information    Type Source Collected On   Blood  07/06/18 0517          Components       Value Reference Range Flag Lab   Sodium 139 133 - 144 mmol/L  HI   Potassium 4.2 3.4 - 5.3 mmol/L  HI   Chloride 109 94 - 109 mmol/L  HI   Carbon Dioxide 25 20 - 32 mmol/L  HI   Anion Gap 5 3 - 14 mmol/L  HI   Glucose 175 70 - 99 mg/dL H HI   Urea Nitrogen 16 7 - 30 mg/dL  HI   Creatinine 0.89 0.66 - 1.25 mg/dL  HI   GFR Estimate 84 >60 mL/min/1.7m2  HI   Comment:  Non   GFR Calc   GFR Estimate If Black >90 >60 mL/min/1.7m2  HI   Comment:  African American GFR Calc   Calcium 8.1 8.5 - 10.1 mg/dL L HI            CBC with platelets differential [[id number]]  Resulted: 07/06/18 0522, Result status: Final result    Ordering provider: Skip Jenkins MD  07/05/18 2300 Resulting lab: [facility]    Specimen Information    Type Source Collected On   Blood  07/06/18 0517          Components       Value Reference Range Flag Lab   WBC 7.6 4.0 - 11.0 10e9/L  HI   RBC Count 4.83 4.4 - 5.9 10e12/L  HI   Hemoglobin 14.1 13.3 - 17.7 g/dL  HI   Hematocrit 42.6 40.0 - 53.0 %  HI   MCV 88 78 - 100 fl  HI   MCH 29.2 26.5 - 33.0 pg  HI   MCHC 33.1 31.5 - 36.5 g/dL  HI   RDW 13.4 10.0 - 15.0 %  HI   Platelet Count 246 150 - 450 10e9/L  HI   Diff Method Automated Method   HI   % Neutrophils 58.1 %  HI   % Lymphocytes 28.7 %  HI   % Monocytes 7.5 %  HI   % Eosinophils 4.9 %  HI   % Basophils 0.5 %  HI   % Immature Granulocytes 0.3 %  HI   Nucleated RBCs 0 0 /100  HI   Absolute Neutrophil 4.4 1.6 - 8.3 10e9/L  HI   Absolute Lymphocytes 2.2 0.8 - 5.3 10e9/L  HI   Absolute Monocytes 0.6 0.0 - 1.3 10e9/L  HI   Absolute Eosinophils 0.4 0.0 - 0.7 10e9/L  HI   Absolute Basophils 0.0 0.0 - 0.2 10e9/L  HI   Abs Immature Granulocytes 0.0 0 - 0.4 10e9/L  HI   Absolute Nucleated RBC 0.0   HI            Glucose by meter [[id number]] (Abnormal)  Resulted: 07/06/18 0345, Result status: Final result    Ordering provider: Leigh Ann Warren MD  07/06/18 0044 Resulting lab: POINT OF CARE TEST, GLUCOSE    Specimen Information    Type Source Collected On     07/06/18 0044          Components       Value Reference Range Flag Lab   Glucose 117 70 - 99 mg/dL H 170            Testing Performed By     Lab - Abbreviation Name Director Address Valid Date Range    45 - NFA637 MISYS Unknown Unknown 01/28/02 0000 - Present    170 - Unknown POINT OF CARE TEST, GLUCOSE Unknown Unknown 10/31/11 1114 -  Present    210 - HI [facility] Unknown [address] [place] [id number] 05/08/15 1057 - Present               Imaging Results - 3 Days      IR Lumbar Puncture [[id number]]  Resulted: 07/06/18 1638, Result status: Final result    Ordering provider: Skip Jenkins MD  07/06/18 1413 Resulted by: Mike Rose MD    Performed: 07/06/18 1507 - 07/06/18 1624 Resulting lab: RADIOLOGY RESULTS    Narrative:       IR LUMBAR PUNCTURE    HISTORY: 72 yearsMale evaluation for possible multiple sclerosis;     TECHNIQUE:Informed consent was obtained. A timeout was performed. The  patient was sterilely prepped and draped. Local injections of  lidocaine were performed.    Using fluoroscopic guidance diagnostic lumbar puncture was performed  with a 22-gauge spinal needle at the L4-L5 level. A total of 12 cc of  clear CSF was collected in 4 separate tubes. Total fluoroscopic time  was 13 seconds. The tubes were labeled with the patient's name and  medical record number and sent to lab for analysis.          Impression:       IMPRESSION: Successful fluoroscopic guided diagnostic lumbar puncture.    MIKE ROSE MD      MR Thoracic Spine w/o & w Contrast [[id number]]  Resulted: 07/06/18 1238, Result status: Final result    Ordering provider: Skip Jenkins MD  07/05/18 1621 Resulted by: Yuriy Jarrell MD    Performed: 07/05/18 1800 - 07/06/18 1030 Resulting lab: RADIOLOGY RESULTS    Narrative:       PROCEDURE: MR THORACIC SPINE W/O & W CONTRAST, 7/6/2018 10:30 AM     HISTORY:Male, age 72 years, enhancing white matter lesions on previous  brain MRI, eval for multiple sclerosis;     COMPARISON: None    TECHNIQUE: Sagittal T1, proton density, T2 fat saturation and T1  postcontrast with fat saturation; axial proton density, T2 and T1  postcontrast with fat saturation.    FINDINGS:  Curvature: The thoracic spine demonstrates normal kyphotic curvature.  There is slight scoliotic curvature,  convex left with the apex at T7.    Thoracic spinal cord: Normal.     Osseous structures: Endplate degenerative changes at C6-7.    C7-T1: Normal..               T1-2: Normal disc. Mild endplate changes..              T2-3: Mild disc and endplate degenerative changes..     T3-4: Mild disc and endplate degenerative changes..             T4-5: Mild disc degeneration. 4 mm posterior central disc protrusion.  No evidence of cord compression or significant foraminal  narrowing/central canal narrowing.     T5-6: Mild disc, endplate degenerative changes..               T6-7: Mild disc, endplate and facet joint degenerative change.              T7-8: 6 mm right parasagittal disc protrusion abutting the ventral  aspect of the thecal sac. No evidence of cord compression. Mild  endplate degenerative change.     T8-9: Mild disc and endplate degenerative change..             T9-10: Mild to moderate endplate degenerative changes. Slight bulge..     T10-11: Mild disc and endplate degenerative changes..               T11-12: Mild bulge. Minimal endplate degenerative change..              T12-L1: Normal..     Paraspinous musculature: Mild generalized atrophy.    Retroperitoneal soft tissues and visualized portions of the lungs:  Normal.      Impression:       IMPRESSION:   1.  No evidence of spinal cord lesion. No abnormal enhancement.    2.  Mild to moderate multilevel degenerative change with 6 mm right  parasagittal disc protrusion at T7-8. No evidence of significant  central canal or foraminal narrowing.     IGGY JARRELL MD      MR Brain w/o & w Contrast [[id number]]  Resulted: 07/06/18 1231, Result status: Final result    Ordering provider: Skip Jenkins MD  07/05/18 1621 Resulted by: Iggy Jarrell MD    Performed: 07/05/18 1800 - 07/06/18 1004 Resulting lab: RADIOLOGY RESULTS    Narrative:       MR BRAIN W/O & W CONTRAST  7/6/2018 10:04 AM    History: Male, age 72 years, enhancing white matter lesions  on  previous brain MRI, eval for multiple sclerosis;     Comparison: MRI brain 1/2/2018    Technique: Sagittal T1, axial T2, T2 FLAIR, diffusion, gradient echo,  ADC mapping, T1 and 3 plane T1 fat saturation postcontrast 3-D. .    Findings:   Ventricles and sulci: Mildly prominent.    Gray and white matter: Scattered, nonenhancing white matter lesions  suggesting small vessel disease.    Cerebellopontine angles: Clear    Extra-axial spaces: Clear.    Enhancement: 3 mm focal enhancement along the medial aspect of the  left temporal horn. This appears be new when compared to prior  examination.    Midline structures: Normal in contour.  Globes: Normal.  Orbits: Normal. Optic nerves are symmetric without evidence of  abnormal enhancement.  Extraocular muscles: Normal.  Paranasal sinuses: Bilateral maxillary sinus mucus retention cysts.  Air-fluid levels are seen previously have resolved.  Mastoid air cells: Normal.  Diffusion: Normal.      Impression:       Impression:  1.  Scattered, nonspecific white matter changes suggest small vessel  disease there is no evidence of acute or subacute ischemia.      2.  3 mm enhancing focus lying along the medial aspect of the left  temporal horn is new when compared to prior examination. White matter  changes are otherwise similar in appearance when compared to the prior  study.    3.  Mucous retention cysts of the maxillary sinuses and sphenoid  locules.    4.  Bilateral maxillary sinus air-fluid levels as seen previously have  resolved.    IGGY JARRELL MD      MR Cervical Spine w/o & w Contrast [[id number]]  Resulted: 07/06/18 1024, Result status: Final result    Ordering provider: Skip Jenkins MD  07/05/18 1621 Resulted by: Iggy Jarrell MD    Performed: 07/05/18 1800 - 07/06/18 1012 Resulting lab: RADIOLOGY RESULTS    Narrative:       PROCEDURE: MR CERVICAL SPINE W/O & W CONTRAST  7/6/2018 10:12 AM    HISTORY: Male, age 72 years, enhancing white matter lesions  on  previous brain MRI, eval for multiple sclerosis;     COMPARISON: MRI of the brain 1/2/2018    TECHNIQUE: Sagittal T1, proton density, T2 fat saturation and T1  postcontrast with fat saturation; axial proton density, T2 and T1  postcontrast with fat saturation.    FINDINGS:  Curvature: The cervical spine demonstrates normal lordotic curvature.    Atlantoaxial and atlanto-occipital joints: Mild degenerative changes.    Cervical spinal cord: 13 mm nonenhancing lesion at the atlantoaxial  junction. 12 mm lesion seen within the cervical spinal cord along the  caudal margin of C2. No evidence of abnormal enhancement.    C2-3: Slight bulge. Mild endplate and facet joint degenerative change.             C3-4: Normal disc. Minimal endplate change. Moderate left facet joint  degeneration with reactive edema.         C4-5: Normal disc. Minimal endplate degeneration. Mild to moderate  bilateral facet joint degeneration, worse on the right. Uncinate  spurring contributes to moderate right foraminal stenosis.              C5-6: Chronic bulge. Uncinate spurring. Moderate to severe left and  severe right foraminal stenosis.    C6-7: Chronic bulge. Mild endplate degenerative changes. Moderate left  and moderately severe right foraminal stenosis. Mild to moderate  bilateral facet joint degeneration.    C7-T1: 5 mm left parasagittal disc protrusion. Mild endplate changes.  Mild to moderate bilateral facet joint degenerative change.                Visualized thoracic spine: Mild degenerative changes.    Paraspinous musculature: Mildly atrophic.    Soft tissues: Skin and subcutaneous fat are normal.      Impression:       IMPRESSION:   1.  Nonenhancing white matter lesions in the upper cervical spine as  described, concerning for demyelinating process.    2.  Multilevel degenerative change of the cervical spine with  bilateral foraminal stenosis at C4-5, C5-6 and C6-7 as described  above.    3.  Prominent degenerative changes within  the right C3-4 and C4-5  facet joints.     IGGY BROWN MD      Testing Performed By     Lab - Abbreviation Name Director Address Valid Date Range    104 - Rad Rslts RADIOLOGY RESULTS Unknown Unknown 02/16/05 1553 - Present                     Jose Antonio Marmolejo #[id number] (CSN: N/A)  (72 year old M)              Encounter-Level Documents:     There are no encounter-level documents.      Order-Level Documents - 07/04/2018:     Scan on 7/5/2018  3:01 PM by Outside, Provider : FINAL EKG (below)

## 2018-07-04 NOTE — ED NOTES
DATE:  7/4/2018   TIME OF RECEIPT FROM LAB:  1531  LAB TEST:  Lactic Acid  LAB VALUE:  2.3  RESULTS GIVEN WITH READ-BACK TO (PROVIDER):  Miguel EWING LAB VALUE REPORTED TO PROVIDER:   1530

## 2018-07-04 NOTE — ED PROVIDER NOTES
History     Chief Complaint   Patient presents with     Wounds     Ambulance called to pt's home for lift assist. On inspection pt found by EMS to have some wounds and brought to the ER for evaluation.     The history is provided by the patient.     Jose Antonio Marmolejo is a 72 year old male who presented to the emergency department via EMS for evaluation of multiple falls, weakness, and multiple wounds on his buttocks.  Jose Antonio is known to our facility and has been admitted 3 times for similar complaints since December.  He is usually transition to the nursing home with rehabilitation and does well, when he is discharged back to home he gradually fails.  Denies any fevers or chills.  Denies any shortness of breath or cough.  Denies any abdominal pain.  Reports rather appreciable weakness.  He has had an abnormal MRI in the past without apparent workup.    Problem List:    Patient Active Problem List    Diagnosis Date Noted     Pain management 05/04/2018     Priority: Medium     Nursing Home Visit 03/30/2018     Priority: Medium     Recurrent falls      Priority: Medium     Fall 03/21/2018     Priority: Medium     Falls frequently 12/31/2017     Priority: Medium     Type 2 diabetes mellitus without complication (H) 08/19/2016     Priority: Medium     Weakness of both lower extremities 08/19/2016     Priority: Medium     Yeast infection of the skin 08/19/2016     Priority: Medium     Essential hypertension 08/17/2016     Priority: Medium     Falling episodes 08/17/2016     Priority: Medium     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     Priority: Medium        Past Medical History:    Past Medical History:   Diagnosis Date     Arthritis      Diabetes (H)      Hypertension      Recurrent falls        Past Surgical History:    Past Surgical History:   Procedure Laterality Date     ORTHOPEDIC SURGERY      left ankle and removal of hardware       Family History:    Family History   Problem Relation Age of Onset     Other -  "See Comments Mother      pneumonia     Obesity Mother      Other Cancer Father      hodgkins lymphoma     Coronary Artery Disease Father      pacemaker     Other Cancer Sister      unknown cancers     Colon Cancer Son        Social History:  Marital Status:  Single [1]  Social History   Substance Use Topics     Smoking status: Former Smoker     Quit date: 1/1/1978     Smokeless tobacco: Never Used     Alcohol use No      Comment: quit 1977, previously an alcoholic        Medications:      acetaminophen (TYLENOL) 325 MG tablet   Blood Pressure Monitoring (ADULT BLOOD PRESSURE CUFF LG) KIT   hydrochlorothiazide (MICROZIDE) 12.5 MG capsule   HYDROcodone-acetaminophen (NORCO) 7.5-325 MG per tablet   ibuprofen (ADVIL/MOTRIN) 600 MG tablet   lisinopril (PRINIVIL/ZESTRIL) 40 MG tablet   metoprolol (TOPROL-XL) 50 MG 24 hr tablet   miconazole (MICATIN; MICRO GUARD) 2 % powder   senna-docusate (SENOKOT-S;PERICOLACE) 8.6-50 MG per tablet         Review of Systems   Constitutional: Negative for activity change, appetite change, chills, fatigue and fever.   Respiratory: Negative for shortness of breath.    Cardiovascular: Negative for chest pain.   Gastrointestinal: Negative for abdominal pain.   Genitourinary: Negative.    Skin: Negative.    Neurological: Positive for weakness. Negative for dizziness and light-headedness.       Physical Exam   BP: 147/98  Heart Rate: (!) 121  Temp: 98.9  F (37.2  C)  Resp: 18  Height: 177.8 cm (5' 10\")  SpO2: 94 %      Physical Exam   Constitutional: He is oriented to person, place, and time. He appears well-developed and well-nourished.   Pleasant gentleman   Cardiovascular: Regular rhythm.    Mild tachycardia   Pulmonary/Chest: Effort normal.   Musculoskeletal:   Decubitus pressure wounds along with acute and chronic posterior thigh erythema with chronic crusting.   Neurological: He is alert and oriented to person, place, and time.   Skin: Skin is warm and dry.   Psychiatric: He has a normal " mood and affect.   Nursing note and vitals reviewed.      ED Course     ED Course     Procedures               Critical Care time:  none     The Lactic acid level is elevated due to dehydration , at this time there is no sign of severe sepsis or septic shock.           Results for orders placed or performed during the hospital encounter of 07/04/18 (from the past 24 hour(s))   CBC with platelets differential   Result Value Ref Range    WBC 12.6 (H) 4.0 - 11.0 10e9/L    RBC Count 5.41 4.4 - 5.9 10e12/L    Hemoglobin 15.9 13.3 - 17.7 g/dL    Hematocrit 47.3 40.0 - 53.0 %    MCV 87 78 - 100 fl    MCH 29.4 26.5 - 33.0 pg    MCHC 33.6 31.5 - 36.5 g/dL    RDW 13.6 10.0 - 15.0 %    Platelet Count 281 150 - 450 10e9/L    Diff Method Automated Method     % Neutrophils 84.7 %    % Lymphocytes 7.1 %    % Monocytes 7.0 %    % Eosinophils 0.6 %    % Basophils 0.3 %    % Immature Granulocytes 0.3 %    Nucleated RBCs 0 0 /100    Absolute Neutrophil 10.7 (H) 1.6 - 8.3 10e9/L    Absolute Lymphocytes 0.9 0.8 - 5.3 10e9/L    Absolute Monocytes 0.9 0.0 - 1.3 10e9/L    Absolute Eosinophils 0.1 0.0 - 0.7 10e9/L    Absolute Basophils 0.0 0.0 - 0.2 10e9/L    Abs Immature Granulocytes 0.0 0 - 0.4 10e9/L    Absolute Nucleated RBC 0.0    Comprehensive metabolic panel   Result Value Ref Range    Sodium 140 133 - 144 mmol/L    Potassium 3.9 3.4 - 5.3 mmol/L    Chloride 106 94 - 109 mmol/L    Carbon Dioxide 23 20 - 32 mmol/L    Anion Gap 11 3 - 14 mmol/L    Glucose 174 (H) 70 - 99 mg/dL    Urea Nitrogen 21 7 - 30 mg/dL    Creatinine 1.26 (H) 0.66 - 1.25 mg/dL    GFR Estimate 56 (L) >60 mL/min/1.7m2    GFR Estimate If Black 68 >60 mL/min/1.7m2    Calcium 8.5 8.5 - 10.1 mg/dL    Bilirubin Total 1.0 0.2 - 1.3 mg/dL    Albumin 3.1 (L) 3.4 - 5.0 g/dL    Protein Total 8.0 6.8 - 8.8 g/dL    Alkaline Phosphatase 65 40 - 150 U/L    ALT 18 0 - 70 U/L    AST 5 0 - 45 U/L   Procalcitonin   Result Value Ref Range    Procalcitonin <0.05 ng/ml   CK total    Result Value Ref Range    CK Total 74 30 - 300 U/L   Lactic acid   Result Value Ref Range    Lactic Acid 2.3 (H) 0.4 - 2.0 mmol/L       Medications   0.9% sodium chloride BOLUS (1,000 mLs Intravenous New Bag 7/4/18 3432)     Followed by   sodium chloride 0.9% infusion (not administered)       Assessments & Plan (with Medical Decision Making)   Jose Antonio is again unable to care for himself.  Should almost certainly entertain log term placement. IV fluids.  Not safe for home.  Recurrent falls.  Acute kidney injury.  Discussed with Dr. Jenkins, who graciously accepted his care.     I have reviewed the nursing notes.    I have reviewed the findings, diagnosis, plan and need for follow up with the patient.       Current Discharge Medication List          Final diagnoses:   Recurrent falls   Acute kidney injury (H)   Decubitus ulcer of buttock, unspecified laterality, unspecified ulcer stage       7/4/2018   [facility]     Srinath Yenug PA-C  07/04/18 3032

## 2018-07-04 NOTE — IP AVS SNAPSHOT
` ` Patient Information     Patient Name Sex     Jose Antonio Kemp ([id number]) Male 1945       Room Bed    3226 3226-1      Patient Demographics     Address Phone    [address] [place] [address] [phone] (Home)  [phone] (Mobile)      Patient Ethnicity & Race     Ethnic Group Patient Race    American White      Emergency Contact(s)     Name Relation Home Work Mobile    ROLANDO KEMP [phone]    Kailyn Howell Sister [phone]    Sherry Bro Sister [phone]      Documents on File        Status Date Received Description       Documents for the Patient    Consent for Services - Hospital/Clinic-ESign Received () 13     [place] Privacy Notice-ESign Received 13     Insurance Card Received 13 MDCR    Patient ID   Need 18    Consent for EHR Access-Received-ESign Received 13     Consent for EHR Access-Decline-ESign       Insurance Card Received () 13 BXMN    Consent for Services/Privacy Notice - Hospital/Clinic-Esign Received 17     Privacy Notice - [place] Received 16     Consent for Services/Privacy Notice - Hospital/Clinic Received () 16     Care Everywhere Prospective Auth Received 17     [facility]IM [facility]    HIM LEAH Authorization  18 [facility]    HIM LEAH Authorization  18 [facility]    Insurance Card   Need [employer] 18    HIM LEAH Authorization - File Only  18 [facility] NEUROLOGY MYOLOG[facility]    Consent for Services - Hospital and Clinic Received 18     HIE Auth Received 18     HIM LEAH Authorization  18 [facility]    Business/Insurance/Care Coordination/Health Form - Patient  18 [employer] MEDICATION RECONCILIATION POST D/C 18       Documents for the Encounter    EMS/Ambulance Record  18 PREHOSPITAL CARE REPORT    CMS IM for Patient Signature Received 18     CMS IM for Patient Signature Received 18      Discharge Attachment   CEPHALEXIN TABLETS OR CAPSULES (ENGLISH)    Discharge Attachment   CELLULITIS, DISCHARGE INSTRUCTIONS FOR (ENGLISH)    Discharge Attachment   DERMATITIS, CONTACT, UNDERSTANDING (ENGLISH)    Discharge Attachment   FALL PREVENTION (ENGLISH)    Discharge Attachment   FALL DUE TO DIZZINESS, WEAKNESS, OR LOSS OF BALANCE (ENGLISH)    EKG Cardiac - HIM Scan  07/05/18 FINAL EKG    Discharge Attachment  (Deleted)  DERMATITIS, WHAT IS ATOPIC  (ENGLISH)    Discharge Attachment  (Deleted)  DERMATITIS, CONTACT, UNDERSTANDING (ENGLISH)    Discharge Attachment  (Deleted)  DERMATITIS, CERCARIAL, UNDERSTANDING (ENGLISH)    Discharge Attachment  (Deleted)  CONTACT DERMATITIS (ENGLISH)    Discharge Attachment  (Deleted)  ATOPIC DERMATITIS (ECZEMA) (ENGLISH)    Discharge Attachment  (Deleted)  DERMATITIS, WHAT IS ATOPIC  (ENGLISH)    Discharge Attachment  (Deleted)  DERMATITIS, SEBORRHEIC, UNDERSTANDING (ENGLISH)      Admission Information     Attending Provider Admitting Provider Admission Type Admission Date/Time    Dangelo Barragan MD Urbonas, Arvydas, MD Emergency 07/04/18  1417    Discharge Date Hospital Service Auth/Cert Status Service Area     General Medicine Incomplete [facility] HEALTH SERVICES    Unit Room/Bed Admission Status       [facility] 3226/3226-1 Admission (Confirmed)       Admission     Complaint    Cellulitis      Hospital Account     Name Acct ID Class Status Primary Coverage    Jose Antonio Marmolejo [id number] Inpatient Open COMMERCIAL - [employer]ET[employer] MEDICARE ADVANTAGE            Guarantor Account (for Hospital Account #[id number])     Name Relation to Pt Service Area Active? Acct Type    Jose Antonio Marmolejo Self RANGE Yes Personal/Family    Address Phone          [address] [facility] [place] [address] [phone](H)              Coverage Information (for Hospital Account #[id number])     F/O Payor/Plan Precert #    COMMERCIAL/AETNA MEDICARE ADVANTAGE     Subscriber Subscriber #    Jose Antonio Marmolejo  A [id number]    Address Phone    [address]  [place] [address] [phone]

## 2018-07-04 NOTE — H&P
"[facility]    History and Physical  Hospitalist       Date of Admission:  7/4/2018    Assessment & Plan   Jose Antonio Marmolejo is a 72 year old male who presents with weakness and s/p fall at home. Brought by ambulance for further evaluation after wound found.    Active Problems:    Cellulitis    Assessment: many factors contribute: poor hygiene, moisture, intertrigo. He has mild leukocytosis, local infection, elevated lactic acid, fever, but normal procalcitonin. My clinical suspicion for possible systemic infection remains quite high - will use Abx.     Plan: Gonzales, vancomycin, antifungal powder. Will repeat lactic acid and procalcitonin. Will f/u cBC and wound care consult    Essential hypertension    Assessment: he did not take BP pills for more than 1.5 mo. His blood pressure acceptable.     Plan: introduce BP med gradually, continue BB, hold Lisinopril until stable      Neuropathy of left lower extremity    Assessment: neurology consult pending    Plan: PT eval,       Type 2 diabetes mellitus without complication (H)    Assessment: not antidiabetics listed on home meds.    Plan: Will start with sliding scale      Weakness of both lower extremities    Assessment: remains unanswered etiology    Plan: consider \"pushing\" MRI images to [place] for neurology to review.      Yeast infection of the skin    Assessment: due to maceration, fungal infection    Plan: will use powder      Recurrent falls    Plan: PT evaluation, neurology evaluation, but eventually he will need placement.     TASIA  - present on admission  - will hydrate (given several boluses). Will monitor renal function        # Pain Assessment:  Current Pain Score 7/4/2018   Patient currently in pain? -   Pain score (0-10) 4   Pain location -   Pain descriptors -   Jose Antonio velarde pain level was assessed and he currently denies pain.        DVT Prophylaxis: Enoxaparin (Lovenox) SQ  Code Status: Full Code    Disposition: Expected discharge in 3-4 days once " "placement found, infection treated, TASIA resolved.    Leigh Ann Warren      Primary Care Physician   Juanita Briggs    Chief Complaint   Weakness, fall    Reason for admission: frequent falls, weakness, cellulitis.     History is obtained from the patient, electronic health record, emergency department physician and pt is also known to me from previous admission.     History of Present Illness   This individual is 72 yr man, with PMH of non-compliance with med treatment, morbid obesity, DM-2, HTN, frequent falls, abnormal MRI brain (white matter lesions, ? MS but not established definitive diagnosis) was brought to ED by EMS after falling at home. Pt was admitted for the same several mo ago, was discharged to NH. He now lives at his home, moves around with wheelchair. Has 4 sisters in town. \"\" recently moved in into his house to stay with him.   Pt admits that since he returned home (1.5 mo prior to admission), he stopped taking all his meds including BP meds.   He has been admitted 3 times for similar complaints since December.  He is usually transition to the nursing home with rehabilitation and does well, when he is discharged back to home he gradually fails.  Denies any fevers or chills.  Denies any shortness of breath or cough.  Denies any abdominal pain.  Reports rather appreciable weakness.  He has had an abnormal MRI in the past without apparent workup.  ED w/u: given 1 L NS. His CBC showed mild leukocytosis, and BMP showed elevated lactic acid, and elevated creatinine which when compared to previous one, allows us to dgn acute kidney injury  He will be admitted for placement, cellulitis treatment, PT    Past Medical History    I have reviewed this patient's medical history and updated it with pertinent information if needed.   Past Medical History:   Diagnosis Date     Arthritis     back and left ankle from fall     Diabetes (H)      Hypertension      Recurrent falls        Past Surgical History "   I have reviewed this patient's surgical history and updated it with pertinent information if needed.  Past Surgical History:   Procedure Laterality Date     ORTHOPEDIC SURGERY      left ankle and removal of hardware       Prior to Admission Medications   Prior to Admission Medications   Prescriptions Last Dose Informant Patient Reported? Taking?   Blood Pressure Monitoring (ADULT BLOOD PRESSURE CUFF LG) KIT   No Yes   Si each 2 times daily   acetaminophen (TYLENOL) 325 MG tablet   No Yes   Sig: Take 2 tablets (650 mg) by mouth every 4 hours as needed for mild pain   hydrochlorothiazide (MICROZIDE) 12.5 MG capsule   No Yes   Sig: Take 1 capsule (12.5 mg) by mouth daily   lisinopril (PRINIVIL/ZESTRIL) 40 MG tablet Unknown  No Yes   Sig: Take 1 tablet (40 mg) by mouth daily   metoprolol (TOPROL-XL) 50 MG 24 hr tablet Unknown  No Yes   Sig: Take 1 tablet (50 mg) by mouth daily   miconazole (MICATIN; MICRO GUARD) 2 % powder Unknown  No Yes   Sig: Apply topically 2 times daily      Facility-Administered Medications: None     Allergies   Allergies   Allergen Reactions     Penicillins        Social History   I have reviewed this patient's social history and updated it with pertinent information if needed. Jose Antonio Marmolejo  reports that he quit smoking about 40 years ago. He has never used smokeless tobacco. He reports that he does not drink alcohol or use illicit drugs.    Family History   I have reviewed this patient's family history and updated it with pertinent information if needed.   Family History   Problem Relation Age of Onset     Other - See Comments Mother      pneumonia     Obesity Mother      Other Cancer Father      hodgkins lymphoma     Coronary Artery Disease Father      pacemaker     Other Cancer Sister      unknown cancers     Colon Cancer Son        Review of Systems   14 points obtained. Pertinent +ves and -ves included in HPI. The rest is -ve.     Physical Exam   Temp: 101.2  F (38.4  C) Temp src:  Tympanic BP: 138/95   Heart Rate: 102 Resp: 16 SpO2: 97 % O2 Device: None (Room air)    Vital Signs with Ranges  Temp:  [69.9  F (21.1  C)-101.2  F (38.4  C)] 101.2  F (38.4  C)  Heart Rate:  [102-121] 102  Resp:  [16-20] 16  BP: (125-167)/(80-98) 138/95  SpO2:  [93 %-97 %] 97 %  296 lbs 15.35 oz    Physical exam;   General: morbidly obese, not in acute distress. Fully awake, oriented x 4. Moves in bed with difficulty  NC/AT. MM dry.   Neck: no JVD. Supple  Cardiac: regular, but tachycardic. No S3  Lungs; diminished due to body habitus, but clear bilaterally  Abd; soft, obese, no HSM  Skin: large areas between wrinkles, under armpits, groins with red macerated skin. Posterior R thigh - looks cellulitic.   Circulatory: diminished pulses distant both legs  Neurological: I cannot elicit DTR both legs. He required 5 attempts with 2 people assist to stand from sitting position on the edge of the bed. No focal weakness.     Data   Data reviewed today:  I personally reviewed the EKG tracing showing NSR, RBBB, LAFB and the chest x-ray image(s) showing clear lungs .    Recent Labs  Lab 07/04/18  1459   WBC 12.6*   HGB 15.9   MCV 87         POTASSIUM 3.9   CHLORIDE 106   CO2 23   BUN 21   CR 1.26*   ANIONGAP 11   STALIN 8.5   *   ALBUMIN 3.1*   PROTTOTAL 8.0   BILITOTAL 1.0   ALKPHOS 65   ALT 18   AST 5       No results found for this or any previous visit (from the past 24 hour(s)).

## 2018-07-05 NOTE — PROGRESS NOTES
07/05/18 1400   Quick Adds   Type of Visit Initial PT Evaluation   Living Environment   Lives With alone   Living Arrangements house  (3 story)   Home Accessibility bed not on first floor;ramps present at home;stairs within home   Functional Level Prior   Ambulation 3-->assistive equipment and person   Transferring 3-->assistive equipment and person   Toileting 2-->assistive person   Bathing 2-->assistive person   Dressing 0-->independent   Eating 0-->independent   Communication 0-->understands/communicates without difficulty   Swallowing 0-->swallows foods/liquids without difficulty   Cognition 1 - attention or memory deficits   Fall history within last six months yes   Number of times patient has fallen within last six months 10   Which of the above functional risks had a recent onset or change? ambulation;transferring   General Information   Onset of Illness/Injury or Date of Surgery - Date 07/04/18   Referring Physician Dr. Warren   Patient/Family Goals Statement to do functional activities at home safely    Pertinent History of Current Problem (include personal factors and/or comorbidities that impact the POC) Patient was admitted to the hostpital due to recurrent falls, decubitis wound on buttox and groin, failure to thrive at home   Precautions/Limitations fall precautions   Cognitive Status Examination   Orientation orientation to person, place and time   Level of Consciousness alert   Follows Commands and Answers Questions 100% of the time   Personal Safety and Judgment impulsive   Memory intact   Cognitive Comment Patient is impulsive in regards to getting up, has a history of getting up without nursing in the nursing home prior to living at home.    Integumentary/Edema   Integumentary/Edema other (describe)   Integumentary/Edema Comments pressure sore in buttox, groin, and inner thiegh region   Posture    Posture Forward head position   Range of Motion (ROM)   ROM Quick Adds Hip, Left;Hip, Right;Knee,  Right;Knee, Left   Left Hip Flexion ROM   Left Hip Flexion AROM - degrees WNL   Right Hip Flexion ROM   Right Hip Flexion AROM - degrees WNL   Left Knee Extension/Flexion ROM   Left Knee Extension/Flexion AROM - degrees WNL   Right Knee Extension/Flexion ROM   Right Knee Extension/Flexion AROM - degrees WNL   Strength   Manual Muscle Testing Quick Adds MMT: Knee;MMT: Hip   MMT: Hip, Rehab Eval   Hip Flexion - Left Side (4/5) good, left   Hip ABduction - Left Side (4/5) good, left   Hip ADduction - Left Side (4/5) good, left   Hip Flexion - Right Side (4/5) good, right   Hip ADduction - Right Side (4/5) good, right   Hip External Rotation - Right Side (4/5) good, right   MMT: Knee, Rehab Eval   Knee Flexion - Left Side (4/5) good, left   Knee Extension - Left Side (4/5) good, left   Knee Flexion - Right Side (4/5) good, right   Knee Extension - Right Side (4/5) good, right   Bed Mobility   Bed Mobility Bed mobility skill: Supine to sit   Bed Mobility Skill: Supine to Sit   Level of Ensign: Supine/Sit minimum assist (75% patients effort)   Physical Assist/Nonphysical Assist: Supine/Sit 1 person assist   Assistive Device: Supine/Sit bed rails   Transfer Skills   Transfer Transfer Skill: Sit to Stand;Transfer Skill:  Stand to Sit   Transfer Skill:  Sit to Stand   Level of Ensign: Sit/Stand minimum assist (75% patients effort)   Physical Assist/Nonphysical Assist: Sit/Stand 2 persons   Assistive Device for Transfer: Sit/Stand rolling walker   Transfer Skill: Stand to Sit   Level of Ensign: Stand/Sit moderate assist (50% patients effort)   Physical Assist/Nonphysical Assist: Stand/Sit 2 persons   Assistive Device: Stand to Sit rolling walker   Gait   Gait Gait Skill   Gait Skills   Level of Ensign: Gait minimum assist (75% patients effort)   Physical Assist/Nonphysical Assist: Gait 2 persons   Assistive Device for Transfer: Gait rolling walker   Gait Distance bed to chair  (10 ft. bed to chair and  "chair to bed)   Balance   Balance Comments balance was decreased when first standing since he has not been able to stand and walk for some time now. Balance was better as more steps were taken, however patient presents with decreased balance overall   General Therapy Interventions   Planned Therapy Interventions bed mobility training;gait training;transfer training   Clinical Impression   Criteria for Skilled Therapeutic Intervention yes, treatment indicated   PT Diagnosis decreased strength in LE consistent with recent falls   Influenced by the following impairments overall weakness, decreased balance, impaired gait   Functional limitations due to impairments ambulation, stairs, transfers, and self care   Clinical Presentation Evolving/Changing   Clinical Presentation Rationale due to current performance in mobility, activities, and anticipated improvement with intervention   Clinical Decision Making (Complexity) Low complexity   Therapy Frequency` 2 times/day   Predicted Duration of Therapy Intervention (days/wks) up to 4 days   Anticipated Discharge Disposition Acute Rehabilitation Facility   Risk & Benefits of therapy have been explained Yes   Patient, Family & other staff in agreement with plan of care Yes   Clinical Impression Comments Based on performance in skilled PT services and medical complexity patient would benefit from additional therapy to address impaired gait, balance, and transfers    [facility] [employer] \"6 Clicks\"   2016, Trustees of [facility], under license to [employer].  All rights reserved.   6 Clicks Short Forms Basic Mobility Inpatient Short Form   [facility] AM-PAC  \"6 Clicks\" V.2 Basic Mobility Inpatient Short Form   1. Turning from your back to your side while in a flat bed without using bedrails? 3 - A Little   2. Moving from lying on your back to sitting on the side of a flat bed without using bedrails? 3 - A Little   3. Moving to and from a bed to a chair " (including a wheelchair)? 2 - A Lot   4. Standing up from a chair using your arms (e.g., wheelchair, or bedside chair)? 2 - A Lot   5. To walk in hospital room? 2 - A Lot   6. Climbing 3-5 steps with a railing? 2 - A Lot   Basic Mobility Raw Score (Score out of 24.Lower scores equate to lower levels of function) 14   Total Evaluation Time   Total Evaluation Time (Minutes) 25

## 2018-07-05 NOTE — PLAN OF CARE
Problem: Patient Care Overview  Goal: Plan of Care/Patient Progress Review  Outcome: No Change  VSS, afebrile, denies pain. Supplemental O2 d/c'd sat reading mid 90's on RA. On tele reading SR70's1st deg AVB & BBB. Skin to whole of buttocks, inner thighs red, blanchable, flaky, medicated cream applied. Groin reddened blanchable flaky, medicated cream applied. Small tube medicated cream almost used entirely to cover affected areas, requested new tube from pharmacy. Up in chair much of shift, T&R q2h. SSI given w/lunch, pt states that he no longer needs to use insulin because the doctor took off his O2, explained that the two are not related and to the knowledge of staff, his DMII is a continuing issue which needs monitoring and adjustments. Gonzales catheter in place putting out 700mL clear yellow urine, catheter tubing cleansed. Pt requesting to keep on his personal shoes even while in bed, educated on importance of letting feet out of shoes for periods of time to prevent further skin breakdown. Pt also states that he would prefer to stool in bed though he is able to tell when he does need to go and is capable of getting up to commode, joe discussion about the impacts of the stool remaining on his already very impaired skin integrity and why this will lead to further severe problems. IV infusing NS@125, ABX - Vanco. Call light w/in reach, alarms on in bed and chair.     Face to face report given with opportunity to observe patient.    Report given to Jelani VILLEGAS RN.     Justin Earl   7/5/2018  5:05 PM

## 2018-07-05 NOTE — PLAN OF CARE
Problem: Patient Care Overview  Goal: Plan of Care/Patient Progress Review  Discharge Planner PT   Patient plan for discharge: Inpatient rehab facility  Current status: Patient went from supine to sit with min assist x 1 and use of bed rails with verbal cuing to continue to get legs off the end of the bed. Patient went from sitting to standing with mod assist x 2 with reminder to use the bed to push off and not the walker. Patient took a couple tries/swings in order to get momentum to stand up. Patient ambulated 10' with a FWW total with a break in the chair residential. Ambulation required min assist x 2 for safety due to unsteadiness on feet. Patient required mod assist x 2 to go from chair to standing with FWW. Patient went from stand to sit on the bed and then transferred to the chair with min assist x 2, however when getting ready to transfer patient was impulsive and started to stand before PT t was ready.   Barriers to return to prior living situation: Impaired gait, balance, transfers, and self care.   Recommendations for discharge: Inpatient rehab facility  Rationale for recommendations: Due to the level of assistance patient requires with mobility tasks, transfers, and multiple falls. PT will follow patient remainder of hospital stay to work on the limitations listed above.  Edda Fuchs DPT, OCS/ Fara Lopez SPT       Entered by: Fara Lopez 07/05/2018 2:47 PM

## 2018-07-05 NOTE — PROGRESS NOTES
Received call back from [facility], they would neuro work up started before they would look at Jose Antonio.  Updated Dr. Jenkins.      Met with Jose Antonio.  He lives with a friend currently.  He has a life alert, power wheel chair, ramps and a walker.  He has been falling frequently.  He understands that he will need short term rehab, referrals sent.  Per discharge planning, the choice of vendor list has been provided to the patient/family for a skilled nursing facility.    First Choice : Skilled Nursing Facility [facility]: [facility]    [phone]    Second Choice: Skilled Nursing Facility [place]: [facility]     [phone]    Third Choice: Skilled Nursing Facility [place]: [facility]         [phone]      Assessment completed see flowsheet.      LOC: alert    Others present: Patient    Dx: fall, cellulitis       Lives with: Lives With: alone    Living at: Living Arrangements: house    Equipment used: power wheel chair, ramp, life alert     Support System: Description of Support System: Involved, Supportive        Primary PCP: Juanita Briggs    POA/Guardian: No     Health Care Directive: NO     Pharmacy: [facility]     :  Not connected     Homecare/[facility] Services:   Not connected       Adequate Resources for needs (housing, utilities, food/med): YES    Meds and appointments management: YES    Work: NO    Transportation: YES - [facility]       ADLs: transferring and mobility     Falls: Yes  Reason for falls risk:  Mobility    Able to Return to Prior Living Arrangements: NO    [place]: YES; not service connected, not established       Goals: return home    Barriers: no barriers identified     Needs: Demonstrates Competency    MARY KATE: elevated     Discharge Plan: short term rehab at skilled nursing facility

## 2018-07-05 NOTE — PROGRESS NOTES
07/05/18 1421   Quick Adds   Type of Visit Initial Occupational Therapy Evaluation   Living Environment   Lives With alone   Living Arrangements house   Home Accessibility stairs to enter home;stairs within home;ramps present at home   Number of Stairs to Enter Home 5   Number of Stairs Within Home 10   Stair Railings at Home inside, present at both sides   Transportation Available family or friend will provide   Living Environment Comment pt has not been able to drive but has been able to get out of home using ramp   Self-Care   Dominant Hand left   Usual Activity Tolerance moderate   Current Activity Tolerance fair   Regular Exercise no   Equipment Currently Used at Home walker, rolling;wheelchair, power;grab bar;shower chair   Functional Level Prior   Ambulation 1-->assistive equipment   Transferring 1-->assistive equipment   Toileting 0-->independent   Bathing 1-->assistive equipment   Dressing 0-->independent   Eating 0-->independent   Communication 0-->understands/communicates without difficulty   Swallowing 0-->swallows foods/liquids without difficulty   Cognition 0 - no cognition issues reported   Fall history within last six months yes   Number of times patient has fallen within last six months 10   Prior Functional Level Comment pt reports he has had many falls and this last fall his legs just gave out   General Information   Onset of Illness/Injury or Date of Surgery - Date 07/04/18   Referring Physician Skip Jenkins MD   Patient/Family Goals Statement pt would like to get back home or got to assisted living    Additional Occupational Profile Info/Pertinent History of Current Problem Pt presented after fall requiring assistance to get up from. Pt has weakness and skin rash   Precautions/Limitations fall precautions   Weight-Bearing Status - LUE full weight-bearing   Weight-Bearing Status - RUE full weight-bearing   Weight-Bearing Status - LLE full weight-bearing   Weight-Bearing Status - RLE full  weight-bearing   Heart Disease Risk Factors Diabetes   Cognitive Status Examination   Orientation orientation to person, place and time   Level of Consciousness alert   Able to Follow Commands WNL/WFL   Personal Safety (Cognitive) WNL/WFL   Memory intact   Attention No deficits were identified   Organization/Problem Solving No deficits were identified   Executive Function No deficits were identified   Visual Perception   Visual Perception Wears glasses   Sensory Examination   Sensory Comments pt has numbness in LE   Pain Assessment   Patient Currently in Pain No   Range of Motion (ROM)   ROM Comment B UE WFL with less ROM in R shoulder due to previous injury   Strength   Strength Comments B UE grossly 3+/5   Muscle Tone Assessment   Muscle Tone Quick Adds No deficits were identified   Coordination   Upper Extremity Coordination No deficits were identified   Transfer Skills   Transfer Transfer Safety Analysis Sit/Stand   Transfer Skill: Bed to Chair/Chair to Bed   Level of [place]: Bed to Chair minimum assist (75% patients effort)   Physical Assist/Nonphysical Assist: Bed to Chair verbal cues;1 person assist   Weight-Bearing Restrictions full weight-bearing   Assistive Device - Transfer Skill Bed to Chair Chair to Bed Rehab Eval standard walker   Upper Body Dressing   Level of [place]: Dress Upper Body independent   Lower Body Dressing   Level of [place]: Dress Lower Body stand-by assist   Grooming   Level of [place]: Grooming independent  (while seated)   Eating/Self Feeding   Level of [place]: Eating independent   Instrumental Activities of Daily Living (IADL)   Previous Responsibilities finances;medication management   IADL Comments pt reports he has been ordering out a lot because it is difficult to cook, reports he has a  and sister that help out with yardwork and cleaning   Activities of Daily Living Analysis   Impairments Contributing to Impaired Activities of Daily Living  "strength decreased;balance impaired   General Therapy Interventions   Planned Therapy Interventions progressive activity/exercise;strengthening   Clinical Impression   Criteria for Skilled Therapeutic Interventions Met yes, treatment indicated   OT Diagnosis decreased ability to care for self and complete functional transfers   Influenced by the following impairments numbness in LE, decreased strength   Assessment of Occupational Performance 1-3 Performance Deficits   Identified Performance Deficits transfers, strength   Clinical Decision Making (Complexity) Low complexity   Therapy Frequency 5 times/wk   Predicted Duration of Therapy Intervention (days/wks) 2 days   Anticipated Discharge Disposition Acute Rehabilitation Facility;Transitional Care Facility   Risks and Benefits of Treatment have been explained. Yes   Patient, Family & other staff in agreement with plan of care Yes   Clinical Impression Comments Pt has had multiple falls in the past 6 months and is requiring assistance for functional transfers. Pt is at risk for additional falls and injury if returning home.  Additionally, pt has not been able to complete IADL tasks such as cooking and basic self cares tasks due to fear of falling.   [facility] [employer] TM \"6 Clicks\"   2016, Trustees of [facility], under license to [employer].  All rights reserved.   6 Clicks Short Forms Daily Activity Inpatient Short Form   [facility] AM-PAC  \"6 Clicks\" Daily Activity Inpatient Short Form   1. Putting on and taking off regular lower body clothing? 3 - A Little   2. Bathing (including washing, rinsing, drying)? 3 - A Little   3. Toileting, which includes using toilet, bedpan or urinal? 3 - A Little   4. Putting on and taking off regular upper body clothing? 4 - None   5. Taking care of personal grooming such as brushing teeth? 4 - None   6. Eating meals? 4 - None   Daily Activity Raw Score (Score out of 24.Lower scores equate to lower levels of " function) 21   Total Evaluation Time   Total Evaluation Time (Minutes) 24

## 2018-07-05 NOTE — PLAN OF CARE
Problem: Patient Care Overview  Goal: Plan of Care/Patient Progress Review  Outcome: No Change  Slept well during night. Needed to be placed on 2L O2, as kept setting off alarm on continuous pulse ox, seems to have some sleep apnea. Needs assist for turns. IVF @ 125/hr and IV Vanco. Bed changed to [place] Iso-air bed. Gonzales placed to assist with skin healing. Has fiery, red, yeasty,macerated rash under breasts, groin, buttocks, lower back and penis. Miconazole powder used. Wound consult order placed by MD. Telemetry : SR 80's. Has PT consult ordered. Attempted to get pt to stand at edge of bed mult times upon arrival to floor-unable.No pain. Appetite good. Makes no attempts to get OOB on own. A&OX3.

## 2018-07-05 NOTE — PLAN OF CARE
Problem: Patient Care Overview  Goal: Plan of Care/Patient Progress Review  Outcome: No Change  OT evaluation completed.  Discharge Planner OT   Patient plan for discharge: pt is agreeable to short-term rehab  Current status: min A, for lower body self cares and functional transfers  Barriers to return to prior living situation: falls, strength, home accessibility  Recommendations for discharge: inpatient rehab, short-term rehab  Rationale for recommendations: pt has had frequent falls in past 6 months and has not been able to manage IADLs and ADLs due to fear of falling.       Entered by: Licha Minor 07/05/2018 3:20 PM

## 2018-07-05 NOTE — PHARMACY-VANCOMYCIN DOSING SERVICE
Pharmacy Vancomycin Initial Note  Date of Service 2018  Patient's  1945  72 year old, male    Indication: Skin and Soft Tissue Infection    Current estimated CrCl = Estimated Creatinine Clearance: 74.3 mL/min (based on Cr of 1.26).    Creatinine for last 3 days  2018:  2:59 PM Creatinine 1.26 mg/dL    Recent Vancomycin Level(s) for last 3 days  No results found for requested labs within last 72 hours.      Vancomycin IV Administrations (past 72 hours)                   vancomycin (VANCOCIN) 2,250 mg in sodium chloride 0.9 % 500 mL intermittent infusion (mg) 2,250 mg New Bag 18                Nephrotoxins and other renal medications (Future)    Start     Dose/Rate Route Frequency Ordered Stop    18  vancomycin (VANCOCIN) 2,250 mg in sodium chloride 0.9 % 500 mL intermittent infusion      2,250 mg  286.5 mL/hr over 2 Hours Intravenous EVERY 18 HOURS 18            Contrast Orders - past 72 hours     None                Plan:  1.  Start vancomycin  2250 mg IV q18h.   2.  Goal Trough Level: 15-20 mg/L   3.  Pharmacy will check trough levels as appropriate in 3-5 Days.    4. Serum creatinine levels will be ordered daily for the first week of therapy and at least twice weekly for subsequent weeks.    5. [place] method utilized to dose vancomycin therapy: Method 2    Adriana Haines

## 2018-07-05 NOTE — PLAN OF CARE
Assumed care of patient. Patient c/o back pain. PRN tylenol given per pt request and pt repositioned. AM .

## 2018-07-05 NOTE — CONSULTS
Pt seen with Dr Jenkins for wound consult.  He was admitted s/p fall at home with extensive fungal type rash under pannus, breasts, arm pits, buttocks, penis.  Apparently he has a history of non-compliance.  He also had not been bathing at home.  He stopped all meds.  He currently is non-ambulatory but able to reposition himself.  A Gonzales was placed due to multiple skin issues.  We did evaluate his buttocks, posterior thighs, and pannus with Dr Jenkins.  His buttocks and upper thighs have confluent erythema and scaling skin.  He had some incontinence of a small amount of stool.  Jose Antonio is on a [employer] alternating pressure, low air loss bed so the Moisture Associated Skin Damage is improving.  Jose Antonio tells us it does hurt.  The area under his left pan has some resolving erythema.      Discussed using Mycolog II cream to buttocks area to deal with both inflammation and fungal infection.  Continue with Miconazole powder to other areas.      Recommendations given to his nurse Brandy- limit amount of linen on [place] support surface to allow for air flow, no briefs, when using Miconazole powder lightly dust it on and remove excess.

## 2018-07-05 NOTE — PHARMACY
[facility]    Pharmacy      Antimicrobial Stewardship Note     Current antimicrobial therapy:  Anti-infectives (Future)    Start     Dose/Rate Route Frequency Ordered Stop    07/05/18 0800  vancomycin (VANCOCIN) 2,000 mg in sodium chloride 0.9 % 500 mL intermittent infusion      2,000 mg  285 mL/hr over 2 Hours Intravenous EVERY 12 HOURS 07/05/18 0738            Indication: Skin and Soft Tissue Infection    Days of Therapy: 2     Pertinent labs:  Creatinine   Creatinine   Date Value Ref Range Status   07/05/2018 0.95 0.66 - 1.25 mg/dL Final   07/04/2018 1.26 (H) 0.66 - 1.25 mg/dL Final   05/05/2018 0.95 0.66 - 1.25 mg/dL Final     WBC   WBC   Date Value Ref Range Status   07/05/2018 7.5 4.0 - 11.0 10e9/L Final   07/04/2018 12.6 (H) 4.0 - 11.0 10e9/L Final   05/05/2018 6.4 4.0 - 11.0 10e9/L Final     Procalcitonin   Procalcitonin   Date Value Ref Range Status   07/05/2018 <0.05 ng/ml Final     Comment:     <0.05 ng/ml  Normal  Recommendation: Very low risk of bacterial infection.   Discourage antibiotics unless strong clinical suspicion for serious infection.     07/04/2018 <0.05 ng/ml Final     Comment:     <0.05 ng/ml  Normal  Recommendation: Very low risk of bacterial infection.   Discourage antibiotics unless strong clinical suspicion for serious infection.       CRP   CRP Inflammation   Date Value Ref Range Status   12/31/2017 14.8 (H) 0.0 - 8.0 mg/L Final       Culture Results:   Blood culture [[id number]] Collected: 07/04/18 1935      Order Status: Completed Lab Status: Preliminary result Updated: 07/05/18 0625     Specimen: Blood       Specimen Description Blood      Culture Micro No growth after 10 hours     Blood culture [[id number]] Collected: 07/04/18 1914     Order Status: Completed Lab Status: Preliminary result Updated: 07/05/18 0625     Specimen: Blood       Specimen Description Blood      Special Requests --      Right Arm   6mL aerobic only         Culture Micro No growth after 11 hours      Active MRSA Surveillance Culture [[id number]] Collected: 07/04/18 1655     Order Status: Completed Lab Status: Preliminary result Updated: 07/05/18 0641     Specimen: Swab from Nose       Specimen Description Swab      Culture Micro Culture in progress          Recommendations/Interventions:  1. No recommendations at this time    Adriana Haines, [facility]  July 5, 2018

## 2018-07-05 NOTE — PLAN OF CARE
Face to face report given with opportunity to observe patient.    Report given to Justin Hernández RN  7/5/2018  7:06 AM

## 2018-07-05 NOTE — PROGRESS NOTES
[facility]    Hospitalist Progress Note      Assessment & Plan   Jose Antonio Marmolejo is a 72 year old male who presents with weakness and s/p fall at home. Brought by ambulance for further evaluation after wound found.     Active Problems:    Cellulitis versus dermatitis of buttocks, thighs, peritoneum    Assessment: many factors contribute: poor hygiene, moisture, intertrigo. He has mild leukocytosis, local infection, elevated lactic acid, fever, but normal procalcitonin. Given clinical suspicion for possible systemic infection, has been started on Vancomycin empirically    Plan: Gonzales, vancomycin, antifungal powder. Repeat lactic acid has normalized and procalcitonin remains <0.05. Yet will continue on IV abx for now given he did spike fever to 101.2 yesterday afternoon. WBC has normalized 12.6-->7.5. Monitor for improvement. Blood cultures with no growth to date. Plan to change to IV ancef tomorrow. MRSA nasal swab negative, and I think less likely MRSA.     Essential hypertension    Assessment: he did not take BP pills for more than 1.5 mo. His blood pressure acceptable.     Plan: introduce BP med gradually, start with BB tomorrow (held this morning due to lower /54), then add Lisinopril next       Type 2 diabetes mellitus without complication    Assessment: not antidiabetics listed on home meds.    Plan: Will start with sliding scale       Yeast infection of the skin    Assessment: due to maceration, fungal infection    Plan: continue with miconazole powder       Recurrent falls    Plan: PT/OT, neurology evaluation, but eventually he will need placement.   Spoke with [facility] neurology, Dr. Bray, today regarding patient's previous brain MRI imaging in January 2018 with white matter lesions, at least one of which was ring enhancing. Question for possible multiple sclerosis. Recommendation was for repeat MRI of the brain, cervical and thoracic spine with contrast. If still with enhancing lesions, may  need to start on steroids and will confer with neurology further after imaging complete. May eventually need LP to further evaluate as well.      Weakness of both lower extremities    Assessment: remains unanswered etiology    Plan: Question of possible underlying multiple sclerosis as per above. Based on abnormal brain MRI from January 2018. Has not had outpatient neurology follow up to date.      TASIA  - present on admission with Cr. 1.26. Baseline 0.9-1  - will continue to hydrate (given several boluses on admission as well). Renal function improved to baseline today.    # Pain Assessment:  Current Pain Score 7/5/2018   Patient currently in pain? denies   Pain score (0-10) -   Pain location -   Pain descriptors -   Jose Antonio s pain level was assessed and he currently denies pain.        DVT Prophylaxis: Enoxaparin (Lovenox) SQ  Code Status: Full Code    Disposition: Expected discharge in 1-2 days once able to convert to oral abx, MRI imaging complete and neurology follow up established, able to find placement for rehab    Skip THOMAS Sharp    Interval History   Lying in bed. Comfortable. Denies pain.     -Data reviewed today: I reviewed all new labs and imaging results over the last 24 hours. I personally reviewed no images or EKG's today.    Physical Exam   Temp: 97.8  F (36.6  C) Temp src: Temporal BP: 154/68   Heart Rate: 69 Resp: 16 SpO2: 99 % O2 Device: None (Room air) Oxygen Delivery: 1 LPM  Vitals:    07/04/18 1700   Weight: 134.7 kg (296 lb 15.4 oz)     Vital Signs with Ranges  Temp:  [97.3  F (36.3  C)-100.3  F (37.9  C)] 97.8  F (36.6  C)  Heart Rate:  [62-98] 69  Resp:  [14-20] 16  BP: (112-170)/(54-85) 154/68  SpO2:  [96 %-99 %] 99 %  I/O last 3 completed shifts:  In: 2990 [P.O.:780; I.V.:2210]  Out: 950 [Urine:950]    Peripheral IV 07/04/18 Right Upper arm (Active)   Site Assessment WDL 7/5/2018  4:06 PM   Line Status Infusing 7/5/2018  4:06 PM   Phlebitis Scale 0-->no symptoms 7/5/2018  4:06 PM    Infiltration Scale 0 7/5/2018  4:06 PM   Number of days:1       Urethral Catheter Straight-tip;Double-lumen;Non-latex 16 fr (Active)   Tube Description Positional 7/5/2018  8:27 AM   Catheter Care Done 7/5/2018  8:27 AM   Collection Container Standard 7/5/2018  8:27 AM   Securement Method Securing device (Describe) 7/5/2018  8:27 AM   Rationale for Continued Use Wound Healing 7/5/2018  8:27 AM   Urine Output 700 mL 7/5/2018  4:02 PM   Number of days:1     No line/device    Constitutional: Alert and oriented x3. No distress    HEENT: NC/AT, PERRL, EOMI, mouth clear, neck supple, no LN.     Cardiovascular: RRR. NO murmur, no  rubs, or gallops.      Respiratory: Clear to auscultation bilaterally    Abdomen: Morbidly obese, Soft, NTND, no organomegaly. Bowel sounds present    Extremities: Warm/dry. No edema    Neuro:  Non focal, cranial nerves intact, Moves all extremities.    Skin: His buttocks and upper thighs have confluent erythema and scaling skin. Fungal type rash under pannus, breasts, arm pits, buttocks, penis      Medications     sodium chloride 1,000 mL (07/05/18 1504)       enoxaparin  40 mg Subcutaneous Q24H     insulin aspart  1-7 Units Subcutaneous TID AC     insulin aspart  1-5 Units Subcutaneous At Bedtime     metoprolol succinate  50 mg Oral Daily     miconazole   Topical BID     nystatin-triamcinolone   Topical BID     vancomycin (VANCOCIN) IV  2,000 mg Intravenous Q12H       Data     Recent Labs  Lab 07/05/18  0522 07/04/18  1459   WBC 7.5 12.6*   HGB 13.9 15.9   MCV 87 87    281    140   POTASSIUM 4.1 3.9   CHLORIDE 111* 106   CO2 24 23   BUN 17 21   CR 0.95 1.26*   ANIONGAP 7 11   STALIN 7.7* 8.5   * 174*   ALBUMIN 2.3* 3.1*   PROTTOTAL 6.3* 8.0   BILITOTAL 0.5 1.0   ALKPHOS 51 65   ALT 14 18   AST 12 5       No results found for this or any previous visit (from the past 24 hour(s)).

## 2018-07-06 NOTE — PROGRESS NOTES
[facility]    Hospitalist Progress Note      Assessment & Plan   Jose Antonio Marmolejo is a 72 year old male who presents with weakness and s/p fall at home. Brought by ambulance for further evaluation after wound found.     Active Problems:    Cellulitis versus dermatitis of buttocks, thighs, peritoneum    Assessment: many factors contribute: poor hygiene, moisture, intertrigo. He has mild leukocytosis, local infection, elevated lactic acid, fever, but normal procalcitonin. Given clinical suspicion for possible systemic infection, has been started on Vancomycin empirically    Plan: Gonzales, abx, antifungal powder, mycolog II cream to buttocks. Repeat lactic acid has normalized and procalcitonin remains <0.05. Initially started on IV vancomycin given he did spike fever to 101.2 shortly after admission. WBC has normalized 12.6-->7.6. Blood cultures with no growth to date. Changed to IV ancef this morning, but will convert to oral keflex at this point. MRSA nasal swab negative, and I think unlikely MRSA involved. Not entirely certain there is bacterial infection involved here given low pro calcitonin. May simply be dermatitis from urine and stool as well as yeast infection. However, given the fever he had and difficult to sort out if bacterial component, will plan to treat for 7-10 day course oral Keflex.    Essential hypertension    Assessment: he did not take BP pills for more than 1.5 mo. His blood pressure acceptable.     Plan: BP still trending high. Continue his beta blocker and add back his lisinopril. Monitor BP       Type 2 diabetes mellitus without complication    Assessment: no antidiabetics listed on home meds. A1c 6.5    Plan: Will start with sliding scale       Cutaneous candidiasis    Assessment: due to maceration, fungal infection    Plan: continue with miconazole powder to groin folds, arm pits, under breasts, pannus. Mycolog II to the buttocks and perineal area where he has more intense erythema and  scaling of the skin       Recurrent falls    Plan: PT/OT, will need outpatient neurology evaluation given concern for possible MS, needs placement to SNF for PT/OT. Has been accepted to SNF in [place], but patient refusing to go and will only stay local although no beds available. States he will discharge to home and will not go to [place]. Have advised against this, but patient is adamant he will not go outside [place] or [place], thus may need to arrange for home services.     Possible multiple sclerosis:  Spoke once again with [facility] neurology, Dr. Bray, today regarding patient's previous brain MRI imaging in January 2018 with white matter lesions, at least one of which was ring enhancing. Question for possible multiple sclerosis. Repeat MRI of the brain, cervical and thoracic spine shows brain with scattered nonspecific white matter changes suggesting small vessel disease, a 3 mm enhancing focus lying along the medial aspect of the left temporal horn, which is new when compared to prior exam and otherwise with white matter changes similar in appearance to his last study in January 2018, cervical spine with non enhancing white matter lesions concerning for demyelinating process as well as multilevel degenerative changes, thoracic spine with no spinal cord lesions or enhancement but again with multilevel degenerative changes. Arranged for LP today. Gram stain unremarkable, culture pending, standard CSF studies unremarkable. Oligoclonal bands and IgG index of CSF pending. Have arranged for patient to be seen by Dr. Saucedo, [facility], on 7/16/18 for consultation and evaluation for possible MS.      Weakness of both lower extremities    Assessment: remains unanswered etiology    Plan: Question of possible underlying multiple sclerosis as per above.      TASIA  - present on admission with Cr. 1.26. Baseline 0.9-1. Likely due to volume depletion and has resolved after IVF. Cr. 0.86      # Pain  Assessment:  Current Pain Score 7/6/2018   Patient currently in pain? denies   Pain score (0-10) 0   Pain location -   Pain descriptors -   Jose Antonio s pain level was assessed and he currently denies pain.        DVT Prophylaxis: Enoxaparin (Lovenox) SQ  Code Status: Full Code    Disposition: Expected discharge in 1-2 days. Continue to work with patient to see if he will be agreeable to accepting SNF, otherwise will need to arrange for home services    Skip Jenkins    Interval History   Lying in bed. Comfortable. Denies pain. No other complaints    -Data reviewed today: I reviewed all new labs and imaging results over the last 24 hours. I personally reviewed MRI of brain, cervical and thoracic spine as indicated above    Physical Exam   Temp: 98.3  F (36.8  C) Temp src: Temporal BP: 178/90 Pulse: 118 Heart Rate: 74 Resp: 18 SpO2: 94 % O2 Device: None (Room air)    Vitals:    07/04/18 1700   Weight: 134.7 kg (296 lb 15.4 oz)     Vital Signs with Ranges  Temp:  [97.8  F (36.6  C)-98.3  F (36.8  C)] 98.3  F (36.8  C)  Pulse:  [118] 118  Heart Rate:  [68-74] 74  Resp:  [16-18] 18  BP: (161-182)/(65-90) 178/90  SpO2:  [94 %-97 %] 94 %  I/O last 3 completed shifts:  In: 3721.83 [P.O.:840; I.V.:2881.83]  Out: 3625 [Urine:3625]    Urethral Catheter Straight-tip;Double-lumen;Non-latex 16 fr (Active)   Tube Description Positional 7/5/2018  8:27 AM   Catheter Care Done 7/6/2018  7:30 AM   Collection Container Standard 7/6/2018  7:30 AM   Securement Method Securing device (Describe) 7/6/2018  7:30 AM   Rationale for Continued Use Wound Healing 7/6/2018  7:30 AM   Urine Output 625 mL 7/6/2018  9:00 AM   Number of days:2     No line/device    Constitutional: Alert and oriented x3. No distress    HEENT: NC/AT, PERRL, EOMI, mouth clear, neck supple, no LN.     Cardiovascular: RRR. NO murmur, no  rubs, or gallops.      Respiratory: Clear to auscultation bilaterally    Abdomen: Morbidly obese, Soft, NTND, no organomegaly. Bowel sounds  present    Extremities: Warm/dry. No edema    Neuro:  Non focal, cranial nerves intact, Moves all extremities.      Medications     - MEDICATION INSTRUCTIONS -       sodium chloride Stopped (07/06/18 1130)       cephalexin  500 mg Oral Q6H TENZIN     enoxaparin  40 mg Subcutaneous Q24H     gadobutrol  7.5 mL Intravenous Once     gadobutrol  7.5 mL Intravenous Once     insulin aspart  1-7 Units Subcutaneous TID AC     insulin aspart  1-5 Units Subcutaneous At Bedtime     metoprolol succinate  50 mg Oral Daily     miconazole   Topical BID     nystatin-triamcinolone   Topical BID       Data     Recent Labs  Lab 07/06/18  0517 07/05/18  0522 07/04/18  1459   WBC 7.6 7.5 12.6*   HGB 14.1 13.9 15.9   MCV 88 87 87    243 281    142 140   POTASSIUM 4.2 4.1 3.9   CHLORIDE 109 111* 106   CO2 25 24 23   BUN 16 17 21   CR 0.89 0.95 1.26*   ANIONGAP 5 7 11   STALIN 8.1* 7.7* 8.5   * 139* 174*   ALBUMIN  --  2.3* 3.1*   PROTTOTAL  --  6.3* 8.0   BILITOTAL  --  0.5 1.0   ALKPHOS  --  51 65   ALT  --  14 18   AST  --  12 5       Recent Results (from the past 24 hour(s))   MR Brain w/o & w Contrast    Narrative    MR BRAIN W/O & W CONTRAST  7/6/2018 10:04 AM    History: Male, age 72 years, enhancing white matter lesions on  previous brain MRI, eval for multiple sclerosis;     Comparison: MRI brain 1/2/2018    Technique: Sagittal T1, axial T2, T2 FLAIR, diffusion, gradient echo,  ADC mapping, T1 and 3 plane T1 fat saturation postcontrast 3-D. .    Findings:   Ventricles and sulci: Mildly prominent.    Gray and white matter: Scattered, nonenhancing white matter lesions  suggesting small vessel disease.    Cerebellopontine angles: Clear    Extra-axial spaces: Clear.    Enhancement: 3 mm focal enhancement along the medial aspect of the  left temporal horn. This appears be new when compared to prior  examination.    Midline structures: Normal in contour.  Globes: Normal.  Orbits: Normal. Optic nerves are symmetric without  evidence of  abnormal enhancement.  Extraocular muscles: Normal.  Paranasal sinuses: Bilateral maxillary sinus mucus retention cysts.  Air-fluid levels are seen previously have resolved.  Mastoid air cells: Normal.  Diffusion: Normal.      Impression    Impression:  1.  Scattered, nonspecific white matter changes suggest small vessel  disease there is no evidence of acute or subacute ischemia.      2.  3 mm enhancing focus lying along the medial aspect of the left  temporal horn is new when compared to prior examination. White matter  changes are otherwise similar in appearance when compared to the prior  study.    3.  Mucous retention cysts of the maxillary sinuses and sphenoid  locules.    4.  Bilateral maxillary sinus air-fluid levels as seen previously have  resolved.    IGGY BROWN MD   MR Cervical Spine w/o & w Contrast    Narrative    PROCEDURE: MR CERVICAL SPINE W/O & W CONTRAST  7/6/2018 10:12 AM    HISTORY: Male, age 72 years, enhancing white matter lesions on  previous brain MRI, eval for multiple sclerosis;     COMPARISON: MRI of the brain 1/2/2018    TECHNIQUE: Sagittal T1, proton density, T2 fat saturation and T1  postcontrast with fat saturation; axial proton density, T2 and T1  postcontrast with fat saturation.    FINDINGS:  Curvature: The cervical spine demonstrates normal lordotic curvature.    Atlantoaxial and atlanto-occipital joints: Mild degenerative changes.    Cervical spinal cord: 13 mm nonenhancing lesion at the atlantoaxial  junction. 12 mm lesion seen within the cervical spinal cord along the  caudal margin of C2. No evidence of abnormal enhancement.    C2-3: Slight bulge. Mild endplate and facet joint degenerative change.             C3-4: Normal disc. Minimal endplate change. Moderate left facet joint  degeneration with reactive edema.         C4-5: Normal disc. Minimal endplate degeneration. Mild to moderate  bilateral facet joint degeneration, worse on the right.  Uncinate  spurring contributes to moderate right foraminal stenosis.              C5-6: Chronic bulge. Uncinate spurring. Moderate to severe left and  severe right foraminal stenosis.    C6-7: Chronic bulge. Mild endplate degenerative changes. Moderate left  and moderately severe right foraminal stenosis. Mild to moderate  bilateral facet joint degeneration.    C7-T1: 5 mm left parasagittal disc protrusion. Mild endplate changes.  Mild to moderate bilateral facet joint degenerative change.                Visualized thoracic spine: Mild degenerative changes.    Paraspinous musculature: Mildly atrophic.    Soft tissues: Skin and subcutaneous fat are normal.      Impression    IMPRESSION:   1.  Nonenhancing white matter lesions in the upper cervical spine as  described, concerning for demyelinating process.    2.  Multilevel degenerative change of the cervical spine with  bilateral foraminal stenosis at C4-5, C5-6 and C6-7 as described  above.    3.  Prominent degenerative changes within the right C3-4 and C4-5  facet joints.     IGGY BROWN MD   MR Thoracic Spine w/o & w Contrast    Narrative    PROCEDURE: MR THORACIC SPINE W/O & W CONTRAST, 7/6/2018 10:30 AM     HISTORY:Male, age 72 years, enhancing white matter lesions on previous  brain MRI, eval for multiple sclerosis;     COMPARISON: None    TECHNIQUE: Sagittal T1, proton density, T2 fat saturation and T1  postcontrast with fat saturation; axial proton density, T2 and T1  postcontrast with fat saturation.    FINDINGS:  Curvature: The thoracic spine demonstrates normal kyphotic curvature.  There is slight scoliotic curvature, convex left with the apex at T7.    Thoracic spinal cord: Normal.     Osseous structures: Endplate degenerative changes at C6-7.    C7-T1: Normal..               T1-2: Normal disc. Mild endplate changes..              T2-3: Mild disc and endplate degenerative changes..     T3-4: Mild disc and endplate degenerative changes..              T4-5: Mild disc degeneration. 4 mm posterior central disc protrusion.  No evidence of cord compression or significant foraminal  narrowing/central canal narrowing.     T5-6: Mild disc, endplate degenerative changes..               T6-7: Mild disc, endplate and facet joint degenerative change.              T7-8: 6 mm right parasagittal disc protrusion abutting the ventral  aspect of the thecal sac. No evidence of cord compression. Mild  endplate degenerative change.     T8-9: Mild disc and endplate degenerative change..             T9-10: Mild to moderate endplate degenerative changes. Slight bulge..     T10-11: Mild disc and endplate degenerative changes..               T11-12: Mild bulge. Minimal endplate degenerative change..              T12-L1: Normal..     Paraspinous musculature: Mild generalized atrophy.    Retroperitoneal soft tissues and visualized portions of the lungs:  Normal.      Impression    IMPRESSION:   1.  No evidence of spinal cord lesion. No abnormal enhancement.    2.  Mild to moderate multilevel degenerative change with 6 mm right  parasagittal disc protrusion at T7-8. No evidence of significant  central canal or foraminal narrowing.     IGGY BROWN MD   IR Lumbar Puncture    Narrative    IR LUMBAR PUNCTURE    HISTORY: 72 yearsMale evaluation for possible multiple sclerosis;     TECHNIQUE:Informed consent was obtained. A timeout was performed. The  patient was sterilely prepped and draped. Local injections of  lidocaine were performed.    Using fluoroscopic guidance diagnostic lumbar puncture was performed  with a 22-gauge spinal needle at the L4-L5 level. A total of 12 cc of  clear CSF was collected in 4 separate tubes. Total fluoroscopic time  was 13 seconds. The tubes were labeled with the patient's name and  medical record number and sent to lab for analysis.          Impression    IMPRESSION: Successful fluoroscopic guided diagnostic lumbar puncture.    ADELFO ROSE MD

## 2018-07-06 NOTE — PLAN OF CARE
"Problem: Patient Care Overview  Goal: Plan of Care/Patient Progress Review  Outcome: Improving  Vitals: VSS. /79  Pulse 118  Temp 97.8  F (36.6  C) (Temporal)  Resp 18  Ht 1.778 m (5' 10\")  Wt 134.7 kg (296 lb 15.4 oz)  SpO2 97%  BMI 42.61 kg/m2    Pain: Denied.    Orientation: A&O.    Cardiac: Reg    Lungs: Clear    ABD: Bowels Active.    Urinating: Gonzales in for skin healing. Adequate amount out.     Skin: Red, dry yeasty area to scrotum, buttocks, and back of thighs. Turns q2. Miconozole and nystatin used.      IV fluids: Stopped this shift. No IV currently. Dr Jenkins to inform us if she wants us to reinsert IV or not depending on plan of care.    Antibiotics: IV Ancef    Mobility: Up with assist of 2. Gait belt and walker used. Generalized weakness.     Safety: Bed/chair alarm on. Call light in reach. Rounding done.    Comment: Went for MRI in AM for possible MS. Met with PT and OT with minimal participation. BG at 150 and 129, treating with sliding scale.     Face to face report given with opportunity to observe patient.    Report given to Amber Brown   7/6/2018  1:59 PM                  "

## 2018-07-06 NOTE — DISCHARGE INSTRUCTIONS
"    DISCHARGE INSTRUCTIONS  Lumbar Puncture      IMPORTANT: As you prepare for discharge, the following information will help you return to your best level of health.       This Information Is About Your Follow Up Care    Call your doctor if you do not get better. Call sooner if you feel worse. You can reach your doctor by calling their clinic phone number.                                    This Information Is About Procedures      LUMBAR PUNCTURE (Spinal Tap).  A lumbar puncture (\"spinal tap\") was done to remove a small amount of your cerebrospinal fluid (CSF). This protective fluid surrounds your brain and spinal cord. A lumbar puncture measures the pressure in the cerebrospinal fluid. The sample of spinal fluid will be tested in the lab. We will let you know the results as soon as they are done.  The results of a lumbar puncture may help diagnose:    serious infections, such as meningitis    brain or spinal cord cancer    certain diseases such as multiple sclerosis or Guillain-Conover syndrome    bleeding in the brain, such as a subarachnoid bleed    At home, please follow these instructions:    Drink plenty of liquids. Drink liquids with caffeine. The caffeine helps replace the CSF more quickly.    Lie down flat for the next 6 to 8 hours when you get home.    Some people get a headache that lasts a day or two after this procedure. If you do get a headache, try lying down flat.    Call your doctor if you have:    a severe headache that will not go away.    redness, swelling or drainage from the puncture site.    neck stiffness.    numbness or weakness below the area of the puncture site.    any new or bothersome symptoms.                        This Information Is About Your Illness and Diagnosis    LOW BACK PAIN  The muscles of the low back are put under a lot of strain in everyday life. Most of us do not keep our backs very strong. A number of medicines and other treatments help with low back symptoms.    Some " treatments may include:    Medicine often helps low back symptoms. Medicine works well to control the pain and reduce inflammation and irritation of the muscle. The type of medicine chosen for you will match the type of symptoms you have.    Heat or cold applied to your back during the first 48 hours after the pain starts may help. Putting a cold pack to the painful area for 5 to 10 minutes at a time.  If your symptoms last longer than 48 hours, use a heating pad, hot shower or bath to help your back pain.    Follow these instructions:    Limit your bedrest to 2-3 days to avoid weakening your muscles.    When lying down, get up every few hours and walk around.    Wear comfortable, low-heeled shoes.    Make sure your work surface is at a comfortable height for you.    Use a chair with a good lower back support that may recline slightly.    Rest your feet on the floor or on a low stool, if you must sit for long periods of time.    Use a pillow or rolled up towel behind the small of your back if you must drive long distances.  Stop often and walk around a few minutes every hour or so.    Sleep on your back with a pillow under your knees, or sleep on your side with your knees bent and a pillow between your knees to avoid trouble sleeping.    Slowly return to your normal activities including exercise. Slowly build up the speed and length of time you do the exercise.  Try the following:  o Walking short distances.  o Using a stationary bicycle.  o Swimming.    USE THE FOLLOWING BODY MECHANICS TO REDUCE YOUR RISK OF BACK INJURY:  Avoid the following:    Bending from your waist.    Lifting heavy objects higher than your waist.    Carrying unbalanced loads and sudden movements.    Activities that arch and strain your back (bending backwards or bending forward touching your toes).    Avoid lifting when twisting, bending forward, and reaching.    Avoid sitting for long periods of time.    Do the following:    Bend from your  knees and face the object you lift.    Hold heavy objects close to your body.    Change your positions often.    Work with tools close to your body when mopping, vacuuming, raking, hoeing, etc.    Sit down to put your shoes and socks on.    Wear shoes with low heels and good support.    Reach for things close to your body.    Round your back and bend your knees slightly when you cough or sneeze.    Kneel when making a bed.    Stand tall with your chin in, back flat, pelvis tucked under and relax your knees.    Call your doctor if you have:    increased pain.    no improvement.    any new problems or concerns.

## 2018-07-06 NOTE — PLAN OF CARE
Problem: PT General Care Plan  Goal: PT Frequency  Clarification from evaluating PT to change to daily treatment vs 2x/day

## 2018-07-06 NOTE — PROGRESS NOTES
Spoke with Angela at [place], they are able to take Jose Antonio but not until tomorrow.  They would like anticipated discharge orders if possible.      Spoke with Jose Antonio.  He is adamant on not going anywhere outside of the [facility].  Will update Dr. Jenkins.

## 2018-07-06 NOTE — PLAN OF CARE
Problem: Patient Care Overview  Goal: Plan of Care/Patient Progress Review  Outcome: Therapy, progress toward functional goals is gradual  Discharge Planner PT   Patient plan for discharge: if he can't go to SNF in [place] or [place], states he is going home  Current status: sup>sit SBA, sit>stand min A, ambulated 20' with 4WW min-mod A with severe toe out and knee hyperextenstion noted.  Fatigue present and general gait instability  Barriers to return to prior living situation: is home alone at times during the day, has had frequent falls and is at risk for continued falls, gait instability  Recommendations for discharge: short term rehab  Rationale for recommendations: Pt would benefit from short term rehab to improve safety with functional mobility.  Pt may benefit from looking into other forms of mobility besides ambulation as he continues to decline as soon as he is out of rehab.         Entered by: Suzi Hutson, PT 07/06/2018 2:36 PM

## 2018-07-06 NOTE — PROGRESS NOTES
Received message from [facility] and [facility], they are unable to admit Jose Antonio.  Left messages and faxed referrals to [facility], [facility], [facility] and [facility].  Spoke with Prerna at [facility], they have one discharge today and would screen, faxed referral.

## 2018-07-06 NOTE — PROGRESS NOTES
Left messages at [facility], [facility] and [facility] to follow up on referral sent yesterday.

## 2018-07-06 NOTE — PLAN OF CARE
Pt sitting up in chair. Dr bravo was in to see pt and discuss and explain options.   Radiology here to take pt for lumbar puncture.   Report to SYLVIA Hunt who assumes care

## 2018-07-06 NOTE — PROGRESS NOTES
Jose Antonio Marmolejo 72 year old    Returned from MRI  Exam Performed : MRI BRAIN WO, MRI C SPINE, MRI TSPINE WO  Pushed to Reading Service?: No  Tolerated Procedure : poorly  May resume previous diet : Yes  Time Returned to Unit : 20:00  Report Given to : Jelani Wolfe spoke to Jelani about patient refusing contrast. Yaneth will contact Dr. Jordan at 7:00am regarding the post contrast imaging.     7/5/2018 8:06 PM   Carlene Duenas

## 2018-07-06 NOTE — PROGRESS NOTES
MRI Contrast Discharge Instructions    The IV contrast you received today will pass out of your body in your  urine. This will happen in the next 24 hours. You will not feel this process.  Your urine will not change color.    Drink at least 4 extra glasses of water or juice today (unless your doctor  has restricted your fluids). This reduces the stress on your kidneys.  You may take your regular medicines.    If you are on dialysis: It is best to have dialysis today.    If you have a reaction: Most reactions happen right away. If you have  any new symptoms after leaving the hospital (such as hives or swelling),  call your hospital at the correct number below. Or call your family doctor.  If you have breathing distress or wheezing, call 911.    Special instructions: -    I have read and understand the above information.    Signature:______________________________________ Date:___________    Staff:__________________________________________ Date:___________     Time:__________    [facility] Departments:    ___[facility]: [phone]  ___[facility]: [phone]  ___[facility]: [phone] ___[facility]: [phone]  ___[facility]: [phone]  ___[facility]: [phone]  ___[phone]  ___[facility]: [phone]  ___[phone]

## 2018-07-06 NOTE — PLAN OF CARE
"Problem: Patient Care Overview  Goal: Plan of Care/Patient Progress Review  Outcome: No Change  Pt a&o, alarms on. Pt up with assist of 2. Pt on IV NS 100ml/hr. Willis power'd, pt now IV Ancef starting tomorrow AM. Pt on room air, 99%. Pt's posterior from waste to knees very red, scaly and rashy. Cream and nystatin powder applied to pt's reddened areas. Pt on tele, hr SR w/BBB in the 80s. Blood sugars WNL, no coverage needed. Pt had MRI done this shift, although pt refused IV contrast. Gonzales patent with great output. /68  Temp 97.8  F (36.6  C) (Temporal)  Resp 16  Ht 1.778 m (5' 10\")  Wt 134.7 kg (296 lb 15.4 oz)  SpO2 99%  BMI 42.61 kg/m2.     Face to face report given with opportunity to observe patient.    Report given to Helen Carpio   7/5/2018  11:28 PM      "

## 2018-07-06 NOTE — PROGRESS NOTES
Spoke with Wendi at [facility], they will start screening process.      Spoke with Jose Antonio and provided him an update.  He identifies that if he is not able to stay in the area ([place], [place], [place]) that he wants to just return home.  Discussed the need to have a safe discharge plan given all his recent falls.  He states that he is going to fall wherever he goes.      Spoke with Juanita at [facility].  She will start the process with Susan to get authorization but likely will not have an answer on that piece until Monday.  Without they would treat the admission as private pay, this has historically been something Jose Antonio could not afford.      Still awaiting results of MRI.

## 2018-07-06 NOTE — PHARMACY
[facility]    Pharmacy    Antimicrobial Stewardship Note     Current antimicrobial therapy:  Anti-infectives (Future)    Start     Dose/Rate Route Frequency Ordered Stop    07/06/18 0800  ceFAZolin (ANCEF) intermittent infusion 1 g      1 g  over 30 Minutes Intravenous EVERY 8 HOURS 07/05/18 2047          Indication: SSTI    Days of Therapy: 1     Pertinent labs:  Creatinine   Creatinine   Date Value Ref Range Status   07/06/2018 0.89 0.66 - 1.25 mg/dL Final   07/05/2018 0.95 0.66 - 1.25 mg/dL Final   07/04/2018 1.26 (H) 0.66 - 1.25 mg/dL Final     WBC   WBC   Date Value Ref Range Status   07/06/2018 7.6 4.0 - 11.0 10e9/L Final   07/05/2018 7.5 4.0 - 11.0 10e9/L Final   07/04/2018 12.6 (H) 4.0 - 11.0 10e9/L Final     Procalcitonin   Procalcitonin   Date Value Ref Range Status   07/05/2018 <0.05 ng/ml Final     Comment:     <0.05 ng/ml  Normal  Recommendation: Very low risk of bacterial infection.   Discourage antibiotics unless strong clinical suspicion for serious infection.     07/04/2018 <0.05 ng/ml Final     Comment:     <0.05 ng/ml  Normal  Recommendation: Very low risk of bacterial infection.   Discourage antibiotics unless strong clinical suspicion for serious infection.       CRP   CRP Inflammation   Date Value Ref Range Status   07/06/2018 51.8 (H) 0.0 - 8.0 mg/L Final   07/05/2018 75.2 (H) 0.0 - 8.0 mg/L Final   12/31/2017 14.8 (H) 0.0 - 8.0 mg/L Final     Culture Results:   7-Day Micro Results      Procedure Component Value Units Date/Time     Glucose CSF: Tube 1      Order Status: No result Lab Status: No result      Specimen: CSF      Protein total CSF: Tube 1      Order Status: No result Lab Status: No result      Specimen: CSF from Cerebral spinal fluid      Gram stain      Order Status: No result Lab Status: No result      Specimen: CSF from Cerebral spinal fluid      CSF Culture Aerobic Bacterial      Order Status: No result Lab Status: No result      Specimen: CSF from Cerebral spinal  fluid      Cell count with differential CSF: Tube 4      Order Status: No result Lab Status: No result      Specimen: CSF from Cerebral spinal fluid      Immunoglobulin G CSF Index: Tube 4      Order Status: No result Lab Status: No result      Blood culture [[id number]] Collected: 07/04/18 1935     Order Status: Completed Lab Status: Preliminary result Updated: 07/06/18 0614     Specimen: Blood       Specimen Description Blood      Culture Micro No growth after 2 days     Blood culture [[id number]] Collected: 07/04/18 1914     Order Status: Completed Lab Status: Preliminary result Updated: 07/06/18 0614     Specimen: Blood       Specimen Description Blood      Special Requests --      Right Arm   6mL aerobic only         Culture Micro No growth after 2 days     Active MRSA Surveillance Culture [[id number]] Collected: 07/04/18 1655     Order Status: Completed Lab Status: Final result Updated: 07/06/18 0639     Specimen: Swab from Nose       Specimen Description Swab      Culture Micro No MRSA isolated         Recommendations/Interventions:  1. None at this time.     Jeri Stewart RPH  July 6, 2018

## 2018-07-06 NOTE — PROGRESS NOTES
No complications via lumbar puncture. To lay flat x one hour. To drink caffeine and lots of liquids per dc instructions.  Site CD&I. dischage instructions given and explained to nurse Preeti RN on med surg floor

## 2018-07-07 NOTE — PROGRESS NOTES
[facility]    Hospitalist Progress Note      Assessment & Plan   Jose Antonio Marmolejo is a 72 year old male who presents with weakness and s/p fall at home. Brought by ambulance for further evaluation after wound found.     Active Problems:    Cellulitis versus dermatitis of buttocks, posterior thighs, peritoneum  Many contributing factors including poor hygiene , moisture, intertrigo. He had initial mild leukocytosis, local infection, elevated lactic acid, fever, but normal procalcitonin. Given clinical suspicion for possible systemic infection, was started on Vancomycin empirically. Nesbitt placed as well and mycolog II cream applied to affected areas per recommendations from wound consult. Repeat lactic acid normalized and procalcitonin remained  <0.05. Initially started on IV vancomycin given he did spike fever to 101.2 shortly after admission. WBC has normalized 12.6-->7.6. Blood cultures with no growth to date. Changed to IV ancef briefly and then converted to oral keflex at this point. MRSA nasal swab negative, and I think unlikely MRSA involved. Not entirely certain there is bacterial infection involved here given low pro calcitonin. May simply be dermatitis from urine and stool as well as yeast infection. However, given the fever he had and difficult to sort out if bacterial component, will plan to treat for 7 day course oral Keflex. Overall, the skin integrity is markedly improved from admission and would consider removing nesbitt in next 1-2 days and working with patient for guidance on correct use of urinal to avoid further spilling of urine.    Essential hypertension  He did not take BP pills for more than 1.5 months simply because he discontinued taking his medications after the last time he returned home from SNF. Have now reinitiated his beta blocker and lisinopril with BP adequately controlled at present.        Type 2 diabetes mellitus without complication  No antidiabetics listed on home meds. A1c  6.5. Continue with sliding scale       Cutaneous candidiasis  Due to maceration, fungal infection. Continue with miconazole powder to groin folds, under breasts, pannus. Mycolog II to the buttocks and perineal area where he has more intense erythema and scaling of the skin       Recurrent falls  Continue with PT/OT, will need outpatient neurology evaluation given concern for possible MS, needs placement to SNF for PT/OT. Still awaiting accepting facility as the SNF in [place] has now declined. Patient is only agreeable to staying local in [place] or [place]. States he will discharge to home before he would go outside the local area to a SNF. Have advised against this, but patient is adamant he will not go outside [place] or [place], thus may need to arrange for home services if we cannot get a SNF locally.    Possible multiple sclerosis:  Spoke once again with [facility] neurology, Dr. Bray, regarding patient's previous brain MRI imaging in January 2018 with white matter lesions, at least one of which was ring enhancing. Question for possible multiple sclerosis. Recommendation was for repeat MRI imaging. Repeat MRI of the brain, cervical and thoracic spine shows brain with scattered nonspecific white matter changes suggesting small vessel disease, a 3 mm enhancing focus lying along the medial aspect of the left temporal horn, which is new when compared to prior exam and otherwise with white matter changes similar in appearance to his last study in January 2018, cervical spine with non enhancing white matter lesions concerning for demyelinating process as well as multilevel degenerative changes, thoracic spine with no spinal cord lesions or enhancement but again with multilevel degenerative changes. Although he does have one small enhancing lesion, did confer with neurology and was felt that steroids are not indicated given his no clinical focal deficits, optic neuritis or transverse myelitis and also his current  state with possible infectious skin process. Arranged for LP completed 7/6/18. Gram stain unremarkable, culture pending, standard CSF studies unremarkable. Oligoclonal bands and IgG index of CSF pending. The CSF studies, when fully resulted, will need to be faxed to Dr. Saucedo's office at [phone].  Have arranged for patient to be seen by Dr. Saucedo, [facility], on 7/16/18 for consultation and evaluation for possible MS. Case management is arranging a rider per [employer].      Weakness of both lower extremities  Remains unclear in etiology. Question of possible underlying multiple sclerosis as per above.      TASIA  Present on admission with Cr. 1.26. Baseline 0.9-1. Likely due to volume depletion and has resolved after IVF. Cr. 0.74      # Pain Assessment:  Current Pain Score 7/7/2018   Patient currently in pain? denies   Pain score (0-10) -   Pain location -   Pain descriptors -   Jose Antonio velarde pain level was assessed and he currently denies pain.        DVT Prophylaxis: Enoxaparin (Lovenox) SQ  Code Status: Full Code    Disposition: Expected discharge in 1-2 days. Continue to work with patient to see if he will be agreeable to accepting SNF, otherwise will need to arrange for home services    Skip Jenkins    Interval History   Sitting up in chair. Comfortable. Complains of poor sleep last night, very tired. Feels exhausted.     -Data reviewed today: I reviewed all new labs and imaging results over the last 24 hours.     Physical Exam   Temp: 98.8  F (37.1  C) Temp src: Tympanic BP: 145/79   Heart Rate: 83 Resp: 20 SpO2: 96 % O2 Device: None (Room air)    Vitals:    07/04/18 1700   Weight: 134.7 kg (296 lb 15.4 oz)     Vital Signs with Ranges  Temp:  [98.3  F (36.8  C)-99.2  F (37.3  C)] 98.8  F (37.1  C)  Heart Rate:  [74-86] 83  Resp:  [18-20] 20  BP: (145-178)/(75-90) 145/79  SpO2:  [94 %-98 %] 96 %  I/O last 3 completed shifts:  In: 1151 [P.O.:600; I.V.:551]  Out: 4125 [Urine:4125]    Peripheral IV  07/06/18 Left Upper arm (Active)   Number of days:1       Urethral Catheter Straight-tip;Double-lumen;Non-latex 16 fr (Active)   Tube Description Positional 7/6/2018  3:45 PM   Catheter Care Done 7/7/2018  1:00 AM   Collection Container Standard 7/7/2018  1:00 AM   Securement Method Securing device (Describe) 7/7/2018  1:00 AM   Rationale for Continued Use Wound Healing 7/7/2018  1:00 AM   Urine Output 1200 mL 7/7/2018  6:41 AM   Number of days:3     No line/device    Constitutional: Alert and oriented x3. No distress    HEENT: NC/AT, PERRL, EOMI, mouth clear, neck supple, no LN.     Cardiovascular: RRR. NO murmur, no  rubs, or gallops.      Respiratory: Clear to auscultation bilaterally    Abdomen: Morbidly obese, Soft, NTND, no organomegaly. Bowel sounds present    Extremities: Warm/dry. No edema    Neuro:  Non focal, cranial nerves intact, Moves all extremities.    Skin: Groin folds, penis and scrotum and breast folds improved in appearance with less erythema. Buttocks, posterior upper thighs and perineal area much less intensely red, still with some scaling skin but improved overall    Medications     - MEDICATION INSTRUCTIONS -         cephalexin  500 mg Oral Q6H TENZIN     enoxaparin  40 mg Subcutaneous Q24H     gadobutrol  7.5 mL Intravenous Once     gadobutrol  7.5 mL Intravenous Once     insulin aspart  1-7 Units Subcutaneous TID AC     insulin aspart  1-5 Units Subcutaneous At Bedtime     lisinopril  40 mg Oral Daily     metoprolol succinate  50 mg Oral Daily     miconazole   Topical BID     nystatin-triamcinolone   Topical BID     sodium chloride (PF)  3 mL Intracatheter Q8H       Data     Recent Labs  Lab 07/07/18  0519 07/06/18  0517 07/05/18  0522 07/04/18  1459   WBC 9.1 7.6 7.5 12.6*   HGB 13.8 14.1 13.9 15.9   MCV 85 88 87 87    246 243 281    139 142 140   POTASSIUM 3.4 4.2 4.1 3.9   CHLORIDE 106 109 111* 106   CO2 24 25 24 23   BUN 13 16 17 21   CR 0.74 0.89 0.95 1.26*   ANIONGAP 8 5 7  11   STALIN 8.0* 8.1* 7.7* 8.5   * 175* 139* 174*   ALBUMIN  --   --  2.3* 3.1*   PROTTOTAL  --   --  6.3* 8.0   BILITOTAL  --   --  0.5 1.0   ALKPHOS  --   --  51 65   ALT  --   --  14 18   AST  --   --  12 5       Recent Results (from the past 24 hour(s))   IR Lumbar Puncture    Narrative    IR LUMBAR PUNCTURE    HISTORY: 72 yearsMale evaluation for possible multiple sclerosis;     TECHNIQUE:Informed consent was obtained. A timeout was performed. The  patient was sterilely prepped and draped. Local injections of  lidocaine were performed.    Using fluoroscopic guidance diagnostic lumbar puncture was performed  with a 22-gauge spinal needle at the L4-L5 level. A total of 12 cc of  clear CSF was collected in 4 separate tubes. Total fluoroscopic time  was 13 seconds. The tubes were labeled with the patient's name and  medical record number and sent to lab for analysis.          Impression    IMPRESSION: Successful fluoroscopic guided diagnostic lumbar puncture.    ADELFO ROSE MD

## 2018-07-07 NOTE — PLAN OF CARE
"Problem: Patient Care Overview  Goal: Plan of Care/Patient Progress Review  Outcome: No Change  Vitals: VSS.  /75  Pulse 118  Temp 99  F (37.2  C) (Temporal)  Resp 18  Ht 1.778 m (5' 10\")  Wt 134.7 kg (296 lb 15.4 oz)  SpO2 98%  BMI 42.61 kg/m2     Pain: Right shoulder pain rated 5/10. Treated with tylenol, slept well afterwards.      Orientation: A&O. Did wake up a little confused in the middle of the night. BG was stable in 170's and VSS at this time. Reoriented quickly.      Cardiac: Reg     Lungs: Clear     ABD: Bowels Active. Obese.     Urinating: Gonzales in for skin healing. Adequate amount out.      Skin: Red, dry yeasty area to scrotum, buttocks, and back of thighs. Turns q2. Red, raw, yeasty areas to breast and ABD folds. Miconozole and nystatin used BID.       IV fluids: NS at 100.      Antibiotics: PO Keflex     Mobility: Up with assist of 2. Gait belt and walker used. Generalized weakness.      Safety: Bed/chair alarm on. Call light in reach. Rounding done.     Comment: BG at 170's. Slept well throughout night.     Face to face report given with opportunity to observe patient.    Report given to Airam Brown   7/7/2018  7:50 AM      "

## 2018-07-07 NOTE — PLAN OF CARE
Problem: Patient Care Overview  Goal: Plan of Care/Patient Progress Review   07/06/18 2212   OTHER   Plan Of Care Reviewed With patient   Plan of Care Review   Progress no change     VSS. Alert, oriented, and using call light appropriately. Large excoriated area on buttocks and perineum, skin peeling, ointment applied. Miconazole powder applied kaushik skin folds under breasts and under pannus. Receiving oral keflex. IV restarted and infiltrated. Tele sinus 70's c BBB per ICU report. Up with assist of 2 and walker to bedside commode, large BM this shift. Gonzales intact for wound healing.  and 103, no coverage needed    Problem: Infection, Risk/Actual (Adult)  Goal: Identify Related Risk Factors and Signs and Symptoms  Related risk factors and signs and symptoms are identified upon initiation of Human Response Clinical Practice Guideline (CPG).    07/06/18 2212   Infection, Risk/Actual   Related Risk Factors (Infection, Risk/Actual) age extremes;chronic illness/condition;exposure to microbes;poor personal hygiene;skin integrity impairment   Signs and Symptoms (Infection, Risk/Actual) edema;weakness       Face to face report given with opportunity to observe patient.    Report given to SYLVIA Cervantes   7/6/2018  11:32 PM

## 2018-07-07 NOTE — PLAN OF CARE
Problem: Patient Care Overview  Goal: Plan of Care/Patient Progress Review  Outcome: Improving  Staff feels skin looks better, less red, some areas skin flaking off. Gonzales maintained for continued healing. Up in chair much of morning.  Face to face report given with opportunity to observe patient.    Report given to Marilee DREW   7/7/2018  4:34 PM

## 2018-07-07 NOTE — PHARMACY
[facility]    Pharmacy      Antimicrobial Stewardship Note     Current antimicrobial therapy:  Anti-infectives (Future)    Start     Dose/Rate Route Frequency Ordered Stop    07/06/18 1600  cephALEXin (KEFLEX) capsule 500 mg      500 mg Oral EVERY 6 HOURS SCHEDULED 07/06/18 1449            Indication: SSTI    Days of Therapy: 2     Pertinent labs:  Creatinine   Creatinine   Date Value Ref Range Status   07/07/2018 0.74 0.66 - 1.25 mg/dL Final   07/06/2018 0.89 0.66 - 1.25 mg/dL Final   07/05/2018 0.95 0.66 - 1.25 mg/dL Final     WBC   WBC   Date Value Ref Range Status   07/07/2018 9.1 4.0 - 11.0 10e9/L Final   07/06/2018 7.6 4.0 - 11.0 10e9/L Final   07/05/2018 7.5 4.0 - 11.0 10e9/L Final     Procalcitonin   Procalcitonin   Date Value Ref Range Status   07/05/2018 <0.05 ng/ml Final     Comment:     <0.05 ng/ml  Normal  Recommendation: Very low risk of bacterial infection.   Discourage antibiotics unless strong clinical suspicion for serious infection.     07/04/2018 <0.05 ng/ml Final     Comment:     <0.05 ng/ml  Normal  Recommendation: Very low risk of bacterial infection.   Discourage antibiotics unless strong clinical suspicion for serious infection.       CRP   CRP Inflammation   Date Value Ref Range Status   07/07/2018 28.2 (H) 0.0 - 8.0 mg/L Final   07/06/2018 51.8 (H) 0.0 - 8.0 mg/L Final   07/05/2018 75.2 (H) 0.0 - 8.0 mg/L Final       Culture Results: cerebrospinal fluid culture in progress     Recommendations/Interventions:  1. none    Shona Martin RPH  July 7, 2018

## 2018-07-07 NOTE — PROGRESS NOTES
Checked in with Jose Antonio.  Advised that writer will arrange for transportation to his appointment with Dr. Saucedo in [place] on July 16th.  Strongly advised that he needs to attend this appointment whether he is still in the nursing home or back at home.  He verbalized understanding.

## 2018-07-08 NOTE — PLAN OF CARE
Face to face report given with opportunity to observe patient.    Report given to SYLVIA Dee   7/8/2018  3:28 PM

## 2018-07-08 NOTE — PROGRESS NOTES
"Pt refused x 2 attempts for physical therapy treatment this date. Pt understands risks of refusing and benefits of participating. Pt states \"I am too tired today.\"  "

## 2018-07-08 NOTE — PLAN OF CARE
Problem: Patient Care Overview  Goal: Plan of Care/Patient Progress Review   07/07/18 2230   OTHER   Plan Of Care Reviewed With patient   Plan of Care Review   Progress no change     VSS. Alert, oriented, and using call light appropriately. Up with assist of 2 to bedside commode; 2 BM's this shift. IV SL. Catheter remains in place for wound healing. Topical cream and powder applied to buttocks, perineum, and skin folds on anterior abd, appears less red today than yesterday. Receiving oral keflex.  and 171, no coverage needed.     Problem: Infection, Risk/Actual (Adult)  Goal: Identify Related Risk Factors and Signs and Symptoms  Related risk factors and signs and symptoms are identified upon initiation of Human Response Clinical Practice Guideline (CPG).    07/07/18 1627   Infection, Risk/Actual   Related Risk Factors (Infection, Risk/Actual) age extremes;chronic illness/condition;exposure to microbes;poor personal hygiene;skin integrity impairment   Signs and Symptoms (Infection, Risk/Actual) edema;weakness       Face to face report given with opportunity to observe patient.    Report given to SYLVIA Camacho   7/7/2018  11:08 PM

## 2018-07-08 NOTE — PLAN OF CARE
/98 upon initial assessment. MD made aware. Amlodipine 10mg given 1x, will continue to monitor. 0012    Recheck /91 0053

## 2018-07-08 NOTE — PROGRESS NOTES
[facility]    Hospitalist Progress Note      Assessment & Plan   Jose Antonio Marmolejo is a 72 year old man who presented with weakness and following a fall at home. Brought by ambulance for further evaluation after wound identified.     Active Problems:    Cellulitis versus dermatitis of buttocks, posterior thighs, peritoneum  Multiple contributing factors including poor hygiene and moisture particularly at the intertrigo. He had initial mild leukocytosis, local apparently superficial infection, elevated lactic acid, fever, but normal procalcitonin, not suggesting any systemic infection. Given clinical suspicion he was initially started on Vancomycin empirically. Nesbitt placed as well and mycolog II cream applied to affected areas after evaluation from wound consult team. Repeat lactic acid normalized and procalcitonin remained below detectable levels. Initially started on IV vancomycin since he did spike fever to 101.2 shortly after admission. Subsequently, WBC has normalized 12.6-->7.6. Blood cultures with no growth to date. Changed to cefazolin briefly and then converted to oral cephalexin at this point. MRSA nasal swab negative; I think unlikely MRSA involved. Not entirely certain there is bacterial infection involved here given low pro calcitonin. May simply be dermatitis from urine and stool as well as yeast infection. However, given the fever he had and difficult to sort out if bacterial component, will plan to treat for 7 day course oral cephalexin. Overall, the skin integrity is markedly improved from admission and would consider removing nesbitt in another day particularly if it is certain on nursing staff working with patient for guidance that he is able to consistently appropriately use urinal to avoid further spilling of urine.    Primary hypertension  He did not take BP pills for more than 1.5 months simply because he discontinued taking his medications after the last time he returned home from SNF.  Have now reinitiated his beta blocker and lisinopril with BP adequately controlled at present, with several isolated increases in pressure. However unlikely further alteration will be fruitful.        Type 2 diabetes mellitus without complication  No antidiabetics listed on home meds. A1c 6.5. Continue with sliding scale, which he is used at low doses, 1 unit / 24 hours over the past several days.  Particularly with increased activity it is likely this will not be required on an ongoing basis.       Cutaneous candidiasis  Due to maceration, fungal infection. Continue with miconazole powder to groin folds, under breasts, pannus. Mycolog II to the buttocks and perineal area where he has more intense erythema and scaling of the skin       Recurrent falls  Continue with PT/OT.  Cause is uncertain. May simply be deconditioning and gait disturbance but a demyelinating condition must also be considered as detailed below.  For the time being needs placement to SNF for PT/OT. Still awaiting accepting facility as the SNF in [place] has now declined. Patient is only agreeable to staying local in [place] or [place]. States he will discharge to home before he would go outside the local area to a SNF. Have advised against this, but patient is adamant he will not go outside [place] or [place], thus may need to arrange for home services if we cannot get a SNF locally.    Possible multiple sclerosis versus other demyelinating condition:  Spoke once again with [facility]'s neurology, Dr. Bray, regarding patient's previous brain MRI imaging in January 2018 with white matter lesions, at least one of which was ring enhancing. Question for possible multiple sclerosis. Recommendation was for repeat MRI imaging. Repeat MRI of the brain, cervical and thoracic spine shows brain with scattered nonspecific white matter changes suggesting small vessel disease, a 3 mm enhancing focus lying along the medial aspect of the left temporal horn,  which is new when compared to prior exam and otherwise with white matter changes similar in appearance to his last study in January 2018, cervical spine with non enhancing white matter lesions concerning for demyelinating process as well as multilevel degenerative changes, thoracic spine with no spinal cord lesions or enhancement but again with multilevel degenerative changes. Although he does have one small enhancing lesion, on discussion with neurology was felt that steroids are not indicated given his no clinical focal deficits, optic neuritis or transverse myelitis and also his current state with possible infectious skin process. Lumbar puncture 7/6/18. Gram stain unremarkable, culture pending, standard CSF studies unremarkable. Oligoclonal bands and IgG index of CSF pending. The CSF studies, when results available, will need to be faxed to Dr. Saucedo's office at [phone].  Have arranged for patient to be seen by Dr. Saucedo, [facility], on 7/16/18 for consultation and evaluation. Case management is arranging a rider per [employer]. I discussed with the patient today, as has Dr. Jenkins, the importance of this evaluation.     Weakness of both lower extremities  As above demyelination versus generalized weakness and gait disturbance.    TASIA  Volume depletion  Both resolved with supportive treatment.  Present on admission with Cr. 1.26. Baseline 0.9-1.       # Pain Assessment:  Current Pain Score 7/8/2018   Patient currently in pain? denies   Pain score (0-10) -   Pain location -   Pain descriptors -   Jose Antonio s pain level was assessed and he currently denies pain.        DVT Prophylaxis: Enoxaparin (Lovenox) SQ  Code Status: Full Code    Disposition: Expected discharge in 1-2 days. Safest and most reasonable plan would be skilled nursing for further rehabilitation.  If this is not possible, will need to arrange for home services.    Dangelo Alvarado    Interval History   Awake alert and interactive.   Hungry.  No dyspnea.  Minimal pain.  Awake alert and interactive.     -Data reviewed today: I reviewed all new labs and imaging results over the last 24 hours.     Physical Exam   Temp: 97.3  F (36.3  C) Temp src: Tympanic BP: 161/86   Heart Rate: 81 Resp: 20 SpO2: 96 % O2 Device: None (Room air)    Vitals:    07/04/18 1700   Weight: 134.7 kg (296 lb 15.4 oz)     Vital Signs with Ranges  Temp:  [97.3  F (36.3  C)-98.8  F (37.1  C)] 97.3  F (36.3  C)  Heart Rate:  [67-83] 81  Resp:  [18-20] 20  BP: (145-221)/(71-98) 161/86  SpO2:  [93 %-97 %] 96 %  I/O last 3 completed shifts:  In: 1680 [P.O.:1680]  Out: 5100 [Urine:5100]    Peripheral IV 07/06/18 Left Upper arm (Active)   Site Assessment WDL 7/8/2018  8:35 AM   Line Status Saline locked;Checked every 1-2 hour 7/8/2018  8:35 AM   Phlebitis Scale 0-->no symptoms 7/8/2018  8:35 AM   Infiltration Scale 0 7/8/2018  8:35 AM   Number of days:2       Urethral Catheter Straight-tip;Double-lumen;Non-latex 16 fr (Active)   Tube Description Positional 7/8/2018  9:52 AM   Catheter Care Done 7/8/2018  9:52 AM   Collection Container Standard 7/8/2018  9:52 AM   Securement Method Securing device (Describe) 7/8/2018  9:52 AM   Rationale for Continued Use Wound Healing 7/8/2018  9:52 AM   Urine Output 3200 mL 7/8/2018  6:44 AM   Number of days:4     Reviewed.  July 8.    Constitutional: Alert and oriented x3. No distress    HEENT: NC/AT, PERRL, EOMI, mouth clear, neck supple, no LN.     Cardiovascular: PMI not displaced. RRR. No murmur, no rubs, or gallops.      Respiratory: Clear to auscultation bilaterally. Aeration to bases.    Abdomen: Morbidly obese, Soft, no tenderness to palpation or percussion.  No distention, no organomegaly. Bowel sounds present in all quadrants.    Extremities: Warm/dry. No edema    Neuro:  Non focal, cranial nerves intact, Moves all extremities.    Skin: Groin folds, penis and scrotum and breast folds mild diffuse erythema. Buttocks, posterior upper  thighs and perineal area somewhat more erythematous, still with some scaling skin but improved overall. No significant drainage.    Medications     - MEDICATION INSTRUCTIONS -         amLODIPine         cephalexin  500 mg Oral Q6H TENZIN     enoxaparin  40 mg Subcutaneous Q24H     gadobutrol  7.5 mL Intravenous Once     gadobutrol  7.5 mL Intravenous Once     insulin aspart  1-7 Units Subcutaneous TID AC     insulin aspart  1-5 Units Subcutaneous At Bedtime     lisinopril  40 mg Oral Daily     metoprolol succinate  50 mg Oral Daily     miconazole   Topical BID     nystatin-triamcinolone   Topical BID     sodium chloride (PF)  3 mL Intracatheter Q8H       Data     Recent Labs  Lab 07/08/18  0541 07/07/18  0519 07/06/18  0517 07/05/18  0522 07/04/18  1459   WBC 6.3 9.1 7.6 7.5 12.6*   HGB 15.5 13.8 14.1 13.9 15.9   MCV 87 85 88 87 87    251 246 243 281    138 139 142 140   POTASSIUM 3.9 3.4 4.2 4.1 3.9   CHLORIDE 108 106 109 111* 106   CO2 24 24 25 24 23   BUN 15 13 16 17 21   CR 0.72 0.74 0.89 0.95 1.26*   ANIONGAP 7 8 5 7 11   STALIN 8.6 8.0* 8.1* 7.7* 8.5   * 170* 175* 139* 174*   ALBUMIN  --   --   --  2.3* 3.1*   PROTTOTAL  --   --   --  6.3* 8.0   BILITOTAL  --   --   --  0.5 1.0   ALKPHOS  --   --   --  51 65   ALT  --   --   --  14 18   AST  --   --   --  12 5       No results found for this or any previous visit (from the past 24 hour(s)).

## 2018-07-08 NOTE — PLAN OF CARE
"Problem: Patient Care Overview  Goal: Plan of Care/Patient Progress Review  Outcome: No Change  Reason for hospital stay:  Cellulitis    Most recent vitals: /86  Pulse 118  Temp 97.3  F (36.3  C) (Tympanic)  Resp 20  Ht 1.778 m (5' 10\")  Wt 134.7 kg (296 lb 15.4 oz)  SpO2 96%  BMI 42.61 kg/m2  Pain Management:  Denies pain  Orientation:  A&O  Cardiac:  wnl  Respiratory:  LS clear and diminished. Denies sob. No use of O2  GI:  BS active. Denies nausea. Small BM this shift.  :  Gonzales catheter in place for wound healing.  Diet: CHO.  and 175, coverage given as ordered.  Skin Issues:  Blanchable redness to skin folds and sarcrums, cream and powder applied. Dry flacky skin to back of bilat thighs. Scattered bruises and scabs.  IVF:  Saline locked.  ABX:  Keflex  Mobility:  A2 with walker and gait belt. Moves well short distances, but Pt height requires A2 for safety. Pt wears orthotic shoes with feet pointed outward  Safety:  Call light within reach. Bed and chair alarm active.    Comments:    7/8/2018  2:14 PM  Cora Ferrari        "

## 2018-07-08 NOTE — PHARMACY
[facility]    Pharmacy    Antimicrobial Stewardship Note     Current antimicrobial therapy:  Anti-infectives (Future)    Start     Dose/Rate Route Frequency Ordered Stop    07/06/18 1600  cephALEXin (KEFLEX) capsule 500 mg      500 mg Oral EVERY 6 HOURS SCHEDULED 07/06/18 1449          Indication: SSTI    Days of Therapy: 3     Pertinent labs:  Creatinine   Creatinine   Date Value Ref Range Status   07/08/2018 0.72 0.66 - 1.25 mg/dL Final   07/07/2018 0.74 0.66 - 1.25 mg/dL Final   07/06/2018 0.89 0.66 - 1.25 mg/dL Final     WBC   WBC   Date Value Ref Range Status   07/08/2018 6.3 4.0 - 11.0 10e9/L Final   07/07/2018 9.1 4.0 - 11.0 10e9/L Final   07/06/2018 7.6 4.0 - 11.0 10e9/L Final     Procalcitonin   Procalcitonin   Date Value Ref Range Status   07/05/2018 <0.05 ng/ml Final     Comment:     <0.05 ng/ml  Normal  Recommendation: Very low risk of bacterial infection.   Discourage antibiotics unless strong clinical suspicion for serious infection.     07/04/2018 <0.05 ng/ml Final     Comment:     <0.05 ng/ml  Normal  Recommendation: Very low risk of bacterial infection.   Discourage antibiotics unless strong clinical suspicion for serious infection.       CRP   CRP Inflammation   Date Value Ref Range Status   07/08/2018 35.4 (H) 0.0 - 8.0 mg/L Final   07/07/2018 28.2 (H) 0.0 - 8.0 mg/L Final   07/06/2018 51.8 (H) 0.0 - 8.0 mg/L Final     Culture Results:   7-Day Micro Results      Procedure Component Value Units Date/Time     Glucose CSF: Tube 1 [[id number]] (Abnormal) Collected: 07/06/18 1545     Order Status: Completed Lab Status: Final result Updated: 07/06/18 1632     Specimen: CSF       Glucose CSF 85 (H) mg/dL        CSF glucose concentrations are about 60 percent of normal plasma glucose.        Protein total CSF: Tube 1 [[id number]] Collected: 07/06/18 1545     Order Status: Completed Lab Status: Final result Updated: 07/06/18 1632     Specimen: CSF from Cerebral spinal fluid       Protein Total CSF 59  mg/dL      Gram stain [[id number]] Collected: 07/06/18 1545     Order Status: Completed Lab Status: Final result Updated: 07/07/18 1202     Specimen: Cerebrospinal fluid from Cerebral spinal fluid       Specimen Description Cerebrospinal fluid      Gram Stain No bacteria seen       Gram stain result is preliminary and awaits review of Microbiology Staff.       Gram stain review consistent with reported results.     CSF Culture Aerobic Bacterial [[id number]] Collected: 07/06/18 1545     Order Status: Completed Lab Status: Preliminary result Updated: 07/08/18 0721     Specimen: Cerebrospinal fluid from Cerebral spinal fluid       Specimen Description Cerebrospinal fluid      Culture Micro No growth after 2 days     Cell count with differential CSF: Tube 4 [[id number]] (Abnormal) Collected: 07/06/18 1545     Order Status: Completed Lab Status: Final result Updated: 07/06/18 1629     Specimen: CSF from Cerebral spinal fluid       WBC CSF 2 /uL       RBC CSF 20 (H) /uL      Immunoglobulin G CSF Index: Tube 4 [[id number]] Collected: 07/06/18 1545     Order Status: Completed Lab Status: In process Updated: 07/06/18 1608     Blood culture [[id number]] Collected: 07/04/18 1935     Order Status: Completed Lab Status: Preliminary result Updated: 07/08/18 1416     Specimen: Blood       Specimen Description Blood      Culture Micro No growth after 4 days     Blood culture [[id number]] Collected: 07/04/18 1914     Order Status: Completed Lab Status: Preliminary result Updated: 07/08/18 1416     Specimen: Blood       Specimen Description Blood      Special Requests --      Right Arm   6mL aerobic only         Culture Micro No growth after 4 days     Active MRSA Surveillance Culture [[id number]] Collected: 07/04/18 1655     Order Status: Completed Lab Status: Final result Updated: 07/06/18 0639     Specimen: Swab from Nose       Specimen Description Swab      Culture Micro No MRSA isolated      Recommendations/Interventions:  1. Unclear whether true bacterial  infection vs dermatitis per provider note. Regardless, plan to treat for 7 days with oral cephalexin. Skin integrity is markedly improved from admission, negative blood cultures, and WBC count wnl. No recommendations at this time. Will continue to monitor.     Jeri Stewart RPH  July 8, 2018

## 2018-07-08 NOTE — PLAN OF CARE
"/91  Pulse 118  Temp 97.8  F (36.6  C) (Temporal)  Resp 18  Ht 1.778 m (5' 10\")  Wt 134.7 kg (296 lb 15.4 oz)  SpO2 93%  BMI 42.61 kg/m2    Pt alert and oriented upon initial assessment. Denies pain. Lungs clear. BS active. HR regular. Redness to breasts, groin, and pannus unchanged. Areas kept dry and powder applied. Gonzales in place, draining adequate amounts of urine. Reminded to move in bed, does well repositioning self. No falls or injuries this shift. Will continue to monitor.     Face to face report given with opportunity to observe patient.    Report given to SYLVIA Shepherd   7/8/2018  7:24 AM      "

## 2018-07-09 NOTE — PLAN OF CARE
Problem: Patient Care Overview  Goal: Plan of Care/Patient Progress Review   07/08/18 2120   OTHER   Plan Of Care Reviewed With patient   Plan of Care Review   Progress improving     VSS. Alert, oriented, and using call light appropriately. Catheter still in place for wound healing. Buttocks, julián area, and abd folds continue to look less red. Up with assist of 2 and walker to bedside commode.     Problem: Infection, Risk/Actual (Adult)  Goal: Identify Related Risk Factors and Signs and Symptoms  Related risk factors and signs and symptoms are identified upon initiation of Human Response Clinical Practice Guideline (CPG).    07/08/18 1600   Infection, Risk/Actual   Related Risk Factors (Infection, Risk/Actual) age extremes;chronic illness/condition;exposure to microbes;poor personal hygiene;skin integrity impairment   Signs and Symptoms (Infection, Risk/Actual) weakness

## 2018-07-09 NOTE — PHARMACY
[facility]    Pharmacy      Antimicrobial Stewardship Note     Current antimicrobial therapy:  Anti-infectives (Future)    Start     Dose/Rate Route Frequency Ordered Stop    07/09/18 0000  cephALEXin (KEFLEX) 500 MG capsule      500 mg Oral EVERY 6 HOURS 07/09/18 0924 07/16/18 2359    07/06/18 1600  cephALEXin (KEFLEX) capsule 500 mg      500 mg Oral EVERY 6 HOURS SCHEDULED 07/06/18 1449            Indication: SSTI     Days of Therapy: 4     Pertinent labs:  Creatinine   Creatinine   Date Value Ref [facility] Status   07/09/2018 0.79 0.66 - 1.25 mg/dL Final   07/08/2018 0.72 0.66 - 1.25 mg/dL Final   07/07/2018 0.74 0.66 - 1.25 mg/dL Final     WBC   WBC   Date Value Ref Range Status   07/09/2018 7.3 4.0 - 11.0 10e9/L Final   07/08/2018 6.3 4.0 - 11.0 10e9/L Final   07/07/2018 9.1 4.0 - 11.0 10e9/L Final     Procalcitonin   Procalcitonin   Date Value Ref Range Status   07/05/2018 <0.05 ng/ml Final     Comment:     <0.05 ng/ml  Normal  Recommendation: Very low risk of bacterial infection.   Discourage antibiotics unless strong clinical suspicion for serious infection.     07/04/2018 <0.05 ng/ml Final     Comment:     <0.05 ng/ml  Normal  Recommendation: Very low risk of bacterial infection.   Discourage antibiotics unless strong clinical suspicion for serious infection.       CRP   CRP Inflammation   Date Value Ref [facility] Status   07/08/2018 35.4 (H) 0.0 - 8.0 mg/L Final   07/07/2018 28.2 (H) 0.0 - 8.0 mg/L Final   07/06/2018 51.8 (H) 0.0 - 8.0 mg/L Final       Culture Results:   Procedure Component Value Units Date/Time      Glucose CSF: Tube 1 [[id number]] (Abnormal) Collected: 07/06/18 1545     Order Status: Completed Lab Status: Final result Updated: 07/06/18 1632     Specimen: CSF       Glucose CSF 85 (H) mg/dL        CSF glucose concentrations are about 60 percent of normal plasma glucose.        Protein total CSF: Tube 1 [[id number]] Collected: 07/06/18 1545     Order Status: Completed Lab Status: Final result  Updated: 07/06/18 1632     Specimen: CSF from Cerebral spinal fluid       Protein Total CSF 59 mg/dL      Gram stain [[id number]] Collected: 07/06/18 1545     Order Status: Completed Lab Status: Final result Updated: 07/07/18 1202     Specimen: Cerebrospinal fluid from Cerebral spinal fluid       Specimen Description Cerebrospinal fluid      Gram Stain No bacteria seen       Gram stain result is preliminary and awaits review of Microbiology Staff.       Gram stain review consistent with reported results.     CSF Culture Aerobic Bacterial [[id number]] Collected: 07/06/18 1545     Order Status: Completed Lab Status: Preliminary result Updated: 07/09/18 0736     Specimen: Cerebrospinal fluid from Cerebral spinal fluid       Specimen Description Cerebrospinal fluid      Culture Micro No growth after 3 days     Cell count with differential CSF: Tube 4 [[id number]] (Abnormal) Collected: 07/06/18 1545     Order Status: Completed Lab Status: Final result Updated: 07/06/18 1629     Specimen: CSF from Cerebral spinal fluid       WBC CSF 2 /uL       RBC CSF 20 (H) /uL      Immunoglobulin G CSF Index: Tube 4 [[id number]] Collected: 07/06/18 1545     Order Status: Completed Lab Status: In process Updated: 07/06/18 1608     Blood culture [[id number]] Collected: 07/04/18 1935     Order Status: Completed Lab Status: Preliminary result Updated: 07/08/18 1416     Specimen: Blood       Specimen Description Blood      Culture Micro No growth after 4 days     Blood culture [[id number]] Collected: 07/04/18 1914     Order Status: Completed Lab Status: Preliminary result Updated: 07/08/18 1416     Specimen: Blood       Specimen Description Blood      Special Requests --      Right Arm   6mL aerobic only         Culture Micro No growth after 4 days     Active MRSA Surveillance Culture [[id number]] Collected: 07/04/18 1655     Order Status: Completed Lab Status: Final result Updated: 07/06/18 0639     Specimen: Swab from Nose       Specimen Description Swab       Culture Micro No MRSA isolated          Recommendations/Interventions:  1. Unclear whether true bacterial infection vs dermatitis per provider note. Regardless, plan to treat for 7 days with oral cephalexin. Skin integrity is markedly improved from admission, negative blood cultures, and WBC count wnl. No recommendations at this time. Will continue to monitor.    REGINA Aviles Student  July 9, 2018

## 2018-07-09 NOTE — PROGRESS NOTES
Spoke with Misty at [facility], she will continue screening process.  Still waiting for [employer] response for prior authorization.

## 2018-07-09 NOTE — PLAN OF CARE
"/76  Pulse 118  Temp 97.6  F (36.4  C) (Temporal)  Resp 16  Ht 1.778 m (5' 10\")  Wt 134.7 kg (296 lb 15.4 oz)  SpO2 95%  BMI 42.61 kg/m2    Pt alert and oriented upon initial assessment. C/O shoulder pain 7/10. Tylenol given. Lungs clear. BS active. HR regular. Able to turn self in bed when reminded t/o night. Breasts, pannus, and groin continues red but improving. Gonzales pulled this shift. Adequate output. No falls or injuries this shift. Will continue to monitor.     Face to face report given with opportunity to observe patient.    Report given to SYLVIA Lee   7/9/2018  7:33 AM      "

## 2018-07-09 NOTE — PROGRESS NOTES
Accepted to [facility].  Ride with [employer] arranged for 2:30.  Alta Pedroza aware.  [place] at [facility].  Faxed orders      PAS-RR    Per DHS regulation, CTS team completed and submitted PAS-RR to [place] [employer] Direct Connect via the Senior LinkAge Line. CTS team advised SNF and they are aware a PAS-RR has been submitted.     CTS team reviewed with pt or health care agent that they may be contacted for a follow up appointment within 10 days of hospital discharge if SNF stay is <30 days. Contact information for [facility] LinkAge Line was also provided.     Pt or health care agent verbalized understanding.     PAS-RR # [id number]     Name: Jose Antonio Marmolejo    MRN#: [id number]    Reason for Hospitalization: Acute kidney injury (H) [N17.9]  Recurrent falls [R29.6]  Decubitus ulcer of buttock, unspecified laterality, unspecified ulcer stage [L89.309]    MARY KATE: elevated     Discharge Date: 7/9/2018    Patient / Family response to discharge plan: agreeable     Follow-Up Appt: Future Appointments  Date Time Provider Department Center   7/10/2018 8:30 AM Suzi Hutson, PT [facility]   7/10/2018 10:30 AM Licha Minor, C[facility]       Other Providers (Care Coordinator, County Services, PCA services etc): Yes: see above     Discharge Disposition: nursing home- [facility]     Cora Islas

## 2018-07-09 NOTE — DISCHARGE SUMMARY
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  Please see admission H+P for additional details.    Hospital Course   Jose Antonio Marmolejo is a 72M with h/o weakness and falling at home was admitted on 7/4/2018 after a fall at home and the inability to care for himself.  He was found to have a cellulitis versus a dermatitis of his buttocks, posterior thighs, and peritoneum most likely 2/2 poor hygiene and inability to care for himself.  This was treated with empiric vancomycin with good improvement.  Blood cultures were negative, WBC normalized, and he was switched to oral keflex without difficulty with continued improvement.  A nesbitt catheter was placed in order for healing to take place with out contamination, and this was removed prior to discharge.    As far as his recurrent falls, he has been diagnosed with a demyelinating process in the past, however he never followed up with neurology as an outpatient like he was supposed to.  A repeat MRI of his brain and spine were done during this admission which showed progression of white matter demyelinating lesions suspicious for multiple sclerosis.  A lumbar puncture was then subsequently obtained, oligoclonal bands and IgG index is pending.  He has f/u appointment with Dr. Saucedo at [facility] neurology on 7/16.  The patient is encouraged to make this appointment.    He is now  medically stable to discharge to SNF.    Carlie Call MD      Significant Results and Procedures   See below.    Pending Results   These results will be followed up by Juanita Briggs    Unresulted Labs Ordered in the Past 30 Days of this Admission     Date and Time Order Name Status Description    7/6/2018 1553 Oligoclonal banding In process     7/6/2018 1413 Immunoglobulin G CSF Index: Tube 4 In process     7/6/2018 1413 CSF Culture Aerobic Bacterial Preliminary     7/4/2018 1849 Blood culture Preliminary     7/4/2018 1849 Blood culture Preliminary           Code Status   Full Code       Primary Care Physician   Juanita Briggs    Physical Exam   Temp: 97.2  F (36.2  C) Temp src: Tympanic BP: 148/64   Heart Rate: 81 Resp: 18 SpO2: 95 % O2 Device: None (Room air)    Vitals:    07/04/18 1700   Weight: 134.7 kg (296 lb 15.4 oz)     Vital Signs with Ranges  Temp:  [97.2  F (36.2  C)-97.9  F (36.6  C)] 97.2  F (36.2  C)  Heart Rate:  [65-81] 81  Resp:  [16-18] 18  BP: (129-156)/(64-76) 148/64  SpO2:  [95 %-98 %] 95 %  I/O last 3 completed shifts:  In: 2400 [P.O.:2400]  Out: 4115 [Urine:4115]    Constitutional: AA, NAD  Eyes: PERRLA, no injection, no icterus  HEENT: atraumatic, normocephalic  Respiratory: CTA b/l  Cardiovascular: S1 S2 RRR  GI: soft, NT, ND, + bowel sounds  Lymph/Hematologic: no palpable lymphadenopathy  Skin: no rashes, no lesions  Musculoskeletal: No edema, good tone, no deformities  Neurologic: oriented x 3, no focal deficits  Psychiatric: appropriate affect    # Discharge Pain Plan:   - Patient currently has NO PAIN and is not being prescribed pain medications on discharge.      Discharge Disposition   Discharged to rehabilitation facility  Condition at discharge: Stable    Consultations This Hospital Stay   SOCIAL WORK IP CONSULT  PHYSICAL THERAPY ADULT IP CONSULT  PHARMACY TO DOSE VANCO  WOUND OSTOMY CONTINENCE NURSE  IP CONSULT  OCCUPATIONAL THERAPY ADULT IP CONSULT  PHYSICAL THERAPY ADULT IP  CONSULT  OCCUPATIONAL THERAPY ADULT IP CONSULT    Time Spent on this Encounter   Carlie ARANGO MD, personally saw the patient today and spent greater than 30 minutes discharging this patient.    Discharge Orders     Follow Up and recommended labs and tests   Patient to have consultation with Dr. Saucedo, Neurology at [facility], on July 17, 2018 at 8:30 AM for evaluation of possible multiple sclerosis. Patient to arrive 15 minutes early as he is first appointment of the day. Location: [address] [place] [address]. Located on 5th floor and use Green parking ramp     General info for SNF   Length of Stay Estimate: Short Term Care: Estimated # of Days <30  Condition at Discharge: Stable  Level of care:skilled   Rehabilitation Potential: Fair  Admission H&P remains valid and up-to-date: Yes  Recent Chemotherapy: N/A  Use Nursing Home Standing Orders: Yes     Mantoux instructions   Give two-step Mantoux (PPD) Per Facility Policy Yes     Reason for your hospital stay   Recurrent falls, cellulitis     Glucose monitor nursing POCT   Once daily AM     Activity - Up with nursing assistance     Follow Up and recommended labs and tests   Follow up with residential physician.  The following labs/tests are recommended: none.     Full Code     Physical Therapy Adult Consult   Evaluate and treat as clinically indicated.    Reason:  weakness     Occupational Therapy Adult Consult   Evaluate and treat as clinically indicated.    Reason:  weakness     Fall precautions     Advance Diet as Tolerated   Follow this diet upon discharge: Orders Placed This Encounter     Combination Diet Regular Diet Adult; 4918-4517 Calories: Moderate Consistent CHO (4-6 CHO units/meal)       Discharge Medications   Current Discharge Medication List      START taking these medications    Details   cephALEXin (KEFLEX) 500 MG capsule Take 1 capsule (500 mg) by mouth every 6 hours for 7 days  Qty: 28 capsule, Refills: 0    Associated Diagnoses:  Recurrent falls; Acute kidney injury (H); Decubitus ulcer of buttock, unspecified laterality, unspecified ulcer stage; Cellulitis of buttock         CONTINUE these medications which have NOT CHANGED    Details   acetaminophen (TYLENOL) 325 MG tablet Take 2 tablets (650 mg) by mouth every 4 hours as needed for mild pain  Qty: 100 tablet    Associated Diagnoses: Fall, initial encounter; Weakness      Blood Pressure Monitoring (ADULT BLOOD PRESSURE CUFF LG) KIT 1 each 2 times daily  Qty: 1 kit, Refills: 0    Comments: Check blood pressures twice per day: before breakfast and at bedtime.  Keep a log.  Associated Diagnoses: Benign essential hypertension; Malignant essential hypertension      hydrochlorothiazide (MICROZIDE) 12.5 MG capsule Take 1 capsule (12.5 mg) by mouth daily  Qty: 30 capsule    Associated Diagnoses: Essential hypertension      lisinopril (PRINIVIL/ZESTRIL) 40 MG tablet Take 1 tablet (40 mg) by mouth daily  Qty: 30 tablet    Associated Diagnoses: Essential hypertension      metoprolol (TOPROL-XL) 50 MG 24 hr tablet Take 1 tablet (50 mg) by mouth daily  Qty: 30 tablet    Associated Diagnoses: Essential hypertension      miconazole (MICATIN; MICRO GUARD) 2 % powder Apply topically 2 times daily  Qty: 90 g, Refills: 1    Associated Diagnoses: Yeast infection of the skin           Allergies   Allergies   Allergen Reactions     Penicillins      Data   Most Recent 3 CBC's:  Recent Labs   Lab Test  07/09/18 0527 07/08/18   0541  07/07/18   0519   WBC  7.3  6.3  9.1   HGB  14.7  15.5  13.8   MCV  86  87  85   PLT  281  232  251      Most Recent 3 BMP's:  Recent Labs   Lab Test  07/09/18   0527  07/08/18   0541  07/07/18   0519   NA  138  139  138   POTASSIUM  3.8  3.9  3.4   CHLORIDE  106  108  106   CO2  24  24  24   BUN  13  15  13   CR  0.79  0.72  0.74   ANIONGAP  8  7  8   STALIN  8.4*  8.6  8.0*   GLC  195*  155*  170*     Most Recent 2 LFT's:  Recent Labs   Lab Test  07/05/18   0522  07/04/18   1452    AST  12  5   ALT  14  18   ALKPHOS  51  65   BILITOTAL  0.5  1.0     Most Recent INR's and Anticoagulation Dosing History:  Anticoagulation Dose History     There is no flowsheet data to display.        Most Recent 3 Troponin's:No lab results found.  Most Recent Cholesterol Panel:No lab results found.  Most Recent 6 Bacteria Isolates From Any Culture (See EPIC Reports for Culture Details):  Recent Labs   Lab Test  07/06/18   1545  07/04/18   1935  07/04/18   1914  07/04/18   1655  05/05/18   0047  03/21/18   2107   CULT  No growth after 3 days  No growth after 4 days  No growth after 4 days  No MRSA isolated  No MRSA isolated  No MRSA isolated     Most Recent TSH, T4 and A1c Labs:  Recent Labs   Lab Test  07/04/18 1915 03/22/18   0526   TSH   --   1.05   A1C  6.5*   --      Results for orders placed or performed during the hospital encounter of 07/04/18   MR Thoracic Spine w/o & w Contrast    Narrative    PROCEDURE: MR THORACIC SPINE W/O & W CONTRAST, 7/6/2018 10:30 AM     HISTORY:Male, age 72 years, enhancing white matter lesions on previous  brain MRI, eval for multiple sclerosis;     COMPARISON: None    TECHNIQUE: Sagittal T1, proton density, T2 fat saturation and T1  postcontrast with fat saturation; axial proton density, T2 and T1  postcontrast with fat saturation.    FINDINGS:  Curvature: The thoracic spine demonstrates normal kyphotic curvature.  There is slight scoliotic curvature, convex left with the apex at T7.    Thoracic spinal cord: Normal.     Osseous structures: Endplate degenerative changes at C6-7.    C7-T1: Normal..               T1-2: Normal disc. Mild endplate changes..              T2-3: Mild disc and endplate degenerative changes..     T3-4: Mild disc and endplate degenerative changes..             T4-5: Mild disc degeneration. 4 mm posterior central disc protrusion.  No evidence of cord compression or significant foraminal  narrowing/central canal narrowing.     T5-6: Mild disc, endplate  degenerative changes..               T6-7: Mild disc, endplate and facet joint degenerative change.              T7-8: 6 mm right parasagittal disc protrusion abutting the ventral  aspect of the thecal sac. No evidence of cord compression. Mild  endplate degenerative change.     T8-9: Mild disc and endplate degenerative change..             T9-10: Mild to moderate endplate degenerative changes. Slight bulge..     T10-11: Mild disc and endplate degenerative changes..               T11-12: Mild bulge. Minimal endplate degenerative change..              T12-L1: Normal..     Paraspinous musculature: Mild generalized atrophy.    Retroperitoneal soft tissues and visualized portions of the lungs:  Normal.      Impression    IMPRESSION:   1.  No evidence of spinal cord lesion. No abnormal enhancement.    2.  Mild to moderate multilevel degenerative change with 6 mm right  parasagittal disc protrusion at T7-8. No evidence of significant  central canal or foraminal narrowing.     IGGY BROWN MD   MR Cervical Spine w/o & w Contrast    Narrative    PROCEDURE: MR CERVICAL SPINE W/O & W CONTRAST  7/6/2018 10:12 AM    HISTORY: Male, age 72 years, enhancing white matter lesions on  previous brain MRI, eval for multiple sclerosis;     COMPARISON: MRI of the brain 1/2/2018    TECHNIQUE: Sagittal T1, proton density, T2 fat saturation and T1  postcontrast with fat saturation; axial proton density, T2 and T1  postcontrast with fat saturation.    FINDINGS:  Curvature: The cervical spine demonstrates normal lordotic curvature.    Atlantoaxial and atlanto-occipital joints: Mild degenerative changes.    Cervical spinal cord: 13 mm nonenhancing lesion at the atlantoaxial  junction. 12 mm lesion seen within the cervical spinal cord along the  caudal margin of C2. No evidence of abnormal enhancement.    C2-3: Slight bulge. Mild endplate and facet joint degenerative change.             C3-4: Normal disc. Minimal endplate change. Moderate  left facet joint  degeneration with reactive edema.         C4-5: Normal disc. Minimal endplate degeneration. Mild to moderate  bilateral facet joint degeneration, worse on the right. Uncinate  spurring contributes to moderate right foraminal stenosis.              C5-6: Chronic bulge. Uncinate spurring. Moderate to severe left and  severe right foraminal stenosis.    C6-7: Chronic bulge. Mild endplate degenerative changes. Moderate left  and moderately severe right foraminal stenosis. Mild to moderate  bilateral facet joint degeneration.    C7-T1: 5 mm left parasagittal disc protrusion. Mild endplate changes.  Mild to moderate bilateral facet joint degenerative change.                Visualized thoracic spine: Mild degenerative changes.    Paraspinous musculature: Mildly atrophic.    Soft tissues: Skin and subcutaneous fat are normal.      Impression    IMPRESSION:   1.  Nonenhancing white matter lesions in the upper cervical spine as  described, concerning for demyelinating process.    2.  Multilevel degenerative change of the cervical spine with  bilateral foraminal stenosis at C4-5, C5-6 and C6-7 as described  above.    3.  Prominent degenerative changes within the right C3-4 and C4-5  facet joints.     IGGY BROWN MD   MR Brain w/o & w Contrast    Narrative    MR BRAIN W/O & W CONTRAST  7/6/2018 10:04 AM    History: Male, age 72 years, enhancing white matter lesions on  previous brain MRI, eval for multiple sclerosis;     Comparison: MRI brain 1/2/2018    Technique: Sagittal T1, axial T2, T2 FLAIR, diffusion, gradient echo,  ADC mapping, T1 and 3 plane T1 fat saturation postcontrast 3-D. .    Findings:   Ventricles and sulci: Mildly prominent.    Gray and white matter: Scattered, nonenhancing white matter lesions  suggesting small vessel disease.    Cerebellopontine angles: Clear    Extra-axial spaces: Clear.    Enhancement: 3 mm focal enhancement along the medial aspect of the  left temporal horn. This  appears be new when compared to prior  examination.    Midline structures: Normal in contour.  Globes: Normal.  Orbits: Normal. Optic nerves are symmetric without evidence of  abnormal enhancement.  Extraocular muscles: Normal.  Paranasal sinuses: Bilateral maxillary sinus mucus retention cysts.  Air-fluid levels are seen previously have resolved.  Mastoid air cells: Normal.  Diffusion: Normal.      Impression    Impression:  1.  Scattered, nonspecific white matter changes suggest small vessel  disease there is no evidence of acute or subacute ischemia.      2.  3 mm enhancing focus lying along the medial aspect of the left  temporal horn is new when compared to prior examination. White matter  changes are otherwise similar in appearance when compared to the prior  study.    3.  Mucous retention cysts of the maxillary sinuses and sphenoid  locules.    4.  Bilateral maxillary sinus air-fluid levels as seen previously have  resolved.    IGGY BROWN MD   IR Lumbar Puncture    Narrative    IR LUMBAR PUNCTURE    HISTORY: 72 yearsMale evaluation for possible multiple sclerosis;     TECHNIQUE:Informed consent was obtained. A timeout was performed. The  patient was sterilely prepped and draped. Local injections of  lidocaine were performed.    Using fluoroscopic guidance diagnostic lumbar puncture was performed  with a 22-gauge spinal needle at the L4-L5 level. A total of 12 cc of  clear CSF was collected in 4 separate tubes. Total fluoroscopic time  was 13 seconds. The tubes were labeled with the patient's name and  medical record number and sent to lab for analysis.          Impression    IMPRESSION: Successful fluoroscopic guided diagnostic lumbar puncture.    ADELFO ROSE MD

## 2018-07-09 NOTE — PLAN OF CARE
Problem: Patient Care Overview  Goal: Plan of Care/Patient Progress Review  Outcome: Declining  Discharge Planner PT   Patient plan for discharge: only agreeable to short term rehab if it is located in [facility]  Current status: sit>stand min A with instability noted upon standing.  Pt ambulated 20' with 4WW mod A with significantly more instability noted today, running 4WW into walls and object to help stabilize.   Barriers to return to prior living situation: requires mod A for safety with ambulation, significant risk for continued falls and injury, lives alone  Recommendations for discharge: short term rehab  Rationale for recommendations: Pt's functional mobility has declined since Friday's treatment session with significantly more instability noted today.  Pt not safe to be at home alone, needs mod A for all mobility       Entered by: Suzi Hutson PT 07/09/2018 11:13 AM

## 2018-07-09 NOTE — PROGRESS NOTES
Received call from Nette with [facility], they approved 5 days of rehab for Jose Antonio.  Updated Misty with this information     11:15- left message with Misty to follow up on referral.

## 2018-07-09 NOTE — PLAN OF CARE
Problem: Patient Care Overview  Goal: Plan of Care/Patient Progress Review  Outcome: No Change  Approached pt for OT, pt declined therapy as he said he had a lot on his mind and did not want to do anything right now. Will attempt tomorrow.

## 2018-07-10 NOTE — PLAN OF CARE
Problem: Patient Care Overview  Goal: Plan of Care/Patient Progress Review  Outcome: Adequate for Discharge Date Met: 07/10/18  Occupational Therapy Discharge Summary    Reason for therapy discharge:    Discharged to transitional care facility.    Progress towards therapy goal(s). See goals on Care Plan in [facility] electronic health record for goal details.  Goals partially met.  Barriers to achieving goals:   discharge from facility.    Therapy recommendation(s):    Continued therapy is recommended.  Rationale/Recommendations:  pt has decreased ADL function and activity tolerance.

## 2018-07-25 NOTE — ED AVS SNAPSHOT
[facility]    [address] [facility] [address]    Phone:  [phone]                                       Jose Antonio Marmolejo   MRN: [id number]    Department:  [facility]   Date of Visit:  7/25/2018           Patient Information     Date Of Birth          1945        Your diagnoses for this visit were:     Transient alteration of awareness        You were seen by Valeriano Vick MD.      Follow-up Information     Schedule an appointment as soon as possible for a visit with Juanita Briggs MD.    Specialty:  Family Practice    Contact information:    [address] [facility] [address]  [phone]          Discharge Instructions         Altered Level of Consciousness  Level of consciousness (LOC) is a measure of a person s ability to interact with other people and to react to the surroundings. A person with an altered level of consciousness may not respond to touch or voices. He or she may look vacant or blank and may not make eye contact with others. He or she may be limp and may not move for a long time or show little interest in moving. He or she may also be confused.  There are many causes of altered LOC. They include low blood sugar, infection, medicines, injuries, or other medical problems.  Altered LOC is a medical emergency. The healthcare provider will do tests to help find the cause. These may include blood tests and imaging tests. The person is treated so breathing and heart rate are stable. An intravenous (IV) line may be put into a vein in the arm or hand to give medicines. Once the cause of altered LOC is found, the goal is to treat the cause.  In almost all cases, the person will be admitted to the hospital for observation.  Home care  When your loved one is released from the hospital, you will be given guidelines for caring for him or her. In general:    Follow the healthcare provider's instructions for giving any prescribed medicines to your child.    Stay  with your loved one or have another responsible adult look after him or her. Watch carefully for any return of symptoms or changes in behavior.    If the person has diabetes, make sure that any approved medicines are given on time and as prescribed.  Follow-up care  Follow up with the healthcare provider or our staff.  When to seek medical advice  Call the healthcare provider right away for any of the following:    Symptoms of altered LOC return    New symptoms appear  Date Last Reviewed: 8/23/2015 2000-2017 [employer]. [address] [place] [address]. All rights reserved. This information is not intended as a substitute for professional medical care. Always follow your healthcare professional's instructions.             Review of your medicines      Our records show that you are taking the medicines listed below. If these are incorrect, please call your family doctor or clinic.        Dose / Directions Last dose taken    acetaminophen 325 MG tablet   Commonly known as:  TYLENOL   Dose:  650 mg   Quantity:  100 tablet        Take 2 tablets (650 mg) by mouth every 4 hours as needed for mild pain   Refills:  0        Adult Blood Pressure Cuff Lg Kit   Dose:  1 each   Quantity:  1 kit        1 each 2 times daily   Refills:  0        hydrochlorothiazide 12.5 MG capsule   Commonly known as:  MICROZIDE   Dose:  12.5 mg   Quantity:  30 capsule        Take 1 capsule (12.5 mg) by mouth daily   Refills:  0        lisinopril 40 MG tablet   Commonly known as:  PRINIVIL/ZESTRIL   Dose:  40 mg   Quantity:  30 tablet        Take 1 tablet (40 mg) by mouth daily   Refills:  0        metoprolol succinate 50 MG 24 hr tablet   Commonly known as:  TOPROL-XL   Dose:  50 mg   Quantity:  30 tablet        Take 1 tablet (50 mg) by mouth daily   Refills:  0        miconazole 2 % powder   Commonly known as:  MICATIN; MICRO GUARD   Quantity:  90 g        Apply topically 2 times daily   Refills:  1               "  Procedures and tests performed during your visit     CBC with platelets differential    CT Head w/o Contrast    Comprehensive metabolic panel    UA with Microscopic reflex to Culture    Urine Culture Aerobic Bacterial    XR Chest 1 View      Orders Needing Specimen Collection     None      Pending Results     Date and Time Order Name Status Description    2018 2356 Urine Culture Aerobic Bacterial In process     2018 2236 XR Chest 1 View In process     2018 2236 CT Head w/o Contrast In process             Pending Culture Results     Date and Time Order Name Status Description    2018 2356 Urine Culture Aerobic Bacterial In process             Thank you for choosing [facility]       Thank you for choosing [facility] for your care. Our goal is always to provide you with excellent care. Hearing back from our patients is one way we can continue to improve our services. Please take a few minutes to complete the written survey that you may receive in the mail after you visit with us. Thank you!        [employer] Information     [employer] lets you send messages to your doctor, view your test results, renew your prescriptions, schedule appointments and more. To sign up, go to [email or web address] . Click on \"Log in\" on the left side of the screen, which will take you to the Welcome page. Then click on \"Sign up Now\" on the right side of the page.     You will be asked to enter the access code listed below, as well as some personal information. Please follow the directions to create your username and password.     Your access code is: [email or web address]  Expires: 2018 11:57 AM     Your access code will  in 90 days. If you need help or a new code, please call your [facility] clinic or [phone].        Care EveryWhere ID     This is your Care EveryWhere ID. This could be used by other organizations to access your [facility] medical records  [id number]        Equal Access to Services     JEFF SALINAS AH: Hadii " charles Brower, rochelle fletcher, viki lira. So [facility] [phone].    ATENCIÓN: Si habla español, tiene a garcia disposición servicios gratuitos de asistencia lingüística. Llame al [phone].    We comply with applicable federal civil rights laws and [place] laws. We do not discriminate on the basis of race, color, national origin, age, disability, sex, sexual orientation, or gender identity.            After Visit Summary       This is your record. Keep this with you and show to your community pharmacist(s) and doctor(s) at your next visit.

## 2018-07-25 NOTE — ED AVS SNAPSHOT
[facility]    [address]    [facility] [address]    Phone:  [phone]                                       Jose Antonio Marmolejo   MRN: [id number]    Department:  [facility]   Date of Visit:  7/25/2018           After Visit Summary Signature Page     I have received my discharge instructions, and my questions have been answered. I have discussed any challenges I see with this plan with the nurse or doctor.    ..........................................................................................................................................  Patient/Patient Representative Signature      ..........................................................................................................................................  Patient Representative Print Name and Relationship to Patient    ..................................................               ................................................  Date                                            Time    ..........................................................................................................................................  Reviewed by Signature/Title    ...................................................              ..............................................  Date                                                            Time

## 2018-07-26 NOTE — ED NOTES
To CT via stretcher.  Patient thought he was able to stand but became unsteady and had to have assist to transfer him to the CT table.

## 2018-07-26 NOTE — ED NOTES
Family is very upset.  States they do not want to leave here until they have some answers as in why the patient is confused.  Patient continues to be very confuse and answers questions inappropriately.  VSS.  Dr. Vick updated re; families concerns.

## 2018-07-26 NOTE — ED NOTES
Suhail from [employer] [employer] phoned and will be here in 45 minutes to transport pt to [facility].

## 2018-07-26 NOTE — ED NOTES
IV was removed.  DC plan reviewed with patient and family.  Patient cleaned up and new depend on.  Waiting for [employer] for transportation.

## 2018-07-26 NOTE — ED NOTES
"Patient presents from [facility] for evaluation.  Reports that the patient is in the nursing home for treatment of cellulitis, falls and \"non-compliance\"  Tonight patient was having periods of worsening confusion and low oxygen sats.  sats at the nursing home 84 % on RA, patient was placed on oxygen and sats improved but patient continued to have confusion.  Patient is able to state his name and  but then has inappropriate answer for other questions.  Patient also reports that he feels like he is confused and doesn't understand \"things\" that are happening around him.  Patient has a negative FAST.  Skin is pink, slightly cool but otherwise intact.  IV lock in place from pre-hospital.  Call light within reach.  "

## 2018-07-26 NOTE — DISCHARGE INSTRUCTIONS
Altered Level of Consciousness  Level of consciousness (LOC) is a measure of a person s ability to interact with other people and to react to the surroundings. A person with an altered level of consciousness may not respond to touch or voices. He or she may look vacant or blank and may not make eye contact with others. He or she may be limp and may not move for a long time or show little interest in moving. He or she may also be confused.  There are many causes of altered LOC. They include low blood sugar, infection, medicines, injuries, or other medical problems.  Altered LOC is a medical emergency. The healthcare provider will do tests to help find the cause. These may include blood tests and imaging tests. The person is treated so breathing and heart rate are stable. An intravenous (IV) line may be put into a vein in the arm or hand to give medicines. Once the cause of altered LOC is found, the goal is to treat the cause.  In almost all cases, the person will be admitted to the hospital for observation.  Home care  When your loved one is released from the hospital, you will be given guidelines for caring for him or her. In general:    Follow the healthcare provider's instructions for giving any prescribed medicines to your child.    Stay with your loved one or have another responsible adult look after him or her. Watch carefully for any return of symptoms or changes in behavior.    If the person has diabetes, make sure that any approved medicines are given on time and as prescribed.  Follow-up care  Follow up with the healthcare provider or our staff.  When to seek medical advice  Call the healthcare provider right away for any of the following:    Symptoms of altered LOC return    New symptoms appear  Date Last Reviewed: 8/23/2015 2000-2017 [employer]. [address] [place] [address], [place] [address]. All rights reserved. This information is not intended as a substitute for professional medical  care. Always follow your healthcare professional's instructions.

## 2018-07-26 NOTE — ED NOTES
[facility] is here.  Paperwork given.  Patient into wheelchair with assist of 3.  Back to nursing home.  Nursing home nurse called and was given an update.

## 2018-07-26 NOTE — TELEPHONE ENCOUNTER
[facility].  He had a referral for scheduling a Neurology appointment.  I placed a call and left a message for Juanita to see if they are able to help to facilitate this plan.

## 2018-07-26 NOTE — ED NOTES
Face to face report given with opportunity to observe patient.    Report given to Cora HUGO   7/25/2018  11:11 PM

## 2018-07-27 NOTE — ED PROVIDER NOTES
History     Chief Complaint   Patient presents with     Altered Mental Status     Shortness of Breath     low sats at the NH 84% on RA     Patient is a 72 year old male presenting with altered mental status. The history is provided by the patient and a relative.   Altered Mental Status   Presenting symptoms: confusion and disorientation    Severity:  Mild  Episode history:  Single  Timing:  Sporadic  Progression:  Resolved  Chronicity:  Recurrent  Associated symptoms: no abdominal pain, no fever, no headaches, no light-headedness, no nausea, no palpitations, no rash, no vomiting and no weakness          Problem List:    Patient Active Problem List    Diagnosis Date Noted     Cellulitis 07/04/2018     Priority: Medium     Pain management 05/04/2018     Priority: Medium     Nursing Home Visit 03/30/2018     Priority: Medium     Recurrent falls      Priority: Medium     Fall 03/21/2018     Priority: Medium     Falls frequently 12/31/2017     Priority: Medium     Type 2 diabetes mellitus without complication (H) 08/19/2016     Priority: Medium     Weakness of both lower extremities 08/19/2016     Priority: Medium     Yeast infection of the skin 08/19/2016     Priority: Medium     Essential hypertension 08/17/2016     Priority: Medium     Falling episodes 08/17/2016     Priority: Medium     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     Priority: Medium        Past Medical History:    Past Medical History:   Diagnosis Date     Arthritis      Diabetes (H)      Hypertension      Recurrent falls        Past Surgical History:    Past Surgical History:   Procedure Laterality Date     ORTHOPEDIC SURGERY      left ankle and removal of hardware       Family History:    Family History   Problem Relation Age of Onset     Other - See Comments Mother      pneumonia     Obesity Mother      Other Cancer Father      hodgkins lymphoma     Coronary Artery Disease Father      pacemaker     Other Cancer Sister      unknown cancers      "Colon Cancer Son        Social History:  Marital Status:  Single [1]  Social History   Substance Use Topics     Smoking status: Former Smoker     Quit date: 1/1/1978     Smokeless tobacco: Never Used     Alcohol use No      Comment: quit 1977, previously an alcoholic        Medications:      acetaminophen (TYLENOL) 325 MG tablet   hydrochlorothiazide (MICROZIDE) 12.5 MG capsule   lisinopril (PRINIVIL/ZESTRIL) 40 MG tablet   metoprolol (TOPROL-XL) 50 MG 24 hr tablet   miconazole (MICATIN; MICRO GUARD) 2 % powder   Blood Pressure Monitoring (ADULT BLOOD PRESSURE CUFF LG) KIT         Review of Systems   Constitutional: Negative for chills, diaphoresis and fever.   HENT: Negative for voice change.    Eyes: Negative for visual disturbance.   Respiratory: Negative for cough, chest tightness, shortness of breath and wheezing.    Cardiovascular: Negative for chest pain, palpitations and leg swelling.   Gastrointestinal: Negative for abdominal distention, abdominal pain, anal bleeding, blood in stool, nausea and vomiting.   Genitourinary: Negative for decreased urine volume, dysuria, flank pain and frequency.   Musculoskeletal: Negative for back pain, gait problem, myalgias and neck pain.   Skin: Negative for color change, pallor and rash.   Neurological: Negative for dizziness, syncope, weakness, light-headedness, numbness and headaches.   Psychiatric/Behavioral: Positive for confusion. Negative for sleep disturbance and suicidal ideas. The patient is nervous/anxious.        Physical Exam   BP: 119/66  Heart Rate: 91  Temp: 97.8  F (36.6  C)  Resp: 18  Height: 177.8 cm (5' 10\")  Weight: 117.9 kg (260 lb) (STATED WEIGHT)  SpO2: 93 % (Simultaneous filing. User may not have seen previous data.)      Physical Exam   Constitutional: He is oriented to person, place, and time. He appears well-developed and well-nourished.   HENT:   Head: Normocephalic and atraumatic.   Mouth/Throat: No oropharyngeal exudate.   Eyes: Conjunctivae " are normal. Pupils are equal, round, and reactive to light.   Neck: Normal range of motion. Neck supple. No JVD present. No tracheal deviation present. No thyromegaly present.   Cardiovascular: Normal rate, regular rhythm, normal heart sounds and intact distal pulses.  Exam reveals no gallop and no friction rub.    No murmur heard.  Pulmonary/Chest: Effort normal and breath sounds normal. No stridor. No respiratory distress. He has no wheezes. He has no rales. He exhibits no tenderness.   Abdominal: Soft. Bowel sounds are normal. He exhibits no distension and no mass. There is no tenderness. There is no rebound and no guarding.   Musculoskeletal: Normal range of motion. He exhibits no edema or tenderness.   Lymphadenopathy:     He has no cervical adenopathy.   Neurological: He is alert and oriented to person, place, and time.   Skin: Skin is warm and dry. No rash noted. No erythema. No pallor.   Psychiatric: His speech is normal and behavior is normal. Judgment normal. His mood appears anxious. His affect is labile. Cognition and memory are normal.   Nursing note and vitals reviewed.      ED Course     ED Course     Procedures             No results found for this or any previous visit (from the past 24 hour(s)).    Medications - No data to display    Assessments & Plan (with Medical Decision Making)   Had an episode of disorientation/ confusion in NH, spo2  reportd to be 84  In ER all time spo2 over 95 on RA, no respiratory distress was notice  AAox3, slightly emotional and treaful  Cxr: negative  CT head negative  Labs; WNL  Pt had similar episode that admitted to hospital and extensive workup was done, including MRI brain that showed small 3mm lesion  I explained to patient and his family the significance of neurology follow up for definite diagnosis  They understood and agree  I have reviewed the nursing notes.    I have reviewed the findings, diagnosis, plan and need for follow up with the  patient.      Discharge Medication List as of 7/26/2018 12:45 AM          Final diagnoses:   Transient alteration of awareness       7/25/2018   [facility]     Valeriano Vick MD  07/27/18 0139

## 2018-07-27 NOTE — TELEPHONE ENCOUNTER
11:46 AM    Reason for Call: Phone Call    Description: Christina from [facility] called and stated pt was at the ER on 07/25/18 for increased confusion and O2 stat was 84%. He also had a fall yesterday and was back in the ER. Wondering if you would like for pt to make appt at clinic or if you would like to see him there during rounds? Please call her back at [phone]    Was an appointment offered for this call? No  If yes : Appointment type              Date    Preferred method for responding to this message: Telephone Call  What is your phone number ?    If we cannot reach you directly, may we leave a detailed response at the number you provided? Yes    Can this message wait until your PCP/provider returns, if available today? Not applicable    Edith Morocho

## 2018-07-27 NOTE — TELEPHONE ENCOUNTER
Call placed to Juanita at [facility].  Referral to schedule appointment for Juanita Briggs and Neurology.  He has an appointment for Neurology made and they will work on seeing Dr Briggs.

## 2018-07-30 NOTE — ED AVS SNAPSHOT
[facility]    [address] [place] [address]    Phone:  [phone]                                       Jose Antonio Marmolejo   MRN: [id number]    Department:  [facility]   Date of Visit:  7/30/2018           Patient Information     Date Of Birth          1945        Your diagnoses for this visit were:     Optic neuritis     MS (multiple sclerosis) (H)        You were seen by Shona Donahue MD.      Follow-up Information     Follow up with Juanita Briggs MD.    Specialty:  Family Practice    Contact information:    [address] [facility] [id number] [phone]          Discharge Instructions       Jose Antonio,    You will be called tomorrow with an appointment time for infusion center.  Keep your appointment with Dr. Briggs.  Keep your appointment with Dr. Saucedo.  Call [facility] in the morning for opthalmology appointment.    Discharge References/Attachments     MULTIPLE SCLEROSIS (MS), UNDERSTANDING (ENGLISH)    MULTIPLE SCLEROSIS, DISCHARGE INSTRUCTIONS (ENGLISH)      Your next 10 appointments already scheduled     Jul 31, 2018  1:00 PM CDT   (Arrive by 12:45 PM)   Office Visit with Juanita Briggs MD   [facility] ([facility] )    [address] [place] [id number] [phone]           Bring a current list of meds and any records pertaining to this visit. For Physicals, please bring immunization records and any forms needing to be filled out. Please arrive 10 minutes early to complete paperwork.              ED Discharge Orders      REFERRAL       Your provider has referred you to: [facility]  for help in arranging for infusion therapy,    Please be aware that coverage of these services is subject to the terms and limitations of your health insurance plan.  Call member services at your health plan with any benefit or coverage questions.      Please bring the following with you to your  appointment:    (1) Any X-Rays, CTs or MRIs which have been performed.  Contact the facility where they were done to arrange for  prior to your scheduled appointment.   (2) List of current medications   (3) This referral request   (4) Any documents/labs given to you for this referral                     Review of your medicines      Our records show that you are taking the medicines listed below. If these are incorrect, please call your family doctor or clinic.        Dose / Directions Last dose taken    acetaminophen 325 MG tablet   Commonly known as:  TYLENOL   Dose:  650 mg   Quantity:  100 tablet        Take 2 tablets (650 mg) by mouth every 4 hours as needed for mild pain   Refills:  0        Adult Blood Pressure Cuff Lg Kit   Dose:  1 each   Quantity:  1 kit        1 each 2 times daily   Refills:  0        hydrochlorothiazide 12.5 MG capsule   Commonly known as:  MICROZIDE   Dose:  12.5 mg   Quantity:  30 capsule        Take 1 capsule (12.5 mg) by mouth daily   Refills:  0        lisinopril 40 MG tablet   Commonly known as:  PRINIVIL/ZESTRIL   Dose:  40 mg   Quantity:  30 tablet        Take 1 tablet (40 mg) by mouth daily   Refills:  0        metoprolol succinate 50 MG 24 hr tablet   Commonly known as:  TOPROL-XL   Dose:  50 mg   Quantity:  30 tablet        Take 1 tablet (50 mg) by mouth daily   Refills:  0        miconazole 2 % powder   Commonly known as:  MICATIN; MICRO GUARD   Quantity:  90 g        Apply topically 2 times daily   Refills:  1                Procedures and tests performed during your visit     CBC with platelets differential    Comprehensive metabolic panel    Visual Acuity      Orders Needing Specimen Collection     None      Pending Results     No orders found from 7/28/2018 to 7/31/2018.            Pending Culture Results     No orders found from 7/28/2018 to 7/31/2018.            Thank you for choosing [facility]       Thank you for choosing [facility] for your care. Our goal is  "always to provide you with excellent care. Hearing back from our patients is one way we can continue to improve our services. Please take a few minutes to complete the written survey that you may receive in the mail after you visit with us. Thank you!        [employer] Information     [employer] lets you send messages to your doctor, view your test results, renew your prescriptions, schedule appointments and more. To sign up, go to [email or web address] . Click on \"Log in\" on the left side of the screen, which will take you to the Welcome page. Then click on \"Sign up Now\" on the right side of the page.     You will be asked to enter the access code listed below, as well as some personal information. Please follow the directions to create your username and password.     Your access code is: [email or web address]  Expires: 2018 11:57 AM     Your access code will  in 90 days. If you need help or a new code, please call your [place] clinic or [phone].        Care EveryWhere ID     This is your Care EveryWhere ID. This could be used by other organizations to access your [place] medical records  [id number]        Equal Access to Services     JEFF SALINSA : Hadii charles Brower, wadhavalda justine, qasilvina kaalmaalen story, viki patterson. So [facility] [phone].    ATENCIÓN: Si habla español, tiene a garcia disposición servicios gratuitos de asistencia lingüística. Sandra al [phone].    We comply with applicable federal civil rights laws and [place] laws. We do not discriminate on the basis of race, color, national origin, age, disability, sex, sexual orientation, or gender identity.            After Visit Summary       This is your record. Keep this with you and show to your community pharmacist(s) and doctor(s) at your next visit.                  "

## 2018-07-30 NOTE — ED AVS SNAPSHOT
[facility]    [address]    [facility] [address]    Phone:  [phone]                                       Jose Antonio Marmolejo   MRN: [id number]    Department:  [facility]   Date of Visit:  7/30/2018           After Visit Summary Signature Page     I have received my discharge instructions, and my questions have been answered. I have discussed any challenges I see with this plan with the nurse or doctor.    ..........................................................................................................................................  Patient/Patient Representative Signature      ..........................................................................................................................................  Patient Representative Print Name and Relationship to Patient    ..................................................               ................................................  Date                                            Time    ..........................................................................................................................................  Reviewed by Signature/Title    ...................................................              ..............................................  Date                                                            Time

## 2018-07-30 NOTE — ED NOTES
Per Dr Donahue, Update  Manisha tomorrow with pt concerns - pt needs referral for opthamology. Needs to go to appt with Dr Briggs already scheduled, and needs infusion center appt for 7/31 and 8/1.  Dr Donahue left a message with infusion center already.  Family has concerns with transportation set up.

## 2018-07-30 NOTE — ED NOTES
Visual acuity done.  When brought to the ED pt stated left eye vision was fuzzy and blurry.  When doing visual acuity for left eye states everything out of left eye is black.

## 2018-07-30 NOTE — ED NOTES
"Pt to the ED via [facility] with c/o left eye blurring and fuzziness that started yesterday.  FAST negative.  Pt recently here last week for increase confusion and discharged back to NH.  Pt was up to the BSC upon arrival to void but after sitting states \"I guess I don't have to go now\". Pt able to stand from BSC and then bed was wheeled behind him and pt sat down.  Assessment complete.  Call light is given and monitors on pt.    "

## 2018-07-30 NOTE — ED PROVIDER NOTES
"eMERGENCY dEPARTMENT eNCOUnter        CHIEF COMPLAINT    Chief Complaint   Patient presents with     Eye Problem     Blurred vision and possible loss of vision left eye;started yesterday       HPI    Jose Antonio Marmolejo is a 72 year old male nursing home resident who complains of left eye blurriness for the last 24-36 hours.  History is a little puzzling to me.  He said he started with blurry vision yesterday he is in the nursing home of because of recurrent falls he said he \"tried all day to get a ride to the hospital\" it is not clear if he notified anyone at the nursing home.  He can see normally out of his right eye feels it is blurry out of the left when he has both eyes open he feels he could see fine.  His sister is here she said a recent MRI showed some white changes and he is scheduled to see a neurologist in September.  He denies any fevers no headaches.  It is not clear why no one has contacted optometry or ophthalmology.  According to his sister there was some concerns about MS although he is not the typical age and presentation.  He was here last week 7/25 but that was for shortness of breath.    A note from 7/4/18 Discharge:    As far as his recurrent falls, he has been diagnosed with a demyelinating process in the past, however he never followed up with neurology as an outpatient like he was supposed to.  A repeat MRI of his brain and spine were done during this admission which showed progression of white matter demyelinating lesions suspicious for multiple sclerosis.  A lumbar puncture was then subsequently obtained, oligoclonal bands and IgG index is pending.  He has f/u appointment with Dr. Saucedo at [facility] neurology on 7/16.  The patient is encouraged to make this appointment.        REVIEW OF SYSTEMS    General: No fevers or chills  GI: No nausea or vomiting  Skin: No new rash  Pulmonary: No shortness of breath or new cough  See HPI for further details.    PAST MEDICAL and SURGICAL HISTORY    Past " "Medical History:   Diagnosis Date     Arthritis     back and left ankle from fall     Diabetes (H)      Hypertension      Recurrent falls      Past Surgical History:   Procedure Laterality Date     ORTHOPEDIC SURGERY      left ankle and removal of hardware       CURRENT MEDICATIONS    Current Outpatient Rx   Medication Sig Dispense Refill     Blood Pressure Monitoring (ADULT BLOOD PRESSURE CUFF LG) KIT 1 each 2 times daily 1 kit 0     hydrochlorothiazide (MICROZIDE) 12.5 MG capsule Take 1 capsule (12.5 mg) by mouth daily 30 capsule      lisinopril (PRINIVIL/ZESTRIL) 40 MG tablet Take 1 tablet (40 mg) by mouth daily 30 tablet      metoprolol (TOPROL-XL) 50 MG 24 hr tablet Take 1 tablet (50 mg) by mouth daily 30 tablet      acetaminophen (TYLENOL) 325 MG tablet Take 2 tablets (650 mg) by mouth every 4 hours as needed for mild pain 100 tablet      miconazole (MICATIN; MICRO GUARD) 2 % powder Apply topically 2 times daily 90 g 1       ALLERGIES    Allergies   Allergen Reactions     Penicillins        SOCIAL and FAMILY HISTORY    Social History     Social History     Marital status: Single     Spouse name: N/A     Number of children: 3     Years of education: N/A     Social History Main Topics     Smoking status: Former Smoker     Quit date: 1/1/1978     Smokeless tobacco: Never Used     Alcohol use No      Comment: quit 1977, previously an alcoholic     Drug use: No     Sexual activity: Not Currently     Other Topics Concern     None     Social History Narrative     Family History   Problem Relation Age of Onset     Other - See Comments Mother      pneumonia     Obesity Mother      Other Cancer Father      hodgkins lymphoma     Coronary Artery Disease Father      pacemaker     Other Cancer Sister      unknown cancers     Colon Cancer Son        PHYSICAL EXAM    VITAL SIGNS: /87  Temp 97.4  F (36.3  C) (Oral)  Resp 18  Ht 1.778 m (5' 10\")  Wt 117.9 kg (260 lb)  SpO2 96%  BMI 37.31 kg/m2  Constitutional:  Well " developed, well nourished, no acute distress, he is alert and joking often inappropriate.  Eyes:  Pupils equally round, minimally reactive  to light,3 mm sclerae nonicteric, conjunctiva moist, EOMI, no nystagmus noted.  When I occlude the left eye, he can make out my face and color of my shirt.  Cannot detect field cut.  Fundoscopic exam shows blunted disc    HENT:  Atraumatic, external ears normal, nose normal, oropharynx moist, no pharyngeal exudates.   Neck: Supple, no neck swelling   Respiratory:  No retractions  Cardiovascular:  No JVD  Integument:  Warm dry skin, no obvious rash  Neurologic:  No slurred speech, normal gait     RADIOLOGY/PROCEDURES    Review of MRI  Results for orders placed or performed during the hospital encounter of 07/25/18   CT Head w/o Contrast    Narrative    PROCEDURE: CT HEAD W/O CONTRAST     HISTORY: ams; .    COMPARISON: None.    TECHNIQUE:  Helical images of the head from the foramen magnum to the  vertex were obtained without contrast.    FINDINGS: The ventricular system and cortical sulci are mildly  enlarged consistent with atrophy. There Is white matter low-density  consistent with small vessel changes. There are no masses or  ventricular shifts. The brainstem and cerebellum appear normal. The  cranial vault is intact. No abnormal fluid is seen in the middle ear  cavity or mastoids.  The visualized paranasal sinuses are clear.      Impression    IMPRESSION: No acute brain abnormality      MENDEZ MATAMOROS MD   XR Chest 1 View    Narrative    Procedure:XR CHEST 1 VW    Clinical history:Male, 72 years, sob;     Technique: Single view was obtained.    Comparison: 12/31/2017    Findings: The cardiac silhouette is normal. The pulmonary vasculature  is normal.    The lungs are clear. Bony structures are unremarkable.      Impression    Impression:  1.   Lungs appear to be clear. No evidence of acute or active disease.    IGGY BRWON MD         ED COURSE & MEDICAL DECISION MAKING  "   Pertinent Labs & Imaging studies reviewed and interpreted. (See chart for details)    /87  Temp 97.4  F (36.3  C) (Oral)  Resp 18  Ht 1.778 m (5' 10\")  Wt 117.9 kg (260 lb)  SpO2 96%  BMI 37.31 kg/m2      FINAL IMPRESSION    1. Optic neuritis    2. MS (multiple sclerosis) (H)        PLAN  Labs from LP and MS serology reviewed, he has positive oligoclonal bands and IgG synthesis, now with what appears to be optic neuritis.  Contacted Dr. Saucedo, on call for neurology.  The patient has had a couple of appointments scheduled and he no-shows.  Dr. Saucedo was very familiar with his case.  When I asked the patient and family, he stated that he \"couldn't find transportation\".  They now have an appointment in 1 month.  He has an appointment tomorrow with Dr. Briggs but he wasn't aware of this.   There seems to be a real disconnect between the patien receiving medical care and family's participation.  They are now upset that the neurology appointment is not for another month, do not acknowledge that the patient has already missed a couple of appointments.  Will treat him with methylprednisolone 1000 mg IV today to be repeated ×2 days.  This will be done through the infusion center.  Referral is made to  to help him arrange for these appointments and infusions.  He has an appointment with Dr. Archer tomorrow and should keep that appointment he will need to see ophthalmology.  We have left this information with ED social work that will be following up with him in the morning.  We will also forward information to Dr. Forrester.  T        (Please note that this note was completed with a voice recognition program.  Every attempt was made to edit the dictations, but inevitably there remain words that are mis-transcribed.)       Shona Donahue MD  07/30/18 1914    "

## 2018-07-30 NOTE — DISCHARGE INSTRUCTIONS
Jose Antonio,    You will be called tomorrow with an appointment time for infusion center.  Keep your appointment with Dr. Briggs.  Keep your appointment with Dr. Saucedo.  Call [facility] in the morning for opthalmology appointment.

## 2018-07-31 NOTE — TELEPHONE ENCOUNTER
Spoke with nurse at [facility] and patient will be here today when done with his appt there.

## 2018-07-31 NOTE — Clinical Note
Discussed with Neurology, will need to continue the infusions for a total of 5 days. So will need 2 additional infusions of the solumedrol at the same dose. Could you help me addend the therapy plan? Also, patient would like to request that the woman who did his injection today continue to do them, if possible :) Thanks!!

## 2018-07-31 NOTE — MR AVS SNAPSHOT
After Visit Summary   7/31/2018    Jose Antonio Marmolejo    MRN: [id number]           Patient Information     Date Of Birth          1945        Visit Information        Provider Department      7/31/2018 1:00 PM Juanita Briggs MD [facility]        Today's Diagnoses     Optic neuritis due to multiple sclerosis (H)    -  1    Type 2 diabetes mellitus without complication, without long-term current use of insulin (H)        Recurrent falls        Essential hypertension          Care Instructions      At your visit today, we discussed your risk for falls and preventive options.    Fall Prevention  Falls often occur due to slipping, tripping or losing your balance. Millions of people fall every year and injure themselves. Here are ways to reduce your risk of falling again.    Think about your fall, was there anything that caused your fall that can be fixed, removed, or replaced?    Make your home safe by keeping walkways clear of objects you may trip over.    Use non-slip pads under rugs. Do not use area rugs or small throw rugs.    Use non-slip mats in bathtubs and showers.    Install handrails and lights on staircases.    Do not walk in poorly lit areas.    Do not stand on chairs or wobbly ladders.    Use caution when reaching overhead or looking upward. This position can cause a loss of balance.    Be sure your shoes fit properly, have non-slip bottoms and are in good condition.     Wear shoes both inside and out. Avoid going barefoot or wearing slippers.    Be cautious when going up and down stairs, curbs, and when walking on uneven sidewalks.    If your balance is poor, consider using a cane or walker.    If your fall was related to alcohol use, stop or limit alcohol intake.     If your fall was related to use of sleeping medicines, talk to your doctor about this. You may need to reduce your dosage at bedtime if you awaken during the night to go to the bathroom.      To reduce the need for  nighttime bathroom trips:  ? Avoid drinking fluids for several hours before going to bed  ? Empty your bladder before going to bed  ? Men can keep a urinal at the bedside    Stay as active as you can. Balance, flexibility, strength, and endurance all come from exercise. They all play a role in preventing falls. Ask your healthcare provider which types of activity are right for you.    Get your vision checked on a regular basis.    If you have pets, know where they are before you stand up or walk so you don't trip over them.    Use night lights.  Date Last Reviewed: 11/5/2015 2000-2017 [employer]. [address] [place] [address]. All rights reserved. This information is not intended as a substitute for professional medical care. Always follow your healthcare professional's instructions.                Follow-ups after your visit        Your next 10 appointments already scheduled     Aug 01, 2018 11:00 AM CDT   Level 2 with  INF RM 3314   [facility] ([facility] )    [address]  [facility] [address]   [phone]            Aug 08, 2018  1:00 PM CDT   (Arrive by 12:45 PM)   Office Visit with Juanita Briggs MD   [facility] ([facility] )    [address]  [facility] [address]   [phone]           Bring a current list of meds and any records pertaining to this visit. For Physicals, please bring immunization records and any forms needing to be filled out. Please arrive 10 minutes early to complete paperwork.              Who to contact     If you have questions or need follow up information about today's clinic visit or your schedule please contact [facility] directly at [phone].  Normal or non-critical lab and imaging results will be communicated to you by MyChart, letter or phone within 4 business days after the clinic has received the results. If you do not hear from us within 7 days, please  contact the clinic through [employer] or phone. If you have a critical or abnormal lab result, we will notify you by phone as soon as possible.  Submit refill requests through [employer] or call your pharmacy and they will forward the refill request to us. Please allow 3 business days for your refill to be completed.          Additional Information About Your Visit        Care EveryWhere ID     This is your Care EveryWhere ID. This could be used by other organizations to access your [facility] medical records  [id number]        Your Vitals Were     Pulse Temperature Pulse Oximetry BMI (Body Mass Index)          76 97.3  F (36.3  C) (Tympanic) 95% 37.31 kg/m2         Blood Pressure from Last 3 Encounters:   07/31/18 114/60   07/31/18 137/82   07/30/18 (!) 118/49    Weight from Last 3 Encounters:   07/31/18 260 lb (117.9 kg)   07/31/18 260 lb (117.9 kg)   07/30/18 260 lb (117.9 kg)              Today, you had the following     No orders found for display         Today's Medication Changes          These changes are accurate as of 7/31/18  4:18 PM.  If you have any questions, ask your nurse or doctor.               Start taking these medicines.        Dose/Directions    predniSONE 20 MG tablet   Commonly known as:  DELTASONE   Used for:  Optic neuritis due to multiple sclerosis (H)   Started by:  Juanita Briggs MD        Dose:  120 mg   Start taking on:  8/4/2018   Take 6 tablets (120 mg) by mouth daily for 11 days To start on 8/4 after Infusion therapy completed   Quantity:  66 tablet   Refills:  0            Where to get your medicines      These medications were sent to [facility] [place] [address] [place] [address]     Phone:  [phone]     predniSONE 20 MG tablet                Primary Care Provider Office Phone # Fax #    Juanita Briggs [phone] [address] [facility] [place] [address]        Equal Access to Services     JEFF SALINAS  AH: Bailey blairretayuan Leonarda, waaxda luqadaha, qaybta kaalkimberly story, viki deepa brandielvin pattersonrobertkim patterson. So [facility] [phone].    ATENCIÓN: Si habla español, tiene a garcia disposición servicios gratuitos de asistencia lingüística. Llame al [phone].    We comply with applicable federal civil rights laws and [place] laws. We do not discriminate on the basis of race, color, national origin, age, disability, sex, sexual orientation, or gender identity.            Thank you!     Thank you for choosing [facility]  for your care. Our goal is always to provide you with excellent care. Hearing back from our patients is one way we can continue to improve our services. Please take a few minutes to complete the written survey that you may receive in the mail after your visit with us. Thank you!             Your Updated Medication List - Protect others around you: Learn how to safely use, store and throw away your medicines at www.disposemymeds.org.          This list is accurate as of 7/31/18  4:18 PM.  Always use your most recent med list.                   Brand Name Dispense Instructions for use Diagnosis    acetaminophen 325 MG tablet    TYLENOL    100 tablet    Take 2 tablets (650 mg) by mouth every 4 hours as needed for mild pain    Fall, initial encounter, Weakness       Adult Blood Pressure Cuff Lg Kit     1 kit    1 each 2 times daily    Benign essential hypertension, Malignant essential hypertension       hydrochlorothiazide 12.5 MG capsule    MICROZIDE    30 capsule    Take 1 capsule (12.5 mg) by mouth daily    Essential hypertension       lisinopril 40 MG tablet    PRINIVIL/ZESTRIL    30 tablet    Take 1 tablet (40 mg) by mouth daily    Essential hypertension       metoprolol succinate 50 MG 24 hr tablet    TOPROL-XL    30 tablet    Take 1 tablet (50 mg) by mouth daily    Essential hypertension       miconazole 2 % powder    MICATIN; MICRO GUARD    90 g    Apply topically 2 times  daily    Yeast infection of the skin       predniSONE 20 MG tablet   Start taking on:  8/4/2018    DELTASONE    66 tablet    Take 6 tablets (120 mg) by mouth daily for 11 days To start on 8/4 after Infusion therapy completed    Optic neuritis due to multiple sclerosis (H)

## 2018-07-31 NOTE — MR AVS SNAPSHOT
After Visit Summary   7/31/2018    Jose Antonio Marmolejo    MRN: [id number]           Patient Information     Date Of Birth          1945        Visit Information        Provider Department      7/31/2018 11:00 AM  INF  [address] [facility] [place]        Today's Diagnoses     Recurrent falls    -  1    Weakness of both lower extremities          Care Instructions      Methylprednisolone Solution for Injection  Brand Names: A-Methapred, Solu-Medrol  What is this medicine?  METHYLPREDNISOLONE (meth ill pred NISS oh lone) is a corticosteroid. It is commonly used to treat inflammation of the skin, joints, lungs, and other organs. Common conditions treated include asthma, allergies, and arthritis. It is also used for other conditions, such as blood disorders and diseases of the adrenal glands.  How should I use this medicine?  This medicine is for injection or infusion into a vein. It is also for injection into a muscle. It is given by a health care professional in a hospital or clinic setting.  Talk to your pediatrician regarding the use of this medicine in children. While this drug may be prescribed for selected conditions, precautions do apply.  What side effects may I notice from receiving this medicine?  Side effects that you should report to your doctor or health care professional as soon as possible:    allergic reactions like skin rash, itching or hives, swelling of the face, lips, or tongue    bloody or tarry stools    changes in vision    hallucination, loss of contact with reality    muscle cramps    muscle pain    palpitations    signs and symptoms of high blood sugar such as dizziness; dry mouth; dry skin; fruity breath; nausea; stomach pain; increased hunger or thirst; increased urination    signs and symptoms of infection like fever or chills; cough; sore throat; pain or trouble passing urine    trouble passing urine or change in the amount of urine  Side effects that usually do not require  medical attention (report to your doctor or health care professional if they continue or are bothersome):    changes in emotions or mood    constipation    diarrhea    excessive hair growth on the face or body    headache    nausea, vomiting    pain, redness, or irritation at site where injected    trouble sleeping    weight gain  What may interact with this medicine?  Do not take this medicine with any of the following medications:    alefacept    echinacea    iopamidol    live virus vaccines    metyrapone    mifepristone  This medicine may also interact with the following medications:    amphotericin B    aspirin and aspirin-like medicines    certain antibiotics like erythromycin, clarithromycin, troleandomycin    certain medicines for diabetes    certain medicines for fungal infection like ketoconazole    certain medicines for seizures like carbamazepine, phenobarbital, phenytoin    certain medicines that treat or prevent blood clots like warfarin    cyclosporine    digoxin    diuretics    female hormones, like estrogens and birth control pills    isoniazid    NSAIDS, medicines for pain and inflammation, like ibuprofen or naproxen    other medicines for myasthenia gravis    rifampin    vaccines  What if I miss a dose?  This does not apply.  Where should I keep my medicine?  This drug is given in a hospital or clinic and will not be stored at home.  What should I tell my health care provider before I take this medicine?  They need to know if you have any of these conditions:    Cushing's syndrome    eye disease, vision problems    diabetes    glaucoma    heart disease    high blood pressure    infection (especially a virus infection such as chickenpox, cold sores, or herpes)    liver disease    mental illness    myasthenia gravis    osteoporosis    recently received or scheduled to receive a vaccine    seizures    stomach or intestine problems    thyroid disease    an unusual or allergic reaction to lactose,  methylprednisolone, other medicines, foods, dyes, or preservatives    pregnant or trying to get pregnant    breast-feeding  What should I watch for while using this medicine?  Tell your doctor or healthcare professional if your symptoms do not start to get better or if they get worse. Do not stop taking except on your doctor's advice. You may develop a severe reaction. Your doctor will tell you how much medicine to take.  Your condition will be monitored carefully while you are receiving this medicine.  This medicine may increase your risk of getting an infection. Tell your doctor or health care professional if you are around anyone with measles or chickenpox, or if you develop sores or blisters that do not heal properly.  This medicine may affect blood sugar levels. If you have diabetes, check with your doctor or health care professional before you change your diet or the dose of your diabetic medicine.  Tell your doctor or health care professional right away if you have any change in your eyesight.  Using this medicine for a long time may increase your risk of low bone mass. Talk to your doctor about bone health.  NOTE:This sheet is a summary. It may not cover all possible information. If you have questions about this medicine, talk to your doctor, pharmacist, or health care provider. Copyright  2018 [employer]                Follow-ups after your visit        Your next 10 appointments already scheduled     Jul 31, 2018  1:00 PM CDT   (Arrive by 12:45 PM)   Office Visit with Juanita Briggs MD   [facility] [place] ([facility] [place] )    [address] [place] [address]   [phone]           Bring a current list of meds and any records pertaining to this visit. For Physicals, please bring immunization records and any forms needing to be filled out. Please arrive 10 minutes early to complete paperwork.            Aug 01, 2018 11:00 AM CDT   Level 2 with  INF RM [id number]   [facility]  [place] ([facility] [place]    [address] [place] [address]   [phone]              Who to contact     If you have questions or need follow up information about today's clinic visit or your schedule please contact [facility] directly at [phone].  Normal or non-critical lab and imaging results will be communicated to you by MyChart, letter or phone within 4 business days after the clinic has received the results. If you do not hear from us within 7 days, please contact the clinic through MyChart or phone. If you have a critical or abnormal lab result, we will notify you by phone as soon as possible.  Submit refill requests through [employer] or call your pharmacy and they will forward the refill request to us. Please allow 3 business days for your refill to be completed.          Additional Information About Your Visit        Care EveryWhere ID     This is your Care EveryWhere ID. This could be used by other organizations to access your [place] medical records  [id number]        Your Vitals Were     Pulse Temperature Respirations Height Pulse Oximetry BMI (Body Mass Index)    112 96.6  F (35.9  C) (Oral) 24 1.778 m (5' 10\") 97% 37.31 kg/m2       Blood Pressure from Last 3 Encounters:   07/31/18 126/81   07/30/18 (!) 118/49   07/26/18 105/69    Weight from Last 3 Encounters:   07/31/18 117.9 kg (260 lb)   07/30/18 117.9 kg (260 lb)   07/25/18 117.9 kg (260 lb)              Today, you had the following     No orders found for display       Primary Care Provider Office Phone # Fax #    Juanita Briggs [phone]       [facility] [address]        Equal Access to Services     KASHIF SALINAS AH: Bailey Brower, rochelle fletcher, viki lira. [facility] [phone].    ATENCIÓN: Si habla español, tiene a garcia disposición servicios gratuitos de asistencia lingüística. Llame al " [phone].    We comply with applicable federal civil rights laws and [place] laws. We do not discriminate on the basis of race, color, national origin, age, disability, sex, sexual orientation, or gender identity.            Thank you!     Thank you for choosing [facility]  for your [facility]. Our goal is always to provide you with excellent care. Hearing back from our patients is one way we can continue to improve our services. Please take a few minutes to complete the written survey that you may receive in the mail after your visit with us. Thank you!             Your Updated Medication List - Protect others around you: Learn how to safely use, store and throw away your medicines at www.disposemymeds.org.          This list is accurate as of 7/31/18 12:58 PM.  Always use your most recent med list.                   Brand Name Dispense Instructions for use Diagnosis    acetaminophen 325 MG tablet    TYLENOL    100 tablet    Take 2 tablets (650 mg) by mouth every 4 hours as needed for mild pain    Fall, initial encounter, Weakness       Adult Blood Pressure Cuff Lg Kit     1 kit    1 each 2 times daily    Benign essential hypertension, Malignant essential hypertension       hydrochlorothiazide 12.5 MG capsule    MICROZIDE    30 capsule    Take 1 capsule (12.5 mg) by mouth daily    Essential hypertension       lisinopril 40 MG tablet    PRINIVIL/ZESTRIL    30 tablet    Take 1 tablet (40 mg) by mouth daily    Essential hypertension       metoprolol succinate 50 MG 24 hr tablet    TOPROL-XL    30 tablet    Take 1 tablet (50 mg) by mouth daily    Essential hypertension       miconazole 2 % powder    MICATIN; MICRO GUARD    90 g    Apply topically 2 times daily    Yeast infection of the skin

## 2018-07-31 NOTE — PATIENT INSTRUCTIONS
Methylprednisolone Solution for Injection  Brand Names: A-Methapred, Solu-Medrol  What is this medicine?  METHYLPREDNISOLONE (meth ill pred NISS oh lone) is a corticosteroid. It is commonly used to treat inflammation of the skin, joints, lungs, and other organs. Common conditions treated include asthma, allergies, and arthritis. It is also used for other conditions, such as blood disorders and diseases of the adrenal glands.  How should I use this medicine?  This medicine is for injection or infusion into a vein. It is also for injection into a muscle. It is given by a health care professional in a hospital or clinic setting.  Talk to your pediatrician regarding the use of this medicine in children. While this drug may be prescribed for selected conditions, precautions do apply.  What side effects may I notice from receiving this medicine?  Side effects that you should report to your doctor or health care professional as soon as possible:    allergic reactions like skin rash, itching or hives, swelling of the face, lips, or tongue    bloody or tarry stools    changes in vision    hallucination, loss of contact with reality    muscle cramps    muscle pain    palpitations    signs and symptoms of high blood sugar such as dizziness; dry mouth; dry skin; fruity breath; nausea; stomach pain; increased hunger or thirst; increased urination    signs and symptoms of infection like fever or chills; cough; sore throat; pain or trouble passing urine    trouble passing urine or change in the amount of urine  Side effects that usually do not require medical attention (report to your doctor or health care professional if they continue or are bothersome):    changes in emotions or mood    constipation    diarrhea    excessive hair growth on the face or body    headache    nausea, vomiting    pain, redness, or irritation at site where injected    trouble sleeping    weight gain  What may interact with this medicine?  Do not take  this medicine with any of the following medications:    alefacept    echinacea    iopamidol    live virus vaccines    metyrapone    mifepristone  This medicine may also interact with the following medications:    amphotericin B    aspirin and aspirin-like medicines    certain antibiotics like erythromycin, clarithromycin, troleandomycin    certain medicines for diabetes    certain medicines for fungal infection like ketoconazole    certain medicines for seizures like carbamazepine, phenobarbital, phenytoin    certain medicines that treat or prevent blood clots like warfarin    cyclosporine    digoxin    diuretics    female hormones, like estrogens and birth control pills    isoniazid    NSAIDS, medicines for pain and inflammation, like ibuprofen or naproxen    other medicines for myasthenia gravis    rifampin    vaccines  What if I miss a dose?  This does not apply.  Where should I keep my medicine?  This drug is given in a hospital or clinic and will not be stored at home.  What should I tell my health care provider before I take this medicine?  They need to know if you have any of these conditions:    Cushing's syndrome    eye disease, vision problems    diabetes    glaucoma    heart disease    high blood pressure    infection (especially a virus infection such as chickenpox, cold sores, or herpes)    liver disease    mental illness    myasthenia gravis    osteoporosis    recently received or scheduled to receive a vaccine    seizures    stomach or intestine problems    thyroid disease    an unusual or allergic reaction to lactose, methylprednisolone, other medicines, foods, dyes, or preservatives    pregnant or trying to get pregnant    breast-feeding  What should I watch for while using this medicine?  Tell your doctor or healthcare professional if your symptoms do not start to get better or if they get worse. Do not stop taking except on your doctor's advice. You may develop a severe reaction. Your doctor will  tell you how much medicine to take.  Your condition will be monitored carefully while you are receiving this medicine.  This medicine may increase your risk of getting an infection. Tell your doctor or health care professional if you are around anyone with measles or chickenpox, or if you develop sores or blisters that do not heal properly.  This medicine may affect blood sugar levels. If you have diabetes, check with your doctor or health care professional before you change your diet or the dose of your diabetic medicine.  Tell your doctor or health care professional right away if you have any change in your eyesight.  Using this medicine for a long time may increase your risk of low bone mass. Talk to your doctor about bone health.  NOTE:This sheet is a summary. It may not cover all possible information. If you have questions about this medicine, talk to your doctor, pharmacist, or health care provider. Copyright  2018 Elsevier

## 2018-07-31 NOTE — PROGRESS NOTES
SUBJECTIVE:   Jose Antonio Marmolejo is a 72 year old male who presents to clinic today for the following health issues:    ED/UC Followup:    Facility: [facility]  Date of visit: 7/30/18  Reason for visit: Blurred vision in left eye and MS  Current Status: eye infusion went well today. Blurred vision still     Patient presents to clinic alone today. He has history of recurrent falls likely related to MS, Presumed cognitive impairment, Diet controlled DM2, HTN who presents to clinic to follow up regarding recent ED visit for left eye vision loss. He has been set up for IV infusions of steroid per phone consultation with Neurology from ED. He has been seen by Optho earlier today as well. I was able to speak with them. 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 They have sent off labwork for this as well.     Patient states he is hungry and tired. Has been out at appointments all day today. He states he continues to have bilateral LE weakness and has had several falls in the NH. He is a poor historian, however. He denies any pain, numbness. He denies any changes to bowel or bladder habits. He is pretty sure he has already been seen by neurology, however, it appears he has not made it to any of the previously scheduled appointments. One of these was scheduled while he was living at home, but at least 2 of these have been scheduled while he was in a SNF. I am not clear why there is a disconnect in getting him appointments.     Problem list and histories reviewed & adjusted, as indicated.  Additional history: as documented    Labs reviewed in EPIC    Reviewed and updated as needed this visit by clinical staff  Tobacco  Allergies  Meds  Problems  Med Hx  Surg Hx  Fam Hx  Soc Hx        Reviewed and updated as needed this visit by Provider  Allergies  Meds  Problems  Med Hx         ROS:  Constitutional, HEENT, cardiovascular, pulmonary, gi and gu systems are  negative, except as otherwise noted.    OBJECTIVE:     /60  Pulse 76  Temp 97.3  F (36.3  C) (Tympanic)  Wt 260 lb (117.9 kg)  SpO2 95%  BMI 37.31 kg/m2  Body mass index is 37.31 kg/(m^2).  GENERAL: healthy, alert and no distress  EYES: Eyes grossly normal to inspection, PERRL and conjunctivae and sclerae normal  RESP: lungs clear to auscultation - no rales, rhonchi or wheezes  CV: regular rate and rhythm, normal S1 S2, no S3 or S4, no murmur, click or rub, no peripheral edema and peripheral pulses strong  MS: RLE exam shows normal tone, strength 4/5 and LLE exam shows normal tone, strength 4/5, pt is unable to get out of his wheelchair today.     Diagnostic Test Results:  none     ASSESSMENT/PLAN:     1. Optic neuritis due to multiple sclerosis (H)/  Concern now for NMO. Discussed with Neurology. Will extend IV steroid dose to a total of 5 days. Start prednisone 1mg/kg x 11 days on 8/4. Will then taper dosing. Follow up with me in 1 week.   - predniSONE (DELTASONE) 20 MG tablet; Take 6 tablets (120 mg) by mouth daily for 11 days To start on 8/4 after Infusion therapy completed  Dispense: 66 tablet; Refill: 0    2. Type 2 diabetes mellitus without complication, without long-term current use of insulin (H)  Last Hemoglobin A1C was 6.5 last month. Diet controlled. Not on medications at this time.     3. Recurrent falls  Likely related to MS, LE weakness. Discussed getting MRI of lumbar spine as this has not yet been done. He is adamant that he will not do this. He gets very anxious with these. Okay to wait for Neurology recommendation.     4. Essential hypertension  BP is okay today. BMP okay 7/30 in the ED. Continue current medications.     5. Cognitive Impairment  Patient has significantly impaired insight into illness and memory impairment as well. Likely at least partially related to the MS. He has, however, been evaluated by OT on several occasions at subacute rehab and has been released home on his  own. I did express my concern about his thinking today. He dismisses this as being tired. No formal testing done today due to fatigue.  I will place a call to family to discuss further. MoCA at follow up visit. Unclear if benefit of cholinesterase inhibitor with MS. Will liekly defer to Neurology.     Juanita Briggs MD  [facility]

## 2018-07-31 NOTE — TELEPHONE ENCOUNTER
10:48 AM    Reason for Call: Phone Call    Description: Naheed from [facility] wanting to verify pt schedule for today. Would like pt to me seen at [facility] before 2p today but has a 1p appt with Juanita Briggs and appt at 11 for eye infusion.  Please call nurse back to coordinate.    Was an appointment offered for this call? No  If yes : Appointment type              Date    Preferred method for responding to this message: Telephone Call  What is your phone number ? [phone]    If we cannot reach you directly, may we leave a detailed response at the number you provided? Ask for Naheed    Can this message wait until your PCP/provider returns, if available today? Not applicable, provider is in    Didi Mitchell

## 2018-07-31 NOTE — TELEPHONE ENCOUNTER
Consult received to assist with follow up appointment, Eye Doc.   Patient has an infusion appointment at 11 and a PCP follow up at 1pm.    Spoke with [facility]: no appointments available today.   Dr. Zarate's office: took info and will call back.    Alternatives:  [facility] ([facility]) [phone]  Dr. Mckeon [phone]    Dr. Zarate's office returned call, patient would best be served by an ophthalmologist. Tri from Dr. Zarate's office contacted Dr. Whiteside office at [facility]. Would be able to accommodate. [phone].     Spoke with [facility], could see at 2 today or sooner if able.      Spoke with Nursing Home Unit Manager, aware of appointment as [facility] contacted Unit Manager and has notified the . Message left with Health line confirming appointments.

## 2018-07-31 NOTE — ED NOTES
Discharge teaching done, AVS reviewed with pt, importance of keeping all of his office appointments, questions answered. Pt verbalized understanding and is in agreement with discharge plan. Await arrival of [facility] for transport of pt back to [facility].   unable

## 2018-07-31 NOTE — ED NOTES
[facility] transportation will be here in 30 minutes to transport patient back to [facility].

## 2018-07-31 NOTE — PROGRESS NOTES
Patient is a 72 year old male here accompanied by self today for infusion of methylprednisolone 1,000mg per order of Dr. Donahue.  Patient meets parameters for today's infusion. Patient identified with two identifiers, order verified, and verbal consent for today's infusion obtained from patient.      22 gauge angio cath inserted into left upper arm.  Immediate blood return noted.  IV secured with sterile, transparent dressing and tape.  Patient tolerated well, denies pain or discomfort at this time.  Flushes easily without resistance, no signs or symptoms of infiltration or infection. Patient denies questions or concerns regarding infusion and/or medication(s) being administered.    IV pump verified with dose, drug, and rate of administration with MAR.  Infusion administered per protocol. Patient tolerated infusion well, no signs or symptoms of adverse reaction noted.  Patient denies pain nor discomfort.     IV removed, catheter intact.  Site clean, dry and intact.  No signs or symptoms of infiltration or infection.  Covered with a sterile bandage, slight pressure applied for 30 seconds. Pt instructed to leave bandage intact for a minimum of one hour, and to call with questions or concerns.  Copy of appointments, discharge instructions, and after visit summary (AVS) provided to patient.  Patient states understanding, discharged ambulatory.

## 2018-07-31 NOTE — NURSING NOTE
"Chief Complaint   Patient presents with     Follow Up For     ER Visit        Initial /60  Pulse 76  Temp 97.3  F (36.3  C) (Tympanic)  Wt 260 lb (117.9 kg)  SpO2 95%  BMI 37.31 kg/m2 Estimated body mass index is 37.31 kg/(m^2) as calculated from the following:    Height as of an earlier encounter on 7/31/18: 5' 10\" (1.778 m).    Weight as of this encounter: 260 lb (117.9 kg).  Medication Reconciliation: complete    Mary Lou Clemente MA    "

## 2018-07-31 NOTE — PROGRESS NOTES
Message received from Dr Donahue that patient is in need of Methylprednisolone 1000mg IV x2 days, starting today. Reviewed her notes in patient chart, and per her message, called ER and spoke with patients ER nurse Gladys to confirm order. Gladys noted that patient has to see PCP and eye Dr today as well. Call to [facility] and updated that patient resides in nursing home, as well as other appointments this date, and asking she schedule patient as able.

## 2018-07-31 NOTE — PATIENT INSTRUCTIONS
At your visit today, we discussed your risk for falls and preventive options.    Fall Prevention  Falls often occur due to slipping, tripping or losing your balance. Millions of people fall every year and injure themselves. Here are ways to reduce your risk of falling again.    Think about your fall, was there anything that caused your fall that can be fixed, removed, or replaced?    Make your home safe by keeping walkways clear of objects you may trip over.    Use non-slip pads under rugs. Do not use area rugs or small throw rugs.    Use non-slip mats in bathtubs and showers.    Install handrails and lights on staircases.    Do not walk in poorly lit areas.    Do not stand on chairs or wobbly ladders.    Use caution when reaching overhead or looking upward. This position can cause a loss of balance.    Be sure your shoes fit properly, have non-slip bottoms and are in good condition.     Wear shoes both inside and out. Avoid going barefoot or wearing slippers.    Be cautious when going up and down stairs, curbs, and when walking on uneven sidewalks.    If your balance is poor, consider using a cane or walker.    If your fall was related to alcohol use, stop or limit alcohol intake.     If your fall was related to use of sleeping medicines, talk to your doctor about this. You may need to reduce your dosage at bedtime if you awaken during the night to go to the bathroom.      To reduce the need for nighttime bathroom trips:  ? Avoid drinking fluids for several hours before going to bed  ? Empty your bladder before going to bed  ? Men can keep a urinal at the bedside    Stay as active as you can. Balance, flexibility, strength, and endurance all come from exercise. They all play a role in preventing falls. Ask your healthcare provider which types of activity are right for you.    Get your vision checked on a regular basis.    If you have pets, know where they are before you stand up or walk so you don't trip over  them.    Use night lights.  Date Last Reviewed: 11/5/2015 2000-2017 [employer]. [address] [place] [address], [place] [address]. All rights reserved. This information is not intended as a substitute for professional medical care. Always follow your healthcare professional's instructions.

## 2018-07-31 NOTE — ED NOTES
Face to face report given with opportunity to observe patient.  Report given to Nicki WARD.    Gladys Templeton  7/30/2018, 7:06 PM           Declined

## 2018-08-01 NOTE — TELEPHONE ENCOUNTER
1:35 PM    Reason for Call: Phone Call    Description: Christina calling from [facility] has some questions regarding bens papers that he came back with from his appointment on  07/31/18 she states that she is confused on his IV orders that he got and would like someone to call and confirm for her.    Was an appointment offered for this call? No  If yes : Appointment type              Date    Preferred method for responding to this message: Telephone Call  What is your phone number ? [phone]    If we cannot reach you directly, may we leave a detailed response at the number you provided? Yes    Can this message wait until your PCP/provider returns, if available today? Not applicable, PCP is in     Kristel Weiss

## 2018-08-01 NOTE — TELEPHONE ENCOUNTER
Patient told staff that he no longer needed IV infusions. This is incorrect. Pt should have a total of 5 infusions prior to starting the oral steroid. Clarified with staff.

## 2018-08-01 NOTE — TELEPHONE ENCOUNTER
Call from Dr Briggs noting she wishes to add 2 more IV doses of Methylprednisolone 1000mg, Thursday and Friday. TORB for this, as well as for glucose to be checked prior to infusion with results routed to her. Orders placed. [facility] updated to place on schedule. Call to SNF and updated nurse re plan and that she should be expecting a call from [facility] with schedule information.

## 2018-08-01 NOTE — MR AVS SNAPSHOT
After Visit Summary   8/1/2018    Jose Antonio Marmolejo    MRN: [id number]           Patient Information     Date Of Birth          1945        Visit Information        Provider Department      8/1/2018 11:00 AM  INF  [address] [facility] [place]        Today's Diagnoses     Recurrent falls    -  1    Weakness of both lower extremities          Care Instructions      Methylprednisolone Solution for Injection  Brand Names: A-Methapred, Solu-Medrol  What is this medicine?  METHYLPREDNISOLONE (meth ill pred NISS oh lone) is a corticosteroid. It is commonly used to treat inflammation of the skin, joints, lungs, and other organs. Common conditions treated include asthma, allergies, and arthritis. It is also used for other conditions, such as blood disorders and diseases of the adrenal glands.  How should I use this medicine?  This medicine is for injection or infusion into a vein. It is also for injection into a muscle. It is given by a health care professional in a hospital or clinic setting.  Talk to your pediatrician regarding the use of this medicine in children. While this drug may be prescribed for selected conditions, precautions do apply.  What side effects may I notice from receiving this medicine?  Side effects that you should report to your doctor or health care professional as soon as possible:    allergic reactions like skin rash, itching or hives, swelling of the face, lips, or tongue    bloody or tarry stools    changes in vision    hallucination, loss of contact with reality    muscle cramps    muscle pain    palpitations    signs and symptoms of high blood sugar such as dizziness; dry mouth; dry skin; fruity breath; nausea; stomach pain; increased hunger or thirst; increased urination    signs and symptoms of infection like fever or chills; cough; sore throat; pain or trouble passing urine    trouble passing urine or change in the amount of urine  Side effects that usually do not require  medical attention (report to your doctor or health care professional if they continue or are bothersome):    changes in emotions or mood    constipation    diarrhea    excessive hair growth on the face or body    headache    nausea, vomiting    pain, redness, or irritation at site where injected    trouble sleeping    weight gain  What may interact with this medicine?  Do not take this medicine with any of the following medications:    alefacept    echinacea    iopamidol    live virus vaccines    metyrapone    mifepristone  This medicine may also interact with the following medications:    amphotericin B    aspirin and aspirin-like medicines    certain antibiotics like erythromycin, clarithromycin, troleandomycin    certain medicines for diabetes    certain medicines for fungal infection like ketoconazole    certain medicines for seizures like carbamazepine, phenobarbital, phenytoin    certain medicines that treat or prevent blood clots like warfarin    cyclosporine    digoxin    diuretics    female hormones, like estrogens and birth control pills    isoniazid    NSAIDS, medicines for pain and inflammation, like ibuprofen or naproxen    other medicines for myasthenia gravis    rifampin    vaccines  What if I miss a dose?  This does not apply.  Where should I keep my medicine?  This drug is given in a hospital or clinic and will not be stored at home.  What should I tell my health care provider before I take this medicine?  They need to know if you have any of these conditions:    Cushing's syndrome    eye disease, vision problems    diabetes    glaucoma    heart disease    high blood pressure    infection (especially a virus infection such as chickenpox, cold sores, or herpes)    liver disease    mental illness    myasthenia gravis    osteoporosis    recently received or scheduled to receive a vaccine    seizures    stomach or intestine problems    thyroid disease    an unusual or allergic reaction to lactose,  methylprednisolone, other medicines, foods, dyes, or preservatives    pregnant or trying to get pregnant    breast-feeding  What should I watch for while using this medicine?  Tell your doctor or healthcare professional if your symptoms do not start to get better or if they get worse. Do not stop taking except on your doctor's advice. You may develop a severe reaction. Your doctor will tell you how much medicine to take.  Your condition will be monitored carefully while you are receiving this medicine.  This medicine may increase your risk of getting an infection. Tell your doctor or health care professional if you are around anyone with measles or chickenpox, or if you develop sores or blisters that do not heal properly.  This medicine may affect blood sugar levels. If you have diabetes, check with your doctor or health care professional before you change your diet or the dose of your diabetic medicine.  Tell your doctor or health care professional right away if you have any change in your eyesight.  Using this medicine for a long time may increase your risk of low bone mass. Talk to your doctor about bone health.  NOTE:This sheet is a summary. It may not cover all possible information. If you have questions about this medicine, talk to your doctor, pharmacist, or health care provider. Copyright  2018 [employer]                Follow-ups after your visit        Your next 10 appointments already scheduled     Aug 08, 2018  1:00 PM CDT   (Arrive by 12:45 PM)   Office Visit with Juanita Briggs MD   [facility] ([facility] )    [address] [place] [address]   [phone]           Bring a current list of meds and any records pertaining to this visit. For Physicals, please bring immunization records and any forms needing to be filled out. Please arrive 10 minutes early to complete paperwork.              Who to contact     If you have questions or need follow up information  about today's clinic visit or your schedule please contact [facility] directly at [phone].  Normal or non-critical lab and imaging results will be communicated to you by MyChart, letter or phone within 4 business days after the clinic has received the results. If you do not hear from us within 7 days, please contact the clinic through MyChart or phone. If you have a critical or abnormal lab result, we will notify you by phone as soon as possible.  Submit refill requests through [employer] or call your pharmacy and they will forward the refill request to us. Please allow 3 business days for your refill to be completed.          Additional Information About Your Visit        Care EveryWhere ID     This is your Care EveryWhere ID. This could be used by other organizations to access your [place] medical records  [id number]         Blood Pressure from Last 3 Encounters:   07/31/18 114/60   07/31/18 137/82   07/30/18 (!) 118/49    Weight from Last 3 Encounters:   07/31/18 117.9 kg (260 lb)   07/31/18 117.9 kg (260 lb)   07/30/18 117.9 kg (260 lb)              Today, you had the following     No orders found for display       Primary Care Provider Office Phone # Fax #    Juanita Briggs [phone] [address] [facility] [address]        Equal Access to Services     KASHIF SALINAS : Hadii aad ku hadasho Soomaali, waaxda luqadaha, qaybta kaalmada adedayda, viki patterson. So [facility] [phone].    ATENCIÓN: Si habla español, tiene a garcia disposición servicios gratuitos de asistencia lingüística. Sandra al [phone].    We comply with applicable federal civil rights laws and [place] laws. We do not discriminate on the basis of race, color, national origin, age, disability, sex, sexual orientation, or gender identity.            Thank you!     Thank you for choosing [facility]  for your care. Our goal is always to provide you with  excellent care. Hearing back from our patients is one way we can continue to improve our services. Please take a few minutes to complete the written survey that you may receive in the mail after your visit with us. Thank you!             Your Updated Medication List - Protect others around you: Learn how to safely use, store and throw away your medicines at www.disposemymeds.org.          This list is accurate as of 8/1/18  1:05 PM.  Always use your most recent med list.                   Brand Name Dispense Instructions for use Diagnosis    acetaminophen 325 MG tablet    TYLENOL    100 tablet    Take 2 tablets (650 mg) by mouth every 4 hours as needed for mild pain    Fall, initial encounter, Weakness       Adult Blood Pressure Cuff Lg Kit     1 kit    1 each 2 times daily    Benign essential hypertension, Malignant essential hypertension       hydrochlorothiazide 12.5 MG capsule    MICROZIDE    30 capsule    Take 1 capsule (12.5 mg) by mouth daily    Essential hypertension       lisinopril 40 MG tablet    PRINIVIL/ZESTRIL    30 tablet    Take 1 tablet (40 mg) by mouth daily    Essential hypertension       metoprolol succinate 50 MG 24 hr tablet    TOPROL-XL    30 tablet    Take 1 tablet (50 mg) by mouth daily    Essential hypertension       miconazole 2 % powder    MICATIN; MICRO GUARD    90 g    Apply topically 2 times daily    Yeast infection of the skin       predniSONE 20 MG tablet   Start taking on:  8/4/2018    DELTASONE    66 tablet    Take 6 tablets (120 mg) by mouth daily for 11 days To start on 8/4 after Infusion therapy completed    Optic neuritis due to multiple sclerosis (H)

## 2018-08-01 NOTE — PROGRESS NOTES
Patient is a 72 year old  here accompanied by self today for infusion of methylprednisolone per order of Dr Donahue.  Patient identified with two identifiers, order verified, and verbal consent for today's infusion obtained from patient.     24 gauge angio cath inserted into left hand by anesthesia after 5 failed attempts by infusion nurses.  Immediate blood return noted.  IV secured with sterile, transparent dressing and tape.  Patient tolerated well, denies pain or discomfort at this time.  Flushes easily without resistance, no signs or symptoms of infiltration or infection.   Patient denies questions or concerns regarding infusion and/or medication(s) being administered.    1203:IV pump verified with methylprednisolone 1000mg dose, drug, and rate of administration.  Infusion administered per protocol.  Patient tolerated infusion well, no signs or symptoms of adverse reaction noted.  Patient denies pain nor discomfort.     IV removed, catheter intact.  Site clean, dry and intact.  No signs or symptoms of infiltration or infection.  Covered with a sterile bandage, slight pressure applied for 30 seconds.  Pt instructed to leave bandage intact for a minimum of one hour, and to call with questions or concerns.  Copy of appointments, discharge instructions, and after visit summary (AVS) provided to patient. Dennis called, [facility] transport, for . Patient states understanding, discharged ambulatory.

## 2018-08-02 NOTE — MR AVS SNAPSHOT
After Visit Summary   8/2/2018    Jose Antonio Marmolejo    MRN: [id number]           Patient Information     Date Of Birth          1945        Visit Information        Provider Department      8/2/2018 1:00 PM  INF RM 3302 [facility]        Today's Diagnoses     Recurrent falls    -  1    Weakness of both lower extremities        Type 2 diabetes mellitus without complication, without long-term current use of insulin (H)          Care Instructions      Methylprednisolone Solution for Injection  Brand Names: A-Methapred, Solu-Medrol  What is this medicine?  METHYLPREDNISOLONE (meth ill pred NISS oh lone) is a corticosteroid. It is commonly used to treat inflammation of the skin, joints, lungs, and other organs. Common conditions treated include asthma, allergies, and arthritis. It is also used for other conditions, such as blood disorders and diseases of the adrenal glands.  How should I use this medicine?  This medicine is for injection or infusion into a vein. It is also for injection into a muscle. It is given by a health care professional in a hospital or clinic setting.  Talk to your pediatrician regarding the use of this medicine in children. While this drug may be prescribed for selected conditions, precautions do apply.  What side effects may I notice from receiving this medicine?  Side effects that you should report to your doctor or health care professional as soon as possible:    allergic reactions like skin rash, itching or hives, swelling of the face, lips, or tongue    bloody or tarry stools    changes in vision    hallucination, loss of contact with reality    muscle cramps    muscle pain    palpitations    signs and symptoms of high blood sugar such as dizziness; dry mouth; dry skin; fruity breath; nausea; stomach pain; increased hunger or thirst; increased urination    signs and symptoms of infection like fever or chills; cough; sore throat; pain or trouble passing  urine    trouble passing urine or change in the amount of urine  Side effects that usually do not require medical attention (report to your doctor or health care professional if they continue or are bothersome):    changes in emotions or mood    constipation    diarrhea    excessive hair growth on the face or body    headache    nausea, vomiting    pain, redness, or irritation at site where injected    trouble sleeping    weight gain  What may interact with this medicine?  Do not take this medicine with any of the following medications:    alefacept    echinacea    iopamidol    live virus vaccines    metyrapone    mifepristone  This medicine may also interact with the following medications:    amphotericin B    aspirin and aspirin-like medicines    certain antibiotics like erythromycin, clarithromycin, troleandomycin    certain medicines for diabetes    certain medicines for fungal infection like ketoconazole    certain medicines for seizures like carbamazepine, phenobarbital, phenytoin    certain medicines that treat or prevent blood clots like warfarin    cyclosporine    digoxin    diuretics    female hormones, like estrogens and birth control pills    isoniazid    NSAIDS, medicines for pain and inflammation, like ibuprofen or naproxen    other medicines for myasthenia gravis    rifampin    vaccines  What if I miss a dose?  This does not apply.  Where should I keep my medicine?  This drug is given in a hospital or clinic and will not be stored at home.  What should I tell my health care provider before I take this medicine?  They need to know if you have any of these conditions:    Cushing's syndrome    eye disease, vision problems    diabetes    glaucoma    heart disease    high blood pressure    infection (especially a virus infection such as chickenpox, cold sores, or herpes)    liver disease    mental illness    myasthenia gravis    osteoporosis    recently received or scheduled to receive a  vaccine    seizures    stomach or intestine problems    thyroid disease    an unusual or allergic reaction to lactose, methylprednisolone, other medicines, foods, dyes, or preservatives    pregnant or trying to get pregnant    breast-feeding  What should I watch for while using this medicine?  Tell your doctor or healthcare professional if your symptoms do not start to get better or if they get worse. Do not stop taking except on your doctor's advice. You may develop a severe reaction. Your doctor will tell you how much medicine to take.  Your condition will be monitored carefully while you are receiving this medicine.  This medicine may increase your risk of getting an infection. Tell your doctor or health care professional if you are around anyone with measles or chickenpox, or if you develop sores or blisters that do not heal properly.  This medicine may affect blood sugar levels. If you have diabetes, check with your doctor or health care professional before you change your diet or the dose of your diabetic medicine.  Tell your doctor or health care professional right away if you have any change in your eyesight.  Using this medicine for a long time may increase your risk of low bone mass. Talk to your doctor about bone health.  NOTE:This sheet is a summary. It may not cover all possible information. If you have questions about this medicine, talk to your doctor, pharmacist, or health care provider. Copyright  2018 [employer]                Follow-ups after your visit        Your next 10 appointments already scheduled     Aug 03, 2018 11:30 AM CDT   Level 2 with  INF RM 3304   [facility] [place] ([facility] [place] )    [address] [place] [address]   [phone]            Aug 08, 2018  1:00 PM CDT   (Arrive by 12:45 PM)   Office Visit with Juanita Briggs MD   [facility] [place] ([facility] [place] )    [address] [place] [address]   [phone]            "Bring a current list of meds and any records pertaining to this visit. For Physicals, please bring immunization records and any forms needing to be filled out. Please arrive 10 minutes early to complete paperwork.              Future tests that were ordered for you today     Open Standing Orders        Priority Remaining Interval Expires Ordered    Glucose whole blood Routine 1/2 8/1/2019 8/1/2018            Who to contact     If you have questions or need follow up information about today's clinic visit or your schedule please contact [facility] directly at [phone].  Normal or non-critical lab and imaging results will be communicated to you by MyChart, letter or phone within 4 business days after the clinic has received the results. If you do not hear from us within 7 days, please contact the clinic through MyChart or phone. If you have a critical or abnormal lab result, we will notify you by phone as soon as possible.  Submit refill requests through [employer] or call your pharmacy and they will forward the refill request to us. Please allow 3 business days for your refill to be completed.          Additional Information About Your Visit        Care EveryWhere ID     This is your Care EveryWhere ID. This could be used by other organizations to access your [place] medical records  [id number]        Your Vitals Were     Pulse Temperature Respirations Height Pulse Oximetry BMI (Body Mass Index)    63 97.1  F (36.2  C) (Tympanic) 20 1.778 m (5' 10\") 96% 37.31 kg/m2       Blood Pressure from Last 3 Encounters:   08/02/18 151/59   08/01/18 148/70   07/31/18 114/60    Weight from Last 3 Encounters:   08/02/18 117.9 kg (260 lb)   08/01/18 117.9 kg (260 lb)   07/31/18 117.9 kg (260 lb)              We Performed the Following     Glucose whole blood        Primary Care Provider Office Phone # Fax #    Juanita Briggs [phone] [address] [facility] [address]        Equal " Access to Services     [facility]: Hadii aad ku hadretayuan Chaiali, wadhavalda luqadaha, qaybta kaalmaviki watkins. So [facility] [phone].    ATENCIÓN: Si habla español, tiene a garcia disposición servicios gratuitos de asistencia lingüística. Llame al [phone].    We comply with applicable federal civil rights laws and [place] laws. We do not discriminate on the basis of race, color, national origin, age, disability, sex, sexual orientation, or gender identity.            Thank you!     [facility] you for [facility]  [facility]. Our goal is always to provide you with excellent care. Hearing back from our patients is one way we can continue to improve our services. Please take a few minutes to complete the written survey that you may receive in the mail after your visit with us. Thank you!             Your Updated Medication List - Protect others around you: Learn how to safely use, store and throw away your medicines at www.disposemymeds.org.          This list is accurate as of 8/2/18  3:05 PM.  Always use your most recent med list.                   Brand Name Dispense Instructions for use Diagnosis    acetaminophen 325 MG tablet    TYLENOL    100 tablet    Take 2 tablets (650 mg) by mouth every 4 hours as needed for mild pain    Fall, initial encounter, Weakness       Adult Blood Pressure Cuff Lg Kit     1 kit    1 each 2 times daily    Benign essential hypertension, Malignant essential hypertension       hydrochlorothiazide 12.5 MG capsule    MICROZIDE    30 capsule    Take 1 capsule (12.5 mg) by mouth daily    Essential hypertension       lisinopril 40 MG tablet    PRINIVIL/ZESTRIL    30 tablet    Take 1 tablet (40 mg) by mouth daily    Essential hypertension       metoprolol succinate 50 MG 24 hr tablet    TOPROL-XL    30 tablet    Take 1 tablet (50 mg) by mouth daily    Essential hypertension       miconazole 2 % powder    MICATIN; MICRO GUARD     90 g    Apply topically 2 times daily    Yeast infection of the skin       predniSONE 20 MG tablet   Start taking on:  8/4/2018    DELTASONE    66 tablet    Take 6 tablets (120 mg) by mouth daily for 11 days To start on 8/4 after Infusion therapy completed    Optic neuritis due to multiple sclerosis (H)

## 2018-08-02 NOTE — PROGRESS NOTES
Patient is a 72 year old  here accompanied by self today for infusion of methylprednisolone per order of Dr Donahue.  Patient identified with two identifiers, order verified, and verbal consent for today's infusion obtained from patient.      22 gauge angio cath inserted into left AC by anesthesia after 2 failed attempts by this writer.  Immediate blood return noted.  IV secured with sterile, transparent dressing and tape. Patient tolerated well, denies pain or discomfort at this time. Flushes easily without resistance, no signs or symptoms of infiltration or infection.   Patient denies questions or concerns regarding infusion and/or medication(s) being administered.     1401: IV pump verified with methylprednisolone 1000mg dose, drug, and rate of administration.  Infusion administered per protocol.  Patient tolerated infusion well, no signs or symptoms of adverse reaction noted.  Patient denies pain nor discomfort.      IV removed, catheter intact.  Site clean, dry and intact.  No signs or symptoms of infiltration or infection.  Covered with a sterile bandage, slight pressure applied for 30 seconds.  Pt instructed to leave bandage intact for a minimum of one hour, and to call with questions or concerns.  Copy of appointments, discharge instructions, and after visit summary (AVS) provided to patient. Dennis called, [facility] transport, for . Patient states understanding, discharged ambulatory.

## 2018-08-02 NOTE — TELEPHONE ENCOUNTER
Attempted to reach pts son x 2 today. No answer. Has no  set up. Will attempt to reach again next working day to help with coordination of care.     Juanita Briggs MD

## 2018-08-03 NOTE — MR AVS SNAPSHOT
After Visit Summary   8/3/2018    Jose Antonio Marmolejo    MRN: [id number]           Patient Information     Date Of Birth          1945        Visit Information        Provider Department      8/3/2018 11:30 AM  INF RM 3[address] [facility] [place]        Today's Diagnoses     Recurrent falls    -  1    Weakness of both lower extremities        Type 2 diabetes mellitus without complication, without long-term current use of insulin (H)          Care Instructions    Remove coban from left upper arm IV site upon arrival.    Patient Education    Methylprednisolone  Methylprednisolone Acetate Suspension for injection  What is this medicine?  METHYLPREDNISOLONE (meth ill pred NISS oh lone) is a corticosteroid. It is commonly used to treat inflammation of the skin, joints, lungs, and other organs. Common conditions treated include asthma, allergies, and arthritis. It is also used for other conditions, such as blood disorders and diseases of the adrenal glands.  This medicine may be used for other purposes; ask your health care provider or pharmacist if you have questions.  What should I tell my health care provider before I take this medicine?  They need to know if you have any of these conditions:    cataracts or glaucoma    Cushings    heart disease    high blood pressure    infection including tuberculosis    low calcium or potassium levels in the blood    recent surgery    seizures    stomach or intestinal disease, including colitis    threadworms    thyroid problems    an unusual or allergic reaction to methylprednisolone, corticosteroids, benzyl alcohol, other medicines, foods, dyes, or preservatives    pregnant or trying to get pregnant    breast-feeding  How should I use this medicine?  This medicine is for injection into a muscle, joint, or other tissue. It is given by a health care professional in a hospital or clinic setting.  Talk to your pediatrician regarding the use of this medicine in children.  While this drug may be prescribed for selected conditions, precautions do apply.  Overdosage: If you think you have taken too much of this medicine contact a poison control center or emergency room at once.  NOTE: This medicine is only for you. Do not share this medicine with others.  What if I miss a dose?  This does not apply.  What may interact with this medicine?  Do not take this medicine with any of the following medications:    mifepristone  This medicine may also interact with the following medications:    aspirin and aspirin-like medicines    cyclosporin    ketoconazole    phenobarbital    phenytoin    rifampin    tacrolimus    troleandomycin    vaccines    warfarin  This list may not describe all possible interactions. Give your health care provider a list of all the medicines, herbs, non-prescription drugs, or dietary supplements you use. Also tell them if you smoke, drink alcohol, or use illegal drugs. Some items may interact with your medicine.  What should I watch for while using this medicine?  Visit your doctor or health care professional for regular checks on your progress. If you are taking this medicine for a long time, carry an identification card with your name and address, the type and dose of your medicine, and your doctor's name and address.  The medicine may increase your risk of getting an infection. Stay away from people who are sick. Tell your doctor or health care professional if you are around anyone with measles or chickenpox.  You may need to avoid some vaccines. Talk to your health care provider for more information.  If you are going to have surgery, tell your doctor or health care professional that you have taken this medicine within the last twelve months.  Ask your doctor or health care professional about your diet. You may need to lower the amount of salt you eat.  The medicine can increase your blood sugar. If you are a diabetic check with your doctor if you need help adjusting  the dose of your diabetic medicine.  What side effects may I notice from receiving this medicine?  Side effects that you should report to your doctor or health care professional as soon as possible:    allergic reactions like skin rash, itching or hives, swelling of the face, lips, or tongue    bloody or tarry stools    changes in vision    eye pain or bulging eyes    fever, sore throat, sneezing, cough, or other signs of infection, wounds that will not heal    increased thirst    irregular heartbeat    muscle cramps    pain in hips, back, ribs, arms, shoulders, or legs    swelling of the ankles, feet, hands    trouble passing urine or change in the amount of urine    unusual bleeding or bruising    unusually weak or tired    weight gain or weight loss  Side effects that usually do not require medical attention (report to your doctor or health care professional if they continue or are bothersome):    changes in emotions or moods    constipation or diarrhea    headache    irritation at site where injected    nausea, vomiting    skin problems, acne, thin and shiny skin    trouble sleeping    unusual hair growth on the face or body  This list may not describe all possible side effects. Call your doctor for medical advice about side effects. You may report side effects to FDA at 8-702-FDA-8515.                  Follow-ups after your visit        Your next 10 appointments already scheduled     Aug 08, 2018  1:00 PM CDT   (Arrive by 12:45 PM)   Office Visit with Juanita Briggs MD   [facility] [place] ([facility] [place] )    [address] [place] [address]   [phone]           Bring a current list of meds and any records pertaining to this visit. For Physicals, please bring immunization records and any forms needing to be filled out. Please arrive 10 minutes early to complete paperwork.              Who to contact     If you have questions or need follow up information about today's clinic  visit or your schedule please contact [facility] directly at [phone].  Normal or non-critical lab and imaging results will be communicated to you by MyChart, letter or phone within 4 business days after the clinic has received the results. If you do not hear from us within 7 days, please contact the clinic through MyChart or phone. If you have a critical or abnormal lab result, we will notify you by phone as soon as possible.  Submit refill requests through [employer] or call your pharmacy and they will forward the refill request to us. Please allow 3 business days for your refill to be completed.          Additional Information About Your Visit        Care EveryWhere ID     This is your Care EveryWhere ID. This could be used by other organizations to access your [place] medical records  [id number]        Your Vitals Were     Pulse Temperature Respirations Pulse Oximetry          70 96.2  F (35.7  C) (Oral) 20 95%         Blood Pressure from Last 3 Encounters:   08/03/18 153/74   08/02/18 151/59   08/01/18 148/70    Weight from Last 3 Encounters:   08/02/18 117.9 kg (260 lb)   08/01/18 117.9 kg (260 lb)   07/31/18 117.9 kg (260 lb)              We Performed the Following     Glucose whole blood        Primary Care Provider Office Phone # Fax #    Juanita Briggs [phone] [address] [facility] [address]        Equal Access to Services     [facility] : Hadii charles scott hadasho Soleela, waaxda luqadaha, qaybta kaalmaviki watkins . So [facility] [phone].    ATENCIÓN: Si habla español, tiene a garcia disposición servicios gratuitos de asistencia lingüística. Llame al [phone].    We comply with applicable federal civil rights laws and [place] laws. We do not discriminate on the basis of race, color, national origin, age, disability, sex, sexual orientation, or gender identity.            Thank you!     Thank you for choosing  [facility] [place]  for your care. Our goal is always to provide you with excellent care. Hearing back from our patients is one way we can continue to improve our services. Please take a few minutes to complete the written survey that you may receive in the mail after your visit with us. Thank you!             Your Updated Medication List - Protect others around you: Learn how to safely use, store and throw away your medicines at www.disposemymeds.org.          This list is accurate as of 8/3/18 12:48 PM.  Always use your most recent med list.                   Brand Name Dispense Instructions for use Diagnosis    acetaminophen 325 MG tablet    TYLENOL    100 tablet    Take 2 tablets (650 mg) by mouth every 4 hours as needed for mild pain    Fall, initial encounter, Weakness       Adult Blood Pressure Cuff Lg Kit     1 kit    1 each 2 times daily    Benign essential hypertension, Malignant essential hypertension       hydrochlorothiazide 12.5 MG capsule    MICROZIDE    30 capsule    Take 1 capsule (12.5 mg) by mouth daily    Essential hypertension       lisinopril 40 MG tablet    PRINIVIL/ZESTRIL    30 tablet    Take 1 tablet (40 mg) by mouth daily    Essential hypertension       metoprolol succinate 50 MG 24 hr tablet    TOPROL-XL    30 tablet    Take 1 tablet (50 mg) by mouth daily    Essential hypertension       miconazole 2 % powder    MICATIN; MICRO GUARD    90 g    Apply topically 2 times daily    Yeast infection of the skin       predniSONE 20 MG tablet   Start taking on:  8/4/2018    DELTASONE    66 tablet    Take 6 tablets (120 mg) by mouth daily for 11 days To start on 8/4 after Infusion therapy completed    Optic neuritis due to multiple sclerosis (H)

## 2018-08-03 NOTE — PROGRESS NOTES
Patient is a 72 year old male here accompanied by self today for infusion of methylprednisolone per order of Dr. Brigitte horowitz.  Patient meets parameters for today's infusion. Patient identified with two identifiers, order verified, and verbal consent for today's infusion obtained from patient.      Recent lab values: Blood glucose 250     24 gauge angio cath inserted into left upper arm.  Immediate blood return noted.  IV secured with sterile, transparent dressing and tape.  Patient tolerated well, denies pain or discomfort at this time.  Flushes easily without resistance, no signs or symptoms of infiltration or infection.   Patient denies questions or concerns regarding infusion and/or medication(s) being administered.    IV pump verified with methylprednisolone 1,000 mg dose, drug, and rate of administration with MAR and medication label. Infusion administered per protocol.  Patient tolerated infusion well, no signs or symptoms of adverse reaction noted.  Patient denies pain nor discomfort.     IV removed, catheter intact.  Site clean, dry and intact.  No signs or symptoms of infiltration or infection.  Covered with a sterile bandage, slight pressure applied for 30 seconds.  Pt instructed to leave bandage intact for a minimum of one hour, and to call with questions or concerns.  Copy of appointments, discharge instructions, and after visit summary (AVS) provided to patient.  Patient states understanding, discharged ambulatory.

## 2018-08-08 NOTE — TELEPHONE ENCOUNTER
Called [facility] about missed appt today. They were not aware of the follow up appointment.     Also discussed with staff regarding family to call. There is release to speak with his sisters, Kailyn and Sherry. Numbers are below:    Kailyn Howell - [phone]  Sherry Jaswantnathan - [phone]    Called and LVM with Kailyn to call back.     Please call [facility] to schedule follow up appointment early next week.     Juanita Briggs MD

## 2018-08-09 PROBLEM — G35 MS (MULTIPLE SCLEROSIS) (H): Status: ACTIVE | Noted: 2018-01-01

## 2018-08-09 PROBLEM — H46.9 OPTIC NEURITIS: Status: ACTIVE | Noted: 2018-01-01

## 2018-08-09 NOTE — MR AVS SNAPSHOT
After Visit Summary   8/9/2018    Jose Antonio Marmolejo    MRN: [id number]           Patient Information     Date Of Birth          1945        Visit Information        Provider Department      8/9/2018 10:51 AM Juanita Briggs MD [facility]        Today's Diagnoses     MS (multiple sclerosis) (H)    -  1    Optic neuritis        Type 2 diabetes mellitus without complication, without long-term current use of insulin (H)        Essential hypertension        Recurrent falls        Arthralgia of shoulder, unspecified laterality        Cognitive impairment           Follow-ups after your visit        Who to contact     If you have questions or need follow up information about today's clinic visit or your schedule please contact [facility] directly at [phone].  Normal or non-critical lab and imaging results will be communicated to you by MyChart, letter or phone within 4 business days after the clinic has received the results. If you do not hear from us within 7 days, please contact the clinic through MyChart or phone. If you have a critical or abnormal lab result, we will notify you by phone as soon as possible.  Submit refill requests through [employer] or call your pharmacy and they will forward the refill request to us. Please allow 3 business days for your refill to be completed.          Additional Information About Your Visit        Care EveryWhere ID     This is your Care EveryWhere ID. This could be used by other organizations to access your [facility] medical records  [id number]        Your Vitals Were     Pulse Respirations Pulse Oximetry             78 22 95%          Blood Pressure from Last 3 Encounters:   08/10/18 118/64   08/09/18 140/74   08/03/18 153/74    Weight from Last 3 Encounters:   08/02/18 260 lb (117.9 kg)   08/01/18 260 lb (117.9 kg)   07/31/18 260 lb (117.9 kg)              Today, you had the following     No orders found for display       Primary Care  Provider Office Phone # Fax #    Juanita Briggs [phone] [address] [facility] [address]        Equal Access to Services     JEFF SALINAS : Bailey scott sariyuan Leonarda, wadhavalda luqadaha, qaybta kamunada porsha, viki jenkins olgaisabel moura lachecoelvin pattersno. So [facility] [phone].    ATENCIÓN: Si habla español, tiene a garcia disposición servicios gratuitos de asistencia lingüística. Llame al [phone].    We comply with applicable federal civil rights laws and [place] laws. We do not discriminate on the basis of race, color, national origin, age, disability, sex, sexual orientation, or gender identity.            Thank you!     Thank you for choosing [facility]  for your care. Our goal is always to provide you with excellent care. Hearing back from our patients is one way we can continue to improve our services. Please take a few minutes to complete the written survey that you may receive in the mail after your visit with us. Thank you!             Your Updated Medication List - Protect others around you: Learn how to safely use, store and throw away your medicines at www.disposemymeds.org.          This list is accurate as of 8/9/18 11:59 PM.  Always use your most recent med list.                   Brand Name Dispense Instructions for use Diagnosis    acetaminophen 325 MG tablet    TYLENOL    100 tablet    Take 2 tablets (650 mg) by mouth every 4 hours as needed for mild pain    Fall, initial encounter, Weakness       Adult Blood Pressure Cuff Lg Kit     1 kit    1 each 2 times daily    Benign essential hypertension, Malignant essential hypertension       hydrochlorothiazide 12.5 MG capsule    MICROZIDE    30 capsule    Take 1 capsule (12.5 mg) by mouth daily    Essential hypertension       lisinopril 40 MG tablet    PRINIVIL/ZESTRIL    30 tablet    Take 1 tablet (40 mg) by mouth daily    Essential hypertension       metoprolol succinate 50 MG 24 hr tablet     TOPROL-XL    30 tablet    Take 1 tablet (50 mg) by mouth daily    Essential hypertension       miconazole 2 % powder    MICATIN; MICRO GUARD    90 g    Apply topically 2 times daily    Yeast infection of the skin       predniSONE 20 MG tablet    DELTASONE    66 tablet    Take 6 tablets (120 mg) by mouth daily for 11 days To start on 8/4 after Infusion therapy completed    Optic neuritis due to multiple sclerosis (H)

## 2018-08-09 NOTE — TELEPHONE ENCOUNTER
Kailyn sister called back and stated we have to call her after 2:30 and the visit has to be at the [facility]. It can not be at the clinic. Please have nurse call her after 2:30.

## 2018-08-10 NOTE — NURSING NOTE
"Chief Complaint   Patient presents with     Wound Check     Shoulder Pain       Initial /64  Pulse 84  Temp 98.6  F (37  C) (Tympanic)  SpO2 93% Estimated body mass index is 37.31 kg/(m^2) as calculated from the following:    Height as of 8/2/18: 5' 10\" (1.778 m).    Weight as of 8/2/18: 260 lb (117.9 kg).  Medication Reconciliation: complete    Mary Lou Clemente MA    "

## 2018-08-10 NOTE — MR AVS SNAPSHOT
After Visit Summary   8/10/2018    Jose Antonio Marmolejo    MRN: [id number]           Patient Information     Date Of Birth          1945        Visit Information        Provider Department      8/10/2018 1:00 PM Juanita Briggs MD [facility]        Today's Diagnoses     Chronic right shoulder pain    -  1    Decubitus ulcer of left buttock, stage 2        Cellulitis of buttock           Follow-ups after your visit        Who to contact     If you have questions or need follow up information about today's clinic visit or your schedule please contact [facility] directly at [phone].  Normal or non-critical lab and imaging results will be communicated to you by MyChart, letter or phone within 4 business days after the clinic has received the results. If you do not hear from us within 7 days, please contact the clinic through MyChart or phone. If you have a critical or abnormal lab result, we will notify you by phone as soon as possible.  Submit refill requests through [employer] or call your pharmacy and they will forward the refill request to us. Please allow 3 business days for your refill to be completed.          Additional Information About Your Visit        Care EveryWhere ID     This is your Care EveryWhere ID. This could be used by other organizations to access your [facility] medical records  [id number]        Your Vitals Were     Pulse Temperature Pulse Oximetry             84 98.6  F (37  C) (Tympanic) 93%          Blood Pressure from Last 3 Encounters:   08/10/18 118/64   08/09/18 140/74   08/03/18 153/74    Weight from Last 3 Encounters:   08/02/18 260 lb (117.9 kg)   08/01/18 260 lb (117.9 kg)   07/31/18 260 lb (117.9 kg)                 Today's Medication Changes          These changes are accurate as of 8/10/18 11:59 PM.  If you have any questions, ask your nurse or doctor.               Start taking these medicines.        Dose/Directions     sulfamethoxazole-trimethoprim 800-160 MG per tablet   Commonly known as:  BACTRIM DS/SEPTRA DS   Used for:  Cellulitis of buttock, Decubitus ulcer of left buttock, stage 2   Started by:  Juanita Briggs MD        Dose:  1 tablet   Take 1 tablet by mouth 2 times daily   Quantity:  14 tablet   Refills:  0            Where to get your medicines      These medications were sent to [facility] [address] [address]     Phone:  [phone]     sulfamethoxazole-trimethoprim 800-160 MG per tablet                Primary Care Provider Office Phone # Fax #    Juanita Briggs [phone] [address] [facility] [address]        Equal Access to Services     KASHIF SALINAS AH: Hadii charles scott hadasho Soomaali, waaxda luqadaha, qaybta kaalmada adeegyada, viki triplett . So [facility] [phone].    ATENCIÓN: Si habla español, tiene a garcia disposición servicios gratuitos de asistencia lingüística. Llame al [phone].    We comply with applicable federal civil rights laws and [place] laws. We do not discriminate on the basis of race, color, national origin, age, disability, sex, sexual orientation, or gender identity.            Thank you!     Thank you for choosing [facility]  for your care. Our goal is always to provide you with excellent care. Hearing back from our patients is one way we can continue to improve our services. Please take a few minutes to complete the written survey that you may receive in the mail after your visit with us. Thank you!             Your Updated Medication List - Protect others around you: Learn how to safely use, store and throw away your medicines at www.disposemymeds.org.          This list is accurate as of 8/10/18 11:59 PM.  Always use your most recent med list.                   Brand Name Dispense Instructions for use Diagnosis    acetaminophen 325 MG tablet     TYLENOL    100 tablet    Take 2 tablets (650 mg) by mouth every 4 hours as needed for mild pain    Fall, initial encounter, Weakness       Adult Blood Pressure Cuff Lg Kit     1 kit    1 each 2 times daily    Benign essential hypertension, Malignant essential hypertension       hydrochlorothiazide 12.5 MG capsule    MICROZIDE    30 capsule    Take 1 capsule (12.5 mg) by mouth daily    Essential hypertension       lisinopril 40 MG tablet    PRINIVIL/ZESTRIL    30 tablet    Take 1 tablet (40 mg) by mouth daily    Essential hypertension       metoprolol succinate 50 MG 24 hr tablet    TOPROL-XL    30 tablet    Take 1 tablet (50 mg) by mouth daily    Essential hypertension       miconazole 2 % powder    MICATIN; MICRO GUARD    90 g    Apply topically 2 times daily    Yeast infection of the skin       nystatin cream    MYCOSTATIN     APPLY TOPICALLY TO GROIN AREA TWICE DAILY UNTIL HEALED THEN USE TWICE DAILY AS NEEDED    Chronic right shoulder pain       predniSONE 20 MG tablet    DELTASONE    66 tablet    Take 6 tablets (120 mg) by mouth daily for 11 days To start on 8/4 after Infusion therapy completed    Optic neuritis due to multiple sclerosis (H)       sulfamethoxazole-trimethoprim 800-160 MG per tablet    BACTRIM DS/SEPTRA DS    14 tablet    Take 1 tablet by mouth 2 times daily    Cellulitis of buttock, Decubitus ulcer of left buttock, stage 2

## 2018-08-10 NOTE — PROGRESS NOTES
SUBJECTIVE:                                                    Jose Antonio Marmolejo is a 72 year old male who presents to clinic today for the following health issues:      Musculoskeletal problem/pain      Duration: injury from 12 years ago    Description  Location: right shoulder    Intensity:  mild    Accompanying signs and symptoms: none    History  Previous similar problem: YES- an old injury from years ago  Previous evaluation:  Many years ago    Precipitating or alleviating factors:  Trauma or overuse: YES  Aggravating factors include: lifting and or any movement. Constant pain    Therapies tried and outcome: nothing    Noted by pts sister. He states this is from a fall 12 years ago. Has never had imaging of it that he can recall. Does not believe he has had any specific treatment. He denies this is worsened due to any recent falls.     Wound check      Duration: follow up    Description (location/character/radiation): buttocks    Intensity:  mild    Accompanying signs and symptoms: wound is still open and painful.     History (similar episodes/previous evaluation): None    Precipitating or alleviating factors: None    Therapies tried and outcome: covered and clean    Per NH staff area was not open yesterday. Today open and hard. They state this is unlikely a pressure related injury. Concern for abscess as area is very firm. It is tender per patient. There has been no fevers, chills. Recently dx with cellulitis vs dermatitis in this area and rx keflex during hospitalization in early June.          Problem list and histories reviewed & adjusted, as indicated.  Additional history: as documented    Labs reviewed in EPIC    ROS:  Constitutional, HEENT, cardiovascular, pulmonary, gi and gu systems are negative, except as otherwise noted.    OBJECTIVE:     /64  Pulse 84  Temp 98.6  F (37  C) (Tympanic)  SpO2 93%  There is no height or weight on file to calculate BMI.  GENERAL: healthy, alert and no  distress  RESP: lungs clear to auscultation - no rales, rhonchi or wheezes  CV: regular rate and rhythm, normal S1 S2, no S3 or S4, no murmur, click or rub, no peripheral edema and peripheral pulses strong  ABDOMEN: soft, nontender, no hepatosplenomegaly, no masses and bowel sounds normal  MS: RUE exam shows no deformities, no erythema, induration, or nodules, no evidence of joint effusion and pain with active and passive ROM> Passive ROM is full. Active is markedly limited in forward flexion and abduction due to pain, he cannot sit forward enough for me today to examine internal rotation. Strength appears intact in SITS muscles, however, exam limited by pt participation. He is very agitated.    SKIN: Pt has roughly 2 cm dimeter ulceration over left buttock area around are of the ischium. There is diffuse redness of the skin of the buttocks bilaterally. Some induration around ulcer. No fluctuance, no drainage other than scant serosang.     Diagnostic Test Results:  PROCEDURE: XR SHOULDER RT G/E 3 VW 8/10/2018 1:29 PM     HISTORY: ; Chronic right shoulder pain; Chronic right shoulder pain     COMPARISONS: None.     TECHNIQUE: 4 views.     FINDINGS: There is severe degenerative change in the glenohumeral  joint with large spur arising from the inferior medial humeral head.  Joint space narrowing and subchondral sclerosis are seen. There are  calcifications along the superior margin of the glenoid, probably  loose bodies.     No acute fracture or dislocation is seen. There is mild degenerative  change in the AC joint.          IMPRESSION: Severe degenerative change in the right shoulder.     STEVEN ROMERO MD    ASSESSMENT/PLAN:     Chronic right shoulder pain  Severe DJD, consistent with history of pain over the course of many years. Cannot fully rule out soft tissue injury that is new as pt is poor historian and exam limited today. He will not agree to MRI. Regardless, would not be a great surgical candidate at  this time. He was referred to Ortho for possible injection. We can consider MRI at a later date, if appropriate.   - nystatin (MYCOSTATIN) cream; APPLY TOPICALLY TO GROIN AREA TWICE DAILY UNTIL HEALED THEN USE TWICE DAILY AS NEEDED; Refill: 99  - XR Shoulder Right G/E 2 Views; Future    Decubitus ulcer of left buttock, stage 2/ Cellulitis of buttock  This does not appear to be consistent with an abscess. Feel more likely area of DTI that ulcerated over the last day or so. There are satellite areas that appear to be consistent with blood blisters, expect these may ulcerate as well. Given induration, will start on bactrim. SNF is aware and their wound nurse will be seeing him.   - sulfamethoxazole-trimethoprim (BACTRIM DS/SEPTRA DS) 800-160 MG per tablet; Take 1 tablet by mouth 2 times daily  Dispense: 14 tablet; Refill: 0    Juanita Briggs MD  [facility]

## 2018-08-10 NOTE — TELEPHONE ENCOUNTER
11:06 AM    Reason for Call: OVERBOOK    Patient is having the following symptoms: Abcess on behind for 1  days.    The patient is requesting an appointment for overbook today with Dr. Briggs.    Was an appointment offered for this call? yes  If yes : Appointment type              Date 08/24/18 declined, does not think he should wait that long    Preferred method for responding to this message: Telephone Call  What is your phone number ? [phone]    If we cannot reach you directly, may we leave a detailed response at the number you provided? Yes    Can this message wait until your PCP/provider returns, if unavailable today? Not applicable, Provider is in    Kelly Shoen
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
## 2018-08-13 NOTE — PROGRESS NOTES
Pt seen in the UC per request of Annabella Steele CNP to consult of Unstageable PI to L buttock. Black eschar covered ulceration to buttock measuring 4.0x3.5cm, moderate amt of serous drainage. Also has a few small scattered Stage 2 pressure injuries to the R buttock.    Recommend surgical consult for debridement of eschar. Post debridement would be happy to follow in the Wound Center.     At this time recommend the following for tx to assist in softening eschar and provide antimicrobial properties:    Daily dressing change:  Cleanse wound and periwound skin with gentle soap (baby wash) and water. Apply honey gauze covered with dry gauze. Hold in place with Medipore.

## 2018-08-13 NOTE — ED AVS SNAPSHOT
[facility]    [address] [address]    Phone:  [phone]                                       Jose Antonio Marmolejo   MRN: [id number]    Department:  [facility]   Date of Visit:  8/13/2018           After Visit Summary Signature Page     I have received my discharge instructions, and my questions have been answered. I have discussed any challenges I see with this plan with the nurse or doctor.    ..........................................................................................................................................  Patient/Patient Representative Signature      ..........................................................................................................................................  Patient Representative Print Name and Relationship to Patient    ..................................................               ................................................  Date                                            Time    ..........................................................................................................................................  Reviewed by Signature/Title    ...................................................              ..............................................  Date                                                            Time

## 2018-08-13 NOTE — DISCHARGE INSTRUCTIONS
Recommend surgical consult for debridement of eschar. Post debridement would be happy to follow in the Wound Center.      At this time recommend the following for tx to assist in softening eschar and provide antimicrobial properties:     Daily dressing change:  Cleanse wound and periwound skin with gentle soap (baby wash) and water. Apply honey gauze covered with dry gauze. Hold in place with Medipore.

## 2018-08-13 NOTE — MR AVS SNAPSHOT
MRN:[id number]                      After Visit Summary   8/13/2018    Jose Antonoi Marmolejo    MRN: [id number]           Visit Information        Provider Department      8/13/2018 3:30 PM Katherine Steele NP [facility]      Care EveryWhere ID     This is your Care EveryWhere ID. This could be used by other organizations to access your [facility] medical records  [id number]        Equal Access to Services     JEFF SALINAS : Hadii charles guoo Soleela, waaxda luqadaha, qaybta kaalmada adeisabelyada, viki triplett . [facility] [phone].    ATENCIÓN: Si habla español, tiene a garcia disposición servicios gratuitos de asistencia lingüística. Llame al [phone].    We comply with applicable federal civil rights laws and [place] laws. We do not discriminate on the basis of race, color, national origin, age, disability, sex, sexual orientation, or gender identity.

## 2018-08-13 NOTE — ED PROVIDER NOTES
History     Chief Complaint   Patient presents with     Wound Check     scratch on buttocks, pt notes he may has done it himself     The history is provided by the patient. No  was used.     Jose Antonio Marmolejo is a 72 year old male who presents today for an evaluation of an ulcer on his left buttocks.  He was seen by Dr Briggs on 8/10 for the same open area.  He was started on Bactrim due concern for infection in the area due to induration.  Per the SNF nurse the wound color has changed over the weekend.  No fevers or chills.  Patient denies pain to the area.  Patient is incontinent of urine, wearing an incontinence brief at this time.  He is on every 2 hour repositioning schedule but nurse states he is turned more frequently due to incontinence.      Problem List:    Patient Active Problem List    Diagnosis Date Noted     MS (multiple sclerosis) (H) 08/09/2018     Priority: Medium     Optic neuritis 08/09/2018     Priority: Medium     Cellulitis 07/04/2018     Priority: Medium     Pain management 05/04/2018     Priority: Medium     Nursing Home Visit 03/30/2018     Priority: Medium     Recurrent falls      Priority: Medium     Fall 03/21/2018     Priority: Medium     Falls frequently 12/31/2017     Priority: Medium     Type 2 diabetes mellitus without complication (H) 08/19/2016     Priority: Medium     Weakness of both lower extremities 08/19/2016     Priority: Medium     Yeast infection of the skin 08/19/2016     Priority: Medium     Essential hypertension 08/17/2016     Priority: Medium     Falling episodes 08/17/2016     Priority: Medium     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     Priority: Medium        Past Medical History:    Past Medical History:   Diagnosis Date     Arthritis      Diabetes (H)      Hypertension      Recurrent falls        Past Surgical History:    Past Surgical History:   Procedure Laterality Date     ORTHOPEDIC SURGERY      left ankle and removal of hardware        Family History:    Family History   Problem Relation Age of Onset     Other - See Comments Mother      pneumonia     Obesity Mother      Other Cancer Father      hodgkins lymphoma     Coronary Artery Disease Father      pacemaker     Other Cancer Sister      unknown cancers     Colon Cancer Son        Social History:  Marital Status:  Single [1]  Social History   Substance Use Topics     Smoking status: Former Smoker     Quit date: 1/1/1978     Smokeless tobacco: Never Used     Alcohol use No      Comment: quit 1977, previously an alcoholic        Medications:      acetaminophen (TYLENOL) 325 MG tablet   CIPROFLOXACIN PO   hydrochlorothiazide (MICROZIDE) 12.5 MG capsule   lisinopril (PRINIVIL/ZESTRIL) 40 MG tablet   metoprolol (TOPROL-XL) 50 MG 24 hr tablet   miconazole (MICATIN; MICRO GUARD) 2 % powder   predniSONE (DELTASONE) 20 MG tablet   Blood Pressure Monitoring (ADULT BLOOD PRESSURE CUFF LG) KIT   nystatin (MYCOSTATIN) cream         Review of Systems   Constitutional: Negative for chills and fever.   Skin: Positive for color change and wound.       Physical Exam   BP: (!) 130/47  Heart Rate: 85  Temp: 98.4  F (36.9  C)  Resp: 16  SpO2: 98 %      Physical Exam   Constitutional: He appears well-developed. He is cooperative. He does not appear ill. No distress.   Well groomed male sitting in wheelchair in hospital gown, in no acute distress   HENT:   Head: Normocephalic and atraumatic.   Cardiovascular: Normal rate.    Pulmonary/Chest: Effort normal.   Musculoskeletal:   Transferred from  to bed with assist of 1 staff and gait belt, able to weight bear and pivot well   Neurological: He is alert.   Skin:   Left buttocks ischial area: Black eschar covered irregular shaped ulceration to buttock measuring 4.0x3.5cm at largest vertical/horizontal measurements, moderate amt of serous drainage.  No fluctuance noted, there is some induration, erythema, and tenderness to the surrounding skin.  Also has a few  "small scattered Stage 2 pressure injuries to the R buttock.   Psychiatric: He has a normal mood and affect. His behavior is normal.   Nursing note and vitals reviewed.      ED Course     ED Course     Procedures    Buttocks ulceration was washed with mild soap and water, honey guaze applied and covered with regular guaze, secured with medipore tape    Assessments & Plan (with Medical Decision Making)     I have reviewed the nursing notes.    I have reviewed the findings, diagnosis, plan and need for follow up with the patient.  ASSESSMENT / PLAN:  (L89.320) Decubitus ulcer of left buttock, unstageable (H)  Comment: worsening  Plan:  GENERAL SURG ADULT REFERRAL, WOUND CARE          REFERRAL ([place])      Wash affected area with mild soap and water daily, cover with honey gauze and regular guaze, secure with medipore tape   A surgical referral has been placed, they will call you to schedule an appointment   Patient with continue with frequent turns, SNF has also placed him on an air flow mattress   Continue bactrim as previously ordered   Return to ED/UC if symptoms increase or concerns develop such as those discussed and listed on the \"When to go the Emergency Room\" portion of your discharge instructions.    Patient and SNF nurse verbally educated and given appropriate education sheets for their diagnoses and has all questions answered to the best of my ability.    Discharge Medication List as of 8/13/2018  3:47 PM          Final diagnoses:   Decubitus ulcer of left buttock, unstageable (H)       8/13/2018   [facility]     Katherine Steele NP  08/13/18 [id number]    "

## 2018-08-23 NOTE — PROGRESS NOTES
"  HISTORY OF PRESENT ILLNESS:      Jose Antonio is a 72 year old male (1945)  resident of [facility]  who is being seen today for an acute visit for follow up on left sided vision loss related to Optic Neuritis. .     Patient has a history of MS based on LP and imaging. Has never been seen by Neurology. Has failed multiple appointments. Has no plans to attend his visit next month either. He was seen by Optho 7/31 and placed on steroids. He unfortunately refused to go to his follow up appointment. SNF staff stated that pts sisters were supportive of his decision. He also refused is General Surgery appointment for pressure ulcer of the right buttock.     Discussed with nursing staff who have the following concerns: swelling of left upper arm.     Patient is seen in their room. Family is not present.    Pt is laying in bed. States he \"does not care\" that there is a sore on his bottom or his vision is going. He states that I shouldn't either. He balks, however, at the question of whether or not he is hoping to die. He tells me everything is just fine. He denies pain. There has been no fevers, chills, sweats.     Current medications, allergies, and interdisciplinary care plan are reviewed.    Patient Active Problem List    Diagnosis Date Noted     MS (multiple sclerosis) (H) 08/09/2018     Priority: Medium     Optic neuritis 08/09/2018     Priority: Medium     Cellulitis 07/04/2018     Priority: Medium     Pain management 05/04/2018     Priority: Medium     Nursing Home Visit 03/30/2018     Priority: Medium     Recurrent falls      Priority: Medium     Fall 03/21/2018     Priority: Medium     Falls frequently 12/31/2017     Priority: Medium     Type 2 diabetes mellitus without complication (H) 08/19/2016     Priority: Medium     Weakness of both lower extremities 08/19/2016     Priority: Medium     Yeast infection of the skin 08/19/2016     Priority: Medium     Essential hypertension " 08/17/2016     Priority: Medium     Falling episodes 08/17/2016     Priority: Medium     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     Priority: Medium          Social History     Social History     Marital status: Single     Spouse name: N/A     Number of children: 3     Years of education: N/A     Occupational History     Not on file.     Social History Main Topics     Smoking status: Former Smoker     Quit date: 1/1/1978     Smokeless tobacco: Never Used     Alcohol use No      Comment: quit 1977, previously an alcoholic     Drug use: No     Sexual activity: Not Currently     Other Topics Concern     Not on file     Social History Narrative        Current Outpatient Prescriptions   Medication Sig     acetaminophen (TYLENOL) 325 MG tablet Take 2 tablets (650 mg) by mouth every 4 hours as needed for mild pain     Blood Pressure Monitoring (ADULT BLOOD PRESSURE CUFF LG) KIT 1 each 2 times daily     CIPROFLOXACIN PO      hydrochlorothiazide (MICROZIDE) 12.5 MG capsule Take 1 capsule (12.5 mg) by mouth daily     lisinopril (PRINIVIL/ZESTRIL) 40 MG tablet Take 1 tablet (40 mg) by mouth daily     metoprolol (TOPROL-XL) 50 MG 24 hr tablet Take 1 tablet (50 mg) by mouth daily     miconazole (MICATIN; MICRO GUARD) 2 % powder Apply topically 2 times daily     nystatin (MYCOSTATIN) cream APPLY TOPICALLY TO GROIN AREA TWICE DAILY UNTIL HEALED THEN USE TWICE DAILY AS NEEDED     No current facility-administered medications for this visit.        Allergies   Allergen Reactions     Penicillins        I have reviewed the care plan and do agree with the plan.    ROS:  No chest pain, shortness of breath, fever, chills, headache, nausea, vomiting, dysuria, or changes in bowel habits.  Appetite is good.  no pain noted.    OBJECTIVE:  Reviewed in paper chart and stable.     GENERAL:  Alert, and in no acute distress  RESP:  Lungs clear.  No rales, rhonchi, or wheezing  CV:  RRR.  S1 S2 with no murmur. No clicks or rubs.  ABD: Soft,  non tender, non distended, no organomegaly. BS are normal.   SKIN:  Pt has two very small areas of infected hair follicles in the groin area. These appear ready to drain. They are not tender. He also has a large swelling (roughly lemon sized) of the right upper arm just inferior to deltoid. This is not tender. There is mild warmth, and a faint reddened area superiorly. There is no fluctuance.   PSYCH:  Affect is flat.   NEURO: Mental status is alert, insight and judement impaired.     Lab/Diagnostic data:    Reviewed in Epic    ASSESSMENT/ORDERS:  Diagnoses and all orders for this visit:    MS (multiple sclerosis) (H) / Optic neuritis due to multiple sclerosis (H): Not treated. Will need to discuss with family implications of refusing care again at some length. They had previously been quite adamant that he receive neurology consultation, so this seems inconsistent with their wishes for him to receive prompt treatment.     Mass of soft tissue of left upper extremity: I am unclear the nature of this mass. Suspicious for infectious cause given the warmth, however, it is expressly non tender which would be highly unusual. He absolutely will not be seen for US or other imaging. Labwork obtained and stable, WBC was high, however, this is likely related to steroid. CRP remains high, but has been stable over the last several months and confounded by his MS. For now, wound RN to monitor closely and notify with changes.     Folliculitis: these areas are minor and expect them to resolve with local measures. Topical antibiotic cream and monitor.         Total time spent with patient visit was 25 min including patient visit, review of pertinent clinical information, and treatment plan.    Juanita Briggs MD

## 2018-08-24 NOTE — TELEPHONE ENCOUNTER
Pt cancelled surgery appt. Attempted to call and make an appt with Wound care. LM 8/24. Will try to contact pt again.

## 2018-08-29 NOTE — ED PROVIDER NOTES
History     Chief Complaint   Patient presents with     Altered Mental Status     Patient is a 72 year old male presenting with altered mental status. The history is provided by the patient.   Altered Mental Status   Presenting symptoms: confusion and disorientation    Severity:  Moderate  Episode history:  Single  Timing:  Sporadic  Chronicity:  New  Associated symptoms: no abdominal pain, no fever, no headaches, no light-headedness, no nausea, no palpitations, no rash, no vomiting and no weakness          Problem List:    Patient Active Problem List    Diagnosis Date Noted     MS (multiple sclerosis) (H) 08/09/2018     Priority: Medium     Optic neuritis 08/09/2018     Priority: Medium     Cellulitis 07/04/2018     Priority: Medium     Pain management 05/04/2018     Priority: Medium     Nursing Home Visit 03/30/2018     Priority: Medium     Recurrent falls      Priority: Medium     Fall 03/21/2018     Priority: Medium     Falls frequently 12/31/2017     Priority: Medium     Type 2 diabetes mellitus without complication (H) 08/19/2016     Priority: Medium     Weakness of both lower extremities 08/19/2016     Priority: Medium     Yeast infection of the skin 08/19/2016     Priority: Medium     Essential hypertension 08/17/2016     Priority: Medium     Falling episodes 08/17/2016     Priority: Medium     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     Priority: Medium        Past Medical History:    Past Medical History:   Diagnosis Date     Arthritis      Diabetes (H)      Hypertension      Recurrent falls        Past Surgical History:    Past Surgical History:   Procedure Laterality Date     ORTHOPEDIC SURGERY      left ankle and removal of hardware       Family History:    Family History   Problem Relation Age of Onset     Other - See Comments Mother      pneumonia     Obesity Mother      Other Cancer Father      hodgkins lymphoma     Coronary Artery Disease Father      pacemaker     Other Cancer Sister       unknown cancers     Colon Cancer Son        Social History:  Marital Status:  Single [1]  Social History   Substance Use Topics     Smoking status: Former Smoker     Quit date: 1/1/1978     Smokeless tobacco: Never Used     Alcohol use No      Comment: quit 1977, previously an alcoholic        Medications:      acetaminophen (TYLENOL) 325 MG tablet   bacitracin ophthalmic ointment   Blood Pressure Monitoring (ADULT BLOOD PRESSURE CUFF LG) KIT   hydrochlorothiazide (MICROZIDE) 12.5 MG capsule   HYDROcodone-acetaminophen (NORCO) 5-325 MG per tablet   lisinopril (PRINIVIL/ZESTRIL) 40 MG tablet   metoprolol (TOPROL-XL) 50 MG 24 hr tablet   miconazole (MICATIN; MICRO GUARD) 2 % powder   Nutritional Supplements (DALTON) POWD   nystatin (MYCOSTATIN) cream   sennosides (SENOKOT) 8.6 MG tablet         Review of Systems   Constitutional: Negative for chills, diaphoresis and fever.   HENT: Negative for voice change.    Eyes: Negative for visual disturbance.   Respiratory: Negative for cough, chest tightness, shortness of breath and wheezing.    Cardiovascular: Negative for chest pain, palpitations and leg swelling.   Gastrointestinal: Negative for abdominal distention, abdominal pain, anal bleeding, blood in stool, nausea and vomiting.   Genitourinary: Negative for decreased urine volume, dysuria, flank pain and frequency.   Musculoskeletal: Negative for back pain, gait problem, myalgias and neck pain.   Skin: Negative for color change, pallor and rash.   Neurological: Negative for dizziness, syncope, weakness, light-headedness, numbness and headaches.   Psychiatric/Behavioral: Positive for confusion. Negative for sleep disturbance and suicidal ideas.       Physical Exam   BP: 123/97  Pulse: 75  Heart Rate: 76  Temp: 97  F (36.1  C)  Resp: 24  SpO2: 92 %      Physical Exam   Constitutional: He is oriented to person, place, and time. He appears well-developed and well-nourished.   HENT:   Head: Normocephalic and atraumatic.    Mouth/Throat: No oropharyngeal exudate.   Eyes: Conjunctivae are normal. Pupils are equal, round, and reactive to light.   Neck: Normal range of motion. Neck supple. No JVD present. No tracheal deviation present. No thyromegaly present.   Cardiovascular: Normal rate, regular rhythm, normal heart sounds and intact distal pulses.  Exam reveals no gallop and no friction rub.    No murmur heard.  Pulmonary/Chest: Effort normal and breath sounds normal. No stridor. No respiratory distress. He has no wheezes. He has no rales. He exhibits no tenderness.   Abdominal: Soft. Bowel sounds are normal. He exhibits no distension and no mass. There is no tenderness. There is no rebound and no guarding.   Musculoskeletal: Normal range of motion. He exhibits no edema or tenderness.   Lymphadenopathy:     He has no cervical adenopathy.   Neurological: He is alert and oriented to person, place, and time.   Skin: Skin is dry. No rash noted. No erythema. No pallor.   unstageble sacral ulcer  Reduce perpheral pulse in all 4 externities, pale and cyanotic   Psychiatric: His behavior is normal.   Nursing note and vitals reviewed.      ED Course     ED Course     Procedures           Results for orders placed or performed during the hospital encounter of 08/28/18 (from the past 24 hour(s))   Lactic acid   Result Value Ref [place]    Lactic Acid 1.7 0.4 - 2.0 mmol/L   CBC with platelets differential   Result Value Ref [place]    WBC 12.4 (H) 4.0 - 11.0 10e9/L    RBC Count 5.32 4.4 - 5.9 10e12/L    Hemoglobin 15.3 13.3 - 17.7 g/dL    Hematocrit 44.4 40.0 - 53.0 %    MCV 84 78 - 100 fl    MCH 28.8 26.5 - 33.0 pg    MCHC 34.5 31.5 - 36.5 g/dL    RDW 14.6 10.0 - 15.0 %    Platelet Count 215 150 - 450 10e9/L    Diff Method Automated Method     % Neutrophils 71.1 %    % Lymphocytes 15.6 %    % Monocytes 10.4 %    % Eosinophils 2.0 %    % Basophils 0.3 %    % Immature Granulocytes 0.6 %    Nucleated RBCs 0 0 /100    Absolute Neutrophil 8.8 (H) 1.6 -  8.3 10e9/L    Absolute Lymphocytes 1.9 0.8 - 5.3 10e9/L    Absolute Monocytes 1.3 0.0 - 1.3 10e9/L    Absolute Eosinophils 0.3 0.0 - 0.7 10e9/L    Absolute Basophils 0.0 0.0 - 0.2 10e9/L    Abs Immature Granulocytes 0.1 0 - 0.4 10e9/L    Absolute Nucleated RBC 0.0    CK total   Result Value Ref [place]    CK Total 23 (L) 30 - 300 U/L   Comprehensive metabolic panel   Result Value Ref [place]    Sodium 136 133 - 144 mmol/L    Potassium 4.1 3.4 - 5.3 mmol/L    Chloride 103 94 - 109 mmol/L    Carbon Dioxide 25 20 - 32 mmol/L    Anion Gap 8 3 - 14 mmol/L    Glucose 146 (H) 70 - 99 mg/dL    Urea Nitrogen 69 (H) 7 - 30 mg/dL    Creatinine 1.14 0.66 - 1.25 mg/dL    GFR Estimate 63 >60 mL/min/1.7m2    GFR Estimate If Black 76 >60 mL/min/1.7m2    Calcium 8.2 (L) 8.5 - 10.1 mg/dL    Bilirubin Total 0.5 0.2 - 1.3 mg/dL    Albumin 2.3 (L) 3.4 - 5.0 g/dL    Protein Total 7.2 6.8 - 8.8 g/dL    Alkaline Phosphatase 68 40 - 150 U/L    ALT 30 0 - 70 U/L    AST 15 0 - 45 U/L   CRP inflammation   Result Value Ref [place]    CRP Inflammation 133.0 (H) 0.0 - 8.0 mg/L   Lipase   Result Value Ref [place]    Lipase 938 (H) 73 - 393 U/L   Troponin I   Result Value Ref [place]    Troponin I ES <0.015 0.000 - 0.045 ug/L   UA with Microscopic reflex to Culture   Result Value Ref [place]    Color Urine Yellow     Appearance Urine Clear     Glucose Urine Negative NEG^Negative mg/dL    Bilirubin Urine Negative NEG^Negative    Ketones Urine Negative NEG^Negative mg/dL    Specific Gravity Urine 1.015 1.003 - 1.035    Blood Urine Negative NEG^Negative    pH Urine 5.0 4.7 - 8.0 pH    Protein Albumin Urine Negative NEG^Negative mg/dL    Urobilinogen mg/dL Normal 0.0 - 2.0 mg/dL    Nitrite Urine Negative NEG^Negative    Leukocyte Esterase Urine Negative NEG^Negative    Source Catheterized Urine     WBC Urine 1 0 - 5 /HPF    RBC Urine <1 0 - 2 /HPF    Bacteria Urine Few (A) NEG^Negative /HPF    Mucous Urine Present (A) NEG^Negative /LPF       Medications    aztreonam (AZACTAM) 2 g in sodium chloride 0.9 % 100 mL intermittent infusion (2 g Intravenous Not Given 8/29/18 0243)   sodium chloride 0.9 % infusion (not administered)   enoxaparin (LOVENOX) injection 120 mg (120 mg Subcutaneous Not Given 8/29/18 0243)   0.9% sodium chloride BOLUS (0 mLs Intravenous Stopped 8/29/18 0014)   ketorolac (TORADOL) injection 15 mg (15 mg Intravenous Given 8/29/18 0005)   0.9% sodium chloride BOLUS (0 mLs Intravenous Stopped 8/29/18 0200)   vancomycin (VANCOCIN) 1000 mg in dextrose 5% 200 mL PREMIX (0 mg Intravenous Stopped 8/29/18 0319)   iopamidol (ISOVUE-370) solution 75 mL (75 mLs Intravenous Given 8/29/18 0107)   sodium chloride (PF) 0.9% PF flush 100 mL (100 mLs Intravenous Given 8/29/18 0107)   aztreonam (AZACTAM) 1 GM vial (  Stopped 8/29/18 0211)   enoxaparin (LOVENOX) 60 MG/0.6ML injection (120 mg  Given 8/29/18 0222)       Assessments & Plan (with Medical Decision Making)   Sent from NH for confusion,non detectable BP and very low o2 saturation  In ER signs of peripheral hypoperfusion was evident,  probe of oxymeter placed on forehead showed spo2 94, no respiratory distress  Low BP, 77/50, responded quickly to fluid, NS 2lit bolus  Pt AAOx 3  EKG: RBBB, old  Sepsis workup up done, vanco + azactam given  Labs: elevated BUN, lipase  CTA chest + CT abdoman: massive saddle PE  Spoke to Dr Becker, no ICU bed available in our hospital, recommended transfer   Family and pt preferred transfering to [facility]  Spoke to Dr Palomares , accetped for transfer    I have reviewed the nursing notes.    I have reviewed the findings, diagnosis, plan and need for follow up with the patient.      Current Discharge Medication List          Final diagnoses:   Acute saddle pulmonary embolism without acute cor pulmonale (H)       8/28/2018   [facility]     Valeriano Vick MD  08/29/18 8948

## 2018-08-29 NOTE — DISCHARGE INSTRUCTIONS
What to expect when you have contrast    During your exam, we will inject  contrast  into your vein or artery. (Contrast is a clear liquid with iodine in it. It shows up on X-rays.)    You may feel warm or hot. You may have a metal taste in your mouth and a slight upset stomach. You may also feel pressure near the kidneys and bladder. These effects will last about 1 to 3 minutes.    Please tell us if you have:    Sneezing     Itching    Hives     Swelling in the face    A hoarse voice    Breathing problems    Other new symptoms    Serious problems are rare.  They may include:    Irregular heartbeat     Seizures    Kidney failure              Tissue damage    Shock      Death    If you have any problems during the exam, we  will treat them right away.    When you get home    Call your hospital if you have any new symptoms in the next 2 days, like hives or swelling. (Phone numbers are at the bottom of this page.) Or call your family doctor.     If you have wheezing or trouble breathing, call 911.    Self-care  -Drink at least 4 extra glasses of water today.   This reduces the stress on your kidneys.  -Keep taking your regular medicines.    The contrast will pass out of your body in your  Urine(pee). This will happen in the next 24 hours. You  will not feel this. Your urine will not  change color.    If you have kidney problems or take metformin    Drink 4 to 8 large glasses of water for the next  2 days, if you are not on a fluid restriction.    ?If you take metformin (Glucophage or Glucovance) for diabetes, keep taking it.      ?Your kidney function tests are abnormal.  If you take Metformin, do not take it for 48 hours. Please go to your clinic for a blood test within 3 days after your exam before the restarting this medicine.     (Note to provider:please give patient prescription for lab tests.)    ?Special instructions: drink 4 to 8 extra glasses of water in the next two days    I have read and understand the  above information.    Patient Sign Here:______________________________________Date:________Time:______    Staff Sign Here:________________________________________Date:_______Time:______      Radiology Departments:     ?[facility]: [phone] ?[place]: [phone]     ?[place]: [phone] ?[facility]:[phone]      ?[place]: [phone]  ?[place]: [phone]  ?[place]:[phone]    ?[facility]:[phone]  ?[facility] [place]:[phone]

## 2018-08-29 NOTE — ED NOTES
Family at bedside. Pt c/o pain in back sided ulcerations. Packing and dressings were changed at NH PTA. POA requesting pain medications. MD notified.

## 2018-09-19 PROBLEM — I26.99 PULMONARY EMBOLISM WITH INFARCTION (H): Status: ACTIVE | Noted: 2018-01-01

## 2018-09-19 NOTE — PROGRESS NOTES
ANTICOAGULATION FOLLOW-UP CLINIC VISIT    Patient Name:  Jose Antonio Marmolejo  Date:  9/19/2018  Contact Type:  New warfarin dosing/INR recheck date faxed to [facility]    SUBJECTIVE:     Patient Findings     Positives Initiation of therapy, No Problem Findings    Comments INR done by [facility] nurse and result faxed to warfarin clinic. Patient new to warfarin, had surgery and now has a Saddle PE.  Was heparinized while in hospital. Per nursing communication sheet, patient has no unusual bleeding/bruising and no new changes in diet/meds/activity. New warfarin dosing/INR recheck date faxed back to [facility]. Staff to notify warfarin clinic if patient has any bleeding/bruising, changes in diet/meds/activity or questions.            OBJECTIVE    INR   Date Value Ref [place] Status   09/19/2018 2.3  Final       ASSESSMENT / PLAN  INR assessment THER    Recheck INR In: 5 DAYS    INR Location [facility]      Anticoagulation Summary as of 9/19/2018     INR goal 2.0-3.0   Today's INR 2.3   Warfarin maintenance plan 4 mg (2 mg x 2) on Mon, Fri; 5 mg (2 mg x 2.5) all other days   Full warfarin instructions 4 mg on Mon, Fri; 5 mg all other days   Weekly warfarin total 33 mg   Plan last modified Devika Flannery RN (9/19/2018)   Next INR check 9/24/2018   Target end date Indefinite    Indications   Long-term (current) use of anticoagulants [Z79.01] [Z79.01]  Pulmonary embolism with infarction (H) [I26.99] [I26.99]         Anticoagulation Episode Summary     INR check location     Preferred lab     Send INR reminders to  ANTICOAG POOL    Comments Is at [facility], [place]  fax [phone]   PH  [phone] or [phone]      Anticoagulation Care Providers     Provider Role Specialty Phone number    Juanita Briggs MD [facility] [phone]            See the Encounter Report to view Anticoagulation Flowsheet and Dosing Calendar (Go to Encounters tab in chart review, and find the Anticoagulation Therapy Visit)        Devika Flannery, RN

## 2018-09-19 NOTE — MR AVS SNAPSHOT
Jose Antonio Marmolejo   9/19/2018   Anticoagulation Therapy Visit    Description:  72 year old male   Provider:  Juanita Briggs MD   Department:  [facility] Anti Coagulation           INR as of 9/19/2018     Today's INR 2.3      Anticoagulation Summary as of 9/19/2018     INR goal 2.0-3.0   Today's INR 2.3   Full warfarin instructions 4 mg on Mon, Fri; 5 mg all other days   Next INR check 9/24/2018    Indications   Long-term (current) use of anticoagulants [Z79.01] [Z79.01]  Pulmonary embolism with infarction (H) [I26.99] [I26.99]         September 2018 Details    Sun Mon Tue Wed Thu Fri Sat           1                 2               3               4               5               6               7               8                 9               10               11               12               13               14               15                 16               17               18               19      5 mg   See details      20      5 mg         21      4 mg         22      5 mg           23      5 mg         24            25               26               27               28               29                 30                      Date Details   09/19 This INR check       Date of next INR:  9/24/2018         How to take your warfarin dose     To take:  4 mg Take 2 of the 2 mg tablets.    To take:  5 mg Take 2.5 of the 2 mg tablets.

## 2018-09-24 NOTE — TELEPHONE ENCOUNTER
To: Nurse covering for Dr. Briggs  Please call her back today (9/24/18) @ [phone] to discuss INR number is @ 9.17 Thank you

## 2018-09-24 NOTE — MR AVS SNAPSHOT
Jose Antonio APODACA Francie   9/24/2018   Anticoagulation Therapy Visit    Description:  72 year old male   Provider:  Juanita Briggs MD   Department:  [facility] Anti Coagulation           INR as of 9/24/2018     Today's INR 9.45!      Anticoagulation Summary as of 9/24/2018     INR goal 2.0-3.0   Today's INR 9.45!   Full warfarin instructions 9/24: Hold; 9/25: Hold; 9/26: 2 mg; Otherwise 4 mg on Mon, Fri; 5 mg all other days   Next INR check 9/26/2018    Indications   Long-term (current) use of anticoagulants [Z79.01] [Z79.01]  Pulmonary embolism with infarction (H) [I26.99] [I26.99]         September 2018 Details    Sun Mon Tue Wed Thu Fri Sat           1                 2               3               4               5               6               7               8                 9               10               11               12               13               14               15                 16               17               18               19               20               21               22                 23               24      Hold   See details      25      Hold         26            27               28               29                 30                      Date Details   09/24 This INR check       Date of next INR:  9/26/2018         How to take your warfarin dose     To take:  2 mg Take 1 of the 2 mg tablets.    Hold Do not take your warfarin dose. See the Details table to the right for additional instructions.

## 2018-09-24 NOTE — PROGRESS NOTES
ANTICOAGULATION FOLLOW-UP CLINIC VISIT    Patient Name:  Jose Antonio Marmolejo  Date:  9/24/2018  Contact Type:  New warfarin dosing/INR recheck date faxed to [facility]    SUBJECTIVE:     Patient Findings     Positives Initiation of therapy, Unexplained INR or factor level change    Comments INR done by [facility] and result faxed to warfarin clinic. Due to strip recall issue I had talked to the nurse via phone and requested they send sample to lab for INR, which they did. Lab resulted INR of 9.45.  Per received nursing communication sheet, patient has no bleeding/bruising and is on topical antibiotic ointment but no oral antibiotics. New warfarin dosing/INR recheck date faxed to [facility] nurse. Staff to notify warfarin clinic if patient has any bleeding/bruising or changes in diet/meds/activity. I will let MD know what INR result is and warfarin dosing. Dr. Briggs is out of office for today so I verbally notified covering MD, Dr. ROHIT Glasgow of INR result, warfarin dosing plan, that I requested [facility] bring patient to ER immediately for uncontrolled bleeding and INR recheck in 2 days. He agreed with plan and had no questions.            OBJECTIVE    INR   Date Value Ref [place] Status   09/24/2018 9.45 (HH) 0.80 - 1.20 Final     Comment:     Critical Value called to and read back by  MIMI BAR AT 1042 ON 9/24/18 BY [facility]         ASSESSMENT / PLAN  INR assessment SUPRA initiation of therapy,   Recheck INR In: 2 DAYS    INR Location [facility]      Anticoagulation Summary as of 9/24/2018     INR goal 2.0-3.0   Today's INR 9.45!   Warfarin maintenance plan 4 mg (2 mg x 2) on Mon, Fri; 5 mg (2 mg x 2.5) all other days   Full warfarin instructions 9/24: Hold; 9/25: Hold; 9/26: 2 mg; Otherwise 4 mg on Mon, Fri; 5 mg all other days   Weekly warfarin total 33 mg   Plan last modified Devika Flannery RN (9/19/2018)   Next INR check 9/26/2018   Target end date Indefinite    Indications   Long-term (current) use of anticoagulants [Z79.01]  [Z79.01]  Pulmonary embolism with infarction (H) [I26.99] [I26.99]         Anticoagulation Episode Summary     INR check location     Preferred lab     Send INR reminders to  ANTICOAG POOL    Comments Is at [facility], [address] [place]  fax [phone]   [place]  [phone] or [phone]      Anticoagulation Care Providers     Provider Role Specialty Phone number    Juanita Briggs MD [facility] [phone]            See the Encounter Report to view Anticoagulation Flowsheet and Dosing Calendar (Go to Encounters tab in chart review, and find the Anticoagulation Therapy Visit)        Devika Flannery RN

## 2018-09-24 NOTE — TELEPHONE ENCOUNTER
Left message for the patient to return phone call back to clinic at earliest convenience.  Fara Dolan, CMA

## 2018-09-26 NOTE — PROGRESS NOTES
ANTICOAGULATION FOLLOW-UP CLINIC VISIT    Patient Name:  Jose Antonio Marmolejo  Date:  9/26/2018  Contact Type:  New warfarin dosing/INR recheck date faxed to [facility]    SUBJECTIVE:     Patient Findings     Positives No Problem Findings    Comments INR done by [facility] nurse and result faxed to warfarin clinic. Per nursing communication sheet, patient has no bleeding/bruising and no new changes in diet/meds/activty. New warfarin dosing/INR recheck date faxed to [facility] facility. Staff to notify warfarin clinic if patient has any bleeding/bruising, changes in diet/meds/activity or questions.            OBJECTIVE    INR   Date Value Ref [place] Status   09/26/2018 3.8  Final       ASSESSMENT / PLAN  INR assessment SUPRA    Recheck INR In: 2 DAYS    INR Location [facility]      Anticoagulation Summary as of 9/26/2018     INR goal 2.0-3.0   Today's INR 3.8!   Warfarin maintenance plan 4 mg (2 mg x 2) on Mon, Fri; 5 mg (2 mg x 2.5) all other days   Full warfarin instructions 9/26: 1 mg; 9/27: 2 mg; Otherwise 4 mg on Mon, Fri; 5 mg all other days   Weekly warfarin total 33 mg   Plan last modified Devika Flannery RN (9/19/2018)   Next INR check 9/28/2018   Target end date Indefinite    Indications   Long-term (current) use of anticoagulants [Z79.01] [Z79.01]  Pulmonary embolism with infarction (H) [I26.99] [I26.99]         Anticoagulation Episode Summary     INR check location     Preferred lab     Send INR reminders to  [facility] POOL    Comments Is at [facility], [address] [place]  fax [phone]   PH  [phone]      Anticoagulation Care Providers     Provider Role Specialty Phone number    Juanita Briggs MD [facility] [phone]            See the Encounter Report to view Anticoagulation Flowsheet and Dosing Calendar (Go to Encounters tab in chart review, and find the Anticoagulation Therapy Visit)        Devika Flannery, RN

## 2018-09-26 NOTE — MR AVS SNAPSHOT
Jose Antonio Marmolejo   9/26/2018   Anticoagulation Therapy Visit    Description:  72 year old male   Provider:  Juanita Briggs MD   Department:  [facility] Anti Coagulation           INR as of 9/26/2018     Today's INR 3.8!      Anticoagulation Summary as of 9/26/2018     INR goal 2.0-3.0   Today's INR 3.8!   Full warfarin instructions 9/26: 1 mg; 9/27: 2 mg; Otherwise 4 mg on Mon, Fri; 5 mg all other days   Next INR check 9/28/2018    Indications   Long-term (current) use of anticoagulants [Z79.01] [Z79.01]  Pulmonary embolism with infarction (H) [I26.99] [I26.99]         September 2018 Details    Sun Mon Tue Wed Thu Fri Sat           1                 2               3               4               5               6               7               8                 9               10               11               12               13               14               15                 16               17               18               19               20               21               22                 23               24               25               26      1 mg   See details      27      2 mg         28            29                 30                      Date Details   09/26 This INR check       Date of next INR:  9/28/2018         How to take your warfarin dose     To take:  1 mg Take 0.5 of a 2 mg tablet.    To take:  2 mg Take 1 of the 2 mg tablets.    To take:  4 mg Take 2 of the 2 mg tablets.

## 2018-09-27 NOTE — Clinical Note
Could you call and make sure that NH has set up Neuro visit? Would like them to notify him and potentially keep reminder in his room about this. Thanks.

## 2018-09-27 NOTE — PROGRESS NOTES
HISTORY OF PRESENT ILLNESS:      Jose Antonio is a 72 year old male (1945)  resident of [facility]  who is being seen today for an routine follow up visit .      Patient has a history of MS based on LP and imaging. Has never been seen by Neurology. Has failed multiple appointments. He was seen by Optho 7/31 and placed on steroids. He unfortunately refused to go to his follow up appointment. SNF staff stated that pts sisters were supportive of his decision. He also refused is General Surgery appointment for pressure ulcer of the right buttock.     Pt recently hospitalized at [facility] from 8/29-9/17 after was noted to have acute saddle pulmonary embolus. This was noted after work up for confusion. He was treated with TPA and was admitted to the ICU. Started on anticoagulation and developed significant epistaxis which required intubation for airway protection. He was able to be extubated without complication after bleeding controlled. Developed c diff which was treated with 10 day course of vancomycin. Neurology was consulted in the hospital and recommended out patient follow up, no further recommendations made. Had multiple visits with Palliative care team, as well as Psychiatry. He refused starting something for possible depression (recommendation was lexapro 5mg) on multiple occasions. Did not want to or could not participate in formal HCA planning.     Discussed with nursing staff who have the following concerns: watery stools     Patient is seen in their room. Family is not present.     Pt is laying in bed. States he wishes he were back at the hospital. [place] well cared for there. Thankful that they were able to save his life. He has no complaints today other than he is tired of people making him do things he does not want to do. He is unhappy about taking so much medication. He has some pain in the buttock area with the pressure ulcer, but this is controlled currently. He  denies abd pain, nausea, vomiting.      Current medications, allergies, and interdisciplinary care plan are reviewed.    Patient Active Problem List    Diagnosis Date Noted     Long-term (current) use of anticoagulants [Z79.01] 09/19/2018     Priority: Medium     Pulmonary embolism with infarction (H) [I26.99] 09/19/2018     Priority: Medium     MS (multiple sclerosis) (H) 08/09/2018     Priority: Medium     Optic neuritis 08/09/2018     Priority: Medium     Cellulitis 07/04/2018     Priority: Medium     Pain management 05/04/2018     Priority: Medium     Nursing Home Visit 03/30/2018     Priority: Medium     Recurrent falls      Priority: Medium     Fall 03/21/2018     Priority: Medium     Falls frequently 12/31/2017     Priority: Medium     Type 2 diabetes mellitus without complication (H) 08/19/2016     Priority: Medium     Weakness of both lower extremities 08/19/2016     Priority: Medium     Yeast infection of the skin 08/19/2016     Priority: Medium     Essential hypertension 08/17/2016     Priority: Medium     Falling episodes 08/17/2016     Priority: Medium     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     Priority: Medium          Social History     Social History     Marital status: Single     Spouse name: N/A     Number of children: 3     Years of education: N/A     Occupational History     Not on file.     Social History Main Topics     Smoking status: Former Smoker     Quit date: 1/1/1978     Smokeless tobacco: Never Used     Alcohol use No      Comment: quit 1977, previously an alcoholic     Drug use: No     Sexual activity: Not Currently     Other Topics Concern     Not on file     Social History Narrative        Current Outpatient Prescriptions   Medication Sig     acetaminophen (TYLENOL) 325 MG tablet Take 2 tablets (650 mg) by mouth every 4 hours as needed for mild pain     bacitracin ophthalmic ointment 0.5 inches 2 times daily     Blood Pressure Monitoring (ADULT BLOOD PRESSURE CUFF LG) KIT 1  each 2 times daily     hydrochlorothiazide (MICROZIDE) 12.5 MG capsule Take 1 capsule (12.5 mg) by mouth daily     HYDROcodone-acetaminophen (NORCO) 5-325 MG per tablet Take 1 tablet by mouth every 6 hours as needed for severe pain     lisinopril (PRINIVIL/ZESTRIL) 40 MG tablet Take 1 tablet (40 mg) by mouth daily     metoprolol (TOPROL-XL) 50 MG 24 hr tablet Take 1 tablet (50 mg) by mouth daily     miconazole (MICATIN; MICRO GUARD) 2 % powder Apply topically 2 times daily     Nutritional Supplements (DALTON) POWD Take 1.5 g by mouth 2 times daily     nystatin (MYCOSTATIN) cream APPLY TOPICALLY TO GROIN AREA TWICE DAILY UNTIL HEALED THEN USE TWICE DAILY AS NEEDED     sennosides (SENOKOT) 8.6 MG tablet Take 1 tablet by mouth 2 times daily     No current facility-administered medications for this visit.        Allergies   Allergen Reactions     Penicillins        I have reviewed the care plan and do agree with the plan.    ROS:  No chest pain, shortness of breath, fever, chills, headache, nausea, vomiting, dysuria, or changes in bowel habits.  Appetite is good.  Mild buttock pain noted.    OBJECTIVE:  There were no vitals taken for this visit.    GENERAL:  Alert, and in no acute distress  RESP:  Lungs clear.  No rales, rhonchi, or wheezing  CV:  RRR.  S1 S2 with no murmur. No clicks or rubs.  ABD: Soft, non tender, non distended, no organomegaly. BS are hyperactive  SKIN:  Age-related changes.  No suspicious lesions or rashes. Hematoma of left UE is resolved.   PSYCH:  Affect is flat  NEURO: Mental status is alert. Memory is intact to recent events of hospitalization and discussion about medications.   EXTREM:  No edema. Legs atrophied.     Lab/Diagnostic data:    Reviewed in Epic    ASSESSMENT/ORDERS:  Diagnoses and all orders for this visit:    Acute saddle pulmonary embolism without acute cor pulmonale (H) / Long-term (current) use of anticoagulants [Z79.01]: Currently stable on coumadin. Coumadin dosing per ACC. No  current evidence of gi bleeding, hematoma.     Type 2 diabetes mellitus without complication, without long-term current use of insulin (H): Remains off medications other than for HTN. He has refused medications in the past for diabetes and hld, again refuses today. Non fasting glucoses remain < 200    MS (multiple sclerosis) (H): Had a long discussion with patient about this today. He does not seem convinced his weakness and falls are really the result of MS, but is willing to entertain this. He is concerned about cost of Neurology appt and states this is why he has declined in the past. Frustrated with lack of control with regard to making appts and dislikes being notified of an appt right before. Will have NH set up appt and keep reminder in his room about this so that he is not surprised by transport. Will also work to arrange family meeting to discuss goals of care.     C. difficile colitis: Completed tx on 9/16 in the hospital. Presented with watery stools again on 9/23. No abd pain, CBC with WBC of 15. Restart vanco 125mg qid x 10 days, taper to bid x 7 days and q day for 7 days.     Cognitive impairment / Apathy: Called and left another  with sister Kailyn. Psychiatry and Palliative consulted in hospital. I am not clear yet if patient is able to make his own decisions, seems to wax and wane as far as ability to recognize risk of non compliance with medical care. Today seems to fully understand our discussion and risk of not treating. Is amenable to meeting with neurology, but concerned about cost. He, however, is adamant that no medications for mood or memory are started.     Total time spent with patient visit was 45 min including patient visit, review of pertinent clinical information, and treatment plan.    Juanita Briggs MD

## 2018-09-27 NOTE — MR AVS SNAPSHOT
After Visit Summary   9/27/2018    Jose Antonio Marmolejo    MRN: [id number]           Patient Information     Date Of Birth          1945        Visit Information        Provider Department      9/27/2018 8:02 AM Juanita Briggs MD [facility]        Today's Diagnoses     Acute saddle pulmonary embolism without acute cor pulmonale (H)    -  1    Type 2 diabetes mellitus without complication, without long-term current use of insulin (H)        MS (multiple sclerosis) (H)        Long-term (current) use of anticoagulants [Z79.01]        C. difficile colitis        Cognitive impairment        Apathy           Follow-ups after your visit        Who to contact     If you have questions or need follow up information about today's clinic visit or your schedule please contact [facility] directly at [phone].  Normal or non-critical lab and imaging results will be communicated to you by MyChart, letter or phone within 4 business days after the clinic has received the results. If you do not hear from us within 7 days, please contact the clinic through MyChart or phone. If you have a critical or abnormal lab result, we will notify you by phone as soon as possible.  Submit refill requests through [employer] or call your pharmacy and they will forward the refill request to us. Please allow 3 business days for your refill to be completed.          Additional Information About Your Visit        Care EveryWhere ID     This is your Care EveryWhere ID. This could be used by other organizations to access your [facility] medical records  [id number]         Blood Pressure from Last 3 Encounters:   08/29/18 108/72   08/13/18 (!) 130/47   08/10/18 118/64    Weight from Last 3 Encounters:   08/02/18 260 lb (117.9 kg)   08/01/18 260 lb (117.9 kg)   07/31/18 260 lb (117.9 kg)              Today, you had the following     No orders found for display       Primary Care Provider Office  Phone # Fax #    Juanita Briggs [phone] [address] [address]        Equal Access to Services     JEFF SALINAS : Hadii aad ku hadretayuan Soleela, wadhavalda luqadaha, qaybta kaalmada porsha, viki adain hayaaelvin espinozaisabel moura ioana patterson. So [facility] [phone].    ATENCIÓN: Si habla español, tiene a garcia disposición servicios gratuitos de asistencia lingüística. Llame al [phone].    We comply with applicable federal civil rights laws and [place] laws. We do not discriminate on the basis of race, color, national origin, age, disability, sex, sexual orientation, or gender identity.            Thank you!     Thank you for choosing [facility]  for your care. Our goal is always to provide you with excellent care. Hearing back from our patients is one way we can continue to improve our services. Please take a few minutes to complete the written survey that you may receive in the mail after your visit with us. Thank you!             Your Updated Medication List - Protect others around you: Learn how to safely use, store and throw away your medicines at www.disposemymeds.org.          This list is accurate as of 9/27/18 11:59 PM.  Always use your most recent med list.                   Brand Name Dispense Instructions for use Diagnosis    acetaminophen 325 MG tablet    TYLENOL    100 tablet    Take 2 tablets (650 mg) by mouth every 4 hours as needed for mild pain    Fall, initial encounter, Weakness       Adult Blood Pressure Cuff Lg Kit     1 kit    1 each 2 times daily    Benign essential hypertension, Malignant essential hypertension       bacitracin ophthalmic ointment      0.5 inches 2 times daily        hydrochlorothiazide 12.5 MG capsule    MICROZIDE    30 capsule    Take 1 capsule (12.5 mg) by mouth daily    Essential hypertension       HYDROcodone-acetaminophen 5-325 MG per tablet    NORCO     Take 1 tablet by mouth every 6 hours as needed for severe  pain        DALTON Powd      Take 1.5 g by mouth 2 times daily        lisinopril 40 MG tablet    PRINIVIL/ZESTRIL    30 tablet    Take 1 tablet (40 mg) by mouth daily    Essential hypertension       metoprolol succinate 50 MG 24 hr tablet    TOPROL-XL    30 tablet    Take 1 tablet (50 mg) by mouth daily    Essential hypertension       miconazole 2 % powder    MICATIN; MICRO GUARD    90 g    Apply topically 2 times daily    Yeast infection of the skin       nystatin cream    MYCOSTATIN     APPLY TOPICALLY TO GROIN AREA TWICE DAILY UNTIL HEALED THEN USE TWICE DAILY AS NEEDED    Chronic right shoulder pain       sennosides 8.6 MG tablet    SENOKOT     Take 1 tablet by mouth 2 times daily

## 2018-09-28 NOTE — MR AVS SNAPSHOT
Jose Antonio Marmolejo   9/28/2018   Anticoagulation Therapy Visit    Description:  72 year old male   Provider:  Juanita Briggs MD   Department:  Hc Anti Coagulation           INR as of 9/28/2018     Today's INR 2.5      Anticoagulation Summary as of 9/28/2018     INR goal 2.0-3.0   Today's INR 2.5   Full warfarin instructions 9/28: 2.5 mg; 9/29: 2.5 mg; 9/30: 2.5 mg; Otherwise 4 mg on Mon, Fri; 5 mg all other days   Next INR check 10/1/2018    Indications   Long-term (current) use of anticoagulants [Z79.01] [Z79.01]  Pulmonary embolism with infarction (H) [I26.99] [I26.99]         September 2018 Details    Sun Mon Tue Wed Thu Fri Sat           1                 2               3               4               5               6               7               8                 9               10               11               12               13               14               15                 16               17               18               19               20               21               22                 23               24               25               26               27               28      2.5 mg   See details      29      2.5 mg           30      2.5 mg                Date Details   09/28 This INR check               How to take your warfarin dose     To take:  2.5 mg Take 1 of the 2.5 mg tablets.           October 2018 Details    Sun Mon Tue Wed Thu Fri Sat      1            2               3               4               5               6                 7               8               9               10               11               12               13                 14               15               16               17               18               19               20                 21               22               23               24               25               26               27                 28               29               30               31                   Date Details   No  additional details    Date of next INR:  10/1/2018         How to take your warfarin dose     To take:  4 mg Take 2 of the 2 mg tablets.

## 2018-09-28 NOTE — PROGRESS NOTES
ANTICOAGULATION FOLLOW-UP CLINIC VISIT    Patient Name:  Jose Antonio Marmolejo  Date:  9/28/2018  Contact Type:  New warfarin dosing/INR recheck date faxed to [facility]    SUBJECTIVE:     Patient Findings     Positives Antibiotic use or infection    Comments INR done by [facility] nurse and result faxed to warfarin clinic. Per nursing communication sheet, patient has no bleeding/bruising and no changes in diet/activity. He is on Vancomycin 125mg QID started 9/26/18.  New warfarin dosing/INR recheck date faxed back to [facility] facility. Staff to notify warfarin clinic if patient has any bleeding/bruising, changes in diet/meds/activity or questions.            OBJECTIVE    INR   Date Value Ref Range Status   09/28/2018 2.5  Final       ASSESSMENT / PLAN  INR assessment THER    Recheck INR In: 3 DAYS    INR Location [facility]      Anticoagulation Summary as of 9/28/2018     INR goal 2.0-3.0   Today's INR 2.5   Warfarin maintenance plan 4 mg (2 mg x 2) on Mon, Fri; 5 mg (2 mg x 2.5) all other days   Full warfarin instructions 4 mg on Mon, Fri; 5 mg all other days   Weekly warfarin total 33 mg   Plan last modified Devika Flannery RN (9/19/2018)   Next INR check 10/1/2018   Target end date Indefinite    Indications   Long-term (current) use of anticoagulants [Z79.01] [Z79.01]  Pulmonary embolism with infarction (H) [I26.99] [I26.99]         Anticoagulation Episode Summary     INR check location     Preferred lab     Send INR reminders to  ANTICOAG POOL    Comments Is at [facility], [address] [place]  fax [phone]   [place]  [phone]      Anticoagulation Care Providers     Provider Role Specialty Phone number    Juanita Briggs MD [facility] [phone]            See the Encounter Report to view Anticoagulation Flowsheet and Dosing Calendar (Go to Encounters tab in chart review, and find the Anticoagulation Therapy Visit)        Devika Flannery, RN

## 2018-10-01 NOTE — PROGRESS NOTES
ANTICOAGULATION FOLLOW-UP CLINIC VISIT    Patient Name:  Jose Antonio Marmolejo  Date:  10/1/2018  Contact Type:  New warfarin dosing/INR recheck date faxed to [facility]    SUBJECTIVE:     Patient Findings     Positives Antibiotic use or infection    Comments INR done by [facility] nurse and result faxed to warfarin clinic. Per nursing communication sheet, patient still on vancomycin 125mg QID for unknown length of time. He has no bleeding/bruising and no other changes in diet/meds/activity. New warfarin dosing/INR recheck date faxed back to [facility]. Staff to notify warfarin clinic if patient has any bleeding/bruising, changes in diet/meds/activity or questions.           OBJECTIVE    INR   Date Value Ref Range Status   10/01/2018 1.8  Final       ASSESSMENT / PLAN  INR assessment SUB dose cut for antibiotics   Recheck INR In: 4 DAYS    INR Location [facility]      Anticoagulation Summary as of 10/1/2018     INR goal 2.0-3.0   Today's INR 1.8!   Warfarin maintenance plan 4 mg (2 mg x 2) on Mon, Fri; 5 mg (2 mg x 2.5) all other days   Full warfarin instructions 10/2: 3.75 mg; 10/3: 3.75 mg; 10/4: 3.75 mg; Otherwise 4 mg on Mon, Fri; 5 mg all other days   Weekly warfarin total 33 mg   Plan last modified Devika Flannery RN (9/28/2018)   Next INR check 10/5/2018   Target end date Indefinite    Indications   Long-term (current) use of anticoagulants [Z79.01] [Z79.01]  Pulmonary embolism with infarction (H) [I26.99] [I26.99]         Anticoagulation Episode Summary     INR check location     Preferred lab     Send INR reminders to  ANTICOAG POOL    Comments Is at [facility], [address] [place]  fax [phone]   PH  [phone] or [phone]      Anticoagulation Care Providers     Provider Role Specialty Phone number    Juanita Briggs MD [facility] [phone]            See the Encounter Report to view Anticoagulation Flowsheet and Dosing Calendar (Go to Encounters tab in chart review, and find the Anticoagulation Therapy  Visit)        Devika Flannery RN

## 2018-10-01 NOTE — MR AVS SNAPSHOT
Jose Antonio Marmolejo   10/1/2018   Anticoagulation Therapy Visit    Description:  72 year old male   Provider:  Juanita Briggs MD   Department:  Hc Anti Coagulation           INR as of 10/1/2018     Today's INR 1.8!      Anticoagulation Summary as of 10/1/2018     INR goal 2.0-3.0   Today's INR 1.8!   Full warfarin instructions 10/2: 3.75 mg; 10/3: 3.75 mg; 10/4: 3.75 mg; Otherwise 4 mg on Mon, Fri; 5 mg all other days   Next INR check 10/5/2018    Indications   Long-term (current) use of anticoagulants [Z79.01] [Z79.01]  Pulmonary embolism with infarction (H) [I26.99] [I26.99]         October 2018 Details    Sun Mon Tue Wed Thu Fri Sat      1      4 mg   See details      2      3.75 mg         3      3.75 mg         4      3.75 mg         5            6                 7               8               9               10               11               12               13                 14               15               16               17               18               19               20                 21               22               23               24               25               26               27                 28               29               30               31                   Date Details   10/01 This INR check       Date of next INR:  10/5/2018         How to take your warfarin dose     To take:  3.75 mg Take 1.5 of the 2.5 mg tablets.    To take:  4 mg Take 2 of the 2 mg tablets.

## 2018-10-05 NOTE — MR AVS SNAPSHOT
Jose Antonio Marmolejo   10/5/2018   Anticoagulation Therapy Visit    Description:  72 year old male   Provider:  Juanita Briggs MD   Department:  Hc Anti Coagulation           INR as of 10/5/2018     Today's INR 3.6!      Anticoagulation Summary as of 10/5/2018     INR goal 2.0-3.0   Today's INR 3.6!   Full warfarin instructions 10/5: Hold; 10/6: 2.5 mg; 10/7: 2.5 mg; Otherwise 4 mg on Mon, Fri; 5 mg all other days   Next INR check 10/8/2018    Indications   Long-term (current) use of anticoagulants [Z79.01] [Z79.01]  Pulmonary embolism with infarction (H) [I26.99] [I26.99]         October 2018 Details    Sun Mon Tue Wed Thu Fri Sat      1               2               3               4               5      Hold   See details      6      2.5 mg           7      2.5 mg         8            9               10               11               12               13                 14               15               16               17               18               19               20                 21               22               23               24               25               26               27                 28               29               30               31                   Date Details   10/05 This INR check       Date of next INR:  10/8/2018         How to take your warfarin dose     To take:  2.5 mg Take 1 of the 2.5 mg tablets.    To take:  4 mg Take 2 of the 2 mg tablets.    Hold Do not take your warfarin dose. See the Details table to the right for additional instructions.

## 2018-10-05 NOTE — PROGRESS NOTES
ANTICOAGULATION FOLLOW-UP CLINIC VISIT    Patient Name:  Jose Antonio Marmolejo  Date:  10/5/2018  Contact Type:  FAX  New warfarin dosing/INR recheck date faxed to [facility]    SUBJECTIVE:     Patient Findings     Positives Antibiotic use or infection    Comments INR done by [facility] nurse and result faxed to warfarin clinic. Per nursing communication sheet, patient is on vancomycin 125mg BID, unknown stop date. No bleeding/bruising and no other changes in diet/meds/activity. New warfarin dosing/INR recheck date faxed back to [facility] facility. Staff to notify warfarin clinic if patient has any bleeding/bruising, changes in diet/meds/activity or questions.            OBJECTIVE    INR   Date Value Ref Range Status   10/05/2018 3.6  Final       ASSESSMENT / PLAN  INR assessment SUPRA antibiotic   Recheck INR In: 3 DAYS    INR Location [facility]      Anticoagulation Summary as of 10/5/2018     INR goal 2.0-3.0   Today's INR 3.6!   Warfarin maintenance plan 4 mg (2 mg x 2) on Mon, Fri; 5 mg (2 mg x 2.5) all other days   Full warfarin instructions 10/5: Hold; 10/6: 2.5 mg; 10/7: 2.5 mg; Otherwise 4 mg on Mon, Fri; 5 mg all other days   Weekly warfarin total 33 mg   Plan last modified Devika Flannery, RN (9/28/2018)   Next INR check 10/8/2018   Target end date Indefinite    Indications   Long-term (current) use of anticoagulants [Z79.01] [Z79.01]  Pulmonary embolism with infarction (H) [I26.99] [I26.99]         Anticoagulation Episode Summary     INR check location     Preferred lab     Send INR reminders to  ANTICOAG POOL    Comments Is at [facility], [address]  fax [phone]   PH  [phone]      Anticoagulation Care Providers     Provider Role Specialty Phone number    Juanita Briggs MD [facility] [phone]            See the Encounter Report to view Anticoagulation Flowsheet and Dosing Calendar (Go to Encounters tab in chart review, and find the Anticoagulation Therapy Visit)        Devika Flannery, RN

## 2018-10-08 NOTE — MR AVS SNAPSHOT
Jose Antonio CONSTANZA Francie   10/8/2018   Anticoagulation Therapy Visit    Description:  72 year old male   Provider:  Juanita Briggs MD   Department:  [facility] Anti Coagulation           INR as of 10/8/2018     Today's INR 3.2!      Anticoagulation Summary as of 10/8/2018     INR goal 2.0-3.0   Today's INR 3.2!   Full warfarin instructions 10/8: Hold; 10/9: 2.5 mg; 10/10: 2.5 mg; 10/11: 2.5 mg; Otherwise 4 mg on Mon, Fri; 5 mg all other days   Next INR check 10/12/2018    Indications   Long-term (current) use of anticoagulants [Z79.01] [Z79.01]  Pulmonary embolism with infarction (H) [I26.99] [I26.99]         October 2018 Details    Sun Mon Tue Wed Thu Fri Sat      1               2               3               4               5               6                 7               8      Hold   See details      9      2.5 mg         10      2.5 mg         11      2.5 mg         12            13                 14               15               16               17               18               19               20                 21               22               23               24               25               26               27                 28               29               30               31                   Date Details   10/08 This INR check       Date of next INR:  10/12/2018         How to take your warfarin dose     To take:  2.5 mg Take 1 of the 2.5 mg tablets.    To take:  4 mg Take 2 of the 2 mg tablets.    Hold Do not take your warfarin dose. See the Details table to the right for additional instructions.

## 2018-10-08 NOTE — PROGRESS NOTES
ANTICOAGULATION FOLLOW-UP CLINIC VISIT    Patient Name:  Jose Antonio Marmolejo  Date:  10/8/2018  Contact Type:  New warfarin dosing/INR recheck date faxed to [facility]    SUBJECTIVE:     Patient Findings     Positives Antibiotic use or infection    Comments INR done by [facility] nurse and result faxed to warfarin clinic. Per nursing communication sheet, patient has no bleeding/bruising and no new changes in diet/meds/activity. He continues on vancomycin 125mg BID. New warfarin dosing/INR recheck date faxed back to [facility] facility. Staff to notify warfarin clinic if patient has any bleeding/bruising, changes in diet/meds/activity or questions.            OBJECTIVE    INR   Date Value Ref Range Status   10/08/2018 3.2  Final       ASSESSMENT / PLAN  INR assessment SUPRA antibiotics   Recheck INR In: 4 DAYS    INR Location [facility]      Anticoagulation Summary as of 10/8/2018     INR goal 2.0-3.0   Today's INR 3.2!   Warfarin maintenance plan 4 mg (2 mg x 2) on Mon, Fri; 5 mg (2 mg x 2.5) all other days   Full warfarin instructions 10/8: Hold; 10/9: 2.5 mg; 10/10: 2.5 mg; 10/11: 2.5 mg; Otherwise 4 mg on Mon, Fri; 5 mg all other days   Weekly warfarin total 33 mg   Plan last modified Devika Flannery, RN (9/28/2018)   Next INR check 10/12/2018   Target end date Indefinite    Indications   Long-term (current) use of anticoagulants [Z79.01] [Z79.01]  Pulmonary embolism with infarction (H) [I26.99] [I26.99]         Anticoagulation Episode Summary     INR check location     Preferred lab     Send INR reminders to  ANTICOAG POOL    Comments Is at [facility], [address] [place]  fax [phone]   PH  [phone] or [phone]      Anticoagulation Care Providers     Provider Role Specialty Phone number    Juanita Briggs MD [facility] [phone]            See the Encounter Report to view Anticoagulation Flowsheet and Dosing Calendar (Go to Encounters tab in chart review, and find the Anticoagulation Therapy Visit)        Devika Flannery,  RN

## 2018-10-09 NOTE — MR AVS SNAPSHOT
After Visit Summary   10/9/2018    Jose Antonio Marmolejo    MRN: [id number]           Patient Information     Date Of Birth          1945        Visit Information        Provider Department      10/9/2018 4:34 PM Juanita Briggs MD [facility]        Today's Diagnoses     ERRONEOUS ENCOUNTER--DISREGARD    -  1       Follow-ups after your visit        Who to contact     If you have questions or need follow up information about today's clinic visit or your schedule please contact [facility] directly at [phone].  Normal or non-critical lab and imaging results will be communicated to you by MyChart, letter or phone within 4 business days after the clinic has received the results. If you do not hear from us within 7 days, please contact the clinic through MyChart or phone. If you have a critical or abnormal lab result, we will notify you by phone as soon as possible.  Submit refill requests through [employer] or call your pharmacy and they will forward the refill request to us. Please allow 3 business days for your refill to be completed.          Additional Information About Your Visit        Care EveryWhere ID     This is your Care EveryWhere ID. This could be used by other organizations to access your [facility] medical records  [id number]         Blood Pressure from Last 3 Encounters:   08/29/18 108/72   08/13/18 (!) 130/47   08/10/18 118/64    Weight from Last 3 Encounters:   08/02/18 260 lb (117.9 kg)   08/01/18 260 lb (117.9 kg)   07/31/18 260 lb (117.9 kg)              Today, you had the following     No orders found for display       Primary Care Provider Office Phone # Fax #    Juanita Briggs [phone] [facility] [address]        Equal Access to Services     [facility]: Bailey Brower, rochelle fletcher, viki lira. So [facility] [place]  [phone].    ATENCIÓN: Si michelle mcclure, tiene a garcia disposición servicios gratuitos de asistencia lingüística. Sandra coleman [phone].    We comply with applicable federal civil rights laws and [place] laws. We do not discriminate on the basis of race, color, national origin, age, disability, sex, sexual orientation, or gender identity.            Thank you!     Thank you for choosing [facility]  for your care. Our goal is always to provide you with excellent care. Hearing back from our patients is one way we can continue to improve our services. Please take a few minutes to complete the written survey that you may receive in the mail after your visit with us. Thank you!             Your Updated Medication List - Protect others around you: Learn how to safely use, store and throw away your medicines at www.disposemymeds.org.          This list is accurate as of 10/9/18  4:48 PM.  Always use your most recent med list.                   Brand Name Dispense Instructions for use Diagnosis    acetaminophen 325 MG tablet    TYLENOL    100 tablet    Take 2 tablets (650 mg) by mouth every 4 hours as needed for mild pain    Fall, initial encounter, Weakness       Adult Blood Pressure Cuff Lg Kit     1 kit    1 each 2 times daily    Benign essential hypertension, Malignant essential hypertension       bacitracin ophthalmic ointment      0.5 inches 2 times daily        hydrochlorothiazide 12.5 MG capsule    MICROZIDE    30 capsule    Take 1 capsule (12.5 mg) by mouth daily    Essential hypertension       HYDROcodone-acetaminophen 5-325 MG per tablet    NORCO     Take 1 tablet by mouth every 6 hours as needed for severe pain        DALTON Powd      Take 1.5 g by mouth 2 times daily        lisinopril 40 MG tablet    PRINIVIL/ZESTRIL    30 tablet    Take 1 tablet (40 mg) by mouth daily    Essential hypertension       metoprolol succinate 50 MG 24 hr tablet    TOPROL-XL    30 tablet    Take 1 tablet (50  mg) by mouth daily    Essential hypertension       miconazole 2 % powder    MICATIN; MICRO GUARD    90 g    Apply topically 2 times daily    Yeast infection of the skin       nystatin cream    MYCOSTATIN     APPLY TOPICALLY TO GROIN AREA TWICE DAILY UNTIL HEALED THEN USE TWICE DAILY AS NEEDED    Chronic right shoulder pain       sennosides 8.6 MG tablet    SENOKOT     Take 1 tablet by mouth 2 times daily

## 2018-10-12 NOTE — MR AVS SNAPSHOT
Jose Antonio Marmolejo   10/12/2018   Anticoagulation Therapy Visit    Description:  72 year old male   Provider:  Juanita Briggs MD   Department:  [facility] Anti Coagulation           INR as of 10/12/2018     Today's INR 2.7      Anticoagulation Summary as of 10/12/2018     INR goal 2.0-3.0   Today's INR 2.7   Full warfarin instructions 10/12: 2.5 mg; 10/13: 2.5 mg; 10/14: 2.5 mg; 10/15: 2.5 mg; 10/16: 2.5 mg; Otherwise 4 mg on Mon, Fri; 5 mg all other days   Next INR check 10/17/2018    Indications   Long-term (current) use of anticoagulants [Z79.01] [Z79.01]  Pulmonary embolism with infarction (H) [I26.99] [I26.99]         October 2018 Details    Sun Mon Tue Wed Thu Fri Sat      1               2               3               4               5               6                 7               8               9               10               11               12      2.5 mg   See details      13      2.5 mg           14      2.5 mg         15      2.5 mg         16      2.5 mg         17            18               19               20                 21               22               23               24               25               26               27                 28               29               30               31                   Date Details   10/12 This INR check       Date of next INR:  10/17/2018         How to take your warfarin dose     To take:  2.5 mg Take 1 of the 2.5 mg tablets.    To take:  5 mg Take 2.5 of the 2 mg tablets.

## 2018-10-12 NOTE — PROGRESS NOTES
ANTICOAGULATION FOLLOW-UP CLINIC VISIT    Patient Name:  Jose Antonio Marmolejo  Date:  10/12/2018  Contact Type:  New warfarin dosing/INR recheck date faxed to [facility]    SUBJECTIVE:     Patient Findings     Positives Antibiotic use or infection    Comments INR done by [facility] nurse and result faxed to warfarin clinic. Per nursing communication sheet, patient continues on vancomycin, unknown stop date despite requesting information from LT. No changes in diet/activity. New warfarin dosing/INR recheck date faxed to [facility] facility. Staff to notify warfarin clinic if patient has any bleeding/bruising, changes in diet/meds/activity or questions.            OBJECTIVE    INR   Date Value Ref Range Status   10/12/2018 2.7  Final       ASSESSMENT / PLAN  INR assessment THER Antibiotics   Recheck INR In: 5 DAYS    INR Location [facility]      Anticoagulation Summary as of 10/12/2018     INR goal 2.0-3.0   Today's INR 2.7   Warfarin maintenance plan 4 mg (2 mg x 2) on Mon, Fri; 5 mg (2 mg x 2.5) all other days   Full warfarin instructions 10/12: 2.5 mg; 10/13: 2.5 mg; 10/14: 2.5 mg; 10/15: 2.5 mg; 10/16: 2.5 mg; Otherwise 4 mg on Mon, Fri; 5 mg all other days   Weekly warfarin total 33 mg   Plan last modified Devika Flannery RN (9/28/2018)   Next INR check 10/17/2018   Target end date Indefinite    Indications   Long-term (current) use of anticoagulants [Z79.01] [Z79.01]  Pulmonary embolism with infarction (H) [I26.99] [I26.99]         Anticoagulation Episode Summary     INR check location     Preferred lab     Send INR reminders to  ANTICOAG POOL    Comments Is at [facility], [address] [place]  fax [phone]   PH  [phone]      Anticoagulation Care Providers     Provider Role Specialty Phone number    Juanita Briggs MD [facility] [phone]            See the Encounter Report to view Anticoagulation Flowsheet and Dosing Calendar (Go to Encounters tab in chart review, and find the Anticoagulation Therapy  Visit)        Devika Flannery RN

## 2018-10-12 NOTE — TELEPHONE ENCOUNTER
Vancomycin  Last office visit: 10/9/18 [facility]  Last refill: Not on current medication list.  Only signed inpatient (injections). No associated diagnosis.  Please advise.  Thank you.

## 2018-10-17 NOTE — PROGRESS NOTES
ANTICOAGULATION FOLLOW-UP CLINIC VISIT    Patient Name:  Jose Antonio Marmolejo  Date:  10/17/2018  Contact Type:  New warfarin dosing/INR recheck date faxed to [facility]    SUBJECTIVE:     Patient Findings     Positives Antibiotic use or infection    Comments INR done by [facility] nurse and result faxed to warfarin clinic. Per nursing communication sheet, patient continues to take vancomycin, unknown stop date. No new changes in diet/meds/activity. No reported bleeding/bruising, New warfarin dosing/INR recheck date faxed to [facility] nurse. Staff to notify warfarin clinic if patient has any bleeding/bruising, changes in diet/meds/activity or question           OBJECTIVE    INR   Date Value Ref Range Status   10/17/2018 5.2  Final       ASSESSMENT / PLAN  INR assessment SUPRA antibiotics   Recheck INR In: 2 DAYS    INR Location [facility]      Anticoagulation Summary as of 10/17/2018     INR goal 2.0-3.0   Today's INR 5.2!   Warfarin maintenance plan 4 mg (2 mg x 2) on Mon, Fri; 5 mg (2 mg x 2.5) all other days   Full warfarin instructions 10/17: Hold; 10/18: Hold; Otherwise 4 mg on Mon, Fri; 5 mg all other days   Weekly warfarin total 33 mg   Plan last modified Devika Flannery RN (9/28/2018)   Next INR check 10/19/2018   Target end date Indefinite    Indications   Long-term (current) use of anticoagulants [Z79.01] [Z79.01]  Pulmonary embolism with infarction (H) [I26.99] [I26.99]         Anticoagulation Episode Summary     INR check location     Preferred lab     Send INR reminders to  ANTICOAG POOL    Comments Is at [facility], [address] [place]  fax [phone]   PH  [phone] or [phone]      Anticoagulation Care Providers     Provider Role Specialty Phone number    Juanita Briggs MD [facility] [phone]            See the Encounter Report to view Anticoagulation Flowsheet and Dosing Calendar (Go to Encounters tab in chart review, and find the Anticoagulation Therapy Visit)        Devika Flannery, RN

## 2018-10-17 NOTE — MR AVS SNAPSHOT
Jose Antonio APODACA Francie   10/17/2018   Anticoagulation Therapy Visit    Description:  72 year old male   Provider:  Juanita Briggs MD   Department:  Hc Anti Coagulation           INR as of 10/17/2018     Today's INR 5.2!      Anticoagulation Summary as of 10/17/2018     INR goal 2.0-3.0   Today's INR 5.2!   Full warfarin instructions 10/17: Hold; 10/18: Hold; Otherwise 4 mg on Mon, Fri; 5 mg all other days   Next INR check 10/19/2018    Indications   Long-term (current) use of anticoagulants [Z79.01] [Z79.01]  Pulmonary embolism with infarction (H) [I26.99] [I26.99]         October 2018 Details    Sun Mon Tue Wed Thu Fri Sat      1               2               3               4               5               6                 7               8               9               10               11               12               13                 14               15               16               17      Hold   See details      18      Hold         19            20                 21               22               23               24               25               26               27                 28               29               30               31                   Date Details   10/17 This INR check       Date of next INR:  10/19/2018         How to take your warfarin dose     To take:  4 mg Take 2 of the 2 mg tablets.    Hold Do not take your warfarin dose. See the Details table to the right for additional instructions.

## 2018-10-19 NOTE — MR AVS SNAPSHOT
Jose Antonio Marmolejo   10/19/2018   Anticoagulation Therapy Visit    Description:  72 year old male   Provider:  Devika Oviedo, RN   Department:  Hc Anti Coagulation           INR as of 10/19/2018     Today's INR 5.2!      Anticoagulation Summary as of 10/19/2018     INR goal 2.0-3.0   Today's INR 5.2!   Full warfarin instructions 10/19: Hold; 10/20: Hold; 10/21: 2 mg; Otherwise 4 mg on Mon, Fri; 5 mg all other days   Next INR check 10/22/2018    Indications   Long-term (current) use of anticoagulants [Z79.01] [Z79.01]  Pulmonary embolism with infarction (H) [I26.99] [I26.99]         October 2018 Details    Sun Mon Tue Wed Thu Fri Sat      1               2               3               4               5               6                 7               8               9               10               11               12               13                 14               15               16               17               18               19      Hold   See details      20      Hold   See details        21      2 mg   See details      22            23               24               25               26               27                 28               29               30               31                   Date Details   10/19 This INR check   Hold dose   Vancomycin 125mg QID  x10 days began 10/12      10/20 Hold dose   Vancomycin 125mg QID  x10 days began 10/12      10/21 Take 2 mg (2 mg tablets x 1)   Vancomycin 125mg QID  x10 days began 10/12       Date of next INR:  10/22/2018         How to take your warfarin dose     To take:  2 mg Take 1 of the 2 mg tablets.    To take:  4 mg Take 2 of the 2 mg tablets.    Hold Do not take your warfarin dose. See the Details table to the right for additional instructions.

## 2018-10-22 NOTE — MR AVS SNAPSHOT
Jose Antonio Marmolejo   10/22/2018   Anticoagulation Therapy Visit    Description:  72 year old male   Provider:  Juanita Briggs MD   Department:  Hc Anti Coagulation           INR as of 10/22/2018     Today's INR 3.3!      Anticoagulation Summary as of 10/22/2018     INR goal 2.0-3.0   Today's INR 3.3!   Full warfarin instructions 10/22: 2 mg; 10/23: 4 mg; 10/24: 4 mg; 10/25: 4 mg; Otherwise 4 mg on Mon, Fri; 5 mg all other days   Next INR check 10/26/2018    Indications   Long-term (current) use of anticoagulants [Z79.01] [Z79.01]  Pulmonary embolism with infarction (H) [I26.99] [I26.99]         October 2018 Details    Sun Mon Tue Wed Thu Fri Sat      1               2               3               4               5               6                 7               8               9               10               11               12               13                 14               15               16               17               18               19               20                 21               22      2 mg   See details      23      4 mg         24      4 mg         25      4 mg         26            27                 28               29               30               31                   Date Details   10/22 This INR check       Date of next INR:  10/26/2018         How to take your warfarin dose     To take:  2 mg Take 1 of the 2 mg tablets.    To take:  4 mg Take 2 of the 2 mg tablets.

## 2018-10-22 NOTE — PROGRESS NOTES
ANTICOAGULATION FOLLOW-UP CLINIC VISIT    Patient Name:  Jose Antonio Marmolejo  Date:  10/22/2018  Contact Type:  New warfarin dosing/INR recheck date faxed to [facility]    SUBJECTIVE:     Patient Findings     Positives Change in medications    Comments INR done by [facility] nurse and result faxed to warfarin clinic. Per nursing communication sheet, patient has no bleeding/bruising and no new changes in diet/activity. Vancomycin has been completed, last dose 10/19/18. New warfarin dosing/INR recheck date faxed back to [facility] facility. Staff to notify warfarin clinic if patient has any bleeding/bruising, changes in diet/meds/activity or questions.           OBJECTIVE    INR   Date Value Ref Range Status   10/22/2018 3.3  Final       ASSESSMENT / PLAN  INR assessment SUPRA antibiotics   Recheck INR In: 4 DAYS    INR Location [facility]      Anticoagulation Summary as of 10/22/2018     INR goal 2.0-3.0   Today's INR 3.3!   Warfarin maintenance plan 4 mg (2 mg x 2) on Mon, Fri; 5 mg (2 mg x 2.5) all other days   Full warfarin instructions 10/22: 2 mg; 10/23: 4 mg; 10/24: 4 mg; 10/25: 4 mg; Otherwise 4 mg on Mon, Fri; 5 mg all other days   Weekly warfarin total 33 mg   Plan last modified Devika Flannery RN (9/28/2018)   Next INR check 10/26/2018   Priority INR   Target end date Indefinite    Indications   Long-term (current) use of anticoagulants [Z79.01] [Z79.01]  Pulmonary embolism with infarction (H) [I26.99] [I26.99]         Anticoagulation Episode Summary     INR check location     Preferred lab     Send INR reminders to  ANTICOAG POOL    Comments [facility]  fax [phone]   PH  [phone]      Anticoagulation Care Providers     Provider Role Specialty Phone number    Juanita Briggs MD [facility] [phone]            See the Encounter Report to view Anticoagulation Flowsheet and Dosing Calendar (Go to Encounters tab in chart review, and find the Anticoagulation Therapy  Visit)        Devika Flannery RN

## 2018-10-25 NOTE — MR AVS SNAPSHOT
After Visit Summary   10/25/2018    Jose Antonio Marmolejo    MRN: [id number]           Patient Information     Date Of Birth          1945        Visit Information        Provider Department      10/25/2018 8:15 AM Juanita Briggs MD [facility]        Today's Diagnoses     MS (multiple sclerosis) (H)    -  1    Cognitive dysfunction, acquired        Counseling regarding advanced care planning and goals of care           Follow-ups after your visit        Who to contact     If you have questions or need follow up information about today's clinic visit or your schedule please contact [facility] directly at [phone].  Normal or non-critical lab and imaging results will be communicated to you by MyChart, letter or phone within 4 business days after the clinic has received the results. If you do not hear from us within 7 days, please contact the clinic through MyChart or phone. If you have a critical or abnormal lab result, we will notify you by phone as soon as possible.  Submit refill requests through [employer] or call your pharmacy and they will forward the refill request to us. Please allow 3 business days for your refill to be completed.          Additional Information About Your Visit        Care EveryWhere ID     This is your Care EveryWhere ID. This could be used by other organizations to access your [place] medical records  [id number]         Blood Pressure from Last 3 Encounters:   08/29/18 108/72   08/13/18 (!) 130/47   08/10/18 118/64    Weight from Last 3 Encounters:   08/02/18 260 lb (117.9 kg)   08/01/18 260 lb (117.9 kg)   07/31/18 260 lb (117.9 kg)              Today, you had the following     No orders found for display       Primary Care Provider Office Phone # Fax #    Juanita Briggs [phone]       [address] [place] [id number]        Equal Access to Services     JEFF SALINAS AH: Bailey Brower,  waaxda luqadaha, qaybta kaalmada porsha, viki haynesaaelvin ah. So [facility] [phone].    ATENCIÓN: Si michelle mcclure, tiene a garcia disposición servicios gratuitos de asistencia lingüística. Sandra al [phone].    We comply with applicable federal civil rights laws and [place] laws. We do not discriminate on the basis of race, color, national origin, age, disability, sex, sexual orientation, or gender identity.            Thank you!     Thank you for choosing [facility]  for your care. Our goal is always to provide you with excellent care. Hearing back from our patients is one way we can continue to improve our services. Please take a few minutes to complete the written survey that you may receive in the mail after your visit with us. Thank you!             Your Updated Medication List - Protect others around you: Learn how to safely use, store and throw away your medicines at www.disposemymeds.org.          This list is accurate as of 10/25/18 11:59 PM.  Always use your most recent med list.                   Brand Name Dispense Instructions for use Diagnosis    acetaminophen 325 MG tablet    TYLENOL    100 tablet    Take 2 tablets (650 mg) by mouth every 4 hours as needed for mild pain    Fall, initial encounter, Weakness       Adult Blood Pressure Cuff Lg Kit     1 kit    1 each 2 times daily    Benign essential hypertension, Malignant essential hypertension       bacitracin ophthalmic ointment      0.5 inches 2 times daily        hydrochlorothiazide 12.5 MG capsule    MICROZIDE    30 capsule    Take 1 capsule (12.5 mg) by mouth daily    Essential hypertension       HYDROcodone-acetaminophen 5-325 MG per tablet    NORCO     Take 1 tablet by mouth every 6 hours as needed for severe pain        * JANTOVEN 2 MG tablet   Generic drug:  warfarin      TAKE 2 TABS BY MOUTH ON MON FRI., TAKE TWO AND ONE-HALF TABS ALL OTHER DAYS OF THE WEEK NEXT INR 9 24 18        * JANTOVEN  2.5 MG tablet   Generic drug:  warfarin     30 tablet    Take 4mg (2x2mg) Mon/Fri; 5mg (2x2.5mg) all other days or as directed by [facility]    Pulmonary embolism with infarction (H), Long term current use of anticoagulant therapy       DALTON Powd      Take 1.5 g by mouth 2 times daily        lisinopril 40 MG tablet    PRINIVIL/ZESTRIL    30 tablet    Take 1 tablet (40 mg) by mouth daily    Essential hypertension       metoprolol succinate 50 MG 24 hr tablet    TOPROL-XL    30 tablet    Take 1 tablet (50 mg) by mouth daily    Essential hypertension       miconazole 2 % powder    MICATIN; MICRO GUARD    90 g    Apply topically 2 times daily    Yeast infection of the skin       nystatin cream    MYCOSTATIN     APPLY TOPICALLY TO GROIN AREA TWICE DAILY UNTIL HEALED THEN USE TWICE DAILY AS NEEDED    Chronic right shoulder pain       sennosides 8.6 MG tablet    SENOKOT     Take 1 tablet by mouth 2 times daily        vancomycin 125 MG capsule    VANCOCIN    40 capsule    TAKE 1 CAP BY MOUTH FOUR TIMES DAILY    Cellulitis of lower extremity, unspecified laterality       * Notice:  This list has 2 medication(s) that are the same as other medications prescribed for you. Read the directions carefully, and ask your doctor or other care provider to review them with you.

## 2018-10-26 NOTE — MR AVS SNAPSHOT
Jose Antonio APODACA Francie   10/26/2018   Anticoagulation Therapy Visit    Description:  72 year old male   Provider:  Juanita Briggs MD   Department:  Hc Anti Coagulation           INR as of 10/26/2018     Today's INR 5.0!      Anticoagulation Summary as of 10/26/2018     INR goal 2.0-3.0   Today's INR 5.0!   Full warfarin instructions 10/26: Hold; 10/27: Hold; 10/28: 1 mg; Otherwise 3 mg every day   Next INR check 10/29/2018    Indications   Long-term (current) use of anticoagulants [Z79.01] [Z79.01]  Pulmonary embolism with infarction (H) [I26.99] [I26.99]         October 2018 Details    Sun Mon Tue Wed Thu Fri Sat      1               2               3               4               5               6                 7               8               9               10               11               12               13                 14               15               16               17               18               19               20                 21               22               23               24               25               26      Hold   See details      27      Hold           28      1 mg         29            30               31                   Date Details   10/26 This INR check       Date of next INR:  10/29/2018         How to take your warfarin dose     To take:  1 mg Take 0.5 of a 2 mg tablet.    To take:  3 mg Take 1.5 of the 2 mg tablets.    Hold Do not take your warfarin dose. See the Details table to the right for additional instructions.

## 2018-10-26 NOTE — PROGRESS NOTES
HISTORY OF PRESENT ILLNESS:      Jose Antonio is a 72 year old male (1945)  resident of [facility]  who is being seen today for an routine follow up visit and for family meeting to discuss plan of care.       Patient has a history of MS based on LP and imaging. Has never been seen by Neurology. Has failed multiple appointments. He was seen by Optho 7/31 and placed on steroids. He unfortunately refused to go to his follow up appointment. SNF staff stated that pts sisters were supportive of his decision. He also refused is General Surgery appointment for pressure ulcer of the right buttock.      Pt has had multiple hospitalizations at [facility] for falls at home prior to his discharge to LT. Again hospitalized at [facility] from 8/29-9/17 after was noted to have acute saddle pulmonary embolus. This was noted after work up for confusion. He was treated with TPA and was admitted to the ICU. He is now anticoagulated. Neurology was consulted in the hospital and recommended out patient follow up, no further recommendations made. Had multiple visits with Palliative care team, as well as Psychiatry. He refused starting something for possible depression (recommendation was lexapro 5mg) on multiple occasions. Did not want to or could not participate in formal HCA planning.      Discussed with nursing staff who have the following concerns: Pt refusing medications intermittently. Does not get out of bed. Has not been up to chair since discharge from [facility]. Refusing any therapy. Pressure ulcer of buttock is healing well, less painful.       Patient is seen in their room. Family is present. Patient states he is doing well at this time. Is happy to be off precautions for C diff. No abd pain, diarrhea. He remains irritated that he is on so many medications. Family expresses frustration with patient not getting out of bed, refusing cares and appointments. They are unsure how to  encourage him.     Pt states today that he has only missed 1-2 appointments and this was because he was in the hospital. He does not recall refusing and berating family and staff when they attempted to get him to go to his last Neuro appt. He feels he is getting better and that his legs work if he wants them to. He does not acknowledge any decline in function since admission here.    We discussed goals of care. He wishes to 'have any chance he can get' and would like to be a Full code despite discussion that this would be very unlikely to result in return to any reasonable functional capacity given his current state. He says he doesn't care but wants it done. This is in contrast to his attitude regarding specialty follow ups and appointments. He tells me 'what will happen will happen.' He does not recall points of discussion from the start of our family meeting toward the end.     Current medications, allergies, and interdisciplinary care plan are reviewed.    Patient Active Problem List    Diagnosis Date Noted     Long-term (current) use of anticoagulants [Z79.01] 09/19/2018     Priority: Medium     Pulmonary embolism with infarction (H) [I26.99] 09/19/2018     Priority: Medium     MS (multiple sclerosis) (H) 08/09/2018     Priority: Medium     Optic neuritis 08/09/2018     Priority: Medium     Cellulitis 07/04/2018     Priority: Medium     Pain management 05/04/2018     Priority: Medium     Nursing Home Visit 03/30/2018     Priority: Medium     Recurrent falls      Priority: Medium     Fall 03/21/2018     Priority: Medium     Falls frequently 12/31/2017     Priority: Medium     Type 2 diabetes mellitus without complication (H) 08/19/2016     Priority: Medium     Weakness of both lower extremities 08/19/2016     Priority: Medium     Yeast infection of the skin 08/19/2016     Priority: Medium     Essential hypertension 08/17/2016     Priority: Medium     Falling episodes 08/17/2016     Priority: Medium      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     Priority: Medium          Social History     Social History     Marital status: Single     Spouse name: N/A     Number of children: 3     Years of education: N/A     Occupational History     Not on file.     Social History Main Topics     Smoking status: Former Smoker     Quit date: 1/1/1978     Smokeless tobacco: Never Used     Alcohol use No      Comment: quit 1977, previously an alcoholic     Drug use: No     Sexual activity: Not Currently     Other Topics Concern     Not on file     Social History Narrative        Current Outpatient Prescriptions   Medication Sig     acetaminophen (TYLENOL) 325 MG tablet Take 2 tablets (650 mg) by mouth every 4 hours as needed for mild pain     bacitracin ophthalmic ointment 0.5 inches 2 times daily     Blood Pressure Monitoring (ADULT BLOOD PRESSURE CUFF LG) KIT 1 each 2 times daily     hydrochlorothiazide (MICROZIDE) 12.5 MG capsule Take 1 capsule (12.5 mg) by mouth daily     HYDROcodone-acetaminophen (NORCO) 5-325 MG per tablet Take 1 tablet by mouth every 6 hours as needed for severe pain     JANTOVEN 2 MG tablet TAKE 2 TABS BY MOUTH ON MON FRI., TAKE TWO AND ONE-HALF TABS ALL OTHER DAYS OF THE WEEK NEXT INR 9 24 18     JANTOVEN 2.5 MG tablet Take 4mg (2x2mg) Mon/Fri; 5mg (2x2.5mg) all other days or as directed by [facility]     lisinopril (PRINIVIL/ZESTRIL) 40 MG tablet Take 1 tablet (40 mg) by mouth daily     metoprolol (TOPROL-XL) 50 MG 24 hr tablet Take 1 tablet (50 mg) by mouth daily     miconazole (MICATIN; MICRO GUARD) 2 % powder Apply topically 2 times daily     Nutritional Supplements (DALTON) POWD Take 1.5 g by mouth 2 times daily     nystatin (MYCOSTATIN) cream APPLY TOPICALLY TO GROIN AREA TWICE DAILY UNTIL HEALED THEN USE TWICE DAILY AS NEEDED     sennosides (SENOKOT) 8.6 MG tablet Take 1 tablet by mouth 2 times daily     vancomycin (VANCOCIN) 125 MG capsule TAKE 1 CAP BY MOUTH FOUR TIMES DAILY     No  current facility-administered medications for this visit.        Allergies   Allergen Reactions     Penicillins        I have reviewed the care plan and do agree with the plan.    ROS:  No chest pain, shortness of breath, fever, chills, headache, nausea, vomiting, dysuria, or changes in bowel habits.  No pain noted.     OBJECTIVE:  Reviewed in NH chart.     GENERAL:  Alert, and in no acute distress  RESP:  Lungs clear.  No rales, rhonchi, or wheezing  CV:  RRR.  S1 S2 with no murmur  No clicks or rubs.  ABD: Soft, non tender, non distended, no organomegaly. BS are normal.   SKIN:  Age-related changes.  No suspicious lesions or rashes. Does appear yellow today, ? Icterus noted as well.   PSYCH:  Affect is full, he alternatively is irritated, but responds tearful to family who are visibly upset.    NEURO: Mental status is alert. Memory is clearly impaired.   EXTREM:  No edema. Significant atrophy.     Lab/Diagnostic data:    Reviewed in Epic    ASSESSMENT/ORDERS:  Diagnoses and all orders for this visit:    MS (multiple sclerosis) (H): Pts strength has declined significantly. Not open to working with PT. Is agreeable to work with family on getting a consultation in the future after discussion today, however, I am not sure pt will recall the conversation long term. Family desires to continue to attempt to pursue treatment and follow up with Neurology. They are aware that the longer he remains in bed the less likely he is to make any significant recovery even if he received medications     Cognitive dysfunction, acquired: Pt is not able to make his own decisions. Family is in agreement with this. Would not participate in formal cog testing, but clearly demonstrates inconsistent goals of care and limited memory for recent events. His daughter, Cora, will be his healthcare agent. All further plans of care will be discussed with her. Paperwork completed today at NH. Unfortunately, getting patient to Neurology for care  still poses a problem as we will not restrain or force patient to travel to these appointments.     Counseling regarding advanced care planning and goals of care: Patient cannot make his own decisions. Family to discuss as they feel FULL CODE is not in patient's best interest. NH staff to follow up with family on this and complete POLST    Total time spent with patient visit was 45 min including patient visit, review of pertinent clinical information, and treatment plan.    Juanita Briggs MD

## 2018-10-26 NOTE — PROGRESS NOTES
ANTICOAGULATION FOLLOW-UP CLINIC VISIT    Patient Name:  Jose Antonio Marmolejo  Date:  10/26/2018  Contact Type:  New warfarin dosing/INR recheck date faxed to [facility]    SUBJECTIVE:     Patient Findings     Positives Unexplained INR or factor level change    Comments POC INR done by [facility] nurse. Call received from nurse stating  POC INR is 8.0 and asked about lab draw to verify results which I told her to do.  INR result refaxed from [facility] and lab reported result is 5.0 per nursing communication sheet. Per communication sheet, patient has no bleeding/bruising and no new changes in diet/meds/activity. New warfarin dosing/INR recheck date faxed to [facility] facility. Staff to notify warfarin clinic if patient has any bleeding/bruising, changes in diet/meds/activity or questions.            OBJECTIVE    INR   Date Value Ref Range Status   10/26/2018 5.0  Final       ASSESSMENT / PLAN  No question data found.  Anticoagulation Summary as of 10/26/2018     INR goal 2.0-3.0   Today's INR 5.0!   Warfarin maintenance plan 3 mg (2 mg x 1.5) every day   Full warfarin instructions 10/26: Hold; 10/27: Hold; 10/28: 1 mg; Otherwise 3 mg every day   Weekly warfarin total 21 mg   Plan last modified Devika Flannery, RN (10/26/2018)   Next INR check 10/29/2018   Priority INR   Target end date Indefinite    Indications   Long-term (current) use of anticoagulants [Z79.01] [Z79.01]  Pulmonary embolism with infarction (H) [I26.99] [I26.99]         Anticoagulation Episode Summary     INR check location     Preferred lab     Send INR reminders to  ANTICOAG POOL    Comments Is at [facility], [place]  fax [phone]   PH  [phone] or [phone]      Anticoagulation Care Providers     Provider Role Specialty Phone number    Juanita Briggs MD [facility] [phone]            See the Encounter Report to view Anticoagulation Flowsheet and Dosing Calendar (Go to Encounters tab in chart review, and find the Anticoagulation Therapy  Visit)        Devika Flannery RN

## 2018-10-26 NOTE — Clinical Note
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 His INR today (lab INR) was 5.0. Not sure why INR went up. I am holding warfarin for 2 days and giving 1mg dose on Ray 10/28 and will have INR done on 10/29/18. He has no bleeding/bruising, no changes in diet/meds/activity.  Devika Flannery RN Anticoagulation clinic

## 2018-10-30 PROBLEM — K83.1 OBSTRUCTIVE JAUNDICE (H): Status: ACTIVE | Noted: 2018-01-01

## 2018-10-30 PROBLEM — R79.1 SUPRATHERAPEUTIC INR: Status: ACTIVE | Noted: 2018-01-01

## 2018-10-30 NOTE — IP AVS SNAPSHOT
MRN:[id number]                      After Visit Summary   10/30/2018    Jose Antonio Marmolejo    MRN: [id number]           Thank you!     Thank you for choosing [facility] for your care. Our goal is always to provide you with excellent care. Hearing back from our patients is one way we can continue to improve our services. Please take a few minutes to complete the written survey that you may receive in the mail after you visit with us. Thank you!        Patient Information     Date Of Birth          1945        Designated Caregiver       Most Recent Value    Caregiver    Will someone help with your care after discharge? yes    Name of designated caregiver Kailyn Howell    Phone number of caregiver [phone]    Caregiver address [address]      About your hospital stay     You were admitted on:  October 30, 2018 You last received care in the:  [facility]    You were discharged on:  November 1, 2018        Reason for your hospital stay       Patient admitted with painless jaundice.  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  Hemodynamically stable.  No fever or evidence of infection/cholangitis.  Had elevated INR 4.97.  Reversed with vitamin K.  Last check 1.24.  Repeat is pending.  Will be transferred to [facility] in [place] for endoscopic ultrasound with biopsy/possible stent placement.                  Who to Call     For medical emergencies, please call 911.  For non-urgent questions about your medical care, please call your primary care provider or clinic, [phone]          Attending Provider     Provider Specialty    Kanika Page PA-C Emergency Medicine    Adilson Ralph MD Internal Medicine    John Torres MD Internal Medicine       Primary Care Provider Office Phone # Fax #    Juanita Briggs [phone]      After Care Instructions     Activity       Per hospital            Advance Diet as Tolerated     "   N.p.o.            IV access       Peripheral IV.                  Follow-up Appointments     Follow-up and recommended labs and tests        Transferred to higher level of care                  Future tests that were ordered for you     Contact Isolation       MRSA positive                  Warfarin Instruction     You have started taking a medicine called warfarin. This is a blood-thinning medicine (anticoagulant). It helps prevent and treat blood clots.      Before leaving the hospital, make sure you know how much to take and how long to take it.      You will need regular blood tests to make sure your blood is clotting safely. It is very important to see your doctor for regular blood tests.    Talk to your doctor before taking any new medicine (this includes over-the-counter drugs and herbal products). Many medicines can interact with warfarin. This may cause more bleeding or too much clotting.     Eating a lot of vitamin K--found in green, leafy vegetables--can change the way warfarin works in your body. Do NOT avoid these foods. Instead, try to eat the same amount each day.     Bleeding is the most common side-effect of warfarin. You may notice bleeding gums, a bloody nose, bruises and bleeding longer when you cut yourself. See a doctor at once if:   o You cough up blood  o You find blood in your stool (poop)  o You have a deep cut, or a cut that bleeds longer than 10 minutes   o You have a bad cut, hard fall, accident or hit your head (go to urgent care or the emergency room).    For women who can get pregnant: This medicine can harm an unborn baby. Be very careful not to get pregnant while taking this medicine. If you think you might be pregnant, call your doctor right away.    For more information, read \"Guide to Warfarin Therapy,  the booklet you received in the hospital.        Pending Results     No orders found from 10/28/2018 to 10/31/2018.            Statement of Approval     Ordered          " "10/31/18 1515  I have reviewed and agree with all the recommendations and orders detailed in this document.  EFFECTIVE NOW     Approved and electronically signed by:  Adilson Ralph MD             Admission Information     Date & Time Provider Department Dept. Phone    10/30/2018 John Torres MD [facility] [phone]      Your Vitals Were     Blood Pressure Pulse Temperature Respirations Height Weight    158/73 94 98.3  F (36.8  C) (Temporal) 20 1.803 m (5' 11\") 102.7 kg (226 lb 6.6 oz)    Pulse Oximetry BMI (Body Mass Index)                98% 31.58 kg/m2          MyChart Information     [employer] lets you send messages to your doctor, view your test results, renew your prescriptions, schedule appointments and more. To sign up, go to [email or web address] . Click on \"Log in\" on the left side of the screen, which will take you to the Welcome page. Then click on \"Sign up Now\" on the right side of the page.     You will be asked to enter the access code listed below, as well as some personal information. Please follow the directions to create your username and password.     Your access code is: [email or web address]  Expires: 2019  3:44 PM     Your access code will  in 90 days. If you need help or a new code, please call your [facility] clinic or [phone].        Care EveryWhere ID     This is your Care EveryWhere ID. This could be used by other organizations to access your [facility] medical records  [id number]        Equal Access to Services     [facility] RITA : Hadii charles scott hadasho Soidalmisali, waaxda luqadaha, qaybta kaalmada adeegyaalen, viki triplett . So [facility] [phone].    ATENCIÓN: Si habla español, tiene a garcia disposición servicios gratuitos de asistencia lingüística. Llame al [phone].    We comply with applicable federal civil rights laws and [place] laws. We do not discriminate on the basis of race, color, national origin, age, disability, sex, sexual " orientation, or gender identity.               Review of your medicines      START taking        Dose / Directions    0.9% sodium chloride + KCl 20 mEq/L Soln infusion        Inject into the vein continuous   Refills:  0       ondansetron 4 MG ODT tab   Commonly known as:  ZOFRAN-ODT        Dose:  4 mg   Take 1 tablet (4 mg) by mouth every 6 hours as needed for nausea or vomiting   Quantity:  120 tablet   Refills:  0         CONTINUE these medicines which may have CHANGED, or have new prescriptions. If we are uncertain of the size of tablets/capsules you have at home, strength may be listed as something that might have changed.        Dose / Directions    acetaminophen 325 MG tablet   Commonly known as:  TYLENOL   This may have changed:  when to take this   Used for:  Fall, initial encounter, Weakness        Dose:  650 mg   Take 2 tablets (650 mg) by mouth every 4 hours as needed for mild pain   Quantity:  100 tablet   Refills:  0         CONTINUE these medicines which have NOT CHANGED        Dose / Directions    Adult Blood Pressure Cuff Lg Kit   Used for:  Benign essential hypertension, Malignant essential hypertension        Dose:  1 each   1 each 2 times daily   Quantity:  1 kit   Refills:  0       bacitracin ointment        Apply topically 2 times daily 500unit/gram, apply 1% topical 2x per day to reddened areas of inner thighs   Refills:  0       COUMADIN PO        On hold per NH   Refills:  0       lisinopril 40 MG tablet   Commonly known as:  PRINIVIL/ZESTRIL   Used for:  Essential hypertension        Dose:  40 mg   Take 1 tablet (40 mg) by mouth daily   Quantity:  30 tablet   Refills:  0       metoprolol succinate 50 MG 24 hr tablet   Commonly known as:  TOPROL-XL   Used for:  Essential hypertension        Dose:  50 mg   Take 1 tablet (50 mg) by mouth daily   Quantity:  30 tablet   Refills:  0       miconazole 2 % powder   Commonly known as:  MICATIN; MICRO GUARD   Used for:  Yeast infection of the skin         Apply topically 2 times daily   Quantity:  90 g   Refills:  1         STOP taking     hydrochlorothiazide 12.5 MG capsule   Commonly known as:  MICROZIDE           DALTON Powd           nystatin cream   Commonly known as:  MYCOSTATIN           sennosides 8.6 MG tablet   Commonly known as:  SENOKOT                    Protect others around you: Learn how to safely use, store and throw away your medicines at www.disposemymeds.org.             Medication List: This is a list of all your medications and when to take them. Check marks below indicate your daily home schedule. Keep this list as a reference.      Medications           Morning Afternoon Evening Bedtime As Needed    0.9% sodium chloride + KCl 20 mEq/L Soln infusion   Inject into the vein continuous   Last time this was given:  11/1/2018 12:46 AM                                acetaminophen 325 MG tablet   Commonly known as:  TYLENOL   Take 2 tablets (650 mg) by mouth every 4 hours as needed for mild pain                                Adult Blood Pressure Cuff Lg Kit   1 each 2 times daily                                bacitracin ointment   Apply topically 2 times daily 500unit/gram, apply 1% topical 2x per day to reddened areas of inner thighs                                COUMADIN PO   On hold per NH                                lisinopril 40 MG tablet   Commonly known as:  PRINIVIL/ZESTRIL   Take 1 tablet (40 mg) by mouth daily   Last time this was given:  40 mg on 10/31/2018  8:58 AM                                metoprolol succinate 50 MG 24 hr tablet   Commonly known as:  TOPROL-XL   Take 1 tablet (50 mg) by mouth daily   Last time this was given:  50 mg on 10/31/2018  8:58 AM                                miconazole 2 % powder   Commonly known as:  MICATIN; MICRO GUARD   Apply topically 2 times daily   Last time this was given:  10/31/2018  9:15 PM                                ondansetron 4 MG ODT tab   Commonly known as:  ZOFRAN-ODT   Take 1  tablet (4 mg) by mouth every 6 hours as needed for nausea or vomiting

## 2018-10-30 NOTE — ED NOTES
"Pt to the ED via [facility].  Report labs drawn by nurse at [facility] and had some elevated liver enzymes and PCP requested he be spent to the ED for further evaluation. Pt states he has no pain but has been in bed most of the time as he \"feels tired\".  Color is jaundiced as well as sclera. IV to RAC started by EMS. Monitors on pt.  Call light is given. Assessment is complete.    "

## 2018-10-30 NOTE — IP AVS SNAPSHOT
[facility]    [address]    [facility] [address]    Phone:  [phone]    Fax:  [phone]                                       After Visit Summary   10/30/2018    Jose Antonio Marmolejo    MRN: [id number]           After Visit Summary Signature Page     I have received my discharge instructions, and my questions have been answered. I have discussed any challenges I see with this plan with the nurse or doctor.    ..........................................................................................................................................  Patient/Patient Representative Signature      ..........................................................................................................................................  Patient Representative Print Name and Relationship to Patient    ..................................................               ................................................  Date                                   Time    ..........................................................................................................................................  Reviewed by Signature/Title    ...................................................              ..............................................  Date                                               Time          22EPIC Rev 08/18

## 2018-10-30 NOTE — ED NOTES
DATE:  10/30/2018   TIME OF RECEIPT FROM LAB: 1123  LAB TEST:  Potassium   LAB VALUE:  2.9  RESULTS GIVEN WITH READ-BACK TO (PROVIDER): Dorothy BRUSH   TIME LAB VALUE REPORTED TO PROVIDER:   1125

## 2018-10-30 NOTE — ED NOTES
Nurse to nurse report given to Mechelle WARD on third floor.  Pt to transfer at this time to room 3222.  VS as charted.  IV site is WDL.  Denies pain.  Personal belongings with pt. WIfe to meet in room.

## 2018-10-30 NOTE — PLAN OF CARE
Patient has a pre existing pressure injury on left buttock. Wound base is moist, pink/red, scant drainage. Surrounding areas are blanchable red. There was a foam dressing upon admission that was re applied. WOC consult ordered for tomorrow. Patient is refusing repositioning saying that he will reposition himself. Continuing to attempt repositioning q2 hours and educating patient.

## 2018-10-30 NOTE — H&P
Admitted:     10/30/2018      CHIEF COMPLAINT:  Jaundice.      HISTORY OF PRESENT ILLNESS:  Mr. Marmolejo is a 72-year-old gentleman who is a current resident of a nursing home.  He has a history of multiple sclerosis and for the most part has refused any PT, OT or other interventions and basically is bed bound.  He initially had some issues back in September where he presented with some confusion.  Workup resulted in getting a CT scan of his chest and was found to have a saddle embolus.  There was no bed available at our facility and he was sent to [facility].  While he was there, he did develop significant epistaxis and they ended up intubating him to protect his airway.  Eventually they got this stopped and extubated and placed on Coumadin.  The patient was very adamant about not participating and refusing all types of therapies, etc. and was transferred to the nursing home.  Over the last couple of days they noted now that he has become jaundiced.  Labs showed abnormal LFTs and was sent to the ER for further evaluation.  His labs in the ER did show that his bilirubin was 8.3.  He did have elevation of alkaline phosphatase 439, , .  His lipase was 1500.  White count 7400.  He was hemodynamically stable, had no fevers at all.  CT scan was done of his abdomen.  It did show a 2 cm mass at the head of his pancreas.  He does have a dilated gallbladder, dilated extrahepatic and intrahepatic ducts along with his pancreatic duct. Of note also, his INR was 4.96.      The ER did contact [place], GI doctor [place], and felt that the patient required endoscopic ultrasound with biopsy and likely stent placement.  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  However, there endoscopic ultrasound physician is unavailable until Thursday 11/01.  They did not wish to accept the patient until that physician was available.  Their recommendation was to admit the patient to our facility to facilitate getting his INR  down and then transfer him to [place] on Thursday morning, 11/01.      In speaking with the patient down in the ER and his sisters, he is an alert, pleasant, in really no distress at all.  Denies any shortness of breath, no chest pains.  No nausea or vomiting, no abdominal pain.  Bowels have been fine.  He has been voiding without difficulty.  Appetite has been good.  After a recent visit with Dr. Briggs, his primary care provider at the nursing home, he and his family have decided on DNR/DNI status and have his POLST written out as he has really declined any significant interventions and basically just wants to be left alone.  However, this whole episode now has scared him somewhat and at least here he is agreeing to undergo the diagnostic testing for this, but any further intervention such as a large surgery likely would not be accepted.      PAST MEDICAL HISTORY:   1.  Multiple sclerosis.  He had an LP and imaging which documented that he was actually seen by Neurology during his hospital stay back in September and they agreed with that diagnosis.  He has not wanted any interventions and absolutely refuses any physical therapy, occupational therapy.   2.  Status post saddle pulmonary embolus.  This was on 08/29 when he presented with confusion.  He did have some marginal perfusion issues also and was transferred to [facility].  I believe there he did undergo TPA treatment and was subsequently placed on Coumadin therapy.  He had a complicating issue of a significant epistaxis.   3.  Echocardiogram performed over in September that essentially showed basically normal systolic function of his left ventricle.  He did not have any right-sided dilatation or strain noted.   4.  History of some diabetes mellitus.  He is not on anything right now.   5.  Hypertension.   6.  Some osteoarthritis.   7.  He is status post left ankle fracture with ORIF.      ALLERGIES:  He has allergies to PENICILLIN.       MEDICATIONS:   1.  Hydrochlorothiazide 12.5 mg daily.   2.  Lisinopril 40 mg daily.   3.  Toprol-XL 50 mg daily.   4.  Senokot 1 tablet b.i.d.   5.  [facility]farin [facility].  It is currently on hold.   6.  Acetaminophen 650 mg every 4 hours p.r.n. pain.     7.  Gino powder 1.5 grams b.i.d., a nutritional supplement.      SOCIAL HISTORY:  He is single, has 3 children.  Resides currently at the nursing home.  He is a former smoker, quit in 1978.  Nondrinker.      FAMILY HISTORY:  Positive for mom had pneumonia.  Father had coronary artery disease, Hodgkin's lymphoma.  Son with colon cancer.      REVIEW OF SYSTEMS:  Pertinent positives and negatives noted above in the HPI.      PHYSICAL EXAMINATION:   GENERAL:  Alert, pleasant gentleman in no obvious distress.  Does appear somewhat jaundiced.   VITAL SIGNS:  His blood pressure is 146/83, temperature is 97.2, heart rate is 94 and regular.  Sats are 97% on room air.   HEENT:  Normocephalic, atraumatic.  Pupils equal, round, reactive.  Sclerae are somewhat jaundiced.  Mucous membranes are moist.  There is no lymphadenopathy or thyromegaly.   LUNGS:  Clear to auscultation.     BACK:  No obvious CVA tenderness or spinal tenderness.   CARDIAC:  Reveals a regular rate and rhythm, normal S1, S2, no audible murmurs, rubs or gallops.  Neck veins appear to be flat.  Pulses are 2+ and equal throughout.   ABDOMEN:  Soft, nontender, no mass or organomegaly.   EXTREMITIES:  Without cyanosis, clubbing nor edema.   NEUROLOGIC:  Briskly nonfocal.  He is just generally somewhat weakened.      IMAGING:  His CT scan showed a 2 cm mass in the head of the pancreas and biliary and pancreatic ductal dilatation.  Does have an enlarged lymph node in the jamel hepatis highly suspicious for malignancy.  The lymph node was 9-18 mm in diameter.      LABORATORY DATA:  His sodium is 137, potassium 2.9, chloride 103, CO2 is 26, BUN is 14, creatinine 0.73, glucose 149, calcium is 8.4, anion  gap is 8, albumin is 2.7, total protein 7.4, total bilirubin 8.3, direct is 7.2, alkaline phosphatase is 439, , .  Ammonia 11.  Lipase is 1500.  Glucose is 159.  White count 7400, hemoglobin is 14.1, platelet count is 250,000.  INR 4.96, PTT is 50.      ASSESSMENT:   1.  Obstructive jaundice.  The patient presents with painless jaundice.  CT scan documents what appears to be a pancreatic mass at the head with both biliary and pancreatic ductal dilatation.  Elevated LFTs, elevated lipase, but he is completely asymptomatic at this point.  There is a high likelihood that this is going to be a malignancy.  This has been explained to the patient and the family.  GI was contacted over at [facility].  They recommended endoscopic ultrasound with biopsies and likely stent placement.  They are unable to accept the patient today due to no endoscopic ultrasound physician available until Thursday 11/01.  Their recommendation was for us to admit him to our facility and transfer on 11/01 to facilitate normalizing his INR.  Currently, he is afebrile, no elevated white count.  No signs of any cholangitis.  He is hemodynamically stable.  We will admit the patient for some gentle IV fluids.  We will work on getting his INR down.  No plans on starting antibiotic therapy at this point at least with plans on transfer on 11/01.   2.  Elevated INR.  The patient did have a significant history of a saddle embolus, actually roughly a little over a month ago.  He has been on warfarin therapy.  INR is 4.96.  Likely it has been rising due to his hepatic dysfunction.  Currently is on hold.  We will give him some vitamin K tonight and see how his INR is in the morning.  We will continue to adjust things to try and get this down to 1.4, so he can have his endoscopic ultrasound performed.  Given his hepatic dysfunction, he may very well require fresh frozen plasma.  Have to take into consideration his recent pulmonary embolism.  He  should be continued on anticoagulation.  If we do get him down to a subtherapeutic point, likely would cover with Lovenox until his procedure.   3.  History of diabetes mellitus.  He is currently on no medications.  Blood sugar is mildly elevated at 159 at this time.   4.  Multiple sclerosis.  The patient has been diagnosed with MS.  He is on no therapy.  Wishes no physical or occupational therapy and has been very resistant to any type of interventions whatsoever.      His code status has been DNR/DNI.  Current plans have been explained to the patient and family and they wish to proceed with this.  However, any further interventions would need to be discussed further.  The patient will be admitted to observation status here.      CODE STATUS:  He is DNR/DNI.         KYLE RUSS MD             D: 10/30/2018   T: 10/30/2018   MT: SID[facility]ARTH      Name:     NICOLE KEMP   MRN:      [id number]        Account:      [id number]   :      1945        Admitted:     10/30/2018                   Document: [id number]       cc: Juanita Briggs MD

## 2018-10-30 NOTE — ED PROVIDER NOTES
History     Chief Complaint   Patient presents with     Jaundice     resides at [facility]. jaundice x 1 week. PCP wanting a further work up for acute liver failure     HPI  Jose Antonio Marmolejo is a 72 year old male who is sent here from [facility] for evaluation of jaundice x 1 week. He has recent h/o saddle PE for which he was ultimately hospitalized at [facility] in [place] due to complication arising with epistaxis on 8/29/18. He has no complaints today. He denies abdominal pain, fevers/chills or n/v/d. Very distant h/o daily ETOH use, 50+ years ago. Denies h/o hepatitis. Stools are brown in color, hard, daily. He had a CT abd/pelvis 8/28/18 of this year which showed subtle edema surrounding the head of the pancrease with and elevated lipase in the 900's, early pancreatitis was questioned though pt had no pain. No h/o pancreatitis.   Jose Antonio has decided not to treat his MS and has been resistant to any OT/PT at the nursing home. He hasn't gotten out of bed there for months.   His three sisters are here with him today.     Problem List:    Patient Active Problem List    Diagnosis Date Noted     Obstructive jaundice 10/30/2018     Priority: Medium     Supratherapeutic INR 10/30/2018     Priority: Medium     Long-term (current) use of anticoagulants [Z79.01] 09/19/2018     Priority: Medium     Pulmonary embolism with infarction (H) [I26.99] 09/19/2018     Priority: Medium     MS (multiple sclerosis) (H) 08/09/2018     Priority: Medium     Optic neuritis 08/09/2018     Priority: Medium     Cellulitis 07/04/2018     Priority: Medium     Pain management 05/04/2018     Priority: Medium     Nursing Home Visit 03/30/2018     Priority: Medium     Recurrent falls      Priority: Medium     Fall 03/21/2018     Priority: Medium     Falls frequently 12/31/2017     Priority: Medium     Type 2 diabetes mellitus without complication (H) 08/19/2016     Priority: Medium     Weakness of both lower extremities 08/19/2016      Priority: Medium     Yeast infection of the skin 08/19/2016     Priority: Medium     Essential hypertension 08/17/2016     Priority: Medium     Falling episodes 08/17/2016     Priority: Medium     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     Priority: Medium        Past Medical History:    Past Medical History:   Diagnosis Date     Arthritis      Diabetes (H)      Hypertension      Recurrent falls        Past Surgical History:    Past Surgical History:   Procedure Laterality Date     ORTHOPEDIC SURGERY      left ankle and removal of hardware       Family History:    Family History   Problem Relation Age of Onset     Other - See Comments Mother      pneumonia     Obesity Mother      Other Cancer Father      hodgkins lymphoma     Coronary Artery Disease Father      pacemaker     Other Cancer Sister      unknown cancers     Colon Cancer Son        Social History:  Marital Status:  Single [1]  Social History   Substance Use Topics     Smoking status: Former Smoker     Quit date: 1/1/1978     Smokeless tobacco: Never Used     Alcohol use No      Comment: quit 1977, previously an alcoholic        Medications:      No current outpatient prescriptions on file.      Review of Systems   Constitutional: Negative for appetite change, chills, fever and unexpected weight change.   HENT: Negative for sore throat.    Respiratory: Negative for shortness of breath.    Cardiovascular: Negative for chest pain.   Gastrointestinal: Negative for abdominal distention, abdominal pain, anal bleeding, blood in stool, constipation, diarrhea, nausea, rectal pain and vomiting.   Genitourinary: Negative.  Negative for dysuria, hematuria, scrotal swelling and testicular pain.   Musculoskeletal: Negative.    Skin: Positive for color change.   Neurological: Negative.  Negative for headaches.   All other systems reviewed and are negative.      Physical Exam   BP: 125/75  Pulse: 94  Heart Rate: 92  Temp: 98.3  F (36.8  C)  Resp: 18  SpO2: 97  %      Physical Exam   Constitutional: He is oriented to person, place, and time. He appears well-developed and well-nourished.  Non-toxic appearance. He does not have a sickly appearance. He does not appear ill. No distress.   HENT:   Head: Normocephalic and atraumatic.   Nose: Nose normal.   Mouth/Throat: Oropharynx is clear and moist. No oropharyngeal exudate.   Eyes: Conjunctivae are normal. Pupils are equal, round, and reactive to light. Right eye exhibits no discharge. Left eye exhibits no discharge. Scleral icterus is present.   Neck: Normal range of motion. Neck supple.   Cardiovascular: Normal rate, regular rhythm, normal heart sounds and intact distal pulses.  Exam reveals no gallop and no friction rub.    No murmur heard.  Pulmonary/Chest: Effort normal and breath sounds normal. No respiratory distress. He has no wheezes. He has no rales.   Abdominal: Soft. Bowel sounds are normal. He exhibits no distension and no mass. There is no tenderness. There is no rebound and no guarding.   Musculoskeletal: Normal range of motion. He exhibits no edema.   Lymphadenopathy:     He has no cervical adenopathy.   Neurological: He is alert and oriented to person, place, and time. No cranial nerve deficit. Coordination normal.   Skin: Skin is warm and dry. No rash noted. He is not diaphoretic. No erythema. No pallor.   Skin is jaundice.    Psychiatric: He has a normal mood and affect. His behavior is normal. Judgment and thought content normal.   Nursing note and vitals reviewed.      ED Course     ED Course     Procedures      Results for orders placed or performed during the hospital encounter of 10/30/18 (from the past 24 hour(s))   Ammonia   Result Value Ref Range    Ammonia 11 10 - 50 umol/L   CBC with platelets differential   Result Value Ref Range    WBC 7.4 4.0 - 11.0 10e9/L    RBC Count 4.83 4.4 - 5.9 10e12/L    Hemoglobin 14.1 13.3 - 17.7 g/dL    Hematocrit 41.9 40.0 - 53.0 %    MCV 87 78 - 100 fl    MCH 29.2  26.5 - 33.0 pg    MCHC 33.7 31.5 - 36.5 g/dL    RDW 16.0 (H) 10.0 - 15.0 %    Platelet Count 250 150 - 450 10e9/L    Diff Method Automated Method     % Neutrophils 60.2 %    % Lymphocytes 27.5 %    % Monocytes 6.9 %    % Eosinophils 4.4 %    % Basophils 0.7 %    % Immature Granulocytes 0.3 %    Nucleated RBCs 0 0 /100    Absolute Neutrophil 4.4 1.6 - 8.3 10e9/L    Absolute Lymphocytes 2.0 0.8 - 5.3 10e9/L    Absolute Monocytes 0.5 0.0 - 1.3 10e9/L    Absolute Eosinophils 0.3 0.0 - 0.7 10e9/L    Absolute Basophils 0.1 0.0 - 0.2 10e9/L    Abs Immature Granulocytes 0.0 0 - 0.4 10e9/L    Absolute Nucleated RBC 0.0    Basic metabolic panel   Result Value Ref Range    Sodium 137 133 - 144 mmol/L    Potassium 2.9 (LL) 3.4 - 5.3 mmol/L    Chloride 103 94 - 109 mmol/L    Carbon Dioxide 26 20 - 32 mmol/L    Anion Gap 8 3 - 14 mmol/L    Glucose 159 (H) 70 - 99 mg/dL    Urea Nitrogen 14 7 - 30 mg/dL    Creatinine 0.73 0.66 - 1.25 mg/dL    GFR Estimate >90 >60 mL/min/1.7m2    GFR Estimate If Black >90 >60 mL/min/1.7m2    Calcium 8.4 (L) 8.5 - 10.1 mg/dL   Hepatic panel   Result Value Ref Range    Bilirubin Direct 7.2 (H) 0.0 - 0.2 mg/dL    Bilirubin Total 8.3 (H) 0.2 - 1.3 mg/dL    Albumin 2.7 (L) 3.4 - 5.0 g/dL    Protein Total 7.4 6.8 - 8.8 g/dL    Alkaline Phosphatase 439 (H) 40 - 150 U/L     (H) 0 - 70 U/L     (H) 0 - 45 U/L   INR   Result Value Ref Range    INR 4.96 (H) 0.80 - 1.20   Partial thromboplastin time   Result Value Ref Range    PTT 50 (H) 24.00 - 37.00 sec   Lipase   Result Value Ref Range    Lipase 1500 (H) 73 - 393 U/L   CT Abdomen Pelvis w Contrast    Narrative    EXAMINATION: CT ABDOMEN PELVIS W CONTRAST, 10/30/2018 12:50 PM    TECHNIQUE:  Helical CT images from the lung bases through the  symphysis pubis were obtained  with IV contrast. Contrast dose: ISOVUE  300  100ML    COMPARISON: August 29, 2018    HISTORY: jaundice, elevated direct bilirubin;     FINDINGS:    There is dependent  atelectasis at the lung bases.    There is dilated bile ducts and a distended gallbladder. The common  bile duct is dilated down to the head of the pancreas. No calcified  gallstones are seen.    The spleen is normal in appearance. There is a mass in the head of the  pancreas measuring 2.1 cm. There is dilation of the pancreatic duct.  This represents a change from August 2, 2018. There is an enlarged  jamel hepatis lymph node seen. This is enlarged from 9 mm to 18 mm.    The adrenal glands are normal.    There is a small low-density mass in the left kidney most likely a  cyst. No hydronephrosis is noted.    The periaortic lymph nodes are normal in caliber.    No intraperitoneal masses or inflammatory changes are noted.    The bladder appears normal. The rectum appears normal.    The regional skeleton is intact      Impression    IMPRESSION: 2 cm mass in the head of the pancreas with biliary and  pancreatic ductal dilatation there is an enlarged lymph node seen in  the jamel hepatis. This mass should be considered highly suspicious  for malignancy.     MENDEZ MATAMOROS MD       Medications   lisinopril (PRINIVIL/ZESTRIL) tablet 40 mg (40 mg Oral Given 10/30/18 1612)   metoprolol succinate (TOPROL-XL) 24 hr tablet 50 mg (50 mg Oral Given 10/30/18 1612)   miconazole (MICATIN; MICRO GUARD) 2 % powder (not administered)   sennosides (SENOKOT) tablet 1 tablet (not administered)   acetaminophen (TYLENOL) tablet 650 mg (not administered)   acetaminophen (TYLENOL) Suppository 650 mg (not administered)   naloxone (NARCAN) injection 0.1-0.4 mg (not administered)   melatonin tablet 1 mg (not administered)   ondansetron (ZOFRAN-ODT) ODT tab 4 mg (not administered)     Or   ondansetron (ZOFRAN) injection 4 mg (not administered)   lidocaine 1 % 1 mL (not administered)   lidocaine (LMX4) kit (not administered)   sodium chloride (PF) 0.9% PF flush 3 mL (not administered)   sodium chloride (PF) 0.9% PF flush 3 mL (3 mLs  Intracatheter Not Given 10/30/18 1617)   0.9% sodium chloride + KCl 20 mEq/L infusion ( Intravenous New Bag 10/30/18 1612)   potassium chloride SA (K-DUR/KLOR-CON M) CR tablet 40 mEq (40 mEq Oral Given 10/30/18 1159)   iopamidol (ISOVUE-300) IV solution 61% 100 mL (100 mLs Intravenous Given 10/30/18 1243)   sodium chloride (PF) 0.9% PF flush 60 mL (60 mLs Intravenous Given 10/30/18 1243)   phytonadione (AQUA-MEPHYTON) 10 mg in sodium chloride 0.9 % 50 mL intermittent infusion (10 mg Intravenous New Bag 10/30/18 1612)       Assessments & Plan (with Medical Decision Making)   Jose Antonio presents with jaundice x 1 week. He is in NAD and VS are WNL. Abdomen is non-tender. Afebrile. LFT's, alk phos, and bili all elevated as above. INR elevated to 4.96 today, last dose of coumadin given per nursing homRN was on 10/28/18. CT abd/pelvis with IV contrast shows a 2 cm mass in the head of the pancrease with biliary and pancreatic ductal dilatation with enlarged lymph node seen in he jamel hepatis, considered highly suspicious for malignancy. I discussed these findings with Dr. Hekc, GI physician at [facility], and she recommends admission to our hospital to get the INR level down below 1.5 and transfer to [facility] on Thursday or Friday for EUP for biopsy and possible stent placement. She recommended against him coming down to [facility] today because this is not urgent and his INR is too high, as well as physician to do the procedure isn't in until Thursday. Family is on board with this plan. He confirms DNR/DNI status. Dr. Ralph has kindly admitted him to the hospital for further care.         I have reviewed the nursing notes.    I have reviewed the findings, diagnosis, plan and need for follow up with the patient.    Current Discharge Medication List          Final diagnoses:   Jaundice   Pancreatic mass       10/30/2018   [facility]     Kanika Page PA-C  10/30/18 0907

## 2018-10-30 NOTE — ED NOTES
Spoke with Christina from [facility] who states coumadin on hold until recheck INR on 10/31.  Held coumadin for 2 days.  Last INR on 10/29 5.1.  Updated Dorothy BRUSH.

## 2018-10-30 NOTE — IP AVS SNAPSHOT
Jose Antonio Kemp #[id number] (CSN:[id number])  (72 year old M)  (Adm: 10/30/18)     [id number]               [facility]: [phone]            Patient Demographics     Patient Name Sex          Age SSN Address Phone    Jose Antonio Kemp Male 1945 (72 year old) [id number]  [place] [address] [phone] (Home)  [phone] (Mobile)      Emergency Contact(s)     Name Relation Home Work Mobile    ROLANDO KEMP Son [phone]    SANDY LUGO Sister [phone]    CHRISTINA BUNCH Sister [phone]      Admission Information     Attending Provider Admitting Provider Admission Type Admission Date/Time    John Torres MD Schweiger, Patrick J, MD Emergency 10/30/18  0942    Discharge Date Hospital Service Auth/Cert Status Service Area     General Medicine Incomplete [facility] HE[facility] SERVICES    Unit Room/Bed Admission Status       [facility]  Admission (Confirmed)       Admission     Complaint    Obstructive jaundice      Hospital Account     Name Acct ID Class Status Primary Coverage    Jose Antonio Kemp [id number] Observation Open COMMERCIAL - [employer]ET[employer] MEDICARE ADVANTAGE            Guarantor Account (for Hospital Account #[id number])     Name Relation to Pt Service Area Active? Acct Type    Jose Antonio Kemp Self RANGE Yes Personal/Family    Address Phone          [address]  [place] [address] [phone](H)              Coverage Information (for Hospital Account #[id number])     F/O Payor/Plan Precert #    COMMERCIAL/[employer]ET[employer] MEDICARE ADVANTAGE     Subscriber Subscriber #    Jose Antonio Kemp [id number]    Address Phone    [address]  [place] [address] [phone]                                                INTERAGENCY TRANSFER FORM - PHYSICIAN ORDERS   10/30/2018                       [facility]: [phone]            Attending Provider: John Torres MD     Allergies:  Penicillins     "Infection:  MRSA   Service:  GENERAL [facility]    Ht:  1.803 m (5' 11\")   Wt:  102.7 kg (226 lb 6.6 oz)   Admission Wt:  99.6 kg (219 lb 9.3 oz)    BMI:  31.58 kg/m 2   BSA:  2.27 m 2            ED Clinical Impression     Diagnosis Description Comment Added By Time Added    Jaundice [R17] Jaundice [R17]  Kanika Page PA-C 10/30/2018  2:21 PM    Pancreatic mass [K86.9] Pancreatic mass [K86.9]  Kanika Page PA-C 10/30/2018  5:51 PM      Hospital Problems as of 11/1/2018              Priority Class Noted POA    Essential hypertension Medium  8/17/2016 Yes    MS (multiple sclerosis) (H) Medium  8/9/2018 Yes    Pulmonary embolism with infarction (H) [I26.99] Medium  9/19/2018 Yes    * (Principal)Obstructive jaundice Medium  10/30/2018 Yes    Supratherapeutic INR Medium  10/30/2018 Yes      Non-Hospital Problems as of 11/1/2018              Priority Class Noted    Falling episodes Medium  8/17/2016    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    Type 2 diabetes mellitus without complication (H) Medium  8/19/2016    Weakness of both lower extremities Medium  8/19/2016    Yeast infection of the skin Medium  8/19/2016    Falls frequently Medium  12/31/2017    Fall Medium  3/21/2018    Recurrent falls Medium  Unknown    Nursing Home Visit Medium  3/30/2018    Pain management Medium  5/4/2018    Cellulitis Medium  7/4/2018    Optic neuritis Medium  8/9/2018    Long-term (current) use of anticoagulants [Z79.01] Medium  9/19/2018      Code Status History     Date Active Date Inactive Code Status Order ID Comments User Context    7/9/2018  9:24 AM 10/30/2018  3:37 PM Full Code [id number]  Carlie Call MD Outpatient    7/4/2018  6:49 PM 7/9/2018  9:24 AM Full Code [id number]  Leigh Ann Warren MD Inpatient    5/4/2018 11:09 PM 5/7/2018  2:27 PM Full Code [id number]  Rach Mills MD ED    3/22/2018 12:50 PM 5/4/2018 11:09 PM Full Code [id number]  Dangelo Barragan MD Outpatient    3/21/2018  8:14 PM 3/22/2018 " 12:50 PM Full Code [id number]  Kei Marcus MD Inpatient    1/5/2018  1:37 PM 3/21/2018  8:14 PM Full Code [id number]  Leigh Ann Warren MD Outpatient    12/31/2017 12:26 PM 1/5/2018  1:37 PM Full Code [id number]  Kristofer Sena MD Inpatient    8/19/2016  8:09 AM 12/31/2017 12:26 PM Full Code [id number]  Eris Rosales,  Outpatient    8/17/2016 10:22 PM 8/19/2016  8:09 AM Full Code [id number]  Kevin Connolly, DO Inpatient      Current Code Status     Date Active Code Status Order ID Comments User Context       10/30/2018  3:37 PM DNR/DNI [id number]  Adilson Ralph MD Inpatient       Questions for Current Code Status     Question Answer Comment    Code status determined by: Discussion with patient/legal decision maker       Summary of Visit     Reason for your hospital stay       Patient admitted with painless jaundice.  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  Hemodynamically stable.  No fever or evidence of infection/cholangitis.  Had elevated INR 4.97.  Reversed with vitamin K.  Last check 1.24.  Repeat is pending.  Will be transferred to [facility] in [place] for endoscopic ultrasound with biopsy/possible stent placement.                Medication Review      START taking        Dose / Directions Comments    0.9% sodium chloride + KCl 20 mEq/L Soln infusion        Inject into the vein continuous   Refills:  0        ondansetron 4 MG ODT tab   Commonly known as:  ZOFRAN-ODT        Dose:  4 mg   Take 1 tablet (4 mg) by mouth every 6 hours as needed for nausea or vomiting   Quantity:  120 tablet   Refills:  0          CONTINUE these medications which may have CHANGED, or have new prescriptions. If we are uncertain of the size of tablets/capsules you have at home, strength may be listed as something that might have changed.        Dose / Directions Comments    acetaminophen 325 MG tablet   Commonly known as:  TYLENOL   This may have changed:  when to take  this   Used for:  Fall, initial encounter, Weakness        Dose:  650 mg   Take 2 tablets (650 mg) by mouth every 4 hours as needed for mild pain   Quantity:  100 tablet   Refills:  0          CONTINUE these medications which have NOT CHANGED        Dose / Directions Comments    Adult Blood Pressure Cuff Lg Kit   Used for:  Benign essential hypertension, Malignant essential hypertension        Dose:  1 each   1 each 2 times daily   Quantity:  1 kit   Refills:  0    Check blood pressures twice per day: before breakfast and at bedtime.  Keep a log.       bacitracin ointment        Apply topically 2 times daily 500unit/gram, apply 1% topical 2x per day to reddened areas of inner thighs   Refills:  0        COUMADIN PO        On hold per NH   Refills:  0        lisinopril 40 MG tablet   Commonly known as:  PRINIVIL/ZESTRIL   Used for:  Essential hypertension        Dose:  40 mg   Take 1 tablet (40 mg) by mouth daily   Quantity:  30 tablet   Refills:  0        metoprolol succinate 50 MG 24 hr tablet   Commonly known as:  TOPROL-XL   Used for:  Essential hypertension        Dose:  50 mg   Take 1 tablet (50 mg) by mouth daily   Quantity:  30 tablet   Refills:  0        miconazole 2 % powder   Commonly known as:  MICATIN; MICRO GUARD   Used for:  Yeast infection of the skin        Apply topically 2 times daily   Quantity:  90 g   Refills:  1          STOP taking     hydrochlorothiazide 12.5 MG capsule   Commonly known as:  MICROZIDE           DALTON Powd           nystatin cream   Commonly known as:  MYCOSTATIN           sennosides 8.6 MG tablet   Commonly known as:  SENOKOT                   After Care     Activity       Per hospital       Advance Diet as Tolerated       N.p.o.       IV access       Peripheral IV.             Other Orders     Contact Isolation       MRSA positive             Follow-Up Appointment Instructions     Follow-up and recommended labs and tests        Transferred to higher level of care            "  Statement of Approval     Ordered          10/31/18 1515  I have reviewed and agree with all the recommendations and orders detailed in this document.  EFFECTIVE NOW     Approved and electronically signed by:  Adilson Ralph MD                                                 INTERAGENCY TRANSFER FORM - NURSING   10/30/2018                       [facility]: [phone]            Attending Provider: John Torres MD     Allergies:  Penicillins    Infection:  MRSA   Service:  GENERAL MEDI    Ht:  1.803 m (5' 11\")   Wt:  102.7 kg (226 lb 6.6 oz)   Admission Wt:  99.6 kg (219 lb 9.3 oz)    BMI:  31.58 kg/m 2   BSA:  2.27 m 2            Advance Directives        Scanned docmt in ACP Activity?           No scanned doc        Immunizations     None      ASSESSMENT     Discharge Profile Flowsheet     DISCHARGE NEEDS ASSESSMENT     Resources List  Skilled Nursing Facility 05/05/18 1252    Concerns To Be Addressed  discharge planning concerns 08/18/16 1505   Other Resources  Transportation Services 05/05/18 1252    Equipment Currently Used at Home  walker, rolling;wheelchair, power (life alert, ramp) 07/05/18 1517   PAS Number  -- [id number]) 05/07/18 1640    # of Referrals Placed by CTS  -- (SNF, Inpatient rehab) 01/02/18 1536   SKIN      Key Recommendations  F/U with PCP 01/02/18 1536   Inspection of bony prominences  Full 11/01/18 0055    Does Patient Need a Referral for Clinic CC  No 01/02/18 1536   Full except areas not inspected   Hip, left;Hip, right;Buttock, left;Buttock, right 10/31/18 1648    [facility] Name  [facility] 07/05/18 1517   Inspection under devices  Full 11/01/18 0055    Primary Care MD Name  Dr. Briggs 07/05/18 1517   Not Inspected under devices  Other (foam dressing to left gluteal fold) 11/01/18 0055    GASTROINTESTINAL (ADULT,PEDIATRIC,OB)     Skin WDL  ex;all 11/01/18 0055    GI WDL  WDL 11/01/18 0055   Skin Color/Characteristics  bruised " "(ecchymotic);yellow 11/01/18 0055    Abdominal Appearance  rounded 11/01/18 0055   Skin Temperature  warm 11/01/18 0055    All Quadrants Bowel Sounds  audible and normoactive 11/01/18 0055   Skin Moisture  dry 11/01/18 0055    All Quadrants Palpation  soft/nontender 10/30/18 1019   Skin Elasticity  quick return to original state 11/01/18 0055    Last Bowel Movement  10/31/18 10/31/18 1648   Skin Integrity  bruise(s);scab(s);scar(s);rash(es);pressure ulcer(s) 11/01/18 0055    Passing flatus  yes 11/01/18 0055   SAFETY      COMMUNICATION ASSESSMENT     Safety WDL  WDL 11/01/18 0055    Patient's communication style  spoken language (English or Bilingual) 08/28/18 2259   All Alarms  alarm(s) activated and audible 11/01/18 0216    FINAL RESOURCES                        Assessment WDL (Within Defined Limits) Definitions           Safety WDL     Effective: 09/28/15    Row Information: <b>WDL Definition:</b> Bed in low position, wheels locked; call light in reach; upper side rails up x 2; ID band on<br> <font color=\"gray\"><i>Item=AS safety wdl>>List=AS safety wdl>>Version=F14</i></font>      Skin WDL     Effective: 09/28/15    Row Information: <b>WDL Definition:</b> Warm; dry; intact; elastic; without discoloration; pressure points without redness<br> <font color=\"gray\"><i>Item=AS skin wdl>>List=AS skin wdl>>Version=F14</i></font>      Vitals     Vital Signs Flowsheet     VITAL SIGNS     Height Method  Stated 10/30/18 1844    Temp  98.3  F (36.8  C) 11/01/18 0046   Weight  102.7 kg (226 lb 6.6 oz) 11/01/18 0538    Temp src  Temporal 11/01/18 0046   Weight Method  Bed scale 11/01/18 0538    Resp  20 11/01/18 0046   BSA (Calculated - sq m)  2.23 10/30/18 1844    Pulse  94 10/30/18 1507   BMI (Calculated)  30.69 10/30/18 1844    Heart Rate  69 11/01/18 0046   SANDEEP COMA SCALE      Pulse/Heart Rate Source  Monitor 11/01/18 0046   Best Eye Response  4-->(E4) spontaneous 10/30/18 1742    BP  158/73 11/01/18 0046   Best Motor " "Response  6-->(M6) obeys commands 10/30/18 1742    OXYGEN THERAPY     Best Verbal Response  5-->(V5) oriented 10/30/18 1742    SpO2  98 % 11/01/18 0046   Edu Coma Scale Score  15 10/30/18 1742    O2 Device  None (Room air) 11/01/18 0046   POSITIONING      PAIN/COMFORT     Body Position  turned 11/01/18 0216    Patient Currently in Pain  denies 11/01/18 0046   Head of Bed (HOB)  HOB at 20-30 degrees 11/01/18 0216    Patient's Stated Pain Goal  No pain 10/30/18 0948   Positioning/Transfer Devices  pillows;in use 10/31/18 0309    0-10 Pain Scale  0 10/30/18 1511   DAILY CARE      HEIGHT AND WEIGHT     Activity Management  bedrest 11/01/18 0216    Height  1.803 m (5' 11\") 10/30/18 1844   Activity Assistance Provided  assistance, 2 people 11/01/18 0216            Patient Lines/Drains/Airways Status    Active LINES/DRAINS/AIRWAYS     Name: Placement date: Placement time: Site: Days: Last dressing change:    Peripheral IV 10/30/18 Right Upper forearm 10/30/18   0946   Upper forearm   1     Pressure Injury 10/30/18 Left Buttocks dime sized pressure injury, open, moist, pink wound base 10/30/18   1718    1             Patient Lines/Drains/Airways Status    Active PICC/CVC     None            Intake/Output Detail Report     Date Intake     Output Net    Shift P.O. I.V. IV Piggyback Total Urine Total       Noc 10/30/18 2300 - 10/31/18 0659 -- -- -- -- -- -- 0    Day 10/31/18 0700 - 10/31/18 1459 -- -- -- -- -- -- 0    Jeanette 10/31/18 1500 - 10/31/18 2259 100 2258 -- 2358 -- -- 2358    Noc 10/31/18 2300 - 11/01/18 0659 -- -- -- -- 300 300 -300    Day 11/01/18 0700 - 11/01/18 1459 -- -- -- -- -- -- 0      Last Void/BM       Most Recent Value    Urine Occurrence 1 at 11/01/2018 0032    Stool Occurrence 1 at 10/31/2018 1600      Case Management/Discharge Planning     Case Management/Discharge Planning Flowsheet     REFERRAL INFORMATION     Does Patient Need a Referral for Clinic CC  No 01/02/18 1536    Arrived From  home or " self-care 01/02/18 1511   FINAL RESOURCES      # of Referrals Placed by CTS  -- (SNF, Inpatient rehab) 01/02/18 1536   Equipment Currently Used at Home  walker, rolling;wheelchair, power (life alert, ramp) 07/05/18 1517    Primary Care [facility] Name  [facility] 07/05/18 1517   Resources List  Skilled Nursing Facility 05/05/18 1252    Primary Care MD Name  Dr. Briggs 07/05/18 1517   Other Resources  Transportation Services 05/05/18 1252    LIVING ENVIRONMENT     PAS Number  -- ([id number]) 05/07/18 1640    Lives With  facility resident 10/30/18 1552   ABUSE RISK SCREEN      Living Arrangements  house 07/05/18 1517   QUESTION TO PATIENT:  Has a member of your family or a partner(now or in the past) intimidated, hurt, manipulated, or controlled you in any way?  no 10/30/18 0948    COPING/STRESS     QUESTION TO PATIENT: Do you feel safe going back to the place where you are living?  yes 10/30/18 0948    Major Change/Loss/Stressor  denies 07/04/18 1703   OBSERVATION: Is there reason to believe there has been maltreatment of a vulnerable adult (ie. Physical/Sexual/Emotional abuse, self neglect, lack of adequate food, shelter, medical care, or financial exploitation)?  no 10/30/18 0948    ASSESSMENT/CONCERNS TO BE ADDRESSED     OTHER      Concerns To Be Addressed  discharge planning concerns 08/18/16 1505   Are you depressed or being treated for depression?  No 10/30/18 1552    DISCHARGE PLANNING     HOMICIDE RISK      Fitzpatrick Recommendations  F/U with PCP 01/02/18 1536   Feels Like Hurting Others  no 10/30/18 0948                  [facility]: [phone]            Medication Administration Report for Jose Antonio Marmolejo as of 11/01/18 0800   Legend:    Given Hold Not Given Due Canceled Entry Other Actions    Time Time (Time) Time  Time-Action       Inactive    Active    Linked        Medications 10/26/18 10/27/18 10/28/18 10/29/18 10/30/18 10/31/18 11/01/18    0.9% sodium chloride + KCl 20 mEq/L  "infusion  Rate: 100 mL/hr   Freq: CONTINUOUS Route: IV  Start: 10/30/18 1545   Last Admin: 11/01/18 0046  Dispense Loc: [facility]  Volume: 1,000 mL   Current Line: Peripheral IV 10/30/18 Right Upper forearm        1612 ( )-New Bag        0307 ( )-New Bag        0046 ( )-New Bag           acetaminophen (TYLENOL) Suppository 650 mg  Dose: 650 mg  Freq: EVERY 4 HOURS PRN Route: RE  PRN Reason: mild pain  Start: 10/30/18 1537   Admin Instructions: Alternate ibuprofen (if ordered) with acetaminophen.  Maximum acetaminophen dose from all sources = 75 mg/kg/day not to exceed 4 grams/day.    Admin. Amount: 1 suppository (1 × 650 mg suppository)  Dispense Loc: [facility] [id number]               acetaminophen (TYLENOL) tablet 650 mg  Dose: 650 mg  Freq: EVERY 4 HOURS PRN Route: PO  PRN Reason: mild pain  Start: 10/30/18 1537   Admin Instructions: Alternate ibuprofen (if ordered) with acetaminophen.  Maximum acetaminophen dose from all sources = 75 mg/kg/day not to exceed 4 grams/day.    Admin. Amount: 2 tablet (2 × 325 mg tablet)  Dispense Loc: [facility] [id number]               lidocaine (LMX4) kit  Freq: EVERY 1 HOUR PRN Route: Top  PRN Reason: pain  PRN Comment: with VAD insertion or accessing implanted port.  Start: 10/30/18 1537   Admin Instructions: Do NOT give if patient has a history of allergy to any local anesthetic or any \"paige\" product.  Apply 30 minutes prior to VAD insertion or port access. MAX Dose: 2.5 g (  of 5 g tube).    Dispense Loc: HI [id number]               lidocaine 1 % 1 mL  Dose: 1 mL  Freq: EVERY 1 HOUR PRN Route: OTHER  PRN Comment: mild pain with VAD insertion or accessing implanted port  Start: 10/30/18 1537   Admin Instructions: Do NOT give if patient has a history of allergy to any local anesthetic or any \"paige\" product. MAX dose 1 mL subcutaneous OR intradermal in divided doses.    Admin. Amount: 1 mL  Dispense Loc: HI [id number]  Volume: 2 mL               lisinopril (PRINIVIL/ZESTRIL) tablet 40 " mg  Dose: 40 mg  Freq: DAILY Route: PO  Start: 10/30/18 1545   Admin. Amount: 1 tablet (1 × 40 mg tablet)  Last Admin: 10/31/18 0858  Dispense Loc: HI [id number]         1612 (40 mg)-Given        0858 (40 mg)-Given        [ ] 0900           melatonin tablet 1 mg  Dose: 1 mg  Freq: AT BEDTIME PRN Route: PO  PRN Reason: sleep  Start: 10/30/18 1537   Admin Instructions: Do not give unless at least 6 hours of uninterrupted sleep is expected.    Admin. Amount: 1 tablet (1 × 1 mg tablet)  Dispense Loc: [facility] [id number]               metoprolol succinate (TOPROL-XL) 24 hr tablet 50 mg  Dose: 50 mg  Freq: DAILY Route: PO  Start: 10/30/18 1545   Admin Instructions: DO NOT CRUSH. Tablet may be split in half along score line.    Admin. Amount: 1 tablet (1 × 50 mg tablet)  Last Admin: 10/31/18 0858  Dispense Loc: HI [id number]         1612 (50 mg)-Given        0858 (50 mg)-Given        [ ] 0900           miconazole (MICATIN; MICRO GUARD) 2 % powder  Freq: 2 TIMES DAILY Route: Top  Start: 10/30/18 2100   Admin Instructions: Apply to groin folds    Last Admin: 10/31/18 2115  Dispense Loc: [facility]         (2235)-Not Given        0858 ( )-Given       2115 ( )-Given        [ ] 0900       [ ] 2100           naloxone (NARCAN) injection 0.1-0.4 mg  Dose: 0.1-0.4 mg  Freq: EVERY 2 MIN PRN Route: IV  PRN Reason: opioid reversal  Start: 10/30/18 1537   Admin Instructions: For respiratory rate LESS than or EQUAL to 8.  Partial reversal dose:  0.1 mg titrated q 2 minutes for Analgesia Side Effects Monitoring Sedation Level of 3 (frequently drowsy, arousable, drifts to sleep during conversation).Full reversal dose:  0.4 mg bolus for Analgesia Side Effects Monitoring Sedation Level of 4 (somnolent, minimal or no response to stimulation).  For ordered IV doses 0.1-2mg give IVP. Give each 0.4mg over 15 seconds in emergency situations. For non-emergent situations further dilute in 9mL of NS to facilitate titration of response.    Admin. Amount:  0.1-0.4 mg = 0.25-1 mL Conc: 0.4 mg/mL  Dispense Loc: HI [id number]  Volume: 1 mL               ondansetron (ZOFRAN-ODT) ODT tab 4 mg  Dose: 4 mg  Freq: EVERY 6 HOURS PRN Route: PO  PRN Reasons: nausea,vomiting  Start: 10/30/18 1537   Admin Instructions: This is Step 1 of nausea and vomiting management.  If nausea not resolved in 15 minutes, go to Step 2 prochlorperazine (COMPAZINE). Do not push through foil backing. Peel back foil and gently remove. Place on tongue immediately. Administration with liquid unnecessary  With dry hands, peel back foil backing and gently remove tablet; do not push oral disintegrating tablet through foil backing; administer immediately on tongue and oral disintegrating tablet dissolves in seconds; then swallow with saliva; liquid not required.    Admin. Amount: 1 tablet (1 × 4 mg tablet)  Dispense Loc: HI [id number]              Or  ondansetron (ZOFRAN) injection 4 mg  Dose: 4 mg  Freq: EVERY 6 HOURS PRN Route: IV  PRN Reasons: nausea,vomiting  Start: 10/30/18 1537   Admin Instructions: This is Step 1 of nausea and vomiting management.  If nausea not resolved in 15 minutes, go to Step 2 prochlorperazine (COMPAZINE).  Irritant. For ordered IV doses 0.1-4 mg, give IV Push undiluted over 2-5 minutes.    Admin. Amount: 4 mg = 2 mL Conc: 4 mg/2 mL  Dispense Loc: HI [id number]  Infused Over: 2-5 Minutes  Volume: 2 mL               potassium chloride (KLOR-CON) Packet 20-40 mEq  Dose: 20-40 mEq  Freq: EVERY 2 HOURS PRN Route: ORAL OR FEED  PRN Reason: potassium supplementation  Start: 10/30/18 8278   Admin Instructions: Use if unable to tolerate tablets.   If Serum K+ 3.0-3.3, dose = 60 mEq po total dose (40 mEq x1 followed in 2 hours by 20 mEq x1). Recheck K+ level 4 hours after dose and the next AM.  If Serum K+ 2.5-2.9, dose = 80 mEq po total dose (40 mEq Q2H x2). Recheck K+ level 4 hours after dose and the next AM.  If Serum K+ less than 2.5, See IV order.  Dissolve packet contents in 4-8 ounces  of cold water or juice.    Admin. Amount: 20-40 mEq  Dispense Loc: HI [id number]               potassium chloride 10 mEq in 100 mL intermittent infusion with 10 mg lidocaine  Dose: 10 mEq  Freq: EVERY 1 HOUR PRN Route: IV  PRN Reason: potassium supplementation  Start: 10/30/18 2315   Admin Instructions: Infuse via PERIPHERAL LINE. Use potassium with lidocaine for pain with peripheral administration.  If Serum K+ 3.0-3.3, dose = 10 mEq/hr x 4 doses (40 mEq IV total dose). Recheck K+ level 2 hours after dose and the next AM.  If Serum K+ less than 3.0, dose = 10 mEq/hr x 6 doses (60 mEq IV total dose). Recheck K+ level 2 hours after dose and the next AM.    Admin. Amount: 10 mEq = 100 mL Conc: 10 mEq/100 mL  Dispense Loc: [facility]  Infused Over: 1 Hours  Volume: 100 mL               potassium chloride 10 mEq in 100 mL sterile water intermittent infusion (premix)  Dose: 10 mEq  Freq: EVERY 1 HOUR PRN Route: IV  PRN Reason: potassium supplementation  Start: 10/30/18 2315   Admin Instructions: Infuse via PERIPHERAL LINE or CENTRAL LINE.  If Serum K+ 3.0-3.3, dose = 10 mEq/hr x 4 doses (40 mEq IV total dose). Recheck K+ level 2 hours after dose and the next AM.  If Serum K+ less than 3.0, dose = 10 mEq/hr x 6 doses (60 mEq IV total dose). Recheck K+ level 2 hours after dose and the next AM.    Admin. Amount: 10 mEq = 100 mL Conc: 10 mEq/100 mL  Dispense Loc: [facility]  Infused Over: 60 Minutes  Volume: 100 mL               potassium chloride SA (K-DUR/KLOR-CON M) CR tablet 20-40 mEq  Dose: 20-40 mEq  Freq: EVERY 2 HOURS PRN Route: PO  PRN Reason: potassium supplementation  Start: 10/30/18 2315   Admin Instructions: Use if able to take PO.   If Serum K+ 3.0-3.3, dose = 60 mEq po total dose (40 mEq x1 followed in 2 hours by 20 mEq x1). Recheck K+ level 4 hours after dose and the next AM.  If Serum K+ 2.5-2.9, dose = 80 mEq po total dose (40 mEq Q2H x2). Recheck K+ level 4 hours after dose and the next AM.  If  Serum K+ less than 2.5, See IV order.  DO NOT CRUSH    Admin. Amount: 1-2 tablet (1-2 × 20 mEq tablet)  Dispense Loc: [facility] [id number]               sennosides (SENOKOT) tablet 1 tablet  Dose: 1 tablet  Freq: 2 TIMES DAILY Route: PO  Start: 10/30/18 2100   Admin. Amount: 1 tablet  Last Admin: 10/31/18 2114  Dispense Loc: [facility] [id number]         (2235)-Not Given        0858 (1 tablet)-Given       211 (1 tablet)-Given        [ ] 0900       [ ] 2100           sodium chloride (PF) 0.9% PF flush 3 mL  Dose: 3 mL  Freq: EVERY 8 HOURS Route: IK  Start: 10/30/18 1545   Admin Instructions: And Q1H PRN, to lock peripheral IV dormant line.    Admin. Amount: 3 mL  Dispense Loc: Med Surg Floorstock  Volume: 3 mL   Current Line: Peripheral IV 10/30/18 Right Upper forearm        (1617)-Not Given        (0237)-Not Given       (0858)-Not Given       (1544)-Not Given        (0048)-Not Given       [ ] 0745       [ ] 1545       [ ] 2345           sodium chloride (PF) 0.9% PF flush 3 mL  Dose: 3 mL  Freq: EVERY 1 HOUR PRN Route: IK  PRN Reason: line flush  Start: 10/30/18 1537   Admin Instructions: for peripheral IV flush post IV meds    Admin. Amount: 3 mL  Dispense Loc: Med Surg Floorstock  Volume: 3 mL              Completed Medications  Medications 10/26/18 10/27/18 10/28/18 10/29/18 10/30/18 10/31/18 11/01/18         Dose: 40 mg  Freq: ONCE Route: SC  Start: 10/31/18 1515   End: 10/31/18 1625   Admin. Amount: 40 mg = 0.4 mL Conc: 40 mg/0.4 mL  Last Admin: 10/31/18 1625  Dispense Loc: [id number]  Administrations Remainin  Volume: 0.4 mL          1625 (40 mg)-Given              Dose: 100 mL  Freq: ONCE Route: IV  Start: 10/30/18 1201   End: 10/30/18 1243   Admin Instructions: Supplied and administered by Radiology.    Admin. Amount: 100 mL  Last Admin: 10/30/18 1243  Dispense Loc: [facility] Radiology [facility] Stock  Administrations Remainin  Volume: 100 mL   Current Line: Peripheral IV 10/30/18 Right Upper forearm        1243 (100  mL)-Given               Dose: 10 mg  Freq: ONCE Route: IV  Last Dose: 10 mg (10/30/18 1612)  Start: 10/30/18 1545   End: 10/30/18 1642   Admin. Amount: 10 mg  Last Admin: 10/30/18 161  Dispense Loc: [facility]  Infused Over: 30 Minutes  Administrations Remainin  Volume: 50 mL   Mixture Administration Information:   Medication Type Amount   phytonadione 10 MG/ML SOLN Medications 10 mg   sodium chloride 0.9 % SOLN Base 50 mL                 1612 (10 mg)-New Bag               Dose: 40 mEq  Freq: ONCE Route: PO  Start: 10/30/18 1145   End: 10/30/18 1159   Admin Instructions: DO NOT CRUSH    Admin. Amount: 2 tablet (2 × 20 mEq tablet)  Last Admin: 10/30/18 1159  Dispense Loc: [facility] MEED  Administrations Remainin         1159 (40 mEq)-Given               Dose: 60 mL  Freq: ONCE Route: IV  Start: 10/30/18 1201   End: 10/30/18 1243   Admin. Amount: 60 mL  Last Admin: 10/30/18 1243  Dispense Loc: ED Floorstock  Administrations Remainin  Volume: 60 mL   Current Line: Peripheral IV 10/30/18 Right Upper forearm        1243 (60 mL)-Given          Medications 10/26/18 10/27/18 10/28/18 10/29/18 10/30/18 10/31/18 11/01/18               INTERAGENCY TRANSFER FORM - NOTES (H&P, Discharge Summary, Consults, Procedures, Therapies)   10/30/2018                       [facility]: [phone]               History & Physicals      H&P signed by Adilson Ralph MD at 10/30/2018  4:54 PM      Author:  Adilson Ralph MD Service:  Internal Medicine Author Type:  Physician    Filed:  10/30/2018  4:54 PM Date of Service:  10/30/2018  3:19 PM Creation Time:  10/30/2018  4:50 PM    Status:  Signed :  Adilson Ralph MD (Physician)         Admitted:     10/30/2018      CHIEF COMPLAINT:  Jaundice.      HISTORY OF PRESENT ILLNESS:  Mr. Marmolejo is a 72-year-old gentleman who is a current resident of a nursing home.  He has a history of multiple sclerosis and for the most part has refused any PT, OT or other  interventions and basically is bed bound.  He initially had some issues back in September where he presented with some confusion.  Workup resulted in getting a CT scan of his chest and was found to have a saddle embolus.  There was no bed available at our facility and he was sent to [facility].  While he was there, he did develop significant epistaxis and they ended up intubating him to protect his airway.  Eventually they got this stopped and extubated and placed on Coumadin.  The patient was very adamant about not participating and refusing all types of therapies, etc. and was transferred to the nursing home.  Over the last couple of days they noted now that he has become jaundiced.  Labs showed abnormal LFTs and was sent to the ER for further evaluation.  His labs in the ER did show that his bilirubin was 8.3.  He did have elevation of alkaline phosphatase 439, , .  His lipase was 1500.  White count 7400.  He was hemodynamically stable, had no fevers at all.  CT scan was done of his abdomen.  It did show a 2 cm mass at the head of his pancreas.  He does have a dilated gallbladder, dilated extrahepatic and intrahepatic ducts along with his pancreatic duct. Of note also, his INR was 4.96.      The ER did contact [place], GI doctor Umang, and felt that the patient required endoscopic ultrasound with biopsy and likely stent placement.  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  However, there endoscopic ultrasound physician is unavailable until Thursday 11/01.  They did not wish to accept the patient until that physician was available.  Their recommendation was to admit the patient to our facility to facilitate getting his INR down and then transfer him to [place] on Thursday morning, 11/01.      In speaking with the patient down in the ER and his sisters, he is an alert, pleasant, in really no distress at all.  Denies any shortness of breath, no chest pains.  No nausea or vomiting, no  abdominal pain.  Bowels have been fine.  He has been voiding without difficulty.  Appetite has been good.  After a recent visit with Dr. Briggs, his primary care provider at the nursing home, he and his family have decided on DNR/DNI status and have his POLST written out as he has really declined any significant interventions and basically just wants to be left alone.  However, this whole episode now has scared him somewhat and at least here he is agreeing to undergo the diagnostic testing for this, but any further intervention such as a large surgery likely would not be accepted.      PAST MEDICAL HISTORY:   1.  Multiple sclerosis.  He had an LP and imaging which documented that he was actually seen by Neurology during his hospital stay back in September and they agreed with that diagnosis.  He has not wanted any interventions and absolutely refuses any physical therapy, occupational therapy.   2.  Status post saddle pulmonary embolus.  This was on 08/29 when he presented with confusion.  He did have some marginal perfusion issues also and was transferred to [facility].  I believe there he did undergo TPA treatment and was subsequently placed on Coumadin therapy.  He had a complicating issue of a significant epistaxis.   3.  Echocardiogram performed over in September that essentially showed basically normal systolic function of his left ventricle.  He did not have any right-sided dilatation or strain noted.   4.  History of some diabetes mellitus.  He is not on anything right now.   5.  Hypertension.   6.  Some osteoarthritis.   7.  He is status post left ankle fracture with ORIF.      ALLERGIES:  He has allergies to PENICILLIN.      MEDICATIONS:   1.  Hydrochlorothiazide 12.5 mg daily.   2.  Lisinopril 40 mg daily.   3.  Toprol-XL 50 mg daily.   4.  Senokot 1 tablet b.i.d.   5.  [facility]farin [facility].  It is currently on hold.   6.  Acetaminophen 650 mg every 4 hours p.r.n. pain.     7.  Gino  powder 1.5 grams b.i.d., a nutritional supplement.      SOCIAL HISTORY:  He is single, has 3 children.  Resides currently at the nursing home.  He is a former smoker, quit in 1978.  Nondrinker.      FAMILY HISTORY:  Positive for mom had pneumonia.  Father had coronary artery disease, Hodgkin's lymphoma.  Son with colon cancer.      REVIEW OF SYSTEMS:  Pertinent positives and negatives noted above in the HPI.      PHYSICAL EXAMINATION:   GENERAL:  Alert, pleasant gentleman in no obvious distress.  Does appear somewhat jaundiced.   VITAL SIGNS:  His blood pressure is 146/83, temperature is 97.2, heart rate is 94 and regular.  Sats are 97% on room air.   HEENT:  Normocephalic, atraumatic.  Pupils equal, round, reactive.  Sclerae are somewhat jaundiced.  Mucous membranes are moist.  There is no lymphadenopathy or thyromegaly.   LUNGS:  Clear to auscultation.     BACK:  No obvious CVA tenderness or spinal tenderness.   CARDIAC:  Reveals a regular rate and rhythm, normal S1, S2, no audible murmurs, rubs or gallops.  Neck veins appear to be flat.  Pulses are 2+ and equal throughout.   ABDOMEN:  Soft, nontender, no mass or organomegaly.   EXTREMITIES:  Without cyanosis, clubbing nor edema.   NEUROLOGIC:  Briskly nonfocal.  He is just generally somewhat weakened.      IMAGING:  His CT scan showed a 2 cm mass in the head of the pancreas and biliary and pancreatic ductal dilatation.  Does have an enlarged lymph node in the jamel hepatis highly suspicious for malignancy.  The lymph node was 9-18 mm in diameter.      LABORATORY DATA:  His sodium is 137, potassium 2.9, chloride 103, CO2 is 26, BUN is 14, creatinine 0.73, glucose 149, calcium is 8.4, anion gap is 8, albumin is 2.7, total protein 7.4, total bilirubin 8.3, direct is 7.2, alkaline phosphatase is 439, , .  Ammonia 11.  Lipase is 1500.  Glucose is 159.  White count 7400, hemoglobin is 14.1, platelet count is 250,000.  INR 4.96, PTT is 50.      ASSESSMENT:    1.  Obstructive jaundice.  The patient presents with painless jaundice.  CT scan documents what appears to be a pancreatic mass at the head with both biliary and pancreatic ductal dilatation.  Elevated LFTs, elevated lipase, but he is completely asymptomatic at this point.  There is a high likelihood that this is going to be a malignancy.  This has been explained to the patient and the family.  GI was contacted over at .  They recommended endoscopic ultrasound with biopsies and likely stent placement.  They are unable to accept the patient today due to no endoscopic ultrasound physician available until Thursday 11/01.  Their recommendation was for us to admit him to our facility and transfer on 11/01 to facilitate normalizing his INR.  Currently, he is afebrile, no elevated white count.  No signs of any cholangitis.  He is hemodynamically stable.  We will admit the patient for some gentle IV fluids.  We will work on getting his INR down.  No plans on starting antibiotic therapy at this point at least with plans on transfer on 11/01.   2.  Elevated INR.  The patient did have a significant history of a saddle embolus, actually roughly a little over a month ago.  He has been on warfarin therapy.  INR is 4.96.  Likely it has been rising due to his hepatic dysfunction.  Currently is on hold.  We will give him some vitamin K tonight and see how his INR is in the morning.  We will continue to adjust things to try and get this down to 1.4, so he can have his endoscopic ultrasound performed.  Given his hepatic dysfunction, he may very well require fresh frozen plasma.  Have to take into consideration his recent pulmonary embolism.  He should be continued on anticoagulation.  If we do get him down to a subtherapeutic point, likely would cover with Lovenox until his procedure.   3.  History of diabetes mellitus.  He is currently on no medications.  Blood sugar is mildly elevated at 159 at this time.   4.  Multiple  sclerosis.  The patient has been diagnosed with MS.  He is on no therapy.  Wishes no physical or occupational therapy and has been very resistant to any type of interventions whatsoever.      His code status has been DNR/DNI.  Current plans have been explained to the patient and family and they wish to proceed with this.  However, any further interventions would need to be discussed further.  The patient will be admitted to observation status here.      CODE STATUS:  He is DNR/DNI.         ADILSON RALPH MD             D: 10/30/2018   T: 10/30/2018   MT: [id number]      Name:     NICOLE KEMP   MRN:      [id number]        Account:      [id number]   :      1945        Admitted:     10/30/2018                   Document: [id number]       cc: Juanita Briggs MD   [SS1.1]   Revision History        User Key Date/Time User Provider Type Action    > SS1.1 10/30/2018  4:54 PM Adilson Ralph MD Physician Sign                  Discharge Summaries     No notes of this type exist for this encounter.         Consult Notes      Consults by Hawa Gonzalez RN at 10/31/2018 11:26 AM     Author:  Hawa Gonzalez RN Service:  [facility] Nurse Author Type:  Registered Nurse    Filed:  10/31/2018 11:37 AM Date of Service:  10/31/2018 11:26 AM Creation Time:  10/31/2018 11:25 AM    Status:  Signed :  Hawa Gonzalez RN (Registered Nurse)     Consult Orders:    1. Wound Ostomy Continence Nurse IP Consult [[id number]] ordered by Adilson Ralph MD at 10/30/18 1723                Wound consult done along with nursing student as ordered by Dr Ralph for pressure injury of buttocks.  Nicole was admitted from Nursing Home for Jaundice.  The pressure injury was present on admission and had an Allevyn foam dressing in place.  His sister is there and says it was quite large but it is improving.  He has been refusing turns but allows repositioning.  Nicole has MS.  Today he is quite upset and crying as he tells me he has  a mass near his pancreas that needs to be removed and it may be terminal.  His sister is trying to arrange transfer to [place] but per charting the accepting physician at [facility] is out until tomorrow.  They are under the understanding he is having surgery tomorrow.  Jose Antonio is incontinent and his brief is wet.  States he has had 2 large stools today.    Location:  Left ischium pressure injury looks Stage II today but it could have been deeper previously  Drainage:  Scant, serosanguinous  Odor:  none  Measurement:  1.4 x 0.6 cm  Undermining:  none  Edges:  attached  Base:  Fully granulated  Surrounding Skin: blanchable erythema, evidence of staining due to long term pressure    The pressure injury was cleansed with saline and a new bordered foam dressing was applied.  Report given to his nurse Lyndsay.  He would benefit from a seat cushion if he is able to get up in the chair.  I did educate him on the importance of repositioning and shifts.  He states he understands.[PR1.1]       Revision History        User Key Date/Time User Provider Type Action    > PR1.1 10/31/2018 11:37 AM Hawa Gonzalez, RN Registered Nurse Sign                     Progress Notes - Physician (Notes for yesterday and today)      Progress Notes by John Torres MD at 11/1/2018  7:40 AM     Author:  John Torres MD Service:  Hospitalist Author Type:  Physician    Filed:  11/1/2018  7:47 AM Date of Service:  11/1/2018  7:40 AM Creation Time:  11/1/2018  7:40 AM    Status:  Signed :  John Torres MD (Physician)           Hospitalist Progress Note          Assessment and Plan:     Painless jaundice  CT sows mass in the head of the pancrease  Will need endoscopic US, biopsies and likely a stent  Transferred to higher level of care    Recent PE  On warfarin prior to admission   INR reversed in anticipation of Endo US  Restart lovenox bridging to warfarin as soon as safe afterprocedure    MS  Deconditioning  PT and  "long term placement    Glucose intolerance             Interval History:     Patient with a hx of MS and HTN  Recent PE and was on warfarin   Noted gradual onset of painless jaundice  CT showed mass in the head of the pancrease  No significant clinical change today                 Medications:       lisinopril  40 mg Oral Daily     metoprolol succinate  50 mg Oral Daily     miconazole   Topical BID     sennosides  1 tablet Oral BID     sodium chloride (PF)  3 mL Intracatheter Q8H     acetaminophen, acetaminophen, lidocaine 4%, lidocaine (buffered or not buffered), melatonin, naloxone, ondansetron **OR** ondansetron, potassium chloride, potassium chloride with lidocaine, potassium chloride, potassium chloride, sodium chloride (PF)               Physical Exam:   Blood pressure 158/73, pulse 94, temperature 98.3  F (36.8  C), temperature source Temporal, resp. rate 20, height 1.803 m (5' 11\"), weight 102.7 kg (226 lb 6.6 oz), SpO2 98 %.  Vitals:    10/30/18 1538 10/31/18 0500 18 0500   Weight: 99.6 kg (219 lb 9.3 oz) 100.4 kg (221 lb 5.5 oz) 102.7 kg (226 lb 6.6 oz)       Vital Sign Ranges  Temperature Temp  Av.1  F (36.7  C)  Min: 97.6  F (36.4  C)  Max: 98.3  F (36.8  C)   Blood pressure Systolic (24hrs), Av , Min:151 , Max:158        Diastolic (24hrs), Av, Min:57, Max:77      Pulse No Data Recorded   Respirations Resp  Av  Min: 20  Max: 20   Pulse oximetry SpO2  Av %  Min: 91 %  Max: 99 %         Intake/Output Summary (Last 24 hours) at 18 0740  Last data filed at 18 0600   Gross per 24 hour   Intake             2358 ml   Output              300 ml   Net             2058 ml     General; looks unwell, no acute distress  Eyes; scleral icterus  Constitutional; Non febrile, non diaphoretic  Psych; pleasant affect  Neuro; alert, appropriate  Neck; no JVD  Lungs; resonant on percussion, normal vesicular breath sounds with no adventitial sounds  Precordium; no heave or thrill, reg " reg, s1 s2 present  Abdomen; adipose, convex, soft non tender, no peritoneal signs, bowel sounds present  Skin; warm,obvious jaundice, non diaphoretic         Data:   All new lab and imaging data was reviewed.   Recent Labs   Lab Test  11/01/18   0413  10/31/18   0531  10/30/18   1045   WBC  6.4  7.4  7.4   HGB  12.7*  12.8*  14.1   MCV  87  88  87   PLT  257  253  250   INR  1.00  1.24*  4.96*      Recent Labs   Lab Test  11/01/18   0413  10/31/18   0531  10/31/18   0014  10/30/18   1045   NA  140  139   --   137   POTASSIUM  3.3*  3.6  5.3  2.9*   CHLORIDE  110*  110*   --   103   CO2  24  22   --   26   BUN  8  10   --   14   CR  0.60*  0.63*   --   0.73   ANIONGAP  6  7   --   8   STALIN  7.9*  8.0*   --   8.4*   GLC  139*  124*   --   159*[PS1.1]          Revision History        User Key Date/Time User Provider Type Action    > PS1.1 11/1/2018  7:47 AM John Torres MD Physician Sign            Progress Notes by Adilson Ralph MD at 10/31/2018  2:52 PM     Author:  Adilson Ralph MD Service:  Hospitalist Author Type:  Physician    Filed:  10/31/2018  3:08 PM Date of Service:  10/31/2018  2:52 PM Creation Time:  10/31/2018  2:52 PM    Status:  Signed :  Adilson Ralph MD (Physician)         [facility]    Hospitalist Progress Note      Assessment & Plan   Jose Antonio Marmolejo is a 72 year old male who was admitted on 10/30/2018.      1.  Painless obstructive jaundice: Patient presented with painless jaundice CT scan shows a 2 cm mass in the pancreatic head with dilatation of both biliary and pancreatic ducts.  Bilirubin is up to 9.5.  The rest of his LFTs are about the same as they were yesterday.  GI was contacted from the ER at [facility], Dr. Heck who recommended the patient have an endoscopic ultrasound with biopsy and possible stent placement.  However they did not want to accept him yesterday.  The physician who does the endoscopic ultrasound will be available on Thursday tomorrow  November 1.  We have fixed his INR with vitamin K he is down to 1.24.  He remains stable at this point with no pain.  No evidence of any cholangitis.  He is afebrile normal white count.  No nausea or vomiting.  He will be n.p.o. after midnight.  I will recheck his labs tomorrow morning at 4 AM.  I will have covering hospitalist tomorrow Dr. Torres call stat doc as early as possible to arrange a bed and transfer to the hospice service therefore GI consultation.  Nursing staff is been alerted.  They will give a heads up to the ambulance crew for a possible transfer as early as 7:53 AM.    2.  Recent history of pulmonary embolus: Patient on August 29 presented with confusion was found to have a saddle pulmonary embolus.  He was sent to [facility] TPA given subsequently developed a significant epistaxis requiring prophylactic intubation.  Has been on warfarin therapy since that time.  Has been stable from pulmonary standpoint.  His INR was close to 5 when he came in.  I did give him a dose of vitamin K.  Is currently down to 1.24.  We will give him a dose of Lovenox now to cover him.  Will not give any further dosing tomorrow morning.  In anticipation of his endoscopic ultrasound.        Diet: Regular Diet Adult  NPO per Anesthesia Guidelines for Procedure/Surgery Except for: Meds  Fluids: Normal saline +20 K1 100 cc an hour    # Pain Assessment:  Current Pain Score 10/30/2018   Patient currently in pain? denies   Pain score (0-10) -   Pain location -   Pain descriptors -   Jose Antonio s pain level was assessed and he currently denies pain.        DVT Prophylaxis: Enoxaparin (Lovenox) SQ  Code Status: DNR/DNI    Disposition: Expected discharge in 1 days once for arranged in the morning.    Adilson Ralph    Interval History   Patient continues to be asymptomatic.  Remains jaundice.  No abdominal pain no nausea vomiting shortness of breath at all.  Hemodynamically stable.  Went over current plan with he and his family  again regarding transfer tomorrow morning for endoscopic ultrasound.  Be n.p.o. after midnight.  Called stat talk and they do have his name and information ready.  Tomorrow morning hospitalist service will need to be contacted to arrange acceptance.    -Data reviewed today: I reviewed all new labs and imaging results over the last 24 hours. I personally reviewed no images or EKG's today.    Physical Exam   Temp: 97.6  F (36.4  C) Temp src: Temporal BP: 155/77 Pulse: 94 Heart Rate: 72 Resp: 20 SpO2: (!) 91 % O2 Device: None (Room air)    Vitals:    10/30/18 1538 10/31/18 0500   Weight: 99.6 kg (219 lb 9.3 oz) 100.4 kg (221 lb 5.5 oz)     Vital Signs with Ranges  Temp:  [97.2  F (36.2  C)-98.1  F (36.7  C)] 97.6  F (36.4  C)  Pulse:  [94] 94  Heart Rate:  [72-92] 72  Resp:  [18-20] 20  BP: (145-171)/(77-87) 155/77  SpO2:  [91 %-99 %] 91 %  I/O last 3 completed shifts:  In: 813 [P.O.:240; I.V.:573]  Out: 50 [Urine:50]    Peripheral IV 10/30/18 Right Upper forearm (Active)   Site Assessment WDL 10/30/2018 11:51 PM   Line Status Infusing 10/30/2018 11:51 PM   Phlebitis Scale 0-->no symptoms 10/30/2018 11:51 PM   Infiltration Scale 0 10/30/2018 11:51 PM   Number of days:1       Pressure Injury 10/30/18 Left Buttocks dime sized pressure injury, open, moist, pink wound base (Active)   Wound Base Moist;[place];Red 10/30/2018  5:00 PM   Loan-wound Assessment Blanchable erythema 10/30/2018  5:00 PM   Drainage Amount Scant 10/30/2018  5:00 PM   Drainage Color/Characteristics Sanguinous 10/30/2018  5:00 PM   Dressing Foam 10/31/2018  7:50 AM   Dressing Status Clean, dry, intact 10/31/2018  7:50 AM   Number of days:1     No line/device    Constitutional: Alert and oriented x3. No distress    HEENT: Normocephalic/atraumatic, PERRL, EOMI, mouth moist, sclerae icteric, neck supple, no LN.     Cardiovascular: RRR.  No murmur, no  rubs, or gallops.  JVD flat.  Bruits no.  Pulses 2+    Respiratory: Clear to auscultation  "bilaterally    Abdomen: Soft, nontender, nondistended, no organomegaly. Bowel sounds present    Extremities: Warm/dry.  Trace edema    Neuro:   Non focal, cranial nerves intact, Moves all extremities.  Medications     0.9% sodium chloride + KCl 20 mEq/L 100 mL/hr at 10/31/18 0307       lisinopril  40 mg Oral Daily     metoprolol succinate  50 mg Oral Daily     miconazole   Topical BID     sennosides  1 tablet Oral BID     sodium chloride (PF)  3 mL Intracatheter Q8H       Data     Recent Labs  Lab 10/31/18  0531 10/31/18  0014 10/30/18  1045 10/29/18   WBC 7.4  --  7.4  --    HGB 12.8*  --  14.1  --    MCV 88  --  87  --      --  250  --    INR 1.24*  --  4.96* 5.1     --  137  --    POTASSIUM 3.6 5.3 2.9*  --    CHLORIDE 110*  --  103  --    CO2 22  --  26  --    BUN 10  --  14  --    CR 0.63*  --  0.73  --    ANIONGAP 7  --  8  --    STALIN 8.0*  --  8.4*  --    *  --  159*  --    ALBUMIN 2.5*  --  2.7*  --    PROTTOTAL 6.5*  --  7.4  --    BILITOTAL 9.5*  --  8.3*  --    ALKPHOS 385*  --  439*  --    *  --  216*  --    *  --  131*  --    LIPASE  --   --  1500*  --        No results found for this or any previous visit (from the past 24 hour(s)).[SS1.1]     Revision History        User Key Date/Time User Provider Type Action    > SS1.1 10/31/2018  3:08 PM Adilson Ralph MD Physician Sign                  Procedure Notes     No notes of this type exist for this encounter.      Progress Notes - Therapies (Notes from 10/29/18 through 11/01/18)     No notes of this type exist for this encounter.                                          INTERAGENCY TRANSFER FORM - LAB / IMAGING / EKG / EMG RESULTS   10/30/2018                       [facility]: [phone]            Unresulted Labs (24h ago through future)    Start       Ordered    Unscheduled  Potassium  (Potassium Replacement - \"Standard\" - For K levels less than 3.4 mmol/L - HI )  CONDITIONAL (SPECIFY),   Routine   "   Comments:  Obtain Potassium Level for these conditions:  *IF no potassium result within 24 hrs before initiation of order set, draw potassium level with next lab collect.    *2 HOURS AFTER last IV potassium replacement dose and 4 hours after an oral replacement dose.  *Next morning after potassium dose.     Repeat Potassium Replacement if necessary.    10/30/18 2315         Lab Results - 3 Days      Comprehensive metabolic panel [[id number]] (Abnormal)  Resulted: 11/01/18 0437, Result status: Final result    Ordering provider: Adilson Ralph MD  10/31/18 2200 Resulting lab: [facility]    Specimen Information    Type Source Collected On   Blood  11/01/18 0413          Components       Value Reference Range Flag Lab   Sodium 140 133 - 144 mmol/L  HI   Potassium 3.3 3.4 - 5.3 mmol/L L HI   Chloride 110 94 - 109 mmol/L H HI   Carbon Dioxide 24 20 - 32 mmol/L  HI   Anion Gap 6 3 - 14 mmol/L  HI   Glucose 139 70 - 99 mg/dL H HI   Urea Nitrogen 8 7 - 30 mg/dL  HI   Creatinine 0.60 0.66 - 1.25 mg/dL L HI   GFR Estimate >90 >60 mL/min/1.7m2  HI   Comment:  Non  GFR Calc   GFR Estimate If Black >90 >60 mL/min/1.7m2  HI   Comment:  African American GFR Calc   Calcium 7.9 8.5 - 10.1 mg/dL L HI   Bilirubin Total 9.4 0.2 - 1.3 mg/dL H HI   Albumin 2.4 3.4 - 5.0 g/dL L HI   Protein Total 6.3 6.8 - 8.8 g/dL L HI   Alkaline Phosphatase 356 40 - 150 U/L H HI    0 - 70 U/L H HI    0 - 45 U/L H HI            INR [[id number]]  Resulted: 11/01/18 0429, Result status: Final result    Ordering provider: Adilson Ralph MD  10/31/18 2200 Resulting lab: [facility]    Specimen Information    Type Source Collected On   Blood  11/01/18 0413          Components       Value Reference Range Flag Lab   INR 1.00 0.80 - 1.20  HI            CBC with platelets differential [[id number]] (Abnormal)  Resulted: 11/01/18 0418, Result status: Final result    Ordering provider: Ramo  MD Adilson  10/31/18 2200 Resulting lab: [facility]    Specimen Information    Type Source Collected On   Blood  11/01/18 0413          Components       Value Reference Range Flag Lab   WBC 6.4 4.0 - 11.0 10e9/L  HI   RBC Count 4.38 4.4 - 5.9 10e12/L L HI   Hemoglobin 12.7 13.3 - 17.7 g/dL L HI   Hematocrit 38.2 40.0 - 53.0 % L HI   MCV 87 78 - 100 fl  HI   MCH 29.0 26.5 - 33.0 pg  HI   MCHC 33.2 31.5 - 36.5 g/dL  HI   RDW 15.9 10.0 - 15.0 % H HI   Platelet Count 257 150 - 450 10e9/L  HI   Diff Method Automated Method   HI   % Neutrophils 59.6 %  HI   % Lymphocytes 24.9 %  HI   % Monocytes 9.5 %  HI   % Eosinophils 4.7 %  HI   % Basophils 0.8 %  HI   % Immature Granulocytes 0.5 %  HI   Nucleated RBCs 0 0 /100  HI   Absolute Neutrophil 3.8 1.6 - 8.3 10e9/L  HI   Absolute Lymphocytes 1.6 0.8 - 5.3 10e9/L  HI   Absolute Monocytes 0.6 0.0 - 1.3 10e9/L  HI   Absolute Eosinophils 0.3 0.0 - 0.7 10e9/L  HI   Absolute Basophils 0.1 0.0 - 0.2 10e9/L  HI   Abs Immature Granulocytes 0.0 0 - 0.4 10e9/L  HI   Absolute Nucleated RBC 0.0   HI            Glucose by meter [[id number]] (Abnormal)  Resulted: 11/01/18 0321, Result status: Final result    Ordering provider: Adilson Ralph MD  11/01/18 0308 Resulting lab: POINT OF CARE TEST, GLUCOSE    Specimen Information    Type Source Collected On     11/01/18 0308          Components       Value Reference Range Flag Lab   Glucose 112 70 - 99 mg/dL H 170            Active MRSA Surveillance Culture [[id number]] (Abnormal)  Resulted: 10/31/18 1317, Result status: Final result    Ordering provider: Adilson Ralph MD  10/30/18 1604 Resulting lab: [facility]    Specimen Information    Type Source Collected On   Nares Nose 10/30/18 1620          Components       Value Reference Range Flag Lab   Specimen Description Nares      Culture Micro Methicillin resistant Staphylococcus aureus (MRSA)  A HI   Culture Micro --   HI   Result:         Significant  value called to and read back by  Helen Mcgovern at 1316 on 10/31/18 by Tri Bender              Comprehensive metabolic panel [[id number]] (Abnormal)  Resulted: 10/31/18 0557, Result status: Final result    Ordering provider: Adilson Ralph MD  10/31/18 0001 Resulting lab: [facility]    Specimen Information    Type Source Collected On   Blood  10/31/18 0531          Components       Value Reference Range Flag Lab   Sodium 139 133 - 144 mmol/L  HI   Potassium 3.6 3.4 - 5.3 mmol/L  HI   Chloride 110 94 - 109 mmol/L H HI   Carbon Dioxide 22 20 - 32 mmol/L  HI   Anion Gap 7 3 - 14 mmol/L  HI   Glucose 124 70 - 99 mg/dL H HI   Urea Nitrogen 10 7 - 30 mg/dL  HI   Creatinine 0.63 0.66 - 1.25 mg/dL L HI   GFR Estimate >90 >60 mL/min/1.7m2  HI   Comment:  Non  GFR Calc   GFR Estimate If Black >90 >60 mL/min/1.7m2  HI   Comment:  African American GFR Calc   Calcium 8.0 8.5 - 10.1 mg/dL L HI   Bilirubin Total 9.5 0.2 - 1.3 mg/dL H HI   Albumin 2.5 3.4 - 5.0 g/dL L HI   Protein Total 6.5 6.8 - 8.8 g/dL L HI   Alkaline Phosphatase 385 40 - 150 U/L H HI    0 - 70 U/L H HI    0 - 45 U/L H HI            INR [[id number]] (Abnormal)  Resulted: 10/31/18 0557, Result status: Final result    Ordering provider: Adilson Ralph MD  10/30/18 2300 Resulting lab: [facility]    Specimen Information    Type Source Collected On   Blood  10/31/18 0531          Components       Value Reference Range Flag Lab   INR 1.24 0.80 - 1.20 H HI            CBC with platelets differential [[id number]] (Abnormal)  Resulted: 10/31/18 0536, Result status: Final result    Ordering provider: Adilson Ralph MD  10/31/18 0001 Resulting lab: [facility]    Specimen Information    Type Source Collected On   Blood  10/31/18 0531          Components       Value Reference Range Flag Lab   WBC 7.4 4.0 - 11.0 10e9/L  HI   RBC Count 4.47 4.4 - 5.9 10e12/L  HI   Hemoglobin 12.8 13.3 -  17.7 g/dL L HI   Hematocrit 39.1 40.0 - 53.0 % L HI   MCV 88 78 - 100 fl  HI   MCH 28.6 26.5 - 33.0 pg  HI   MCHC 32.7 31.5 - 36.5 g/dL  HI   RDW 15.9 10.0 - 15.0 % H HI   Platelet Count 253 150 - 450 10e9/L  HI   Diff Method Automated Method   HI   % Neutrophils 56.1 %  HI   % Lymphocytes 30.2 %  HI   % Monocytes 8.0 %  HI   % Eosinophils 4.4 %  HI   % Basophils 0.8 %  HI   % Immature Granulocytes 0.5 %  HI   Nucleated RBCs 0 0 /100  HI   Absolute Neutrophil 4.1 1.6 - 8.3 10e9/L  HI   Absolute Lymphocytes 2.2 0.8 - 5.3 10e9/L  HI   Absolute Monocytes 0.6 0.0 - 1.3 10e9/L  HI   Absolute Eosinophils 0.3 0.0 - 0.7 10e9/L  HI   Absolute Basophils 0.1 0.0 - 0.2 10e9/L  HI   Abs Immature Granulocytes 0.0 0 - 0.4 10e9/L  HI   Absolute Nucleated RBC 0.0   HI            Glucose by meter [[id number]] (Abnormal)  Resulted: 10/31/18 0242, Result status: Final result    Ordering provider: Adilson Ralph MD  10/31/18 0229 Resulting lab: POINT OF CARE TEST, GLUCOSE    Specimen Information    Type Source Collected On     10/31/18 0229          Components       Value Reference Range Flag Lab   Glucose 129 70 - 99 mg/dL H 170            Potassium [[id number]]  Resulted: 10/31/18 0031, Result status: Final result    Ordering provider: Kei Marcus MD  10/30/18 2322 Resulting lab: [facility]    Specimen Information    Type Source Collected On   Blood  10/31/18 0014          Components       Value Reference Range Flag Lab   Potassium 5.3 3.4 - 5.3 mmol/L  HI   Comment:  Specimen slightly hemolyzed            Glucose by meter [[id number]] (Abnormal)  Resulted: 10/30/18 2227, Result status: Final result    Ordering provider: Adilson Ralph MD  10/30/18 2216 Resulting lab: POINT OF CARE TEST, GLUCOSE    Specimen Information    Type Source Collected On     10/30/18 2216          Components       Value Reference Range Flag Lab   Glucose 144 70 - 99 mg/dL H 170            UA reflex to Microscopic and Culture  [[id number]] (Abnormal)  Resulted: 10/30/18 1921, Result status: Edited Result - FINAL    Ordering provider: Adilson Ralph MD  10/30/18 1828 Resulting lab: [facility]    Specimen Information    Type Source Collected On   Midstream Urine Urine clean catch 10/30/18 1830          Components       Value Reference Range Flag Lab   Color Urine Dark Brown   HI   Appearance Urine Clear   HI   Glucose Urine 70 NEG^Negative mg/dL A HI   Bilirubin Urine Moderate NEG^Negative A HI   Comment:         This is an unconfirmed screening test result. A positive result may be false.   Ketones Urine Negative NEG^Negative mg/dL  HI   Specific Gravity Urine 1.050 1.003 - 1.035 H HI   Blood Urine Moderate NEG^Negative A HI   pH Urine 5.5 4.7 - 8.0 pH  HI   Protein Albumin Urine 10 NEG^Negative mg/dL A HI   Urobilinogen mg/dL Normal 0.0 - 2.0 mg/dL  HI   Nitrite Urine Negative NEG^Negative  HI   Leukocyte Esterase Urine Negative NEG^Negative  HI   Source Midstream Urine   HI   RBC Urine 75 0 - 2 /HPF H HI   WBC Urine 4 0 - 5 /HPF  HI   Bacteria Urine None NEG^Negative /HPF A HI   Mucous Urine Present NEG^Negative /LPF A HI            Lipase [[id number]] (Abnormal)  Resulted: 10/30/18 1206, Result status: Final result    Ordering provider: Kanika Page PA-C  10/30/18 1123 Resulting lab: [facility]    Specimen Information    Type Source Collected On   Blood  10/30/18 1045          Components       Value Reference Range Flag Lab   Lipase 1500 73 - 393 U/L H HI            UA reflex to Microscopic and Culture [[id number]]  Resulted: 10/30/18 1126, Result status: In process    Ordering provider: Kanika Page PA-C  10/30/18 1018 Resulting lab: [facility]    Specimen Information    Type Source Collected On   Urine Urine clean catch 10/30/18 1045            Basic metabolic panel [[id number]] (Abnormal)  Resulted: 10/30/18 1125, Result status: Final result    Ordering provider: Kanika Page PA-C   10/30/18 1018 Resulting lab: [facility]    Specimen Information    Type Source Collected On   Blood  10/30/18 1045          Components       Value Reference Range Flag Lab   Sodium 137 133 - 144 mmol/L  HI   Potassium 2.9 3.4 - 5.3 mmol/L LL HI   Comment:         Critical Value called to and read back by  ADAL HUGO AT 1124 10/30/18 JU     Chloride 103 94 - 109 mmol/L  HI   Carbon Dioxide 26 20 - 32 mmol/L  HI   Anion Gap 8 3 - 14 mmol/L  HI   Glucose 159 70 - 99 mg/dL H HI   Urea Nitrogen 14 7 - 30 mg/dL  HI   Creatinine 0.73 0.66 - 1.25 mg/dL  HI   GFR Estimate >90 >60 mL/min/1.7m2  HI   Comment:  Non  GFR Calc   GFR Estimate If Black >90 >60 mL/min/1.7m2  HI   Comment:  African American GFR Calc   Calcium 8.4 8.5 - 10.1 mg/dL L HI            Hepatic panel [[id number]] (Abnormal)  Resulted: 10/30/18 1125, Result status: Final result    Ordering provider: Kanika Page PA-C  10/30/18 1018 Resulting lab: [facility]    Specimen Information    Type Source Collected On   Blood  10/30/18 1045          Components       Value Reference Range Flag Lab   Bilirubin Direct 7.2 0.0 - 0.2 mg/dL H HI   Bilirubin Total 8.3 0.2 - 1.3 mg/dL H HI   Albumin 2.7 3.4 - 5.0 g/dL L HI   Protein Total 7.4 6.8 - 8.8 g/dL  HI   Alkaline Phosphatase 439 40 - 150 U/L H HI    0 - 70 U/L H HI    0 - 45 U/L H HI            Partial thromboplastin time [[id number]] (Abnormal)  Resulted: 10/30/18 1108, Result status: Final result    Ordering provider: Kanika Page PA-C  10/30/18 1018 Resulting lab: [facility]    Specimen Information    Type Source Collected On   Blood  10/30/18 1045          Components       Value Reference Range Flag Lab   PTT 50 24.00 - 37.00 sec H HI            INR [[id number]] (Abnormal)  Resulted: 10/30/18 1108, Result status: Final result    Ordering provider: Kanika Page PA-C  10/30/18 1018 Resulting lab: [facility]    Specimen Information    Type Source Collected On   Blood  10/30/18 1045          Components       Value Reference Range Flag Lab   INR 4.96 0.80 - 1.20 H HI            Ammonia [[id number]]  Resulted: 10/30/18 1107, Result status: Final result    Ordering provider: Kanika Page PA-C  10/30/18 1018 Resulting lab: [facility]    Specimen Information    Type Source Collected On   Blood  10/30/18 1045          Components       Value Reference Range Flag Lab   Ammonia 11 10 - 50 umol/L  HI            CBC with platelets differential [[id number]] (Abnormal)  Resulted: 10/30/18 1051, Result status: Final result    Ordering provider: Kanika Page PA-C  10/30/18 1018 Resulting lab: [facility]    Specimen Information    Type Source Collected On   Blood  10/30/18 1045          Components       Value Reference Range Flag Lab   WBC 7.4 4.0 - 11.0 10e9/L  HI   RBC Count 4.83 4.4 - 5.9 10e12/L  HI   Hemoglobin 14.1 13.3 - 17.7 g/dL  HI   Hematocrit 41.9 40.0 - 53.0 %  HI   MCV 87 78 - 100 fl  HI   MCH 29.2 26.5 - 33.0 pg  HI   MCHC 33.7 31.5 - 36.5 g/dL  HI   RDW 16.0 10.0 - 15.0 % H HI   Platelet Count 250 150 - 450 10e9/L  HI   Diff Method Automated Method   HI   % Neutrophils 60.2 %  HI   % Lymphocytes 27.5 %  HI   % Monocytes 6.9 %  HI   % Eosinophils 4.4 %  HI   % Basophils 0.7 %  HI   % Immature Granulocytes 0.3 %  HI   Nucleated RBCs 0 0 /100  HI   Absolute Neutrophil 4.4 1.6 - 8.3 10e9/L  HI   Absolute Lymphocytes 2.0 0.8 - 5.3 10e9/L  HI   Absolute Monocytes 0.5 0.0 - 1.3 10e9/L  HI   Absolute Eosinophils 0.3 0.0 - 0.7 10e9/L  HI   Absolute Basophils 0.1 0.0 - 0.2 10e9/L  HI   Abs Immature Granulocytes 0.0 0 - 0.4 10e9/L  HI   Absolute Nucleated RBC 0.0   HI            Testing Performed By     Lab - Abbreviation Name Director Address Valid Date Range    45 - VFC603 [facility] Unknown Unknown 01/28/02 0000 - Present    170 - Unknown POINT OF CARE TEST, GLUCOSE Unknown  Unknown 10/31/11 1114 - Present    210 - HI [facility] Unknown [address] [facility] [address] 05/08/15 1057 - Present               Imaging Results - 3 Days      CT Abdomen Pelvis w Contrast [[id number]]  Resulted: 10/30/18 1308, Result status: Final result    Ordering provider: Kanika Page PA-C  10/30/18 1145 Resulted by: Ace Hedrick MD    Performed: 10/30/18 1232 - 10/30/18 1250 Resulting lab: RADIOLOGY RESULTS    Narrative:       EXAMINATION: CT ABDOMEN PELVIS W CONTRAST, 10/30/2018 12:50 PM    TECHNIQUE:  Helical CT images from the lung bases through the  symphysis pubis were obtained  with IV contrast. Contrast dose: ISOVUE  300  100ML    COMPARISON: August 29, 2018    HISTORY: jaundice, elevated direct bilirubin;     FINDINGS:    There is dependent atelectasis at the lung bases.    There is dilated bile ducts and a distended gallbladder. The common  bile duct is dilated down to the head of the pancreas. No calcified  gallstones are seen.    The spleen is normal in appearance. There is a mass in the head of the  pancreas measuring 2.1 cm. There is dilation of the pancreatic duct.  This represents a change from August 2, 2018. There is an enlarged  jamel hepatis lymph node seen. This is enlarged from 9 mm to 18 mm.    The adrenal glands are normal.    There is a small low-density mass in the left kidney most likely a  cyst. No hydronephrosis is noted.    The periaortic lymph nodes are normal in caliber.    No intraperitoneal masses or inflammatory changes are noted.    The bladder appears normal. The rectum appears normal.    The regional skeleton is intact      Impression:       IMPRESSION: 2 cm mass in the head of the pancreas with biliary and  pancreatic ductal dilatation there is an enlarged lymph node seen in  the jamel hepatis. This mass should be considered highly suspicious  for malignancy.     ACE HEDRICK MD      Testing Performed By     Lab - Abbreviation Name  Director Address Valid Date Range    104 - Rad [facility] RADIOLOGY RESULTS Unknown Unknown 02/16/05 1553 - Present            Encounter-Level Documents:     There are no encounter-level documents.      Order-Level Documents:     There are no order-level documents.

## 2018-10-31 NOTE — PLAN OF CARE
"/87  Pulse 94  Temp 98.1  F (36.7  C) (Temporal)  Resp 20  Ht 1.803 m (5' 11\")  Wt 99.6 kg (219 lb 9.3 oz)  SpO2 97%  BMI 30.62 kg/m2    Alert and oriented. VSS and afebrile. Denies pain. Sats adequate on room air. IV infusing with NS and 20 mEq K at 100ml/hr.  Lungs clear. Bowels active, tolerates regular diet, BM this shift. Urine sample obtained, urine is very dark in color. Incontinent most of the time. Skin and sclera are yellow. Skin with scattered bruising and scabs. Open pressure injury on left buttock approximately dime sized. Wound base moist, pink/red, julián wound assessment is blanchable red. Covered with foam dressing, scant drainage. WOC nurse consult in for tomorrow. Patient is refusing turns/ repositioning, continuing to educate and attempt to reposition q2 hours. Alarms active and audible, call light within reach, will continue to monitor.    Face to face report given with opportunity to observe patient.    Report given to Lonnie Soriano   10/30/2018  11:02 PM      "

## 2018-10-31 NOTE — PROGRESS NOTES
Spoke with Jose Antonio and his sister, Cari.  Jose Antonio is currently at [facility].  Anticipate he will be able to transfer to [place] tomorrow for his needed care.  [facility] letter reviewed.  Left message Juanita at [facility].

## 2018-10-31 NOTE — CONSULTS
Wound consult done along with nursing student as ordered by Dr Ralph for pressure injury of buttocks.  [facility].  The pressure injury was present on admission and had an Allevyn foam dressing in place.  His sister is there and says it was quite large but it is improving.  He has been refusing turns but allows repositioning.  Jose Antonio has MS.  Today he is quite upset and crying as he tells me he has a mass near his pancreas that needs to be removed and it may be terminal.  His sister is trying to arrange transfer to [place] but per charting the accepting physician at [facility] is out until tomorrow.  They are under the understanding he is having surgery tomorrow.  Jose Antonio is incontinent and his brief is wet.  States he has had 2 large stools today.    Location:  Left ischium pressure injury looks Stage II today but it could have been deeper previously  Drainage:  Scant, serosanguinous  Odor:  none  Measurement:  1.4 x 0.6 cm  Undermining:  none  Edges:  attached  Base:  Fully granulated  Surrounding Skin: blanchable erythema, evidence of staining due to long term pressure    The pressure injury was cleansed with saline and a new bordered foam dressing was applied.  Report given to his nurse Lyndsay.  He would benefit from a seat cushion if he is able to get up in the chair.  I did educate him on the importance of repositioning and shifts.  He states he understands.

## 2018-10-31 NOTE — PROGRESS NOTES
[facility]    Hospitalist Progress Note      Assessment & Plan   Jose Antonio Marmolejo is a 72 year old male who was admitted on 10/30/2018.      1.  Painless obstructive jaundice: Patient presented with painless jaundice CT scan shows a 2 cm mass in the pancreatic head with dilatation of both biliary and pancreatic ducts.  Bilirubin is up to 9.5.  The rest of his LFTs are about the same as they were yesterday.  GI was contacted from the ER at [facility], Dr. Heck who recommended the patient have an endoscopic ultrasound with biopsy and possible stent placement.  However they did not want to accept him yesterday.  The physician who does the endoscopic ultrasound will be available on Thursday tomorrow November 1.  We have fixed his INR with vitamin K he is down to 1.24.  He remains stable at this point with no pain.  No evidence of any cholangitis.  He is afebrile normal white count.  No nausea or vomiting.  He will be n.p.o. after midnight.  I will recheck his labs tomorrow morning at 4 AM.  I will have covering hospitalist tomorrow Dr. Torres call stat doc as early as possible to arrange a bed and transfer to the hospice service therefore GI consultation.  Nursing staff is been alerted.  They will give a heads up to the ambulance crew for a possible transfer as early as 7:53 AM.    2.  Recent history of pulmonary embolus: Patient on August 29 presented with confusion was found to have a saddle pulmonary embolus.  He was sent to [facility] TPA given subsequently developed a significant epistaxis requiring prophylactic intubation.  Has been on warfarin therapy since that time.  Has been stable from pulmonary standpoint.  His INR was close to 5 when he came in.  I did give him a dose of vitamin K.  Is currently down to 1.24.  We will give him a dose of Lovenox now to cover him.  Will not give any further dosing tomorrow morning.  In anticipation of his endoscopic ultrasound.        Diet: Regular Diet Adult  NPO  per Anesthesia Guidelines for Procedure/Surgery Except for: Meds  Fluids: Normal saline +20 K1 100 cc an hour    # Pain Assessment:  Current Pain Score 10/30/2018   Patient currently in pain? denies   Pain score (0-10) -   Pain location -   Pain descriptors -   Jose Antonio velarde pain level was assessed and he currently denies pain.        DVT Prophylaxis: Enoxaparin (Lovenox) SQ  Code Status: DNR/DNI    Disposition: Expected discharge in 1 days once for arranged in the morning.    Adilson Ralph    Interval History   Patient continues to be asymptomatic.  Remains jaundice.  No abdominal pain no nausea vomiting shortness of breath at all.  Hemodynamically stable.  Went over current plan with he and his family again regarding transfer tomorrow morning for endoscopic ultrasound.  Be n.p.o. after midnight.  Called stat talk and they do have his name and information ready.  Tomorrow morning hospitalist service will need to be contacted to arrange acceptance.    -Data reviewed today: I reviewed all new labs and imaging results over the last 24 hours. I personally reviewed no images or EKG's today.    Physical Exam   Temp: 97.6  F (36.4  C) Temp src: Temporal BP: 155/77 Pulse: 94 Heart Rate: 72 Resp: 20 SpO2: (!) 91 % O2 Device: None (Room air)    Vitals:    10/30/18 1538 10/31/18 0500   Weight: 99.6 kg (219 lb 9.3 oz) 100.4 kg (221 lb 5.5 oz)     Vital Signs with Ranges  Temp:  [97.2  F (36.2  C)-98.1  F (36.7  C)] 97.6  F (36.4  C)  Pulse:  [94] 94  Heart Rate:  [72-92] 72  Resp:  [18-20] 20  BP: (145-171)/(77-87) 155/77  SpO2:  [91 %-99 %] 91 %  I/O last 3 completed shifts:  In: 813 [P.O.:240; I.V.:573]  Out: 50 [Urine:50]    Peripheral IV 10/30/18 Right Upper forearm (Active)   Site Assessment WDL 10/30/2018 11:51 PM   Line Status Infusing 10/30/2018 11:51 PM   Phlebitis Scale 0-->no symptoms 10/30/2018 11:51 PM   Infiltration Scale 0 10/30/2018 11:51 PM   Number of days:1       Pressure Injury 10/30/18 Left Buttocks dime sized  pressure injury, open, moist, pink wound base (Active)   Wound Base Moist;[place];Red 10/30/2018  5:00 PM   Loan-wound Assessment Blanchable erythema 10/30/2018  5:00 PM   Drainage Amount Scant 10/30/2018  5:00 PM   Drainage Color/Characteristics Sanguinous 10/30/2018  5:00 PM   Dressing Foam 10/31/2018  7:50 AM   Dressing Status Clean, dry, intact 10/31/2018  7:50 AM   Number of days:1     No line/device    Constitutional: Alert and oriented x3. No distress    HEENT: Normocephalic/atraumatic, PERRL, EOMI, mouth moist, sclerae icteric, neck supple, no LN.     Cardiovascular: RRR.  No murmur, no  rubs, or gallops.  JVD flat.  Bruits no.  Pulses 2+    Respiratory: Clear to auscultation bilaterally    Abdomen: Soft, nontender, nondistended, no organomegaly. Bowel sounds present    Extremities: Warm/dry.  Trace edema    Neuro:   Non focal, cranial nerves intact, Moves all extremities.  Medications     0.9% sodium chloride + KCl 20 mEq/L 100 mL/hr at 10/31/18 0307       lisinopril  40 mg Oral Daily     metoprolol succinate  50 mg Oral Daily     miconazole   Topical BID     sennosides  1 tablet Oral BID     sodium chloride (PF)  3 mL Intracatheter Q8H       Data     Recent Labs  Lab 10/31/18  0531 10/31/18  0014 10/30/18  1045 10/29/18   WBC 7.4  --  7.4  --    HGB 12.8*  --  14.1  --    MCV 88  --  87  --      --  250  --    INR 1.24*  --  4.96* 5.1     --  137  --    POTASSIUM 3.6 5.3 2.9*  --    CHLORIDE 110*  --  103  --    CO2 22  --  26  --    BUN 10  --  14  --    CR 0.63*  --  0.73  --    ANIONGAP 7  --  8  --    STALIN 8.0*  --  8.4*  --    *  --  159*  --    ALBUMIN 2.5*  --  2.7*  --    PROTTOTAL 6.5*  --  7.4  --    BILITOTAL 9.5*  --  8.3*  --    ALKPHOS 385*  --  439*  --    *  --  216*  --    *  --  131*  --    LIPASE  --   --  1500*  --        No results found for this or any previous visit (from the past 24 hour(s)).

## 2018-10-31 NOTE — PLAN OF CARE
[facility] Inpatient Admission Note:    Patient admitted to 3222/3222-1 at approximately 1530 via cart accompanied by transport tech from emergency room . Report received from Gladys WARD in SBAR format at 1520 via telephone. Patient transferred to bed via slide board.. Patient is alert and oriented X 3, denies pain; rates at 0 on 0-10 scale.  Patient oriented to room, unit, hourly rounding, and plan of care. Explained admission packet and patient handbook with patient bill of rights brochure. Will continue to monitor and document as needed.     Inpatient Nursing criteria listed below was met:    Health care directives status obtained and documented: Yes    Care Everywhere authorization obtained No    MRSA swab completed for patient 65 years and older: Yes    Patient identifies a surrogate decision maker: No If yes, who: Contact Information:    Core Measure diagnosis present:No. If yes, state diagnosis:      If initial lactic acid >2.0, repeat lactic acid drawn within one hour of arrival to unit: NA. If no, state reason:     Vaccination assessment and education completed: Yes   Vaccinations received prior to admission: Pneumovax no  Influenza(seasonal)  NO   Vaccination(s) ordered: patient declines    Clergy visit ordered if patient requests: N/A    Skin issues/needs documented: Yes    Isolation Patient: no Education given, correct sign in place and documentation row added to PCS:      Fall Prevention Yes: Care plan updated, education given and documented, sticker and magnet in place: Yes    Care Plan initiated: Yes    Education Documented (including assessment): Yes    Patient has discharge needs : unknown If yes, please explain:

## 2018-10-31 NOTE — PLAN OF CARE
Problem: Patient Care Overview  Goal: Plan of Care/Patient Progress Review  Outcome: Improving   10/31/18 0752   OTHER   Plan Of Care Reviewed With patient   Plan of Care Review   Progress improving   '    VSS, afebrile, HRR, lungs clear, bowels active. Pt denies any pain. Pt is alert and oriented x 4, makes his needs known, refuses turns but does allow weight shifts, and calls regularly to be shifted. Pt is incontient, but is aware of when he needs to be changed. Pt left inner gluteal fold has a foam dressing, CDI, his scrotum is bright red, yeasty red groin folds. Pt also has several red, pimple looking sores on his arms and legs. Uses call light appropriately. Pt is a [place] lift.     Face to face report given with opportunity to observe patient.    Report given to SYLVIA Umana   10/31/2018  8:00 AM

## 2018-11-01 NOTE — PLAN OF CARE
VSS, afebrile, HRR, lungs clear, bowels active, pt denies pain. Pt awake much of the night, continent and incontient at times. Pt originally declined lab draw this morning, due to him being a hard stick, and lab draws being extremely painful for him, however I explained to the patient the importance of the lab results and he agreed, blood sample was achieved with only 1 attempt. Pt has a few scattered red, sores. Pt has a foam dressing covering a wound to his left gluteal fold, dressing CDI. Pt NPO since 0045. Pt skin is jaundiced. Pt is alert and oriented x 4, makes his needs well known.     Face to face report given with opportunity to observe patient.    Report given to SYLVIA Burdick   11/1/2018  8:05 AM

## 2018-11-01 NOTE — PLAN OF CARE
Problem: Patient Care Overview  Goal: Plan of Care/Patient Progress Review  Outcome: No Change  Pt alert and orientated.  Refused supper.  IV infusing.  Incontinent of urine.  Uses call light after to let staff notify of incontinence.  Foam intact on left side buttocks.  Dry drainage.  Slight redden in groins- powder applied.  Skin jaundice.

## 2018-11-01 NOTE — PLAN OF CARE
Face to face report given with opportunity to observe patient.    Report given to Lonnie Desir   10/31/2018  11:23 PM

## 2018-11-01 NOTE — PLAN OF CARE
Problem: Patient Care Overview  Goal: Plan of Care/Patient Progress Review  Outcome: Adequate for Discharge Date Met: 11/01/18  Patient discharged at 0815 PM via cart accompanied by other:family and staff. Prescriptions - None ordered for discharge. All belongings sent with patient.     Discharge instructions reviewed with patient. Listed belongings gathered and returned to patient. yes    Patient discharged to [facility].   Report called to [facility]    Core Measures and Vaccines  Core Measures applicable during stay: No. If yes, state diagnosis:   Pneumonia and Influenza given prior to discharge, if indicated: N/A    Surgical Patient   Surgical Procedures during stay: no  Did patient receive discharge instruction on wound care and recognition of infection symptoms? N/A    MISC  Follow up appointment made:  Hospitalization  Home and hospital aquired medications returned to patient: N/A  Patient reports pain was well managed at discharge: Yes    A&O. Denies pain/discomfort. Incontinent of urine x 1. Brief changed prior to arrival of ACLS for transport to [facility].

## 2018-11-01 NOTE — PLAN OF CARE
0947 [facility] Daisy called to give report, nurse Alivia not available ; was told she would be given message and would return call when available.  1012 Alivia RN returns call, Report given.

## 2018-11-01 NOTE — PROGRESS NOTES
"  Hospitalist Progress Note          Assessment and Plan:     Painless jaundice  CT sows mass in the head of the pancrease  Will need endoscopic US, biopsies and likely a stent  Transferred to higher level of care    Recent PE  On warfarin prior to admission   INR reversed in anticipation of Endo US  Restart lovenox bridging to warfarin as soon as safe afterprocedure    MS  Deconditioning  PT and long term placement    Glucose intolerance             Interval History:     Patient with a hx of MS and HTN  Recent PE and was on warfarin   Noted gradual onset of painless jaundice  CT showed mass in the head of the pancrease  No significant clinical change today                 Medications:       lisinopril  40 mg Oral Daily     metoprolol succinate  50 mg Oral Daily     miconazole   Topical BID     sennosides  1 tablet Oral BID     sodium chloride (PF)  3 mL Intracatheter Q8H     acetaminophen, acetaminophen, lidocaine 4%, lidocaine (buffered or not buffered), melatonin, naloxone, ondansetron **OR** ondansetron, potassium chloride, potassium chloride with lidocaine, potassium chloride, potassium chloride, sodium chloride (PF)               Physical Exam:   Blood pressure 158/73, pulse 94, temperature 98.3  F (36.8  C), temperature source Temporal, resp. rate 20, height 1.803 m (5' 11\"), weight 102.7 kg (226 lb 6.6 oz), SpO2 98 %.  Vitals:    10/30/18 1538 10/31/18 0500 18 0500   Weight: 99.6 kg (219 lb 9.3 oz) 100.4 kg (221 lb 5.5 oz) 102.7 kg (226 lb 6.6 oz)       Vital Sign Ranges  Temperature Temp  Av.1  F (36.7  C)  Min: 97.6  F (36.4  C)  Max: 98.3  F (36.8  C)   Blood pressure Systolic (24hrs), Av , Min:151 , Max:158        Diastolic (24hrs), Av, Min:57, Max:77      Pulse No Data Recorded   Respirations Resp  Av  Min: 20  Max: 20   Pulse oximetry SpO2  Av %  Min: 91 %  Max: 99 %         Intake/Output Summary (Last 24 hours) at 18 0740  Last data filed at 18 0600   Gross " per 24 hour   Intake             2358 ml   Output              300 ml   Net             2058 ml     General; looks unwell, no acute distress  Eyes; scleral icterus  Constitutional; Non febrile, non diaphoretic  Psych; pleasant affect  Neuro; alert, appropriate  Neck; no JVD  Lungs; resonant on percussion, normal vesicular breath sounds with no adventitial sounds  Precordium; no heave or thrill, reg reg, s1 s2 present  Abdomen; adipose, convex, soft non tender, no peritoneal signs, bowel sounds present  Skin; warm,obvious jaundice, non diaphoretic         Data:   All new lab and imaging data was reviewed.   Recent Labs   Lab Test  11/01/18   0413  10/31/18   0531  10/30/18   1045   WBC  6.4  7.4  7.4   HGB  12.7*  12.8*  14.1   MCV  87  88  87   PLT  257  253  250   INR  1.00  1.24*  4.96*      Recent Labs   Lab Test  11/01/18   0413  10/31/18   0531  10/31/18   0014  10/30/18   1045   NA  140  139   --   137   POTASSIUM  3.3*  3.6  5.3  2.9*   CHLORIDE  110*  110*   --   103   CO2  24  22   --   26   BUN  8  10   --   14   CR  0.60*  0.63*   --   0.73   ANIONGAP  6  7   --   8   STALIN  7.9*  8.0*   --   8.4*   GLC  139*  124*   --   159*

## 2018-11-05 NOTE — DISCHARGE SUMMARY
Admit Date:     10/30/2018   Discharge Date:     11/01/2018      DISCHARGE DIAGNOSES:   1.  Painless obstructive jaundice due to pancreatic mass.   2.  Supratherapeutic INR.   3.  Essential hypertension.   4.  Recent pulmonary embolism.      PROCEDURES:  None.      COMPLICATIONS:  None.      HOSPITAL COURSE:  Mr. Marmolejo is a 72-year-old gentleman who presented to our ER after being found to be somewhat jaundiced at the nursing home.  He had a CT scan performed which showed a 2 cm mass in the head of his pancreas with biliary and pancreatic ductal dilatation.  There was a lymph node also noted in the jamel hepatis area between 9 and 18 mm in diameter.  His lab work was significant in that his total bilirubin was 8.3, alkaline phosphatase 439, , .  Direct bilirubin was 7.2.  Lipase was 1500.  He had lab work last done here on 9/24/2018 showing basically normal liver function tests at that time.  His INR on admission was 4.96.      From the ER, GI was consulted, Dr. Heck who recommended the patient have an endoscopic ultrasound for biopsy/possible stent placement.  They did not wish to have the patient transferred to their facility immediately as the endoscopic ultrasound physician is not available until 11/01.  Therefore, he was admitted to our facility for correction of his INR, monitoring and then subsequent transfer on 11/01 to [facility].      The patient remained very stable.  He had no abdominal pain, no nausea or vomiting.  No bleeding issues.  No fevers, no elevated white count or any evidence of infection.  He was  given IV fluids.  His potassium initially was low at 2.9.  This was replaced.  His INR was elevated.  His warfarin has been on hold.  He was given a dose of vitamin K and today his INR is 1.24.  The repeat value prior to transfer is currently pending.  Our current plan is that he will be transferred via ground ambulance to [facility] to the Hospitalist Service with  subsequent consultation with GI for endoscopic ultrasound.      The patient's other significant medical issues are that he has had a recent large saddle embolus in September where he was actually transferred to [facility].  He did undergo TPA treatment as he was having evidence of hypoperfusion.  Unfortunately, he suffered a significant epistaxis event which required intervention and prophylactic intubation.  Has been on warfarin therapy since that time.  Currently, his INR has been reversed.  He is getting a dose of Lovenox, just a DVT dose here tonight in preparation for his endoscopic ultrasound.      The next issue is hypertension.  We continued his blood pressure medications while he was here, his pressures have been stable 140-150 systolic.  Afebrile.  Sats 91% on room air.      Multiple sclerosis.  The patient does have a diagnosis of MS.  Has refused all medical treatment for this.  Also has refused all therapy attempts in terms of PT and OT.  He is currently in a nursing home which he will stay there.  He has not really been out of bed for quite some time.      CODE STATUS:  He is DNR/DNI status documented his POLST.        Both he and his family are interested in having the endoscopic ultrasound done for diagnosis and/or possible stent placement.  If it would lead to more invasive procedure such as Whipple or further surgery that is something that they are not overly interested in, but would be discussed this with the patient and family.      The patient will be n.p.o.  He is currently getting IV fluids 100 mL an hour.  We will continue his current regimen of antihypertensive agents which include the lisinopril 40 mg daily, metoprolol XL 50 mg daily.  He is on no current antibiotics.  He is not having any pain.  Vital signs remained stable.  DNR/DNI status.        We transferred via ground ambulance to [facility].            KYLE RUSS MD             D: 10/31/2018   T: 11/02/2018    MT: [id number]      Name:     NICOLE KEMP   MRN:      [id number]        Account:        [id number]   :      1945           Admit Date:     10/30/2018                                  Discharge Date: 2018      Document: [id number]       cc: Juanita Briggs MD

## 2018-11-06 NOTE — TELEPHONE ENCOUNTER
12:35 PM    Reason for Call: Phone Call    Description: Holli from [facility] will be sending a fax on one of Dr Briggs's pt who has been readmitted.  A response is required within 24 hrs.    Was an appointment offered for this call? No  If yes : Appointment type              Date    Preferred method for responding to this message: Telephone Call  What is your phone number ?  [phone]    If we cannot reach you directly, may we leave a detailed response at the number you provided? Yes    Can this message wait until your PCP/provider returns, if available today? Not applicable, provider is in today.    Didi Mitchell

## 2018-11-08 PROBLEM — Z51.5 HOSPICE CARE PATIENT: Status: ACTIVE | Noted: 2018-01-01

## 2018-11-08 NOTE — PROGRESS NOTES
PHYSICIAN CERTIFICATION OF TERMINAL ILLNESS FOR HOSPICE BENEFIT    November 7, 2018    Jose Antonio Marmolejo    1945      Effective Date of Certification    Start Date:  11-07-18      End Date:  2-4-19    Terminal Diagnosis:    Pancreatic cancer      Secondary Diagnoses:     Obstructive juandice      Prognosis Related Comorbidities:    Diabetes mellitus, type 2    Multiple sclerosis    Pulmonary embolism              Narrative Statement:  Client suffers from metastatic pancreatic cancer and is not undergoing disease directed therapy.  He continues to decline. Client resides at SNF and requires assistance with ADL's.  Goals of symptom control will be met.  Because of the progressive nature of the illness and associated comorbidities, client qualifies for hospice care.             I certify that this patient is terminally ill and has a life expectancy of 6 months or less if the terminal illness runs its anticipated course.        Attestation:  I confirm that this narrative was composed by myself and is based on review of the medical record and/or examination of the patient.            Joe Brand MD

## 2018-11-26 NOTE — MR AVS SNAPSHOT
"              After Visit Summary   11/26/2018    Jose Antonio Marmolejo    MRN: [id number]           Patient Information     Date Of Birth          1945        Visit Information        Provider Department      11/26/2018 8:11 AM Juanita Briggs MD [facility]        Today's Diagnoses     Malignant neoplasm of pancreas, unspecified location of malignancy (H)    -  1    Obstructive jaundice        Type 2 diabetes mellitus without complication, without long-term current use of insulin (H)        Essential hypertension        Recurrent falls        MS (multiple sclerosis) (H)           Follow-ups after your visit        Who to contact     If you have questions or need follow up information about today's clinic visit or your schedule please contact [facility] directly at [phone].  Normal or non-critical lab and imaging results will be communicated to you by MyChart, letter or phone within 4 business days after the clinic has received the results. If you do not hear from us within 7 days, please contact the clinic through Vusayhart or phone. If you have a critical or abnormal lab result, we will notify you by phone as soon as possible.  Submit refill requests through Pican[employer] or call your pharmacy and they will forward the refill request to us. Please allow 3 business days for your refill to be completed.          Additional Information About Your Visit        VusayharAppcore Information     Picanova lets you send messages to your doctor, view your test results, renew your prescriptions, schedule appointments and more. To sign up, go to [email or web address] . Click on \"Log in\" on the left side of the screen, which will take you to the Welcome page. Then click on \"Sign up Now\" on the right side of the page.     You will be asked to enter the access code listed below, as well as some personal information. Please follow the directions to create your username and password.     Your " access code is: [email or web address]  Expires: 2019  2:44 PM     Your access code will  in 90 days. If you need help or a new code, please call your [facility] clinic or [phone].        Care EveryWhere ID     This is your Care EveryWhere ID. This could be used by other organizations to access your [facility] medical records  [id number]         Blood Pressure from Last 3 Encounters:   18 174/80   18 108/72   18 (!) 130/47    Weight from Last 3 Encounters:   18 226 lb 6.6 oz (102.7 kg)   18 260 lb (117.9 kg)   18 260 lb (117.9 kg)              Today, you had the following     No orders found for display         Today's Medication Changes          These changes are accurate as of 18 11:59 PM.  If you have any questions, ask your nurse or doctor.               These medicines have changed or have updated prescriptions.        Dose/Directions    acetaminophen 325 MG tablet   Commonly known as:  TYLENOL   This may have changed:  when to take this   Used for:  Fall, initial encounter, Weakness        Dose:  650 mg   Take 2 tablets (650 mg) by mouth every 4 hours as needed for mild pain   Quantity:  100 tablet   Refills:  0                Primary Care Provider Office Phone # Fax #    Juanita Briggs [phone] [address] [facility] [address]        Equal Access to Services     [place] AH: Hadii charles guoo Soleela, waaxda luqadaha, qaybta kaalmada porsha, viki triplett . So [facility] [phone].    ATENCIÓN: Si habla español, tiene a garcia disposición servicios gratuitos de asistencia lingüística. Llame al [phone].    We comply with applicable federal civil rights laws and [place] laws. We do not discriminate on the basis of race, color, national origin, age, disability, sex, sexual orientation, or gender identity.            Thank you!     Thank you for choosing [facility]  for your  care. Our goal is always to provide you with excellent care. Hearing back from our patients is one way we can continue to improve our services. Please take a few minutes to complete the written survey that you may receive in the mail after your visit with us. Thank you!             Your Updated Medication List - Protect others around you: Learn how to safely use, store and throw away your medicines at www.disposemymeds.org.          This list is accurate as of 11/26/18 11:59 PM.  Always use your most recent med list.                   Brand Name Dispense Instructions for use Diagnosis    0.9% sodium chloride + KCl 20 mEq/L Soln infusion      Inject into the vein continuous        acetaminophen 325 MG tablet    TYLENOL    100 tablet    Take 2 tablets (650 mg) by mouth every 4 hours as needed for mild pain    Fall, initial encounter, Weakness       Adult Blood Pressure Cuff Lg Kit     1 kit    1 each 2 times daily    Benign essential hypertension, Malignant essential hypertension       bacitracin 500 UNIT/GM external ointment      Apply topically 2 times daily 500unit/gram, apply 1% topical 2x per day to reddened areas of inner thighs        COUMADIN PO      On hold per NH        lisinopril 40 MG tablet    PRINIVIL/ZESTRIL    30 tablet    Take 1 tablet (40 mg) by mouth daily    Essential hypertension       metoprolol succinate 50 MG 24 hr tablet    TOPROL-XL    30 tablet    Take 1 tablet (50 mg) by mouth daily    Essential hypertension       miconazole 2 % external powder    MICATIN/MICRO GUARD    90 g    Apply topically 2 times daily    Yeast infection of the skin       ondansetron 4 MG ODT tab    ZOFRAN-ODT    120 tablet    Take 1 tablet (4 mg) by mouth every 6 hours as needed for nausea or vomiting

## 2018-11-26 NOTE — PROGRESS NOTES
HISTORY OF PRESENT ILLNESS:      Jose Antonio is a 72 year old male (1945)  resident of [facility]  who is being seen today for an routine follow up visit      Patient has a history of MS based on LP and imaging as well as NMO based on Optho exam. Treated with IV steroids.  Has never been seen by Neurology as he had failed numerous appointments.       Pt has had multiple hospitalizations at [facility] for falls at home prior to his discharge to LT. Again hospitalized at [place] from 8/29-9/17 after was noted to have acute saddle pulmonary embolus. This was noted after work up for confusion. He was treated with TPA and was admitted to the ICU. He is now anticoagulated. Neurology was consulted in the hospital and recommended out patient follow up, no further recommendations made. Had multiple visits with Palliative care team, as well as Psychiatry. He refused starting something for possible depression (recommendation was lexapro 5mg) on multiple occasions.     At our last visit, it was noted that patient was slightly jaundiced appearing. Labwork notable for elevated bili and liver enzymes. He was admitted for work up. Noted to have pancreatic cancer. No interventions planned due to patient being asymptomatic at this time. He and family have elected to move forward with hospice cares and he is currently under their care.       Discussed with nursing staff who have the following concerns: Pt refusing medications intermittently. Has had several falls out of bed.       Patient is seen in their room. Family is not present. He states he is going to [facility] the [facility] because they will not put up bed rails which he believes is why he has fallen out of bed. He has no other concerns today. Tearful. Denies abd pain. Notes he has less appetite and is full quicker. He has been having regular BMs which he attributes to eating grapes throughout the day. No abd pain, nausea, vomiting.        Current medications, allergies, and interdisciplinary care plan are reviewed.    Patient Active Problem List    Diagnosis Date Noted     Hospice care patient 11/08/2018     Priority: Medium     Obstructive jaundice 10/30/2018     Priority: Medium     Supratherapeutic INR 10/30/2018     Priority: Medium     Long-term (current) use of anticoagulants [Z79.01] 09/19/2018     Priority: Medium     Pulmonary embolism with infarction (H) [I26.99] 09/19/2018     Priority: Medium     MS (multiple sclerosis) (H) 08/09/2018     Priority: Medium     Optic neuritis 08/09/2018     Priority: Medium     Cellulitis 07/04/2018     Priority: Medium     Pain management 05/04/2018     Priority: Medium     Nursing Home Visit 03/30/2018     Priority: Medium     Recurrent falls      Priority: Medium     Fall 03/21/2018     Priority: Medium     Falls frequently 12/31/2017     Priority: Medium     Type 2 diabetes mellitus without complication (H) 08/19/2016     Priority: Medium     Weakness of both lower extremities 08/19/2016     Priority: Medium     Yeast infection of the skin 08/19/2016     Priority: Medium     Essential hypertension 08/17/2016     Priority: Medium     Falling episodes 08/17/2016     Priority: Medium     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     Priority: Medium          Social History     Social History     Marital status: Single     Spouse name: N/A     Number of children: 3     Years of education: N/A     Occupational History     Not on file.     Social History Main Topics     Smoking status: Former Smoker     Quit date: 1/1/1978     Smokeless tobacco: Never Used     Alcohol use No      Comment: quit 1977, previously an alcoholic     Drug use: No     Sexual activity: Not Currently     Other Topics Concern     Not on file     Social History Narrative        Current Outpatient Prescriptions   Medication Sig     0.9% sodium chloride + KCl 20 mEq/L infusion Inject into the vein continuous     acetaminophen  (TYLENOL) 325 MG tablet Take 2 tablets (650 mg) by mouth every 4 hours as needed for mild pain (Patient taking differently: Take 650 mg by mouth every 6 hours as needed for mild pain )     bacitracin ointment Apply topically 2 times daily 500unit/gram, apply 1% topical 2x per day to reddened areas of inner thighs     Blood Pressure Monitoring (ADULT BLOOD PRESSURE CUFF LG) KIT 1 each 2 times daily     lisinopril (PRINIVIL/ZESTRIL) 40 MG tablet Take 1 tablet (40 mg) by mouth daily     metoprolol (TOPROL-XL) 50 MG 24 hr tablet Take 1 tablet (50 mg) by mouth daily     miconazole (MICATIN; MICRO GUARD) 2 % powder Apply topically 2 times daily     ondansetron (ZOFRAN-ODT) 4 MG ODT tab Take 1 tablet (4 mg) by mouth every 6 hours as needed for nausea or vomiting     Warfarin Sodium (COUMADIN PO) On hold per NH     No current facility-administered medications for this visit.        Allergies   Allergen Reactions     Penicillins        I have reviewed the care plan and do agree with the plan.    ROS:  No chest pain, shortness of breath, fever, chills, headache, nausea, vomiting, dysuria, or changes in bowel habits.  Appetite is decreased.  no pain noted.    OBJECTIVE:  bp 146/70    GENERAL:  Alert, and in no acute distress  RESP:  Lungs clear.  No rales, rhonchi, or wheezing  CV:  RRR.  S1 S2 with no murmur. No clicks or rubs.  ABD: Soft, non tender, non distended, no organomegaly  SKIN:  Age-related changes.  No suspicious lesions or rashes.  PSYCH:  Affect is tearful and irritable  NEURO: Mental status is alert. Memory is impaired>   EXTREM:  No edema.     Lab/Diagnostic data:    Reviewed in Epic    ASSESSMENT/ORDERS:  Diagnoses and all orders for this visit:    Malignant neoplasm of pancreas, unspecified location of malignancy (H)  / Obstructive jaundice  Remains asymptomatic other than for some early satiety. Continue to monitor closely. Appreciate hospice involvement.     Type 2 diabetes mellitus without complication,  without long-term current use of insulin (H)  Not on medications at this time. BG have been < 200. Not at risk of acute complications of hyperglycemia.     Essential hypertension'  Under fair control, continue current medications.     Recurrent falls / MS (multiple sclerosis) (H)  Patient's performance status continues to decline. Appreciate staff efforts to ensure pts safety.    Saddle Pulmonary Embolus  On Xarelto.      Total time spent with patient visit was 15 min including patient visit, review of pertinent clinical information, and treatment plan.    Juanita Briggs MD

## 2018-11-28 NOTE — MR AVS SNAPSHOT
Jose Antonio Marmolejo   11/28/2018   Anticoagulation Therapy Visit    Description:  72 year old male   Provider:  Juanita Briggs MD   Department:  [facility] Anti Coagulation           INR as of 11/28/2018     Today's INR       Anticoagulation Summary as of 11/28/2018     INR goal 2.0-3.0   Today's INR    Full warfarin instructions 3 mg every day   Next INR check     Indications   Long-term (current) use of anticoagulants [Z79.01] [Z79.01]  Pulmonary embolism with infarction (H) [I26.99] [I26.99]         Anticoagulation Episode Summary     Resolved date 11/28/2018    Resolved reason Changed Anticoagulant

## 2018-11-28 NOTE — PROGRESS NOTES
ANTICOAGULATION FOLLOW-UP CLINIC VISIT    Patient Name:  Jose Antonio Marmolejo  Date:  11/28/2018  Contact Type:  discharged from warfarin clinic.     SUBJECTIVE:     Patient Findings     Comments Note sent to PCP inquiring about warfarin status. Per Dr. Briggs, warfarin has been discontinued and patient started on xeralto He will be discharged from warfarin clinic           OBJECTIVE    INR   Date Value Ref Range Status   11/01/2018 1.00 0.80 - 1.20 Final       ASSESSMENT / PLAN  No question data found.  Anticoagulation Summary as of 11/28/2018     INR goal 2.0-3.0   Today's INR    Warfarin maintenance plan 3 mg (2 mg x 1.5) every day   Full warfarin instructions 3 mg every day   Weekly warfarin total 21 mg   Plan last modified Devika Flannery RN (10/26/2018)   Next INR check    Target end date Indefinite    Indications   Long-term (current) use of anticoagulants [Z79.01] [Z79.01]  Pulmonary embolism with infarction (H) [I26.99] [I26.99]         Anticoagulation Episode Summary     INR check location     Preferred lab     Resolved date 11/28/2018    Resolved reason Changed Anticoagulant     Send INR reminders to  ANTICOAG POOL    Comments Is at [facility] [place]  fax [phone]   PH  [phone] or [phone]      Anticoagulation Care Providers     Provider Role Specialty Phone number    Juanita Briggs MD [facility] [phone]            See the Encounter Report to view Anticoagulation Flowsheet and Dosing Calendar (Go to Encounters tab in chart review, and find the Anticoagulation Therapy Visit)        Devika Flannery, RN

## 2018-12-27 NOTE — PROGRESS NOTES
HISTORY OF PRESENT ILLNESS:      Jose Antonio is a 73 year old male (1945) resident of [facility]  who is being seen today for a routine follow up visit      Patient has a history of MS based on LP and imaging as well as NMO based on Optho exam. Treated with IV steroids.  Has never been seen by Neurology as he had failed numerous appointments.       Pt has had multiple hospitalizations at [facility] for falls at home prior to his discharge to LT. Again hospitalized at [place] from 8/29-9/17 after was noted to have acute saddle pulmonary embolus. This was noted after work up for confusion. He was treated with TPA and was admitted to the ICU. He is now anticoagulated.  Had multiple visits with Palliative care team, as well as Psychiatry. He refused starting something for possible depression (recommendation was lexapro 5mg) on multiple occasions.      Most recently, patient found to have pancreatic cancer after work up of painless jaundice. Patient is currently on hospice cares.      Discussed with nursing staff who have the following concerns: Pt refusing medications intermittently. Has had several falls out of bed.       Patient is seen in their room. Family is not present. He is quiet today. Tearful, but not forthcoming about any additional information. He states he has no complaints or requests today. He denies chest pain, shortness of breath, abd pain, nausea, vomiting. Appetite is low, but eating is not uncomfortable. Bowel are moving. Legs feel very weak. Feels tired all the time.       Current medications, allergies, and interdisciplinary care plan are reviewed.    Patient Active Problem List    Diagnosis Date Noted     Hospice care patient 11/08/2018     Priority: Medium     Obstructive jaundice 10/30/2018     Priority: Medium     Supratherapeutic INR 10/30/2018     Priority: Medium     Long-term (current) use of anticoagulants [Z79.01] 09/19/2018      Priority: Medium     Pulmonary embolism with infarction (H) [I26.99] 2018     Priority: Medium     MS (multiple sclerosis) (H) 2018     Priority: Medium     Optic neuritis 2018     Priority: Medium     Cellulitis 2018     Priority: Medium     Pain management 2018     Priority: Medium     Nursing Home Visit 2018     Priority: Medium     Recurrent falls      Priority: Medium     Fall 2018     Priority: Medium     Falls frequently 2017     Priority: Medium     Type 2 diabetes mellitus without complication (H) 2016     Priority: Medium     Weakness of both lower extremities 2016     Priority: Medium     Yeast infection of the skin 2016     Priority: Medium     Essential hypertension 2016     Priority: Medium     Falling episodes 2016     Priority: Medium     Neuropathy of left lower extremity 2016     Priority: Medium          Social History     Socioeconomic History     Marital status: Single     Spouse name: Not on file     Number of children: 3     Years of education: Not on file     Highest education level: Not on file   Social Needs     Financial resource strain: Not on file     Food insecurity - worry: Not on file     Food insecurity - inability: Not on file     Transportation needs - medical: Not on file     Transportation needs - non-medical: Not on file   Occupational History     Not on file   Tobacco Use     Smoking status: Former Smoker     Last attempt to quit: 1978     Years since quittin.0     Smokeless tobacco: Never Used   Substance and Sexual Activity     Alcohol use: No     Comment: quit , previously an alcoholic     Drug use: No     Sexual activity: Not Currently   Other Topics Concern     Parent/sibling w/ CABG, MI or angioplasty before 65F 55M? Not Asked   Social History Narrative     Not on file        Current Outpatient Medications   Medication Sig     0.9% sodium chloride + KCl 20 mEq/L infusion  Inject into the vein continuous     acetaminophen (TYLENOL) 325 MG tablet Take 2 tablets (650 mg) by mouth every 4 hours as needed for mild pain (Patient taking differently: Take 650 mg by mouth every 6 hours as needed for mild pain )     bacitracin ointment Apply topically 2 times daily 500unit/gram, apply 1% topical 2x per day to reddened areas of inner thighs     Blood Pressure Monitoring (ADULT BLOOD PRESSURE CUFF LG) KIT 1 each 2 times daily     lisinopril (PRINIVIL/ZESTRIL) 40 MG tablet Take 1 tablet (40 mg) by mouth daily     metoprolol (TOPROL-XL) 50 MG 24 hr tablet Take 1 tablet (50 mg) by mouth daily     miconazole (MICATIN; MICRO GUARD) 2 % powder Apply topically 2 times daily     ondansetron (ZOFRAN-ODT) 4 MG ODT tab Take 1 tablet (4 mg) by mouth every 6 hours as needed for nausea or vomiting     Warfarin Sodium (COUMADIN PO) On hold per NH     No current facility-administered medications for this visit.        Allergies   Allergen Reactions     Penicillins        I have reviewed the care plan and do agree with the plan.    ROS:  No chest pain, shortness of breath, fever, chills, headache, nausea, vomiting, dysuria, or changes in bowel habits.  Appetite is decreased.  No pain noted.    OBJECTIVE:  /72   Pulse 74   Wt 99.3 kg (219 lb)   BMI 30.54 kg/m      GENERAL:  Alert, and in no acute distress  RESP:  Lungs clear.  No rales, rhonchi, or wheezing  CV:  RRR.  S1 S2 with no murmur. No clicks or rubs.  ABD: Soft, non tender, non distended, no organomegaly. BS are normal.   SKIN:  No suspicious lesions or rashes. Pressure wounds are healed.   PSYCH:  Affect is tearful, withdrawn  NEURO: Mental status is alert   EXTREM:  No edema.     Lab/Diagnostic data:    Reviewed in Epic    ASSESSMENT/ORDERS:  Diagnoses and all orders for this visit:    Malignant neoplasm of pancreas, unspecified location of malignancy (H)  On hospice cares. Largely asymptomatic as of yet. Continue to monitor for abd symptoms.      Acute saddle pulmonary embolism without acute cor pulmonale (H)  On Xarelto. Continue.     MS (multiple sclerosis) (H)  Patient continues to weakness. No disease directed care due to above diagnosis, goals of care.     Type 2 diabetes mellitus without complication, without long-term current use of insulin (H)  Pt has been diet controlled. Last A1C was 6.5 July 2018. On no medications. Will not recheck.     Essential hypertension  BPs have been controlled on lisinopril 40mg. Continue to monitor for low BPs as weight is decreasing.     Total time spent with patient visit was 15 min including patient visit, review of pertinent clinical information, and treatment plan.    Juanita Briggs MD

## 2019-01-01 ENCOUNTER — DOCUMENTATION ONLY (OUTPATIENT)
Dept: FAMILY MEDICINE | Facility: OTHER | Age: 74
End: 2019-01-01

## 2019-01-01 ENCOUNTER — NURSING HOME VISIT (OUTPATIENT)
Dept: FAMILY MEDICINE | Facility: OTHER | Age: 74
End: 2019-01-01
Payer: MEDICARE

## 2019-01-01 VITALS
DIASTOLIC BLOOD PRESSURE: 66 MMHG | BODY MASS INDEX: 28.45 KG/M2 | SYSTOLIC BLOOD PRESSURE: 110 MMHG | WEIGHT: 204 LBS | HEART RATE: 76 BPM

## 2019-01-01 DIAGNOSIS — I26.92 ACUTE SADDLE PULMONARY EMBOLISM WITHOUT ACUTE COR PULMONALE (H): ICD-10-CM

## 2019-01-01 DIAGNOSIS — G35 MS (MULTIPLE SCLEROSIS) (H): ICD-10-CM

## 2019-01-01 DIAGNOSIS — I10 ESSENTIAL HYPERTENSION: Primary | ICD-10-CM

## 2019-01-01 DIAGNOSIS — E11.9 TYPE 2 DIABETES MELLITUS WITHOUT COMPLICATION, WITHOUT LONG-TERM CURRENT USE OF INSULIN (H): ICD-10-CM

## 2019-01-01 DIAGNOSIS — C25.9 MALIGNANT NEOPLASM OF PANCREAS, UNSPECIFIED LOCATION OF MALIGNANCY (H): ICD-10-CM

## 2019-01-01 PROCEDURE — 99308 SBSQ NF CARE LOW MDM 20: CPT | Mod: GV | Performed by: FAMILY MEDICINE

## 2019-01-28 NOTE — PROGRESS NOTES
PHYSICIAN CERTIFICATION OF TERMINAL ILLNESS FOR HOSPICE BENEFIT    January 28, 2019    Jose Antonio Marmolejo    1945      Effective Date of Certification    Start Date:  2/5/19      End Date:  5/5/19    Terminal Diagnosis:    Pancreatic Cancer      Secondary Diagnoses:     Obstructive juandice      Prognosis Related Comorbidities:    Diabetes mellitus, type 2     Multiple sclerosis     Pulmonary embolism           Narrative Statement:  Client suffers from locally metastatic pancreatic cancer and is not undergoing disease directed therapy.  He continues to decline. [facility]  and requires assistance with ADL's.  Goals of symptom control will be met.  Because of the progressive nature of the illness and associated comorbidities, client qualifies for hospice care.             I certify that this patient is terminally ill and has a life expectancy of 6 months or less if the terminal illness runs its anticipated course.        Attestation:  I confirm that this narrative was composed by myself and is based on review of the medical record and/or examination of the patient.            Joe Brand MD

## 2019-01-31 PROBLEM — C25.9 MALIGNANT NEOPLASM OF PANCREAS, UNSPECIFIED LOCATION OF MALIGNANCY (H): Status: ACTIVE | Noted: 2019-01-01

## 2019-01-31 NOTE — PROGRESS NOTES
HISTORY OF PRESENT ILLNESS:      Jose Antonio is a 73 year old male (1945) resident of [facility]  who is being seen today for a routine follow up visit      Patient has a history of MS based on LP and imaging as well as NMO based on Optho exam. Treated with IV steroids.  Has never been seen by Neurology as he had failed numerous appointments.       Pt has had multiple hospitalizations at [facility] for falls at home prior to his discharge to LT. Again hospitalized at [facility] from 8/29-9/17 after was noted to have acute saddle pulmonary embolus. This was noted after work up for confusion. He was treated with TPA and was admitted to the ICU. He is now anticoagulated.  Had multiple visits with Palliative care team, as well as Psychiatry. He refused starting something for possible depression (recommendation was lexapro 5mg) on multiple occasions.      Most recently, patient found to have pancreatic cancer after work up of painless jaundice. Patient is currently on hospice cares.       Discussed with nursing staff who have the following concerns: Pt continues to decline. Refusing medications frequently. Refusing turning. He has macerated wound on buttocks that is new. He is intermittently refusing cares of this as well.       Patient is seen in their room. Family is not present. He is resting. He opens his eyes only briefly for me. He states he does not want to talk. He denies pain, nausea. He wants me to leave so he can go back to bed. He refuses to allow me to view his wound.     Chart is reviewed. I do not increased pain ratings on vitals sheets. Nursing also note grimacing with turning. He, however, often declines pain medications.       Current medications, allergies, and interdisciplinary care plan are reviewed.    Patient Active Problem List    Diagnosis Date Noted     Malignant neoplasm of pancreas, unspecified location of malignancy (H) 01/31/2019      Priority: Medium     Hospice care patient 2018     Priority: Medium     Obstructive jaundice 10/30/2018     Priority: Medium     Supratherapeutic INR 10/30/2018     Priority: Medium     Long-term (current) use of anticoagulants [Z79.01] 2018     Priority: Medium     Pulmonary embolism with infarction (H) [I26.99] 2018     Priority: Medium     MS (multiple sclerosis) (H) 2018     Priority: Medium     Optic neuritis 2018     Priority: Medium     Cellulitis 2018     Priority: Medium     Pain management 2018     Priority: Medium     Nursing Home Visit 2018     Priority: Medium     Recurrent falls      Priority: Medium     Fall 2018     Priority: Medium     Falls frequently 2017     Priority: Medium     Type 2 diabetes mellitus without complication (H) 2016     Priority: Medium     Weakness of both lower extremities 2016     Priority: Medium     Yeast infection of the skin 2016     Priority: Medium     Essential hypertension 2016     Priority: Medium     Falling episodes 2016     Priority: Medium     Neuropathy of left lower extremity 2016     Priority: Medium          Social History     Socioeconomic History     Marital status: Single     Spouse name: Not on file     Number of children: 3     Years of education: Not on file     Highest education level: Not on file   Social Needs     Financial resource strain: Not on file     Food insecurity - worry: Not on file     Food insecurity - inability: Not on file     Transportation needs - medical: Not on file     Transportation needs - non-medical: Not on file   Occupational History     Not on file   Tobacco Use     Smoking status: Former Smoker     Last attempt to quit: 1978     Years since quittin.1     Smokeless tobacco: Never Used   Substance and Sexual Activity     Alcohol use: No     Comment: quit , previously an alcoholic     Drug use: No     Sexual activity: Not  Currently   Other Topics Concern     Parent/sibling w/ CABG, MI or angioplasty before 65F 55M? Not Asked   Social History Narrative     Not on file        Current Outpatient Medications   Medication Sig     0.9% sodium chloride + KCl 20 mEq/L infusion Inject into the vein continuous     acetaminophen (TYLENOL) 325 MG tablet Take 2 tablets (650 mg) by mouth every 4 hours as needed for mild pain (Patient taking differently: Take 650 mg by mouth every 6 hours as needed for mild pain )     bacitracin ointment Apply topically 2 times daily 500unit/gram, apply 1% topical 2x per day to reddened areas of inner thighs     Blood Pressure Monitoring (ADULT BLOOD PRESSURE CUFF LG) KIT 1 each 2 times daily     lisinopril (PRINIVIL/ZESTRIL) 40 MG tablet Take 1 tablet (40 mg) by mouth daily     metoprolol (TOPROL-XL) 50 MG 24 hr tablet Take 1 tablet (50 mg) by mouth daily     miconazole (MICATIN; MICRO GUARD) 2 % powder Apply topically 2 times daily     ondansetron (ZOFRAN-ODT) 4 MG ODT tab Take 1 tablet (4 mg) by mouth every 6 hours as needed for nausea or vomiting     Warfarin Sodium (COUMADIN PO) On hold per NH     No current facility-administered medications for this visit.        Allergies   Allergen Reactions     Penicillins        I have reviewed the care plan and do agree with the plan.    ROS:  No chest pain, shortness of breath, fever, chills, headache, nausea, vomiting, dysuria, or changes in bowel habits.  Appetite is poor.  Pt declines pain, however, nursing notes indication increasing abd pain.    OBJECTIVE:  /66   Pulse 76   Wt 92.5 kg (204 lb)   BMI 28.45 kg/m      GENERAL:  Fatigued, and in no acute distress  RESP:  No increased work of breath, refuses auscultation  CV:  Pt refuses  ABD: Pt refuses  SKIN:  Pt refuses exam, slightly increased jaundice noted today  PSYCH:  Affect is flat.   NEURO: Mental status is somnolent  EXTREM:  No edema    Lab/Diagnostic data:    Reviewed in  Epic    ASSESSMENT/ORDERS:  Diagnoses and all orders for this visit:    Essential hypertension  Discontinue lisinopril. BP okay, refusing much of the time.     MS (multiple sclerosis) (H)  No new obvious symptoms.     Malignant neoplasm of pancreas, unspecified location of malignancy (H)  Pt continues to decline. Weight is decreasing rapidly. Increased pain reports. Appropriate for hospice cares.     Acute saddle pulmonary embolism without acute cor pulmonale (H)  Continue xarelto for now. If continues to refuse, will d/c    Type 2 diabetes mellitus without complication, without long-term current use of insulin (H)  Not on medications. Not check BG.       Total time spent with patient visit was 15 min including patient visit, review of pertinent clinical information, and treatment plan.    Juanita Briggs MD

## 2019-04-20 NOTE — MR AVS SNAPSHOT
After Visit Summary   8/23/2018    Jose Antonio Marmolejo    MRN: [id number]           Patient Information     Date Of Birth          1945        Visit Information        Provider Department      8/23/2018 10:52 AM Juanita Briggs MD [facility]        Today's Diagnoses     MS (multiple sclerosis) (H)    -  1    Optic neuritis due to multiple sclerosis (H)        Mass of soft tissue of left upper extremity        Folliculitis           Follow-ups after your visit        Who to contact     If you have questions or need follow up information about today's clinic visit or your schedule please contact [facility] directly at [phone].  Normal or non-critical lab and imaging results will be communicated to you by MyChart, letter or phone within 4 business days after the clinic has received the results. If you do not hear from us within 7 days, please contact the clinic through MyChart or phone. If you have a critical or abnormal lab result, we will notify you by phone as soon as possible.  Submit refill requests through [employer] or call your pharmacy and they will forward the refill request to us. Please allow 3 business days for your refill to be completed.          Additional Information About Your Visit        Care EveryWhere ID     This is your Care EveryWhere ID. This could be used by other organizations to access your [facility] medical records  [id number]         Blood Pressure from Last 3 Encounters:   08/13/18 (!) 130/47   08/10/18 118/64   08/09/18 140/74    Weight from Last 3 Encounters:   08/02/18 260 lb (117.9 kg)   08/01/18 260 lb (117.9 kg)   07/31/18 260 lb (117.9 kg)              Today, you had the following     No orders found for display       Primary Care Provider Office Phone # Fax #    Juanita Briggs [phone]       [address] [place] [id number]        Equal Access to Services     JEFF SALINAS AH: Bailey Brower,  wadhavalda luqadaha, qaybta kaalkimberly story, viik haynesaaelvin ah. So [facility] [phone].    ATENCIÓN: Si michelle mcclure, tiene a garcia disposición servicios gratuitos de asistencia lingüística. Sandra al [phone].    We comply with applicable federal civil rights laws and [place] laws. We do not discriminate on the basis of race, color, national origin, age, disability, sex, sexual orientation, or gender identity.            Thank you!     Thank you for choosing [facility]  for your care. Our goal is always to provide you with excellent care. Hearing back from our patients is one way we can continue to improve our services. Please take a few minutes to complete the written survey that you may receive in the mail after your visit with us. Thank you!             Your Updated Medication List - Protect others around you: Learn how to safely use, store and throw away your medicines at www.disposemymeds.org.          This list is accurate as of 8/23/18 11:59 PM.  Always use your most recent med list.                   Brand Name Dispense Instructions for use Diagnosis    acetaminophen 325 MG tablet    TYLENOL    100 tablet    Take 2 tablets (650 mg) by mouth every 4 hours as needed for mild pain    Fall, initial encounter, Weakness       Adult Blood Pressure Cuff Lg Kit     1 kit    1 each 2 times daily    Benign essential hypertension, Malignant essential hypertension       CIPROFLOXACIN PO           hydrochlorothiazide 12.5 MG capsule    MICROZIDE    30 capsule    Take 1 capsule (12.5 mg) by mouth daily    Essential hypertension       lisinopril 40 MG tablet    PRINIVIL/ZESTRIL    30 tablet    Take 1 tablet (40 mg) by mouth daily    Essential hypertension       metoprolol succinate 50 MG 24 hr tablet    TOPROL-XL    30 tablet    Take 1 tablet (50 mg) by mouth daily    Essential hypertension       miconazole 2 % powder    MICATIN; MICRO GUARD    90 g    Apply topically 2 times daily     Yeast infection of the skin       nystatin cream    MYCOSTATIN     APPLY TOPICALLY TO GROIN AREA TWICE DAILY UNTIL HEALED THEN USE TWICE DAILY AS NEEDED    Chronic right shoulder pain          Statement Selected

## 2019-06-17 PROBLEM — Z79.01 LONG TERM CURRENT USE OF ANTICOAGULANT THERAPY: Status: ACTIVE | Noted: 2018-01-01

## 2019-08-08 ENCOUNTER — MEDICAL CORRESPONDENCE (OUTPATIENT)
Dept: HEALTH INFORMATION MANAGEMENT | Facility: CLINIC | Age: 74
End: 2019-08-08

## 2021-06-29 NOTE — PROGRESS NOTES
ANTICOAGULATION FOLLOW-UP CLINIC VISIT    Patient Name:  Jose Antonio Marmolejo  Date:  10/19/2018  Contact Type:  FAX    SUBJECTIVE:     Patient Findings     Positives Antibiotic use or infection, Intentional hold of therapy    Comments POCT INR drawn today by [facility] staff; results and other pertinent information from the Anticoagulation Clinic Communication form received by fax.  Staff report pt is currently taking Vancomycin 125mg.  Coumadin held 10/17, 10/18.  Dosage adjustment made based on physician directed care plan, review of INR results and Coumadin history.  INR results/dosing/INR recheck information faxed to [facility] today.  Staff informed of the need to be cautious with sharp objects; cautious with wt bearing - fall prevention and informed if there is any bleeding that can not be stopped with the use of cold/ice/direct pressure without peaking 10-20 mins, then go to the nearest Emergency dept or call 911 or follow their facility protocols.  Staff instructed to contact the AC clinic with any questions or concerns.  Devika Oviedo RN           OBJECTIVE    INR   Date Value Ref Range Status   10/19/2018 5.2  Final       ASSESSMENT / PLAN  No question data found.  Anticoagulation Summary as of 10/19/2018     INR goal 2.0-3.0   Today's INR 5.2!   Warfarin maintenance plan 4 mg (2 mg x 2) on Mon, Fri; 5 mg (2 mg x 2.5) all other days   Full warfarin instructions 10/19: Hold; 10/20: Hold; 10/21: 2 mg; Otherwise 4 mg on Mon, Fri; 5 mg all other days   Weekly warfarin total 33 mg   Plan last modified Devika Flannery RN (9/28/2018)   Next INR check 10/22/2018   Priority INR   Target end date Indefinite    Indications   Long-term (current) use of anticoagulants [Z79.01] [Z79.01]  Pulmonary embolism with infarction (H) [I26.99] [I26.99]         Anticoagulation Episode Summary     INR check location     Preferred lab     Send INR reminders to [facility] ANTICOAG POOL    Comments Is at [facility]  Physical Therapy IRP Treatment     Primary Rehabilitation Diagnosis: S/P CABG        Planned Discharge Destination: Home (Discharge Date: 7/1/2021)     SUBJECTIVE: Subjective: agreeable (06/29/21 0830)       Patient's Personal Goal: \"I want to return back  to my old life again.\"     OBJECTIVE:  Precautions  Other Precautions: sternal precautions (06/29/21 0700)  Precautions Comments: fall (06/27/21 0700)    , Therapist wearing eye protection and surgical mask during entire session.    Patient was wearing mask throughout duration of therapy session.     See below for current functional status overview.  See PT flowsheet for full details regarding the PT therapy provided.    ASSESSMENT:     Treatment today focused on progressive gait training, balance training and strength and endurance training.  Patient is demonstrating good progress supported by less physical assistance required. Patient limited at this time by impaired balance, overall deconditioning and impaired cognition. Patient will benefit from further skilled PT  for continued training to address noted impairments to upgrade functional mobility and safety.         This patient participated in all scheduled physical therapy time with this therapist today.    EDUCATION:   On this date, education was provided to patient regarding bed mobility, transfers, ambulation and stairs.    The response to education was/were: Demonstrates understanding and Needs reinforcement.    LONG TERM GOALS:    Bed Mobility Discharge Goal: sit <> supine, mod I  Transfer Discharge Goal: sit <> stand, mod I  Ambulation Discharge Goal: Amb 150'+ with aad, mod I for household mobility  Stairs Discharge Goal: Negotiate 12 stairs with rail, SBA    PLAN:   Continue skilled PT, including the following Treatment/Interventions: Functional transfer training;Strengthening;Endurance training;Bed mobility;Gait training;Stairs retraining;Safety Education;Neuromuscular re-education (06/20/21 0930)    PT Frequency: 5 days/week;6 days/week;7 days/week (06/20/21 0930),                     RECOMMENDATIONS FOR DISCHARGE:  Recommendation for Discharge: PT IL: Patient requires intermittent nonskilled assistance to perform mobility and/or ADLs safely, Patient needs nondaily skilled therapy after discharge     PT/OT Mobility Equipment for Discharge: tbd (06/20/21 0930)   PT/OT ADL Equipment for Discharge: shower chair, grab bars (06/24/21 1030)       FUNCTIONAL DATA OVERVIEW LAST 24 HOURS  Bed Mobility   Bed Mobility  Rolling to the Right: Modified Independent (06/29/21 0830)  Rolling to the Left: Modified Independent (06/29/21 0830)  Supine to Sit: Modified Independent (06/29/21 0830)  Sit to Supine: Modified Independent (06/29/21 0830)     Transfers  Transfers  Sit to Stand: Modified Independent (06/29/21 0830)  Stand to Sit: Modified Independent (06/29/21 0830)  Stand Pivot Transfers: Modified Independent (06/29/21 0830)  Car Transfers: Modified Independent (06/29/21 0830)     Gait  Gait  Gait Assistance: Supervision (Supv);Modified Independent (06/29/21 0830)  Assistive Device/: None (06/29/21 0830)  Ambulation Distance (Feet): 150 Feet (06/29/21 0830)  Gait Comments 1: 150'+ for endurance training (06/29/21 0830),      Stairs  Stairs Mobility  Number of Stairs: 18 (06/29/21 0830)  Stair Management Assistance: Supervision (Supv);Modified Independent (06/29/21 0830)  Stair Management Technique: One rail R;Alternating pattern (06/29/21 0830)  Curbs: Supervision (Supv) (6\" curb) (06/29/21 0830)         Balance  Balance  Balance Comments #1: amb over compliant surface, SBA (06/29/21 0830)     Neuromuscular Re-education  Neuromuscular Re-education  Neuromuscular Re-education 1: supine/sidelying LE ther ex x10 reps (06/29/21 0830)     .    At the end of the therapy session, patient is in wheelchair with the following safety measures in place: alarm system in place/re-engaged.    Patient is located in the  [place]  fax [phone]     [phone] or [phone]      Anticoagulation Care Providers     Provider Role Specialty Phone number    Juanita Briggs MD [facility] [phone]            See the Encounter Report to view Anticoagulation Flowsheet and Dosing Calendar (Go to Encounters tab in chart review, and find the Anticoagulation Therapy Visit)        Devika Oviedo RN                transport line and was handed off to Rehab Aide/Tech. Patient's family was not present. .

## 2021-09-29 NOTE — PROGRESS NOTES
Pt informed that referral was faxed over to specialist office.    [facility]    Hospitalist Progress Note      Assessment & Plan   Jose Antonio Marmolejo is a 72 year old male who was admitted on 12/31/2017. The main reason for admission was LE weakness and frequent falls.    Frequent falls: CVA r/o. Myopathy less likely. PT/OT     Hypertension: Looking through his records it appears that he was on Lopressor and Zestoretic about 2 years ago  And it appears that about a year ago he stopped taking his medications.  Patient does not have any good explanation why he stopped mid taking medications. His blood pressure is not controlled during this admission and I will start him on ACE-I and amlodipine     Diabetes: he denies, but he is diabetic, mild and I believe, diet controlled.      Mild renal insufficiency: Was due to dehydration. Resolved. Will keep monitoring     Social issues: He will need placement. Social service consulted.    DVT Prophylaxis: SCD  Code Status: Full Code    Disposition: Expected discharge in 2-3 days once above problems resolved. He will need placement. Social service will be consulted.     Leigh Ann Warren    Interval History   DM, mild, diet controlled. MRI brain ordered, done. Findings:   Multiple white matter lesions in both hemispheres of the suggestive of a white matter disease such as multiple sclerosis. One particular white matter lesion without mass effect is seen to enhance in a ringlike fashion in the left centrum semiovale. White matter  lesions are also noted. The brainstem particularly in the left side of the medulla the craniocervical junction and at the right side of the  medulla at the level the olivary nuclei. Findings will require OP neurology consult    -Data reviewed today: I reviewed all new labs and imaging results over the last 24 hours. I personally reviewed the CT and XR image(s) showing FINDINGS: The ventricular system and cortical sulci are normal for.  XR chest: no acute  Cardiopulmonary process  Lumbar spine XR: FINDINGS: The  lumbar discs are normal height anterior and lateral osteophytes are seen at T12 L1-L4 L5. There are facet joint degenerative changes noted at L4-L5 and L5-S1 bilaterally.  Pelvis XR: Pelvis is intact the sacrum and sacroiliac joints appear normal articular spaces are normal height of both hips both proximal femurs appear intact.    MRI: see above.    Physical Exam   Temp: 98  F (36.7  C) Temp src: Tympanic BP: 174/87   Heart Rate: 69 Resp: 18 SpO2: 97 % O2 Device: None (Room air)    Vitals:    12/31/17 1208 01/01/18 0523 01/02/18 0619   Weight: 121 kg (266 lb 12.1 oz) 122.7 kg (270 lb 8.1 oz) 119.3 kg (263 lb 0.1 oz)     Vital Signs with Ranges  Temp:  [97.7  F (36.5  C)-98.3  F (36.8  C)] 98  F (36.7  C)  Heart Rate:  [68-75] 69  Resp:  [18-20] 18  BP: (160-195)/() 174/87  SpO2:  [93 %-97 %] 97 %  I/O last 3 completed shifts:  In: 1200 [P.O.:1200]  Out: 1545 [Urine:1545]    Peripheral IV 12/31/17 Right Upper forearm (Active)   Site Assessment WDL 1/2/2018  4:00 PM   Line Status Saline locked 1/2/2018  4:00 PM   Phlebitis Scale 0-->no symptoms 1/2/2018  4:00 PM   Infiltration Scale 0 1/2/2018  4:00 PM   Extravasation? No 1/2/2018  4:00 PM   Number of days:2     ROS: admits LE weakness, R<L, denies fever, chills, cough, chest pain, abdominal pain, dysuria, muscle pain, fasciculations.     Medications        lisinopril  20 mg Oral Daily     amLODIPine  2.5 mg Oral QPM     sodium chloride (PF)  3 mL Intracatheter Q8H     famotidine  20 mg Oral BID     metoprolol  50 mg Oral Daily     miconazole   Topical BID     Physical exam:  Constitutional: snot in distress  HEENT: MMM, no oral lesions  Hematologic / Lymphatic: no cervical lymphadenopathy  Respiratory: no wheezing, diminished at bases. Easy air exit/entry  Cardiovascular: PMI not palpable, no S3, no rubs, no gallops  GI: soft non tender no hepatosplenomegaly, BS present  Rectal exam: not done  : bladder not palpable. No CVA tenderness  Skin: no rashes , skin  warm, well perfused.   Musculoskeletal: there is mild sarcopenia, more proximal weakness  Neurologic: CN intact. R side weaker than left. Also some distal muscle weakness.   Psychiatric: awake, oriented x4   Endocrine: obese, exgenous obesity type, no striae  Data     Recent Labs  Lab 01/01/18  0531 12/31/17  0720   WBC  --  9.4   HGB  --  17.2   MCV  --  87   PLT  --  297    139   POTASSIUM 3.9 4.0   CHLORIDE 109 106   CO2 24 24   BUN 18 16   CR 1.03 1.31*   ANIONGAP 7 9   STALIN 7.5* 8.4*   GLC 93 161*   ALBUMIN 2.6* 3.6   PROTTOTAL 6.8 8.7   BILITOTAL 0.4 0.6   ALKPHOS 53 71   ALT 19 24   AST 28 24       Recent Results (from the past 24 hour(s))   MR Brain w/o & w Contrast    Narrative    PROCEDURE: MR BRAIN W/O & W CONTRAST 1/2/2018 10:00 AM    HISTORY: frequent falls but on exam R weaker than left (upper and  lower extremities);     COMPARISONS: None.    Meds/Dose Given: Gadavist  10 mL    TECHNIQUE: Images were obtained sagittally T1 weighted images were  obtained axially diffusion gradient echo FLAIR T1 and T2-weighted  images were obtained axially sagittally coronally T1 weighted  following gadolinium administration report    FINDINGS: There are multiple white matter lesions in both hemispheres  most severe in the periventricular distribution white matter lesions  are also seen in the brainstem including the right and left meg the  right and left medulla. There is a ringlike enhancement associated  with one of the right matter lesions in the centrum semiovale on the  left.. There is enlargement of the cortical sulci and ventricular  system consistent with atrophy. 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 There is mucosal thickening seen in both maxillary ethmoid and  frontal sinuses. There is mucosal thickening seen in the left sphenoid  sinus.         Impression    IMPRESSION: Multiple white matter lesions in both hemispheres of the  suggestive of a white matter disease such as multiple sclerosis.  One  particular white matter lesion without mass effect is seen to enhance  in a ringlike fashion in the left centrum semiovale. White matter  lesions are also noted. The brainstem particularly in the left side of  the medulla the craniocervical junction and at the right side of the  medulla at the level the olivary nuclei    MENDEZ MATAMOROS MD

## 2022-10-05 NOTE — ED AVS SNAPSHOT
[facility]    [address] [address]    Phone:  [phone]                                       Jose Antonio Marmolejo   MRN: [id number]    Department:  [facility]   Date of Visit:  8/13/2018           Patient Information     Date Of Birth          1945        Your diagnoses for this visit were:     Decubitus ulcer of left buttock, unstageable (H)        You were seen by Katherine Steele NP.      Follow-up Information     Follow up with [facility].    Specialty:  EMERGENCY MEDICINE    Why:  As needed, If symptoms worsen, or concerns develop    Contact information:    [address] [address] [phone]    Additional information:    From [facility]: Take [address]. Turn left at [address]. Turn left at the first stoplight on [address]. At the first stop sign, take a right onto [address]. Take a left into the parking lot and continue through until you reach the North enterance of the building.       From [place]: Take [address]. Take the [address] ramp towards [place]. Turn left onto [address]. Take a slight right onto [address] [facility]. Turn left at the first stoplight on [address]. At the first stop sign, take a right onto [address]. Take a left into the parking lot and continue through until you reach the North enterance of the building.       From [place]: Take [address]. Take a right at [address]. At the first stop sign, take a right onto [address]. Take a left into the parking lot and continue through until you reach the North enterance of the building.         Follow up with General Surgery.    Why:  a referral was placed, they will call you to schedule an appointment         Discharge Instructions       Recommend surgical consult for debridement of eschar. Post debridement would be happy to follow in the Wound Center.      At  this time recommend the following for tx to assist in softening eschar and provide antimicrobial properties:     Daily dressing change:  Cleanse wound and periwound skin with gentle soap (baby wash) and water. Apply honey gauze covered with dry gauze. Hold in place with Medipore.    ED Discharge Orders     GENERAL SURG ADULT REFERRAL       Your provider has referred you to: General Surgery, first available    Please be aware that coverage of these services is subject to the terms and limitations of your health insurance plan.  Call member services at your health plan with any benefit or coverage questions.      Please bring the following with you to your appointment:    (1) Any X-Rays, CTs or MRIs which have been performed.  Contact the facility where they were done to arrange for  prior to your scheduled appointment.   (2) List of current medications   (3) This referral request   (4) Any documents/labs given to you for this referral            WOUND CARE REFERRAL ([place])       WOUND/OSTOMY CENTER ([facility]) TO EVALUATE, INITIATE TREATMENT BASED ON WOUND OSTOMY PROTOCOL, AND RECOMMEND AND REVISE AS NEEDED AN INDIVIDUALIZED TREATMENT CARE PLAN:    Conservative Sharp Debridement of non-viable and loose necrotic tissue  Topical 5% Lidocaine (Xylocaine) ointment - for pain associated with wound care/debridement; apply thin layer   Dakin's Solution (Sodium Hypochlorite Topical 0.125%) to cleanse wounds with odor & signs of infection  Antifungal Powder and Cream (miconazole (Micatin) 2%) - applied topically to areas of fungal dermatitis  Cadexomer Iodine (Iodosorb) 0.9% Topical Gel apply topically to wound beds with slough/soft necrotic tissue with suspected bioburden  Collagenase Enzymatic Debrider (Santyl ointment) - 250units/gram apply topically to wound beds with necrotic debris/slough;   Acetic Acid 0.25% to cleanse wounds with odor and signs of infection  Lidocaine Hydrochloride (Xylocaine) topical solution 4%  to assist with Wound Vac dressing remove  Silver Sulfadiazine (Silvadene) topical cream; apply topically to burns/wound beds  Hydrocortisone (Cortaid) 1% cream; apply topically to areas with dermatitis  Silver nitrate (Arazol) topical stick to control minor bleeding, and applied to epibole and hypergranular tissue  Negative Pressure Wound Therapy (Wound Vac) (additional physician order needed)  Triple Antibiotic (Neosporin) ointment for wounds with signs of infection or at risk for infection  Topical agents, dressings, or products per Wound Ostomy Protocol Clinical Guidelines  Compression therapy as indicated for lower extremity ulcers caused by venous insufficiency or complicated by edema.  WANDA above 0.8 - medium compression; WANDA 0.6 - 0.8 - light compression  Therapeutic support surfaces for bed and/or chair and off-loading devices to minimize pressure    DIAGNOSTICS THAT MAY BE ORDERED BY CWON OR APRN; ORDERS TO BE PLACED UNDER THE REFERRING PROVIDER OR APRN FOR REVIEW AND ANY NEEDED ACTION:    Wound culture when signs of infection or colonization are present  Ankle Brachial Index (WANDA) in patients with Lower Extremity ulcerations; Toe Brachial Index (TBI) for diabetics as indicated  Hgb A1c for diabetics or those suspected of undiagnosed diabetes  Albumin or Prealbumin if nutritional concerns   Urinalysis/Urine Culture (UA/UC) if urinary tract infection (UTI) suspected in Urostomy patient  Clostridium difficile toxin B PCR panel stool sample if suspected in Fecal Ostomy patient    ANCILLARY CONSULTS AS NEEDED:  Pedorithist/Prosthetist for specialty shoes/orthotic  Diabetes Education for improved blood glucose during wound healing  Medical Nutrition Therapy for diabetes management and wound and/or ostomy healing nutrition   Physical Therapy/Occupational Therapy - assess tissue loads & need for positioning devices, mobility safety/ ADL's, lymphedema treatment    OSTOMY ONLY:  Preoperative stoma site marking if  requested by surgeon  Equipment selection/Ostomy supplies based on assessment  Treatment of stomal and peristomal complications per Wound Ostomy Protocol Clinical Guidelines  Colostomy Irrigation routine for constipation: Irrigate with 500-1000mL warm tap water per patient rountine. Do not exceed 1000mL without physician consultation.  Ileostomy Lavage with lubricated soft catheter by instilling 30-50mL normal saline at a time until resolved. May take 1-2 hours with repeat attempts; if not resolved at that point notify surgeon.    NOTE EXCLUSIONS FROM ABOVE ITEMS HERE: none    Katherine Steele NP                     Review of your medicines      Our records show that you are taking the medicines listed below. If these are incorrect, please call your family doctor or clinic.        Dose / Directions Last dose taken    acetaminophen 325 MG tablet   Commonly known as:  TYLENOL   Dose:  650 mg   Quantity:  100 tablet        Take 2 tablets (650 mg) by mouth every 4 hours as needed for mild pain   Refills:  0        Adult Blood Pressure Cuff Lg Kit   Dose:  1 each   Quantity:  1 kit        1 each 2 times daily   Refills:  0        CIPROFLOXACIN PO        Refills:  0        hydrochlorothiazide 12.5 MG capsule   Commonly known as:  MICROZIDE   Dose:  12.5 mg   Quantity:  30 capsule        Take 1 capsule (12.5 mg) by mouth daily   Refills:  0        lisinopril 40 MG tablet   Commonly known as:  PRINIVIL/ZESTRIL   Dose:  40 mg   Quantity:  30 tablet        Take 1 tablet (40 mg) by mouth daily   Refills:  0        metoprolol succinate 50 MG 24 hr tablet   Commonly known as:  TOPROL-XL   Dose:  50 mg   Quantity:  30 tablet        Take 1 tablet (50 mg) by mouth daily   Refills:  0        miconazole 2 % powder   Commonly known as:  MICATIN; MICRO GUARD   Quantity:  90 g        Apply topically 2 times daily   Refills:  1        nystatin cream   Commonly known as:  MYCOSTATIN        APPLY TOPICALLY TO GROIN AREA TWICE DAILY  UNTIL HEALED THEN USE TWICE DAILY AS NEEDED   Refills:  99        predniSONE 20 MG tablet   Commonly known as:  DELTASONE   Dose:  120 mg   Quantity:  66 tablet        Take 6 tablets (120 mg) by mouth daily for 11 days To start on 8/4 after Infusion therapy completed   Refills:  0                Orders Needing Specimen Collection     None      Pending Results     No orders found from 8/11/2018 to 8/14/2018.            Pending Culture Results     No orders found from 8/11/2018 to 8/14/2018.            Thank you for choosing [facility]       Thank you for choosing [facility] for your care. Our goal is always to provide you with excellent care. Hearing back from our patients is one way we can continue to improve our services. Please take a few minutes to complete the written survey that you may receive in the mail after you visit with us. Thank you!        Care EveryWhere ID     This is your Care EveryWhere ID. This could be used by other organizations to access your [facility] medical records  [id number]        Equal Access to Services     JEFF SALINAS : Bailey Brower, rochelle fletcher, chelle story, viki triplett . So [facility] [phone].    ATENCIÓN: Si habla español, tiene a garcia disposición servicios gratuitos de asistencia lingüística. Llame al [phone].    We comply with applicable federal civil rights laws and [place] laws. We do not discriminate on the basis of race, color, national origin, age, disability, sex, sexual orientation, or gender identity.            After Visit Summary       This is your record. Keep this with you and show to your community pharmacist(s) and doctor(s) at your next visit.                   Duration Of Freeze Thaw-Cycle (Seconds): 3 Detail Level: Detailed Show Applicator Variable?: Yes Render Note In Bullet Format When Appropriate: No Post-Care Instructions: I reviewed with the patient in detail post-care instructions. Patient is to wear sunprotection, and avoid picking at any of the treated lesions. Pt may apply Vaseline to crusted or scabbing areas. Consent: The patient's consent was obtained including but not limited to risks of crusting, scabbing, blistering, scarring, darker or lighter pigmentary change, recurrence, incomplete removal and infection. Number Of Freeze-Thaw Cycles: 2 freeze-thaw cycles

## 2023-05-10 NOTE — PLAN OF CARE
Patient discharged at 2:25 PM via wheel chair accompanied by other:transport tech and staff. Prescriptions sent to patients preferred pharmacy. All belongings sent with patient.     Discharge instructions reviewed with pt. Listed belongings gathered and returned to patient.     Patient discharged to [facility]  Report called to Nursing Home:  [facility]    Core Measures and Vaccines  Core Measures applicable during stay: No. If yes, state diagnosis:   Pneumonia and Influenza given prior to discharge, if indicated: N/A    Surgical Patient   Surgical Procedures during stay: n/a  Did patient receive discharge instruction on wound care and recognition of infection symptoms? N/A    MISC  Follow up appointment made:  Yes  Home and hospital aquired medications returned to patient: Yes  Patient reports pain was well managed at discharge: Yes     The patient was seen today for an ultrasound  Please see ultrasound report (located under Ob Procedures) for additional details  Thank you very much for allowing us to participate in the care of this very nice patient  Should you have any questions, please do not hesitate to contact me  Cam Chapman MD [address]  Attending Physician, [place]

## 2025-05-09 NOTE — PLAN OF CARE
Problem: Patient Care Overview  Goal: Plan of Care/Patient Progress Review  Outcome: No Change  Discharge Planner PT   Patient plan for discharge: short term rehab  Current status: sup>sit SBA with HOB elevated, several attempts and significant use of siderails, sit<>stand min-mod A with heavy reliance on walker, ambulated 15' with 4WW min A with close w/c follow, significant knee hyperextension bilaterally and WB noted to be through lateral aspect of ankles with significant toe out gait  Barriers to return to prior living situation: home alone at times, increasing frequency of falls at home, weakness  Recommendations for discharge: inpatient rehab  Rationale for recommendations: pt would benefit from strengthening and home safety education to optimize functional mobility and allow for safety DC to home.         Entered by: Suzi Hutson PT 01/02/2018 1:19 PM            Stable